# Patient Record
Sex: FEMALE | Race: WHITE | Employment: UNEMPLOYED | URBAN - NONMETROPOLITAN AREA
[De-identification: names, ages, dates, MRNs, and addresses within clinical notes are randomized per-mention and may not be internally consistent; named-entity substitution may affect disease eponyms.]

---

## 2017-08-31 ENCOUNTER — HOSPITAL ENCOUNTER (EMERGENCY)
Dept: GENERAL RADIOLOGY | Age: 32
Discharge: HOME | End: 2017-08-31
Payer: MEDICAID

## 2017-08-31 ENCOUNTER — HOSPITAL ENCOUNTER (EMERGENCY)
Age: 32
Discharge: HOME | End: 2017-08-31
Attending: NURSE PRACTITIONER
Payer: MEDICAID

## 2017-08-31 VITALS
WEIGHT: 293 LBS | DIASTOLIC BLOOD PRESSURE: 93 MMHG | HEART RATE: 69 BPM | OXYGEN SATURATION: 97 % | BODY MASS INDEX: 51.91 KG/M2 | SYSTOLIC BLOOD PRESSURE: 148 MMHG | HEIGHT: 63 IN | RESPIRATION RATE: 16 BRPM | TEMPERATURE: 97.9 F

## 2017-08-31 DIAGNOSIS — M89.8X7 PAIN IN METATARSUS OF LEFT FOOT: Primary | ICD-10-CM

## 2017-08-31 PROCEDURE — 99214 OFFICE O/P EST MOD 30 MIN: CPT

## 2017-08-31 PROCEDURE — 73630 X-RAY EXAM OF FOOT: CPT

## 2017-08-31 PROCEDURE — 99213 OFFICE O/P EST LOW 20 MIN: CPT | Performed by: NURSE PRACTITIONER

## 2017-08-31 RX ORDER — ACETAMINOPHEN 325 MG/1
975 TABLET ORAL EVERY 6 HOURS PRN
COMMUNITY
End: 2022-08-24

## 2017-08-31 RX ORDER — METHYLPREDNISOLONE 4 MG/1
TABLET ORAL
Qty: 1 KIT | Refills: 0 | Status: SHIPPED | OUTPATIENT
Start: 2017-08-31 | End: 2017-09-06

## 2017-08-31 ASSESSMENT — PAIN DESCRIPTION - FREQUENCY: FREQUENCY: CONTINUOUS

## 2017-08-31 ASSESSMENT — PAIN SCALES - GENERAL: PAINLEVEL_OUTOF10: 4

## 2017-08-31 ASSESSMENT — PAIN DESCRIPTION - PAIN TYPE: TYPE: ACUTE PAIN

## 2017-08-31 ASSESSMENT — PAIN DESCRIPTION - DESCRIPTORS: DESCRIPTORS: ACHING

## 2017-08-31 ASSESSMENT — PAIN DESCRIPTION - LOCATION: LOCATION: FOOT

## 2017-08-31 ASSESSMENT — PAIN DESCRIPTION - ORIENTATION: ORIENTATION: LEFT

## 2017-09-18 ENCOUNTER — APPOINTMENT (OUTPATIENT)
Dept: CT IMAGING | Age: 32
DRG: 313 | End: 2017-09-18
Payer: MEDICAID

## 2017-09-18 ENCOUNTER — APPOINTMENT (OUTPATIENT)
Dept: GENERAL RADIOLOGY | Age: 32
DRG: 313 | End: 2017-09-18
Payer: MEDICAID

## 2017-09-18 ENCOUNTER — HOSPITAL ENCOUNTER (INPATIENT)
Age: 32
LOS: 7 days | Discharge: NURSING, REHAB OR LONG-TERM CARE | DRG: 313 | End: 2017-09-25
Attending: FAMILY MEDICINE | Admitting: SURGERY
Payer: MEDICAID

## 2017-09-18 DIAGNOSIS — Z91.89 AT HIGH RISK FOR PAIN FROM PROCEDURE: ICD-10-CM

## 2017-09-18 DIAGNOSIS — E66.01 MORBID OBESITY WITH BMI OF 60.0-69.9, ADULT (HCC): ICD-10-CM

## 2017-09-18 DIAGNOSIS — J98.4 OTHER DISEASES OF LUNG, NOT ELSEWHERE CLASSIFIED: ICD-10-CM
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
## 2017-09-18 DIAGNOSIS — S82.62XA CLOSED FRACTURE OF LATERAL MALLEOLUS OF LEFT FIBULA, INITIAL ENCOUNTER: ICD-10-CM

## 2017-09-18 PROBLEM — S40.212A ABRASION OF LEFT SHOULDER: Status: ACTIVE | Noted: 2017-09-18

## 2017-09-18 PROBLEM — S29.011A RUPTURE OF PECTORALIS MAJOR MUSCLE: Status: ACTIVE | Noted: 2017-09-18

## 2017-09-18 PROBLEM — S20.01XA CONTUSION OF RIGHT BREAST: Status: ACTIVE | Noted: 2017-09-18

## 2017-09-18 PROBLEM — S30.811A ABRASION OF ABDOMINAL WALL: Status: ACTIVE | Noted: 2017-09-18

## 2017-09-18 PROBLEM — S71.131A: Status: ACTIVE | Noted: 2017-09-18

## 2017-09-18 PROBLEM — S70.211A ABRASION OF RIGHT HIP: Status: ACTIVE | Noted: 2017-09-18

## 2017-09-18 PROBLEM — S82.892A CLOSED LEFT ANKLE FRACTURE: Status: ACTIVE | Noted: 2017-09-18

## 2017-09-18 PROBLEM — S70.212A: Status: ACTIVE | Noted: 2017-09-18

## 2017-09-18 LAB
ABO: NORMAL
ALBUMIN SERPL-MCNC: 3.3 G/DL (ref 3.5–5.1)
ALP BLD-CCNC: 69 U/L (ref 38–126)
ALT SERPL-CCNC: 33 U/L (ref 11–66)
AMPHETAMINE+METHAMPHETAMINE URINE SCREEN: NEGATIVE
ANGLE TEG: 76.8 DEG (ref 53–72)
ANION GAP SERPL CALCULATED.3IONS-SCNC: 17 MEQ/L (ref 8–16)
ANTIBODY SCREEN: NORMAL
APTT: 25.4 SECONDS (ref 22–38)
AST SERPL-CCNC: 40 U/L (ref 5–40)
BACTERIA: ABNORMAL
BARBITURATE QUANTITATIVE URINE: NEGATIVE
BENZODIAZEPINE QUANTITATIVE URINE: NEGATIVE
BILIRUB SERPL-MCNC: 0.2 MG/DL (ref 0.3–1.2)
BILIRUBIN URINE: NEGATIVE
BLOOD, URINE: NEGATIVE
BUN BLDV-MCNC: 17 MG/DL (ref 7–22)
CALCIUM SERPL-MCNC: 8.3 MG/DL (ref 8.5–10.5)
CANNABINOID QUANTITATIVE URINE: NEGATIVE
CASTS: ABNORMAL /LPF
CASTS: ABNORMAL /LPF
CHARACTER, URINE: ABNORMAL
CHLORIDE BLD-SCNC: 101 MEQ/L (ref 98–111)
CO2: 21 MEQ/L (ref 23–33)
COCAINE METABOLITE QUANTITATIVE URINE: NEGATIVE
COLOR: YELLOW
CREAT SERPL-MCNC: 0.7 MG/DL (ref 0.4–1.2)
CRYSTALS: ABNORMAL
DIFFERENTIAL, MANUAL: NORMAL
EPITHELIAL CELLS, UA: ABNORMAL /HPF
EPL-TEG: 0 %
ETHYL ALCOHOL, SERUM: < 0.01 %
GFR SERPL CREATININE-BSD FRML MDRD: > 90 ML/MIN/1.73M2
GLUCOSE BLD-MCNC: 114 MG/DL (ref 70–108)
GLUCOSE BLD-MCNC: 139 MG/DL (ref 70–108)
GLUCOSE, URINE: NEGATIVE MG/DL
HEPARIN THERAPY: NO
INHIBITION AA TEG: 15 %
INHIBITION ADP TEG: 100 %
INR BLD: 1.01 (ref 0.85–1.13)
KETONES, URINE: NEGATIVE
KINETICS TEG: 0.8 MINUTES (ref 1–3)
LACTIC ACID: 4.3 MMOL/L (ref 0.5–2.2)
LEUKOCYTE EST, POC: NEGATIVE
LY30 (LYSIS) TEG: 0 % (ref 0–7.5)
MA (MAX CLOT) TEG: 74.5 MM (ref 50–70)
MA(AA) TEG: 65.3 MM
MA(ACTIVATED) TEG: 13.2 MM
MA(ADP) TEG: 13.1 MM
MISCELLANEOUS LAB TEST RESULT: ABNORMAL
MUCUS: ABNORMAL
NITRITE, URINE: NEGATIVE
OPIATES, URINE: NEGATIVE
OSMOLALITY CALCULATION: 281.3 MOSMOL/KG (ref 275–300)
OXYCODONE: NEGATIVE
PH UA: 5.5
PHENCYCLIDINE QUANTITATIVE URINE: NEGATIVE
POTASSIUM SERPL-SCNC: 3.9 MEQ/L (ref 3.5–5.2)
PREGNANCY, SERUM: NEGATIVE
PROTEIN UA: ABNORMAL MG/DL
RBC URINE: ABNORMAL /HPF
REACTION TIME TEG: 3.5 MINUTES (ref 5–10)
RENAL EPITHELIAL, UA: ABNORMAL
RH FACTOR: NORMAL
SODIUM BLD-SCNC: 139 MEQ/L (ref 135–145)
SPECIFIC GRAVITY UA: 1.03 (ref 1–1.03)
TOTAL CK: 660 U/L (ref 30–135)
TOTAL PROTEIN: 6.5 G/DL (ref 6.1–8)
UROBILINOGEN, URINE: 0.2 EU/DL
WBC UA: ABNORMAL /HPF
YEAST: ABNORMAL

## 2017-09-18 PROCEDURE — 6370000000 HC RX 637 (ALT 250 FOR IP): Performed by: NURSE PRACTITIONER

## 2017-09-18 PROCEDURE — 6360000002 HC RX W HCPCS

## 2017-09-18 PROCEDURE — 74177 CT ABD & PELVIS W/CONTRAST: CPT

## 2017-09-18 PROCEDURE — 70450 CT HEAD/BRAIN W/O DYE: CPT

## 2017-09-18 PROCEDURE — 6360000002 HC RX W HCPCS: Performed by: FAMILY MEDICINE

## 2017-09-18 PROCEDURE — 73030 X-RAY EXAM OF SHOULDER: CPT

## 2017-09-18 PROCEDURE — 90471 IMMUNIZATION ADMIN: CPT | Performed by: FAMILY MEDICINE

## 2017-09-18 PROCEDURE — 86900 BLOOD TYPING SEROLOGIC ABO: CPT

## 2017-09-18 PROCEDURE — 81001 URINALYSIS AUTO W/SCOPE: CPT

## 2017-09-18 PROCEDURE — 85025 COMPLETE CBC W/AUTO DIFF WBC: CPT

## 2017-09-18 PROCEDURE — 85610 PROTHROMBIN TIME: CPT

## 2017-09-18 PROCEDURE — G0480 DRUG TEST DEF 1-7 CLASSES: HCPCS

## 2017-09-18 PROCEDURE — 72170 X-RAY EXAM OF PELVIS: CPT

## 2017-09-18 PROCEDURE — 80053 COMPREHEN METABOLIC PANEL: CPT

## 2017-09-18 PROCEDURE — 86850 RBC ANTIBODY SCREEN: CPT

## 2017-09-18 PROCEDURE — 99223 1ST HOSP IP/OBS HIGH 75: CPT | Performed by: SURGERY

## 2017-09-18 PROCEDURE — 93005 ELECTROCARDIOGRAM TRACING: CPT

## 2017-09-18 PROCEDURE — P9016 RBC LEUKOCYTES REDUCED: HCPCS

## 2017-09-18 PROCEDURE — 96374 THER/PROPH/DIAG INJ IV PUSH: CPT

## 2017-09-18 PROCEDURE — 86901 BLOOD TYPING SEROLOGIC RH(D): CPT

## 2017-09-18 PROCEDURE — 2580000003 HC RX 258: Performed by: NURSE PRACTITIONER

## 2017-09-18 PROCEDURE — 99285 EMERGENCY DEPT VISIT HI MDM: CPT

## 2017-09-18 PROCEDURE — 85730 THROMBOPLASTIN TIME PARTIAL: CPT

## 2017-09-18 PROCEDURE — 6360000002 HC RX W HCPCS: Performed by: SURGERY

## 2017-09-18 PROCEDURE — 99999 PR OFFICE/OUTPT VISIT,PROCEDURE ONLY: CPT | Performed by: NURSE PRACTITIONER

## 2017-09-18 PROCEDURE — 1200000003 HC TELEMETRY R&B

## 2017-09-18 PROCEDURE — 73552 X-RAY EXAM OF FEMUR 2/>: CPT

## 2017-09-18 PROCEDURE — 72125 CT NECK SPINE W/O DYE: CPT

## 2017-09-18 PROCEDURE — 6820000002 HC L2 INJURY CALL ACTIVATION

## 2017-09-18 PROCEDURE — 86923 COMPATIBILITY TEST ELECTRIC: CPT

## 2017-09-18 PROCEDURE — 1200000000 HC SEMI PRIVATE

## 2017-09-18 PROCEDURE — 6370000000 HC RX 637 (ALT 250 FOR IP): Performed by: SURGERY

## 2017-09-18 PROCEDURE — 84703 CHORIONIC GONADOTROPIN ASSAY: CPT

## 2017-09-18 PROCEDURE — 6360000004 HC RX CONTRAST MEDICATION: Performed by: FAMILY MEDICINE

## 2017-09-18 PROCEDURE — 71260 CT THORAX DX C+: CPT

## 2017-09-18 PROCEDURE — 36415 COLL VENOUS BLD VENIPUNCTURE: CPT

## 2017-09-18 PROCEDURE — 82948 REAGENT STRIP/BLOOD GLUCOSE: CPT

## 2017-09-18 PROCEDURE — 85576 BLOOD PLATELET AGGREGATION: CPT

## 2017-09-18 PROCEDURE — 83605 ASSAY OF LACTIC ACID: CPT

## 2017-09-18 PROCEDURE — 80307 DRUG TEST PRSMV CHEM ANLYZR: CPT

## 2017-09-18 PROCEDURE — 71010 XR CHEST PORTABLE: CPT

## 2017-09-18 PROCEDURE — 73110 X-RAY EXAM OF WRIST: CPT

## 2017-09-18 PROCEDURE — 73600 X-RAY EXAM OF ANKLE: CPT

## 2017-09-18 PROCEDURE — 90715 TDAP VACCINE 7 YRS/> IM: CPT | Performed by: FAMILY MEDICINE

## 2017-09-18 RX ORDER — DOCUSATE SODIUM 100 MG/1
100 CAPSULE, LIQUID FILLED ORAL 2 TIMES DAILY
Status: DISCONTINUED | OUTPATIENT
Start: 2017-09-18 | End: 2017-09-25 | Stop reason: HOSPADM

## 2017-09-18 RX ORDER — FENTANYL CITRATE 50 UG/ML
50 INJECTION, SOLUTION INTRAMUSCULAR; INTRAVENOUS
Status: DISCONTINUED | OUTPATIENT
Start: 2017-09-18 | End: 2017-09-25 | Stop reason: HOSPADM

## 2017-09-18 RX ORDER — OXYCODONE HYDROCHLORIDE AND ACETAMINOPHEN 5; 325 MG/1; MG/1
1 TABLET ORAL EVERY 4 HOURS PRN
Status: DISCONTINUED | OUTPATIENT
Start: 2017-09-18 | End: 2017-09-25 | Stop reason: HOSPADM

## 2017-09-18 RX ORDER — BISACODYL 10 MG
10 SUPPOSITORY, RECTAL RECTAL DAILY PRN
Status: DISCONTINUED | OUTPATIENT
Start: 2017-09-18 | End: 2017-09-25 | Stop reason: HOSPADM

## 2017-09-18 RX ORDER — MORPHINE SULFATE 2 MG/ML
2 INJECTION, SOLUTION INTRAMUSCULAR; INTRAVENOUS
Status: DISCONTINUED | OUTPATIENT
Start: 2017-09-18 | End: 2017-09-18

## 2017-09-18 RX ORDER — GINSENG 100 MG
CAPSULE ORAL 3 TIMES DAILY
Status: DISCONTINUED | OUTPATIENT
Start: 2017-09-18 | End: 2017-09-25 | Stop reason: HOSPADM

## 2017-09-18 RX ORDER — SODIUM CHLORIDE 0.9 % (FLUSH) 0.9 %
10 SYRINGE (ML) INJECTION EVERY 12 HOURS SCHEDULED
Status: DISCONTINUED | OUTPATIENT
Start: 2017-09-18 | End: 2017-09-25 | Stop reason: HOSPADM

## 2017-09-18 RX ORDER — SODIUM CHLORIDE 9 MG/ML
INJECTION, SOLUTION INTRAVENOUS CONTINUOUS
Status: DISCONTINUED | OUTPATIENT
Start: 2017-09-18 | End: 2017-09-20

## 2017-09-18 RX ORDER — MORPHINE SULFATE 4 MG/ML
4 INJECTION, SOLUTION INTRAMUSCULAR; INTRAVENOUS
Status: DISCONTINUED | OUTPATIENT
Start: 2017-09-18 | End: 2017-09-18

## 2017-09-18 RX ORDER — OXYCODONE HYDROCHLORIDE AND ACETAMINOPHEN 5; 325 MG/1; MG/1
2 TABLET ORAL EVERY 4 HOURS PRN
Status: DISCONTINUED | OUTPATIENT
Start: 2017-09-18 | End: 2017-09-25 | Stop reason: HOSPADM

## 2017-09-18 RX ORDER — FENTANYL CITRATE 50 UG/ML
25 INJECTION, SOLUTION INTRAMUSCULAR; INTRAVENOUS
Status: DISCONTINUED | OUTPATIENT
Start: 2017-09-18 | End: 2017-09-25 | Stop reason: HOSPADM

## 2017-09-18 RX ORDER — FENTANYL CITRATE 50 UG/ML
100 INJECTION, SOLUTION INTRAMUSCULAR; INTRAVENOUS ONCE
Status: COMPLETED | OUTPATIENT
Start: 2017-09-18 | End: 2017-09-18

## 2017-09-18 RX ORDER — FENTANYL CITRATE 50 UG/ML
INJECTION, SOLUTION INTRAMUSCULAR; INTRAVENOUS
Status: COMPLETED
Start: 2017-09-18 | End: 2017-09-18

## 2017-09-18 RX ORDER — FAMOTIDINE 20 MG/1
20 TABLET, FILM COATED ORAL 2 TIMES DAILY
Status: DISCONTINUED | OUTPATIENT
Start: 2017-09-18 | End: 2017-09-25 | Stop reason: HOSPADM

## 2017-09-18 RX ORDER — SODIUM CHLORIDE 0.9 % (FLUSH) 0.9 %
10 SYRINGE (ML) INJECTION PRN
Status: DISCONTINUED | OUTPATIENT
Start: 2017-09-18 | End: 2017-09-25 | Stop reason: HOSPADM

## 2017-09-18 RX ORDER — ONDANSETRON 2 MG/ML
4 INJECTION INTRAMUSCULAR; INTRAVENOUS EVERY 6 HOURS PRN
Status: DISCONTINUED | OUTPATIENT
Start: 2017-09-18 | End: 2017-09-25 | Stop reason: HOSPADM

## 2017-09-18 RX ORDER — ACETAMINOPHEN 325 MG/1
650 TABLET ORAL EVERY 4 HOURS PRN
Status: DISCONTINUED | OUTPATIENT
Start: 2017-09-18 | End: 2017-09-25 | Stop reason: HOSPADM

## 2017-09-18 RX ADMIN — FENTANYL CITRATE 100 MCG: 50 INJECTION, SOLUTION INTRAMUSCULAR; INTRAVENOUS at 17:49

## 2017-09-18 RX ADMIN — FAMOTIDINE 20 MG: 20 TABLET, FILM COATED ORAL at 21:47

## 2017-09-18 RX ADMIN — IOPAMIDOL 85 ML: 755 INJECTION, SOLUTION INTRAVENOUS at 18:33

## 2017-09-18 RX ADMIN — FENTANYL CITRATE 50 MCG: 50 INJECTION INTRAMUSCULAR; INTRAVENOUS at 20:44

## 2017-09-18 RX ADMIN — BACITRACIN: 500 OINTMENT TOPICAL at 21:47

## 2017-09-18 RX ADMIN — SODIUM CHLORIDE: 9 INJECTION, SOLUTION INTRAVENOUS at 21:45

## 2017-09-18 RX ADMIN — TETANUS TOXOID, REDUCED DIPHTHERIA TOXOID AND ACELLULAR PERTUSSIS VACCINE, ADSORBED 0.5 ML: 5; 2.5; 8; 8; 2.5 SUSPENSION INTRAMUSCULAR at 19:09

## 2017-09-18 RX ADMIN — OXYCODONE HYDROCHLORIDE AND ACETAMINOPHEN 2 TABLET: 5; 325 TABLET ORAL at 21:47

## 2017-09-18 RX ADMIN — FENTANYL CITRATE 100 MCG: 50 INJECTION INTRAMUSCULAR; INTRAVENOUS at 17:49

## 2017-09-18 ASSESSMENT — ENCOUNTER SYMPTOMS
SHORTNESS OF BREATH: 1
APNEA: 0
CONSTIPATION: 0
EYE DISCHARGE: 0
CHEST TIGHTNESS: 0
EYE REDNESS: 0
VOMITING: 0
BACK PAIN: 0
WHEEZING: 0
VOICE CHANGE: 0
SHORTNESS OF BREATH: 0
FACIAL SWELLING: 0
STRIDOR: 0
BLOOD IN STOOL: 0
ABDOMINAL DISTENTION: 0
DIARRHEA: 0
PHOTOPHOBIA: 0
EYE PAIN: 0
NAUSEA: 0
TROUBLE SWALLOWING: 0
CHOKING: 0
SINUS PRESSURE: 0
EYE ITCHING: 0
ABDOMINAL PAIN: 0
COLOR CHANGE: 0
SORE THROAT: 0
COUGH: 0
RHINORRHEA: 0

## 2017-09-18 ASSESSMENT — PAIN DESCRIPTION - DESCRIPTORS: DESCRIPTORS: PRESSURE

## 2017-09-18 ASSESSMENT — PAIN SCALES - GENERAL
PAINLEVEL_OUTOF10: 8
PAINLEVEL_OUTOF10: 10
PAINLEVEL_OUTOF10: 8
PAINLEVEL_OUTOF10: 3
PAINLEVEL_OUTOF10: 4
PAINLEVEL_OUTOF10: 10
PAINLEVEL_OUTOF10: 10

## 2017-09-18 ASSESSMENT — PAIN DESCRIPTION - LOCATION: LOCATION: CHEST

## 2017-09-18 ASSESSMENT — PAIN DESCRIPTION - PAIN TYPE: TYPE: ACUTE PAIN

## 2017-09-18 ASSESSMENT — PAIN DESCRIPTION - ONSET: ONSET: ON-GOING

## 2017-09-18 ASSESSMENT — PAIN DESCRIPTION - ORIENTATION: ORIENTATION: ANTERIOR

## 2017-09-18 ASSESSMENT — PAIN DESCRIPTION - FREQUENCY: FREQUENCY: CONTINUOUS

## 2017-09-19 ENCOUNTER — APPOINTMENT (OUTPATIENT)
Dept: GENERAL RADIOLOGY | Age: 32
DRG: 313 | End: 2017-09-19
Payer: MEDICAID

## 2017-09-19 ENCOUNTER — ANESTHESIA EVENT (OUTPATIENT)
Dept: OPERATING ROOM | Age: 32
DRG: 313 | End: 2017-09-19
Payer: MEDICAID

## 2017-09-19 ENCOUNTER — ANESTHESIA (OUTPATIENT)
Dept: OPERATING ROOM | Age: 32
DRG: 313 | End: 2017-09-19
Payer: MEDICAID

## 2017-09-19 VITALS
DIASTOLIC BLOOD PRESSURE: 90 MMHG | TEMPERATURE: 96.8 F | SYSTOLIC BLOOD PRESSURE: 160 MMHG | OXYGEN SATURATION: 89 % | RESPIRATION RATE: 2 BRPM

## 2017-09-19 PROBLEM — S82.62XA CLOSED FRACTURE OF LATERAL MALLEOLUS OF LEFT FIBULA: Status: ACTIVE | Noted: 2017-09-19

## 2017-09-19 PROBLEM — Z91.89 AT HIGH RISK FOR PAIN FROM PROCEDURE: Status: ACTIVE | Noted: 2017-09-19

## 2017-09-19 LAB
ANION GAP SERPL CALCULATED.3IONS-SCNC: 11 MEQ/L (ref 8–16)
ANISOCYTOSIS: ABNORMAL
ATYPICAL LYMPHOCYTES: ABNORMAL %
BANDED NEUTROPHILS ABSOLUTE COUNT: 0.3 THOU/MM3
BANDS PRESENT: 1 %
BASOPHILIA: SLIGHT
BASOPHILS # BLD: 1 %
BASOPHILS ABSOLUTE: 0.3 THOU/MM3 (ref 0–0.1)
BUN BLDV-MCNC: 16 MG/DL (ref 7–22)
CALCIUM SERPL-MCNC: 7.9 MG/DL (ref 8.5–10.5)
CHLORIDE BLD-SCNC: 104 MEQ/L (ref 98–111)
CO2: 23 MEQ/L (ref 23–33)
CREAT SERPL-MCNC: 0.6 MG/DL (ref 0.4–1.2)
CRENATED RBC'S: ABNORMAL
EOSINOPHIL # BLD: 1 %
EOSINOPHILS ABSOLUTE: 0.3 THOU/MM3 (ref 0–0.4)
GFR SERPL CREATININE-BSD FRML MDRD: > 90 ML/MIN/1.73M2
GLUCOSE BLD-MCNC: 117 MG/DL (ref 70–108)
HCT VFR BLD CALC: 31.7 % (ref 37–47)
HCT VFR BLD CALC: 39.6 % (ref 37–47)
HEMOGLOBIN: 10.3 GM/DL (ref 12–16)
HEMOGLOBIN: 12.5 GM/DL (ref 12–16)
HYPOCHROMIA: ABNORMAL
LYMPHOCYTES # BLD: 17 %
LYMPHOCYTES ABSOLUTE: 5.8 THOU/MM3 (ref 1–4.8)
MCH RBC QN AUTO: 25.8 PG (ref 27–31)
MCH RBC QN AUTO: 26 PG (ref 27–31)
MCHC RBC AUTO-ENTMCNC: 31.7 GM/DL (ref 33–37)
MCHC RBC AUTO-ENTMCNC: 32.5 GM/DL (ref 33–37)
MCV RBC AUTO: 80.2 FL (ref 81–99)
MCV RBC AUTO: 81.5 FL (ref 81–99)
MONOCYTES # BLD: 8 %
MONOCYTES ABSOLUTE: 2.7 THOU/MM3 (ref 0.4–1.3)
NUCLEATED RED BLOOD CELLS: 0 /100 WBC
OVALOCYTES: ABNORMAL
PATHOLOGIST REVIEW: ABNORMAL
PDW BLD-RTO: 16.2 % (ref 11.5–14.5)
PDW BLD-RTO: 16.2 % (ref 11.5–14.5)
PLATELET # BLD: 200 THOU/MM3 (ref 130–400)
PLATELET # BLD: 320 THOU/MM3 (ref 130–400)
PLATELET ESTIMATE: ADEQUATE
PMV BLD AUTO: 9.3 MCM (ref 7.4–10.4)
PMV BLD AUTO: 9.8 MCM (ref 7.4–10.4)
POTASSIUM SERPL-SCNC: 4.4 MEQ/L (ref 3.5–5.2)
RBC # BLD: 3.96 MILL/MM3 (ref 4.2–5.4)
RBC # BLD: 4.86 MILL/MM3 (ref 4.2–5.4)
RBC # BLD: NORMAL 10*6/UL
SEG NEUTROPHILS: 72 %
SEGMENTED NEUTROPHILS ABSOLUTE COUNT: 24.5 THOU/MM3 (ref 1.8–7.7)
SODIUM BLD-SCNC: 138 MEQ/L (ref 135–145)
WBC # BLD: 34 THOU/MM3 (ref 4.8–10.8)
WBC # BLD: 8.5 THOU/MM3 (ref 4.8–10.8)

## 2017-09-19 PROCEDURE — 80048 BASIC METABOLIC PNL TOTAL CA: CPT

## 2017-09-19 PROCEDURE — 3700000000 HC ANESTHESIA ATTENDED CARE: Performed by: ORTHOPAEDIC SURGERY

## 2017-09-19 PROCEDURE — 3600000004 HC SURGERY LEVEL 4 BASE: Performed by: ORTHOPAEDIC SURGERY

## 2017-09-19 PROCEDURE — 73600 X-RAY EXAM OF ANKLE: CPT

## 2017-09-19 PROCEDURE — 2580000003 HC RX 258: Performed by: NURSE PRACTITIONER

## 2017-09-19 PROCEDURE — 71010 XR CHEST PORTABLE: CPT

## 2017-09-19 PROCEDURE — 1200000003 HC TELEMETRY R&B

## 2017-09-19 PROCEDURE — 6360000002 HC RX W HCPCS: Performed by: NURSE ANESTHETIST, CERTIFIED REGISTERED

## 2017-09-19 PROCEDURE — 6370000000 HC RX 637 (ALT 250 FOR IP): Performed by: SURGERY

## 2017-09-19 PROCEDURE — 1200000000 HC SEMI PRIVATE

## 2017-09-19 PROCEDURE — 3209999900 FLUORO FOR SURGICAL PROCEDURES

## 2017-09-19 PROCEDURE — 85027 COMPLETE CBC AUTOMATED: CPT

## 2017-09-19 PROCEDURE — 99999 PR OFFICE/OUTPT VISIT,PROCEDURE ONLY: CPT | Performed by: NURSE PRACTITIONER

## 2017-09-19 PROCEDURE — A6223 GAUZE >16<=48 NO W/SAL W/O B: HCPCS | Performed by: ORTHOPAEDIC SURGERY

## 2017-09-19 PROCEDURE — 7100000000 HC PACU RECOVERY - FIRST 15 MIN: Performed by: ORTHOPAEDIC SURGERY

## 2017-09-19 PROCEDURE — 2500000003 HC RX 250 WO HCPCS: Performed by: NURSE ANESTHETIST, CERTIFIED REGISTERED

## 2017-09-19 PROCEDURE — 6370000000 HC RX 637 (ALT 250 FOR IP): Performed by: NURSE PRACTITIONER

## 2017-09-19 PROCEDURE — 7100000001 HC PACU RECOVERY - ADDTL 15 MIN: Performed by: ORTHOPAEDIC SURGERY

## 2017-09-19 PROCEDURE — 6360000002 HC RX W HCPCS: Performed by: NURSE PRACTITIONER

## 2017-09-19 PROCEDURE — 92523 SPEECH SOUND LANG COMPREHEN: CPT

## 2017-09-19 PROCEDURE — 6360000002 HC RX W HCPCS: Performed by: SURGERY

## 2017-09-19 PROCEDURE — C1713 ANCHOR/SCREW BN/BN,TIS/BN: HCPCS | Performed by: ORTHOPAEDIC SURGERY

## 2017-09-19 PROCEDURE — 73610 X-RAY EXAM OF ANKLE: CPT

## 2017-09-19 PROCEDURE — 3700000001 HC ADD 15 MINUTES (ANESTHESIA): Performed by: ORTHOPAEDIC SURGERY

## 2017-09-19 PROCEDURE — 73130 X-RAY EXAM OF HAND: CPT

## 2017-09-19 PROCEDURE — 99232 SBSQ HOSP IP/OBS MODERATE 35: CPT | Performed by: SURGERY

## 2017-09-19 PROCEDURE — 3600000014 HC SURGERY LEVEL 4 ADDTL 15MIN: Performed by: ORTHOPAEDIC SURGERY

## 2017-09-19 PROCEDURE — 36415 COLL VENOUS BLD VENIPUNCTURE: CPT

## 2017-09-19 PROCEDURE — 0QSK04Z REPOSITION LEFT FIBULA WITH INTERNAL FIXATION DEVICE, OPEN APPROACH: ICD-10-PCS | Performed by: ORTHOPAEDIC SURGERY

## 2017-09-19 DEVICE — EVOS 3.5MM X 15MM CORTEX SCREW SELF-TAPPING
Type: IMPLANTABLE DEVICE | Site: ANKLE | Status: FUNCTIONAL
Brand: EVOS

## 2017-09-19 DEVICE — EVOS 3.5MM X 10MM CORTEX SCREW SELF-TAPPING
Type: IMPLANTABLE DEVICE | Site: ANKLE | Status: FUNCTIONAL
Brand: EVOS

## 2017-09-19 DEVICE — EVOS 3.5MM X 14MM CORTEX SCREW SELF-TAPPING
Type: IMPLANTABLE DEVICE | Site: ANKLE | Status: FUNCTIONAL
Brand: EVOS

## 2017-09-19 DEVICE — EVOS 3.5MM X 14MM LOCKING SCREW SELF-TAPPING
Type: IMPLANTABLE DEVICE | Site: ANKLE | Status: FUNCTIONAL
Brand: EVOS

## 2017-09-19 DEVICE — EVOS 3.5MM X 16MM LOCKING SCREW SELF-TAPPING
Type: IMPLANTABLE DEVICE | Site: ANKLE | Status: FUNCTIONAL
Brand: EVOS

## 2017-09-19 DEVICE — EVOS 3.5MM X 15MM LOCKING SCREW SELF-TAPPING
Type: IMPLANTABLE DEVICE | Site: ANKLE | Status: FUNCTIONAL
Brand: EVOS

## 2017-09-19 DEVICE — EVOS 3.5MM LATERAL DISTAL FIBULA                                    PLATE 5 HOLE LEFT 81MM
Type: IMPLANTABLE DEVICE | Site: ANKLE | Status: FUNCTIONAL
Brand: EVOS

## 2017-09-19 RX ORDER — TIZANIDINE 4 MG/1
4 TABLET ORAL EVERY 6 HOURS PRN
Status: DISCONTINUED | OUTPATIENT
Start: 2017-09-19 | End: 2017-09-20

## 2017-09-19 RX ORDER — OXYCODONE HYDROCHLORIDE AND ACETAMINOPHEN 5; 325 MG/1; MG/1
1-2 TABLET ORAL EVERY 4 HOURS PRN
Qty: 80 TABLET | Refills: 0 | Status: SHIPPED | OUTPATIENT
Start: 2017-09-19 | End: 2017-09-26

## 2017-09-19 RX ORDER — LIDOCAINE 50 MG/G
2 PATCH TOPICAL DAILY
Status: DISPENSED | OUTPATIENT
Start: 2017-09-19 | End: 2017-09-24

## 2017-09-19 RX ORDER — POLYETHYLENE GLYCOL 3350 17 G/17G
17 POWDER, FOR SOLUTION ORAL DAILY
Status: DISCONTINUED | OUTPATIENT
Start: 2017-09-19 | End: 2017-09-21

## 2017-09-19 RX ORDER — FENTANYL CITRATE 50 UG/ML
INJECTION, SOLUTION INTRAMUSCULAR; INTRAVENOUS PRN
Status: DISCONTINUED | OUTPATIENT
Start: 2017-09-19 | End: 2017-09-19 | Stop reason: SDUPTHER

## 2017-09-19 RX ORDER — MIDAZOLAM HYDROCHLORIDE 1 MG/ML
INJECTION INTRAMUSCULAR; INTRAVENOUS PRN
Status: DISCONTINUED | OUTPATIENT
Start: 2017-09-19 | End: 2017-09-19 | Stop reason: SDUPTHER

## 2017-09-19 RX ORDER — PROPOFOL 10 MG/ML
INJECTION, EMULSION INTRAVENOUS PRN
Status: DISCONTINUED | OUTPATIENT
Start: 2017-09-19 | End: 2017-09-19 | Stop reason: SDUPTHER

## 2017-09-19 RX ORDER — SENNA PLUS 8.6 MG/1
2 TABLET ORAL NIGHTLY
Status: DISCONTINUED | OUTPATIENT
Start: 2017-09-19 | End: 2017-09-21

## 2017-09-19 RX ORDER — LIDOCAINE HYDROCHLORIDE 20 MG/ML
INJECTION, SOLUTION INFILTRATION; PERINEURAL PRN
Status: DISCONTINUED | OUTPATIENT
Start: 2017-09-19 | End: 2017-09-19 | Stop reason: SDUPTHER

## 2017-09-19 RX ORDER — SUCCINYLCHOLINE CHLORIDE 20 MG/ML
INJECTION INTRAMUSCULAR; INTRAVENOUS PRN
Status: DISCONTINUED | OUTPATIENT
Start: 2017-09-19 | End: 2017-09-19 | Stop reason: SDUPTHER

## 2017-09-19 RX ORDER — ONDANSETRON 2 MG/ML
INJECTION INTRAMUSCULAR; INTRAVENOUS PRN
Status: DISCONTINUED | OUTPATIENT
Start: 2017-09-19 | End: 2017-09-19 | Stop reason: SDUPTHER

## 2017-09-19 RX ORDER — DEXAMETHASONE SODIUM PHOSPHATE 4 MG/ML
INJECTION, SOLUTION INTRA-ARTICULAR; INTRALESIONAL; INTRAMUSCULAR; INTRAVENOUS; SOFT TISSUE PRN
Status: DISCONTINUED | OUTPATIENT
Start: 2017-09-19 | End: 2017-09-19 | Stop reason: SDUPTHER

## 2017-09-19 RX ADMIN — OXYCODONE HYDROCHLORIDE AND ACETAMINOPHEN 2 TABLET: 5; 325 TABLET ORAL at 05:58

## 2017-09-19 RX ADMIN — BACITRACIN: 500 OINTMENT TOPICAL at 09:45

## 2017-09-19 RX ADMIN — SUCCINYLCHOLINE CHLORIDE 140 MG: 20 INJECTION, SOLUTION INTRAMUSCULAR; INTRAVENOUS at 12:39

## 2017-09-19 RX ADMIN — DOCUSATE SODIUM 100 MG: 100 CAPSULE ORAL at 09:45

## 2017-09-19 RX ADMIN — BACITRACIN: 500 OINTMENT TOPICAL at 20:15

## 2017-09-19 RX ADMIN — OXYCODONE HYDROCHLORIDE AND ACETAMINOPHEN 2 TABLET: 5; 325 TABLET ORAL at 10:47

## 2017-09-19 RX ADMIN — ONDANSETRON 4 MG: 2 INJECTION INTRAMUSCULAR; INTRAVENOUS at 00:55

## 2017-09-19 RX ADMIN — FAMOTIDINE 20 MG: 20 TABLET, FILM COATED ORAL at 09:45

## 2017-09-19 RX ADMIN — FENTANYL CITRATE 50 MCG: 50 INJECTION INTRAMUSCULAR; INTRAVENOUS at 13:35

## 2017-09-19 RX ADMIN — OXYCODONE HYDROCHLORIDE AND ACETAMINOPHEN 2 TABLET: 5; 325 TABLET ORAL at 20:14

## 2017-09-19 RX ADMIN — FAMOTIDINE 20 MG: 20 TABLET, FILM COATED ORAL at 20:14

## 2017-09-19 RX ADMIN — LIDOCAINE HYDROCHLORIDE 100 MG: 20 INJECTION, SOLUTION INFILTRATION; PERINEURAL at 12:39

## 2017-09-19 RX ADMIN — OXYCODONE HYDROCHLORIDE AND ACETAMINOPHEN 2 TABLET: 5; 325 TABLET ORAL at 13:55

## 2017-09-19 RX ADMIN — SENNA 17.2 MG: 8.6 TABLET, COATED ORAL at 20:14

## 2017-09-19 RX ADMIN — BACITRACIN: 500 OINTMENT TOPICAL at 16:14

## 2017-09-19 RX ADMIN — SODIUM CHLORIDE: 9 INJECTION, SOLUTION INTRAVENOUS at 04:11

## 2017-09-19 RX ADMIN — POLYETHYLENE GLYCOL 3350 17 G: 17 POWDER, FOR SOLUTION ORAL at 16:14

## 2017-09-19 RX ADMIN — CEFAZOLIN SODIUM 3 G: 2 SOLUTION INTRAVENOUS at 12:54

## 2017-09-19 RX ADMIN — PROPOFOL 170 MG: 10 INJECTION, EMULSION INTRAVENOUS at 12:39

## 2017-09-19 RX ADMIN — ONDANSETRON 4 MG: 2 INJECTION INTRAMUSCULAR; INTRAVENOUS at 12:42

## 2017-09-19 RX ADMIN — SODIUM CHLORIDE: 9 INJECTION, SOLUTION INTRAVENOUS at 16:14

## 2017-09-19 RX ADMIN — FENTANYL CITRATE 50 MCG: 50 INJECTION INTRAMUSCULAR; INTRAVENOUS at 13:02

## 2017-09-19 RX ADMIN — FENTANYL CITRATE 50 MCG: 50 INJECTION INTRAMUSCULAR; INTRAVENOUS at 12:37

## 2017-09-19 RX ADMIN — MIDAZOLAM HYDROCHLORIDE 2 MG: 1 INJECTION INTRAMUSCULAR; INTRAVENOUS at 12:35

## 2017-09-19 RX ADMIN — DEXAMETHASONE SODIUM PHOSPHATE 10 MG: 4 INJECTION, SOLUTION INTRAMUSCULAR; INTRAVENOUS at 12:42

## 2017-09-19 RX ADMIN — SODIUM CHLORIDE: 9 INJECTION, SOLUTION INTRAVENOUS at 22:15

## 2017-09-19 RX ADMIN — FENTANYL CITRATE 50 MCG: 50 INJECTION INTRAMUSCULAR; INTRAVENOUS at 12:39

## 2017-09-19 RX ADMIN — SODIUM CHLORIDE: 9 INJECTION, SOLUTION INTRAVENOUS at 11:32

## 2017-09-19 RX ADMIN — FENTANYL CITRATE 25 MCG: 50 INJECTION INTRAMUSCULAR; INTRAVENOUS at 09:45

## 2017-09-19 RX ADMIN — OXYCODONE HYDROCHLORIDE AND ACETAMINOPHEN 2 TABLET: 5; 325 TABLET ORAL at 01:50

## 2017-09-19 RX ADMIN — FENTANYL CITRATE 50 MCG: 50 INJECTION INTRAMUSCULAR; INTRAVENOUS at 22:15

## 2017-09-19 RX ADMIN — DOCUSATE SODIUM 100 MG: 100 CAPSULE ORAL at 20:14

## 2017-09-19 RX ADMIN — Medication 10 ML: at 22:15

## 2017-09-19 RX ADMIN — CEFAZOLIN SODIUM 2 G: 2 SOLUTION INTRAVENOUS at 20:14

## 2017-09-19 ASSESSMENT — PULMONARY FUNCTION TESTS
PIF_VALUE: 25
PIF_VALUE: 26
PIF_VALUE: 24
PIF_VALUE: 6
PIF_VALUE: 22
PIF_VALUE: 23
PIF_VALUE: 1
PIF_VALUE: 10
PIF_VALUE: 24
PIF_VALUE: 23
PIF_VALUE: 20
PIF_VALUE: 18
PIF_VALUE: 18
PIF_VALUE: 24
PIF_VALUE: 0
PIF_VALUE: 28
PIF_VALUE: 24
PIF_VALUE: 27
PIF_VALUE: 25
PIF_VALUE: 26
PIF_VALUE: 25
PIF_VALUE: 26
PIF_VALUE: 25
PIF_VALUE: 8
PIF_VALUE: 24
PIF_VALUE: 24
PIF_VALUE: 26
PIF_VALUE: 27
PIF_VALUE: 1
PIF_VALUE: 24
PIF_VALUE: 27
PIF_VALUE: 11
PIF_VALUE: 0
PIF_VALUE: 25
PIF_VALUE: 24
PIF_VALUE: 1
PIF_VALUE: 24
PIF_VALUE: 0
PIF_VALUE: 25
PIF_VALUE: 3
PIF_VALUE: 2
PIF_VALUE: 25
PIF_VALUE: 29
PIF_VALUE: 0
PIF_VALUE: 24
PIF_VALUE: 19
PIF_VALUE: 0
PIF_VALUE: 22
PIF_VALUE: 24
PIF_VALUE: 0
PIF_VALUE: 21
PIF_VALUE: 21
PIF_VALUE: 15
PIF_VALUE: 26
PIF_VALUE: 21
PIF_VALUE: 24
PIF_VALUE: 0
PIF_VALUE: 26
PIF_VALUE: 24
PIF_VALUE: 25
PIF_VALUE: 25
PIF_VALUE: 19
PIF_VALUE: 26

## 2017-09-19 ASSESSMENT — PAIN DESCRIPTION - PAIN TYPE: TYPE: ACUTE PAIN

## 2017-09-19 ASSESSMENT — PAIN DESCRIPTION - ORIENTATION: ORIENTATION: ANTERIOR

## 2017-09-19 ASSESSMENT — PAIN DESCRIPTION - ONSET: ONSET: ON-GOING

## 2017-09-19 ASSESSMENT — PAIN SCALES - GENERAL
PAINLEVEL_OUTOF10: 9
PAINLEVEL_OUTOF10: 7
PAINLEVEL_OUTOF10: 5
PAINLEVEL_OUTOF10: 2
PAINLEVEL_OUTOF10: 7
PAINLEVEL_OUTOF10: 8
PAINLEVEL_OUTOF10: 5
PAINLEVEL_OUTOF10: 2
PAINLEVEL_OUTOF10: 4
PAINLEVEL_OUTOF10: 7
PAINLEVEL_OUTOF10: 7
PAINLEVEL_OUTOF10: 8
PAINLEVEL_OUTOF10: 5

## 2017-09-19 ASSESSMENT — PAIN DESCRIPTION - FREQUENCY: FREQUENCY: CONTINUOUS

## 2017-09-19 ASSESSMENT — PAIN DESCRIPTION - DESCRIPTORS: DESCRIPTORS: PRESSURE

## 2017-09-19 ASSESSMENT — LIFESTYLE VARIABLES: HISTORY_ALCOHOL_USE: NO

## 2017-09-19 ASSESSMENT — PAIN DESCRIPTION - LOCATION: LOCATION: CHEST

## 2017-09-20 LAB
ANION GAP SERPL CALCULATED.3IONS-SCNC: 12 MEQ/L (ref 8–16)
BUN BLDV-MCNC: 11 MG/DL (ref 7–22)
CALCIUM SERPL-MCNC: 8 MG/DL (ref 8.5–10.5)
CHLORIDE BLD-SCNC: 105 MEQ/L (ref 98–111)
CO2: 22 MEQ/L (ref 23–33)
CREAT SERPL-MCNC: 0.4 MG/DL (ref 0.4–1.2)
GFR SERPL CREATININE-BSD FRML MDRD: > 90 ML/MIN/1.73M2
GLUCOSE BLD-MCNC: 138 MG/DL (ref 70–108)
HCT VFR BLD CALC: 29.2 % (ref 37–47)
HEMOGLOBIN: 9.6 GM/DL (ref 12–16)
MCH RBC QN AUTO: 26.4 PG (ref 27–31)
MCHC RBC AUTO-ENTMCNC: 32.9 GM/DL (ref 33–37)
MCV RBC AUTO: 80.4 FL (ref 81–99)
PDW BLD-RTO: 15.9 % (ref 11.5–14.5)
PLATELET # BLD: 184 THOU/MM3 (ref 130–400)
PMV BLD AUTO: 9.4 MCM (ref 7.4–10.4)
POTASSIUM SERPL-SCNC: 4.2 MEQ/L (ref 3.5–5.2)
RBC # BLD: 3.63 MILL/MM3 (ref 4.2–5.4)
SODIUM BLD-SCNC: 139 MEQ/L (ref 135–145)
WBC # BLD: 11 THOU/MM3 (ref 4.8–10.8)

## 2017-09-20 PROCEDURE — 97530 THERAPEUTIC ACTIVITIES: CPT

## 2017-09-20 PROCEDURE — 36415 COLL VENOUS BLD VENIPUNCTURE: CPT

## 2017-09-20 PROCEDURE — 97162 PT EVAL MOD COMPLEX 30 MIN: CPT

## 2017-09-20 PROCEDURE — 6370000000 HC RX 637 (ALT 250 FOR IP): Performed by: NURSE PRACTITIONER

## 2017-09-20 PROCEDURE — 99999 PR OFFICE/OUTPT VISIT,PROCEDURE ONLY: CPT | Performed by: NURSE PRACTITIONER

## 2017-09-20 PROCEDURE — 80048 BASIC METABOLIC PNL TOTAL CA: CPT

## 2017-09-20 PROCEDURE — 2580000003 HC RX 258: Performed by: NURSE PRACTITIONER

## 2017-09-20 PROCEDURE — 97167 OT EVAL HIGH COMPLEX 60 MIN: CPT

## 2017-09-20 PROCEDURE — 6360000002 HC RX W HCPCS: Performed by: NURSE PRACTITIONER

## 2017-09-20 PROCEDURE — 97110 THERAPEUTIC EXERCISES: CPT

## 2017-09-20 PROCEDURE — G8979 MOBILITY GOAL STATUS: HCPCS

## 2017-09-20 PROCEDURE — 85027 COMPLETE CBC AUTOMATED: CPT

## 2017-09-20 PROCEDURE — G8978 MOBILITY CURRENT STATUS: HCPCS

## 2017-09-20 PROCEDURE — 1200000003 HC TELEMETRY R&B

## 2017-09-20 PROCEDURE — 99232 SBSQ HOSP IP/OBS MODERATE 35: CPT | Performed by: SURGERY

## 2017-09-20 PROCEDURE — 6370000000 HC RX 637 (ALT 250 FOR IP): Performed by: SURGERY

## 2017-09-20 RX ORDER — TIZANIDINE 4 MG/1
2 TABLET ORAL EVERY 6 HOURS PRN
Status: DISCONTINUED | OUTPATIENT
Start: 2017-09-20 | End: 2017-09-21

## 2017-09-20 RX ADMIN — TIZANIDINE 2 MG: 4 TABLET ORAL at 15:44

## 2017-09-20 RX ADMIN — FAMOTIDINE 20 MG: 20 TABLET, FILM COATED ORAL at 20:33

## 2017-09-20 RX ADMIN — OXYCODONE HYDROCHLORIDE AND ACETAMINOPHEN 2 TABLET: 5; 325 TABLET ORAL at 04:44

## 2017-09-20 RX ADMIN — CEFAZOLIN SODIUM 2 G: 2 SOLUTION INTRAVENOUS at 04:39

## 2017-09-20 RX ADMIN — TIZANIDINE 2 MG: 4 TABLET ORAL at 23:56

## 2017-09-20 RX ADMIN — FAMOTIDINE 20 MG: 20 TABLET, FILM COATED ORAL at 08:15

## 2017-09-20 RX ADMIN — SODIUM CHLORIDE: 9 INJECTION, SOLUTION INTRAVENOUS at 04:40

## 2017-09-20 RX ADMIN — BACITRACIN: 500 OINTMENT TOPICAL at 08:16

## 2017-09-20 RX ADMIN — DOCUSATE SODIUM 100 MG: 100 CAPSULE ORAL at 20:33

## 2017-09-20 RX ADMIN — TIZANIDINE 4 MG: 4 TABLET ORAL at 08:15

## 2017-09-20 RX ADMIN — SENNA 17.2 MG: 8.6 TABLET, COATED ORAL at 20:33

## 2017-09-20 RX ADMIN — BACITRACIN: 500 OINTMENT TOPICAL at 20:34

## 2017-09-20 RX ADMIN — ENOXAPARIN SODIUM 40 MG: 40 INJECTION SUBCUTANEOUS at 07:58

## 2017-09-20 RX ADMIN — OXYCODONE HYDROCHLORIDE AND ACETAMINOPHEN 2 TABLET: 5; 325 TABLET ORAL at 18:58

## 2017-09-20 RX ADMIN — BACITRACIN: 500 OINTMENT TOPICAL at 13:31

## 2017-09-20 RX ADMIN — OXYCODONE HYDROCHLORIDE AND ACETAMINOPHEN 2 TABLET: 5; 325 TABLET ORAL at 00:09

## 2017-09-20 RX ADMIN — OXYCODONE HYDROCHLORIDE AND ACETAMINOPHEN 2 TABLET: 5; 325 TABLET ORAL at 09:22

## 2017-09-20 RX ADMIN — OXYCODONE HYDROCHLORIDE AND ACETAMINOPHEN 2 TABLET: 5; 325 TABLET ORAL at 23:56

## 2017-09-20 RX ADMIN — DOCUSATE SODIUM 100 MG: 100 CAPSULE ORAL at 08:15

## 2017-09-20 RX ADMIN — Medication 10 ML: at 20:33

## 2017-09-20 RX ADMIN — OXYCODONE HYDROCHLORIDE AND ACETAMINOPHEN 2 TABLET: 5; 325 TABLET ORAL at 13:31

## 2017-09-20 ASSESSMENT — PAIN SCALES - GENERAL
PAINLEVEL_OUTOF10: 2
PAINLEVEL_OUTOF10: 7
PAINLEVEL_OUTOF10: 6
PAINLEVEL_OUTOF10: 7
PAINLEVEL_OUTOF10: 7
PAINLEVEL_OUTOF10: 8
PAINLEVEL_OUTOF10: 7
PAINLEVEL_OUTOF10: 2

## 2017-09-20 ASSESSMENT — PAIN DESCRIPTION - ORIENTATION: ORIENTATION: LEFT

## 2017-09-20 ASSESSMENT — PAIN DESCRIPTION - PAIN TYPE: TYPE: ACUTE PAIN

## 2017-09-20 ASSESSMENT — PAIN DESCRIPTION - LOCATION: LOCATION: CHEST;ANKLE

## 2017-09-21 ENCOUNTER — APPOINTMENT (OUTPATIENT)
Dept: CT IMAGING | Age: 32
DRG: 313 | End: 2017-09-21
Payer: MEDICAID

## 2017-09-21 ENCOUNTER — APPOINTMENT (OUTPATIENT)
Dept: GENERAL RADIOLOGY | Age: 32
DRG: 313 | End: 2017-09-21
Payer: MEDICAID

## 2017-09-21 PROBLEM — D62 ACUTE BLOOD LOSS ANEMIA: Status: ACTIVE | Noted: 2017-09-21

## 2017-09-21 PROBLEM — S22.41XA CLOSED FRACTURE OF MULTIPLE RIBS OF RIGHT SIDE: Status: ACTIVE | Noted: 2017-09-21

## 2017-09-21 LAB
ANION GAP SERPL CALCULATED.3IONS-SCNC: 4 MEQ/L (ref 8–16)
BUN BLDV-MCNC: 15 MG/DL (ref 7–22)
CALCIUM SERPL-MCNC: 7.8 MG/DL (ref 8.5–10.5)
CHLORIDE BLD-SCNC: 105 MEQ/L (ref 98–111)
CO2: 25 MEQ/L (ref 23–33)
CREAT SERPL-MCNC: 0.6 MG/DL (ref 0.4–1.2)
GFR SERPL CREATININE-BSD FRML MDRD: > 90 ML/MIN/1.73M2
GLUCOSE BLD-MCNC: 89 MG/DL (ref 70–108)
HCT VFR BLD CALC: 20.2 % (ref 37–47)
HCT VFR BLD CALC: 23 % (ref 37–47)
HCT VFR BLD CALC: 24.2 % (ref 37–47)
HEMOGLOBIN: 6.6 GM/DL (ref 12–16)
HEMOGLOBIN: 7.4 GM/DL (ref 12–16)
HEMOGLOBIN: 7.7 GM/DL (ref 12–16)
MCH RBC QN AUTO: 26 PG (ref 27–31)
MCHC RBC AUTO-ENTMCNC: 32.3 GM/DL (ref 33–37)
MCV RBC AUTO: 80.7 FL (ref 81–99)
PDW BLD-RTO: 16.4 % (ref 11.5–14.5)
PLATELET # BLD: 178 THOU/MM3 (ref 130–400)
PMV BLD AUTO: 9.5 MCM (ref 7.4–10.4)
POTASSIUM SERPL-SCNC: 3.8 MEQ/L (ref 3.5–5.2)
RBC # BLD: 2.85 MILL/MM3 (ref 4.2–5.4)
SODIUM BLD-SCNC: 134 MEQ/L (ref 135–145)
WBC # BLD: 11.8 THOU/MM3 (ref 4.8–10.8)

## 2017-09-21 PROCEDURE — 36415 COLL VENOUS BLD VENIPUNCTURE: CPT

## 2017-09-21 PROCEDURE — P9016 RBC LEUKOCYTES REDUCED: HCPCS

## 2017-09-21 PROCEDURE — 94010 BREATHING CAPACITY TEST: CPT

## 2017-09-21 PROCEDURE — 71250 CT THORAX DX C-: CPT

## 2017-09-21 PROCEDURE — 97530 THERAPEUTIC ACTIVITIES: CPT

## 2017-09-21 PROCEDURE — 76376 3D RENDER W/INTRP POSTPROCES: CPT

## 2017-09-21 PROCEDURE — 85027 COMPLETE CBC AUTOMATED: CPT

## 2017-09-21 PROCEDURE — 80048 BASIC METABOLIC PNL TOTAL CA: CPT

## 2017-09-21 PROCEDURE — 2580000003 HC RX 258: Performed by: NURSE PRACTITIONER

## 2017-09-21 PROCEDURE — 99232 SBSQ HOSP IP/OBS MODERATE 35: CPT | Performed by: SURGERY

## 2017-09-21 PROCEDURE — 6370000000 HC RX 637 (ALT 250 FOR IP): Performed by: NURSE PRACTITIONER

## 2017-09-21 PROCEDURE — 85018 HEMOGLOBIN: CPT

## 2017-09-21 PROCEDURE — 71020 XR CHEST STANDARD TWO VW: CPT

## 2017-09-21 PROCEDURE — 85014 HEMATOCRIT: CPT

## 2017-09-21 PROCEDURE — 97535 SELF CARE MNGMENT TRAINING: CPT

## 2017-09-21 PROCEDURE — 99999 PR OFFICE/OUTPT VISIT,PROCEDURE ONLY: CPT | Performed by: NURSE PRACTITIONER

## 2017-09-21 PROCEDURE — 36430 TRANSFUSION BLD/BLD COMPNT: CPT

## 2017-09-21 PROCEDURE — 6370000000 HC RX 637 (ALT 250 FOR IP): Performed by: SURGERY

## 2017-09-21 PROCEDURE — 1200000003 HC TELEMETRY R&B

## 2017-09-21 PROCEDURE — 97110 THERAPEUTIC EXERCISES: CPT

## 2017-09-21 RX ORDER — 0.9 % SODIUM CHLORIDE 0.9 %
250 INTRAVENOUS SOLUTION INTRAVENOUS ONCE
Status: COMPLETED | OUTPATIENT
Start: 2017-09-21 | End: 2017-09-22

## 2017-09-21 RX ORDER — FERROUS SULFATE 325(65) MG
325 TABLET ORAL 2 TIMES DAILY WITH MEALS
Status: DISCONTINUED | OUTPATIENT
Start: 2017-09-21 | End: 2017-09-25 | Stop reason: HOSPADM

## 2017-09-21 RX ORDER — IPRATROPIUM BROMIDE AND ALBUTEROL SULFATE 2.5; .5 MG/3ML; MG/3ML
1 SOLUTION RESPIRATORY (INHALATION)
Status: DISCONTINUED | OUTPATIENT
Start: 2017-09-21 | End: 2017-09-25 | Stop reason: HOSPADM

## 2017-09-21 RX ORDER — SENNA PLUS 8.6 MG/1
4 TABLET ORAL NIGHTLY
Status: DISCONTINUED | OUTPATIENT
Start: 2017-09-21 | End: 2017-09-25 | Stop reason: HOSPADM

## 2017-09-21 RX ORDER — POLYETHYLENE GLYCOL 3350 17 G/17G
17 POWDER, FOR SOLUTION ORAL 2 TIMES DAILY
Status: DISCONTINUED | OUTPATIENT
Start: 2017-09-21 | End: 2017-09-25 | Stop reason: HOSPADM

## 2017-09-21 RX ORDER — GUAIFENESIN 600 MG/1
600 TABLET, EXTENDED RELEASE ORAL 2 TIMES DAILY
Status: DISCONTINUED | OUTPATIENT
Start: 2017-09-21 | End: 2017-09-25 | Stop reason: HOSPADM

## 2017-09-21 RX ORDER — CYCLOBENZAPRINE HCL 10 MG
5 TABLET ORAL 3 TIMES DAILY PRN
Status: DISCONTINUED | OUTPATIENT
Start: 2017-09-21 | End: 2017-09-25 | Stop reason: HOSPADM

## 2017-09-21 RX ADMIN — POLYETHYLENE GLYCOL 3350 17 G: 17 POWDER, FOR SOLUTION ORAL at 09:29

## 2017-09-21 RX ADMIN — OXYCODONE HYDROCHLORIDE AND ACETAMINOPHEN 2 TABLET: 5; 325 TABLET ORAL at 12:12

## 2017-09-21 RX ADMIN — FAMOTIDINE 20 MG: 20 TABLET, FILM COATED ORAL at 09:29

## 2017-09-21 RX ADMIN — Medication 325 MG: at 18:54

## 2017-09-21 RX ADMIN — GUAIFENESIN 600 MG: 600 TABLET, EXTENDED RELEASE ORAL at 21:28

## 2017-09-21 RX ADMIN — OXYCODONE HYDROCHLORIDE AND ACETAMINOPHEN 2 TABLET: 5; 325 TABLET ORAL at 21:28

## 2017-09-21 RX ADMIN — OXYCODONE HYDROCHLORIDE AND ACETAMINOPHEN 2 TABLET: 5; 325 TABLET ORAL at 06:50

## 2017-09-21 RX ADMIN — SENNA 34.4 MG: 8.6 TABLET, COATED ORAL at 21:27

## 2017-09-21 RX ADMIN — SODIUM CHLORIDE 250 ML: 9 INJECTION, SOLUTION INTRAVENOUS at 20:47

## 2017-09-21 RX ADMIN — DOCUSATE SODIUM 100 MG: 100 CAPSULE ORAL at 21:28

## 2017-09-21 RX ADMIN — Medication 10 ML: at 21:32

## 2017-09-21 RX ADMIN — Medication 10 ML: at 09:29

## 2017-09-21 RX ADMIN — BACITRACIN: 500 OINTMENT TOPICAL at 21:28

## 2017-09-21 RX ADMIN — FAMOTIDINE 20 MG: 20 TABLET, FILM COATED ORAL at 21:28

## 2017-09-21 RX ADMIN — DOCUSATE SODIUM 100 MG: 100 CAPSULE ORAL at 09:29

## 2017-09-21 RX ADMIN — POLYETHYLENE GLYCOL 3350 17 G: 17 POWDER, FOR SOLUTION ORAL at 21:28

## 2017-09-21 RX ADMIN — BACITRACIN: 500 OINTMENT TOPICAL at 09:29

## 2017-09-21 RX ADMIN — BACITRACIN: 500 OINTMENT TOPICAL at 16:58

## 2017-09-21 ASSESSMENT — PAIN DESCRIPTION - LOCATION
LOCATION: GENERALIZED
LOCATION: ANKLE;CHEST

## 2017-09-21 ASSESSMENT — PAIN SCALES - GENERAL
PAINLEVEL_OUTOF10: 10
PAINLEVEL_OUTOF10: 7
PAINLEVEL_OUTOF10: 3
PAINLEVEL_OUTOF10: 7
PAINLEVEL_OUTOF10: 0
PAINLEVEL_OUTOF10: 8
PAINLEVEL_OUTOF10: 8
PAINLEVEL_OUTOF10: 7

## 2017-09-21 ASSESSMENT — PAIN DESCRIPTION - PAIN TYPE
TYPE: ACUTE PAIN
TYPE: ACUTE PAIN

## 2017-09-22 ENCOUNTER — APPOINTMENT (OUTPATIENT)
Dept: GENERAL RADIOLOGY | Age: 32
DRG: 313 | End: 2017-09-22
Payer: MEDICAID

## 2017-09-22 LAB
ANION GAP SERPL CALCULATED.3IONS-SCNC: 11 MEQ/L (ref 8–16)
BUN BLDV-MCNC: 15 MG/DL (ref 7–22)
CALCIUM SERPL-MCNC: 7.9 MG/DL (ref 8.5–10.5)
CHLORIDE BLD-SCNC: 101 MEQ/L (ref 98–111)
CO2: 23 MEQ/L (ref 23–33)
CREAT SERPL-MCNC: 0.4 MG/DL (ref 0.4–1.2)
GFR SERPL CREATININE-BSD FRML MDRD: > 90 ML/MIN/1.73M2
GLUCOSE BLD-MCNC: 81 MG/DL (ref 70–108)
HCT VFR BLD CALC: 24.1 % (ref 37–47)
HCT VFR BLD CALC: 24.3 % (ref 37–47)
HEMOGLOBIN: 8.3 GM/DL (ref 12–16)
HEMOGLOBIN: 8.5 GM/DL (ref 12–16)
MCH RBC QN AUTO: 27.8 PG (ref 27–31)
MCHC RBC AUTO-ENTMCNC: 35.4 GM/DL (ref 33–37)
MCV RBC AUTO: 78.6 FL (ref 81–99)
PDW BLD-RTO: 16.5 % (ref 11.5–14.5)
PLATELET # BLD: 169 THOU/MM3 (ref 130–400)
PMV BLD AUTO: 9.4 MCM (ref 7.4–10.4)
POTASSIUM SERPL-SCNC: 4.2 MEQ/L (ref 3.5–5.2)
RBC # BLD: 3.06 MILL/MM3 (ref 4.2–5.4)
SODIUM BLD-SCNC: 135 MEQ/L (ref 135–145)
WBC # BLD: 13.7 THOU/MM3 (ref 4.8–10.8)

## 2017-09-22 PROCEDURE — 6370000000 HC RX 637 (ALT 250 FOR IP): Performed by: NURSE PRACTITIONER

## 2017-09-22 PROCEDURE — 36415 COLL VENOUS BLD VENIPUNCTURE: CPT

## 2017-09-22 PROCEDURE — 2580000003 HC RX 258: Performed by: NURSE PRACTITIONER

## 2017-09-22 PROCEDURE — 36430 TRANSFUSION BLD/BLD COMPNT: CPT

## 2017-09-22 PROCEDURE — 1200000003 HC TELEMETRY R&B

## 2017-09-22 PROCEDURE — 85014 HEMATOCRIT: CPT

## 2017-09-22 PROCEDURE — 71020 XR CHEST STANDARD TWO VW: CPT

## 2017-09-22 PROCEDURE — 85027 COMPLETE CBC AUTOMATED: CPT

## 2017-09-22 PROCEDURE — 87086 URINE CULTURE/COLONY COUNT: CPT

## 2017-09-22 PROCEDURE — 97110 THERAPEUTIC EXERCISES: CPT

## 2017-09-22 PROCEDURE — 6370000000 HC RX 637 (ALT 250 FOR IP): Performed by: SURGERY

## 2017-09-22 PROCEDURE — 94640 AIRWAY INHALATION TREATMENT: CPT

## 2017-09-22 PROCEDURE — 87186 SC STD MICRODIL/AGAR DIL: CPT

## 2017-09-22 PROCEDURE — 87184 SC STD DISK METHOD PER PLATE: CPT

## 2017-09-22 PROCEDURE — 85018 HEMOGLOBIN: CPT

## 2017-09-22 PROCEDURE — 80048 BASIC METABOLIC PNL TOTAL CA: CPT

## 2017-09-22 PROCEDURE — 51798 US URINE CAPACITY MEASURE: CPT

## 2017-09-22 PROCEDURE — 99232 SBSQ HOSP IP/OBS MODERATE 35: CPT | Performed by: NURSE PRACTITIONER

## 2017-09-22 PROCEDURE — 87077 CULTURE AEROBIC IDENTIFY: CPT

## 2017-09-22 RX ORDER — ATROPA BELLADONNA AND OPIUM 16.2; 3 MG/1; MG/1
30 SUPPOSITORY RECTAL EVERY 8 HOURS PRN
Status: DISCONTINUED | OUTPATIENT
Start: 2017-09-22 | End: 2017-09-25 | Stop reason: HOSPADM

## 2017-09-22 RX ORDER — DIPHENHYDRAMINE HYDROCHLORIDE 50 MG/ML
25 INJECTION INTRAMUSCULAR; INTRAVENOUS EVERY 6 HOURS PRN
Status: DISCONTINUED | OUTPATIENT
Start: 2017-09-22 | End: 2017-09-25 | Stop reason: HOSPADM

## 2017-09-22 RX ORDER — SODIUM CHLORIDE 9 MG/ML
INJECTION, SOLUTION INTRAVENOUS CONTINUOUS
Status: DISCONTINUED | OUTPATIENT
Start: 2017-09-22 | End: 2017-09-23

## 2017-09-22 RX ADMIN — ATROPA BELLADONNA AND OPIUM 30 MG: 16.2; 3 SUPPOSITORY RECTAL at 17:25

## 2017-09-22 RX ADMIN — FAMOTIDINE 20 MG: 20 TABLET, FILM COATED ORAL at 21:48

## 2017-09-22 RX ADMIN — IPRATROPIUM BROMIDE AND ALBUTEROL SULFATE 1 AMPULE: .5; 3 SOLUTION RESPIRATORY (INHALATION) at 21:00

## 2017-09-22 RX ADMIN — OXYCODONE HYDROCHLORIDE AND ACETAMINOPHEN 2 TABLET: 5; 325 TABLET ORAL at 01:48

## 2017-09-22 RX ADMIN — Medication 10 ML: at 09:24

## 2017-09-22 RX ADMIN — BACITRACIN: 500 OINTMENT TOPICAL at 09:23

## 2017-09-22 RX ADMIN — FAMOTIDINE 20 MG: 20 TABLET, FILM COATED ORAL at 09:23

## 2017-09-22 RX ADMIN — OXYCODONE HYDROCHLORIDE AND ACETAMINOPHEN 2 TABLET: 5; 325 TABLET ORAL at 09:23

## 2017-09-22 RX ADMIN — CYCLOBENZAPRINE HYDROCHLORIDE 5 MG: 10 TABLET, FILM COATED ORAL at 16:06

## 2017-09-22 RX ADMIN — BACITRACIN: 500 OINTMENT TOPICAL at 21:49

## 2017-09-22 RX ADMIN — Medication 325 MG: at 16:06

## 2017-09-22 RX ADMIN — IPRATROPIUM BROMIDE AND ALBUTEROL SULFATE 1 AMPULE: .5; 3 SOLUTION RESPIRATORY (INHALATION) at 14:04

## 2017-09-22 RX ADMIN — OXYCODONE HYDROCHLORIDE AND ACETAMINOPHEN 2 TABLET: 5; 325 TABLET ORAL at 16:51

## 2017-09-22 RX ADMIN — SODIUM CHLORIDE: 9 INJECTION, SOLUTION INTRAVENOUS at 18:23

## 2017-09-22 RX ADMIN — IPRATROPIUM BROMIDE AND ALBUTEROL SULFATE 1 AMPULE: .5; 3 SOLUTION RESPIRATORY (INHALATION) at 10:43

## 2017-09-22 RX ADMIN — GUAIFENESIN 600 MG: 600 TABLET, EXTENDED RELEASE ORAL at 21:48

## 2017-09-22 RX ADMIN — SENNA 34.4 MG: 8.6 TABLET, COATED ORAL at 21:48

## 2017-09-22 RX ADMIN — BACITRACIN: 500 OINTMENT TOPICAL at 16:06

## 2017-09-22 RX ADMIN — Medication 325 MG: at 09:23

## 2017-09-22 ASSESSMENT — PAIN SCALES - GENERAL
PAINLEVEL_OUTOF10: 8
PAINLEVEL_OUTOF10: 9
PAINLEVEL_OUTOF10: 4
PAINLEVEL_OUTOF10: 6
PAINLEVEL_OUTOF10: 8
PAINLEVEL_OUTOF10: 1
PAINLEVEL_OUTOF10: 4

## 2017-09-22 ASSESSMENT — PAIN DESCRIPTION - LOCATION
LOCATION: CHEST
LOCATION: GENERALIZED

## 2017-09-22 ASSESSMENT — PAIN DESCRIPTION - PAIN TYPE
TYPE: ACUTE PAIN

## 2017-09-23 ENCOUNTER — APPOINTMENT (OUTPATIENT)
Dept: GENERAL RADIOLOGY | Age: 32
DRG: 313 | End: 2017-09-23
Payer: MEDICAID

## 2017-09-23 LAB
ANION GAP SERPL CALCULATED.3IONS-SCNC: 14 MEQ/L (ref 8–16)
BACTERIA: ABNORMAL /HPF
BILIRUBIN URINE: NEGATIVE
BLOOD, URINE: ABNORMAL
BUN BLDV-MCNC: 14 MG/DL (ref 7–22)
CALCIUM SERPL-MCNC: 8 MG/DL (ref 8.5–10.5)
CASTS UA: ABNORMAL /LPF
CHARACTER, URINE: ABNORMAL
CHLORIDE BLD-SCNC: 100 MEQ/L (ref 98–111)
CO2: 23 MEQ/L (ref 23–33)
COLOR: ABNORMAL
CREAT SERPL-MCNC: 0.4 MG/DL (ref 0.4–1.2)
CRYSTALS, UA: ABNORMAL
EPITHELIAL CELLS, UA: ABNORMAL /HPF
GFR SERPL CREATININE-BSD FRML MDRD: > 90 ML/MIN/1.73M2
GLUCOSE BLD-MCNC: 90 MG/DL (ref 70–108)
GLUCOSE URINE: NEGATIVE MG/DL
HCT VFR BLD CALC: 25.4 % (ref 37–47)
HEMOGLOBIN: 8.3 GM/DL (ref 12–16)
KETONES, URINE: NEGATIVE
LEUKOCYTE ESTERASE, URINE: ABNORMAL
MAGNESIUM: 2 MG/DL (ref 1.6–2.4)
MCH RBC QN AUTO: 26.1 PG (ref 27–31)
MCHC RBC AUTO-ENTMCNC: 32.6 GM/DL (ref 33–37)
MCV RBC AUTO: 80.2 FL (ref 81–99)
MISCELLANEOUS 2: ABNORMAL
MUCUS: ABNORMAL
NITRITE, URINE: POSITIVE
PDW BLD-RTO: 17.1 % (ref 11.5–14.5)
PH UA: 5.5
PHOSPHORUS: 4.2 MG/DL (ref 2.4–4.7)
PLATELET # BLD: 236 THOU/MM3 (ref 130–400)
PMV BLD AUTO: 8.8 MCM (ref 7.4–10.4)
POTASSIUM SERPL-SCNC: 4.6 MEQ/L (ref 3.5–5.2)
PROTEIN UA: 30
RBC # BLD: 3.17 MILL/MM3 (ref 4.2–5.4)
RBC URINE: > 200 /HPF
RENAL EPITHELIAL, UA: ABNORMAL
SODIUM BLD-SCNC: 137 MEQ/L (ref 135–145)
SPECIFIC GRAVITY, URINE: > 1.03 (ref 1–1.03)
UROBILINOGEN, URINE: 0.2 EU/DL
WBC # BLD: 11.9 THOU/MM3 (ref 4.8–10.8)
WBC UA: ABNORMAL /HPF
YEAST: ABNORMAL

## 2017-09-23 PROCEDURE — 94010 BREATHING CAPACITY TEST: CPT

## 2017-09-23 PROCEDURE — 80048 BASIC METABOLIC PNL TOTAL CA: CPT

## 2017-09-23 PROCEDURE — 6360000002 HC RX W HCPCS: Performed by: NURSE PRACTITIONER

## 2017-09-23 PROCEDURE — 97530 THERAPEUTIC ACTIVITIES: CPT

## 2017-09-23 PROCEDURE — 1200000003 HC TELEMETRY R&B

## 2017-09-23 PROCEDURE — 71010 XR CHEST PORTABLE: CPT

## 2017-09-23 PROCEDURE — 36415 COLL VENOUS BLD VENIPUNCTURE: CPT

## 2017-09-23 PROCEDURE — 81001 URINALYSIS AUTO W/SCOPE: CPT

## 2017-09-23 PROCEDURE — 6370000000 HC RX 637 (ALT 250 FOR IP): Performed by: NURSE PRACTITIONER

## 2017-09-23 PROCEDURE — 94640 AIRWAY INHALATION TREATMENT: CPT

## 2017-09-23 PROCEDURE — 97112 NEUROMUSCULAR REEDUCATION: CPT

## 2017-09-23 PROCEDURE — 83735 ASSAY OF MAGNESIUM: CPT

## 2017-09-23 PROCEDURE — 97116 GAIT TRAINING THERAPY: CPT

## 2017-09-23 PROCEDURE — 6370000000 HC RX 637 (ALT 250 FOR IP): Performed by: SURGERY

## 2017-09-23 PROCEDURE — 99999 PR OFFICE/OUTPT VISIT,PROCEDURE ONLY: CPT | Performed by: NURSE PRACTITIONER

## 2017-09-23 PROCEDURE — 97110 THERAPEUTIC EXERCISES: CPT

## 2017-09-23 PROCEDURE — 85027 COMPLETE CBC AUTOMATED: CPT

## 2017-09-23 PROCEDURE — 99232 SBSQ HOSP IP/OBS MODERATE 35: CPT | Performed by: SURGERY

## 2017-09-23 PROCEDURE — 2580000003 HC RX 258: Performed by: NURSE PRACTITIONER

## 2017-09-23 PROCEDURE — 84100 ASSAY OF PHOSPHORUS: CPT

## 2017-09-23 RX ADMIN — Medication 325 MG: at 16:53

## 2017-09-23 RX ADMIN — OXYCODONE HYDROCHLORIDE AND ACETAMINOPHEN 2 TABLET: 5; 325 TABLET ORAL at 09:08

## 2017-09-23 RX ADMIN — DOCUSATE SODIUM 100 MG: 100 CAPSULE ORAL at 21:45

## 2017-09-23 RX ADMIN — IPRATROPIUM BROMIDE AND ALBUTEROL SULFATE 1 AMPULE: .5; 3 SOLUTION RESPIRATORY (INHALATION) at 17:39

## 2017-09-23 RX ADMIN — GUAIFENESIN 600 MG: 600 TABLET, EXTENDED RELEASE ORAL at 09:08

## 2017-09-23 RX ADMIN — FAMOTIDINE 20 MG: 20 TABLET, FILM COATED ORAL at 21:45

## 2017-09-23 RX ADMIN — BACITRACIN: 500 OINTMENT TOPICAL at 14:15

## 2017-09-23 RX ADMIN — Medication 325 MG: at 09:08

## 2017-09-23 RX ADMIN — OXYCODONE HYDROCHLORIDE AND ACETAMINOPHEN 2 TABLET: 5; 325 TABLET ORAL at 00:16

## 2017-09-23 RX ADMIN — BACITRACIN: 500 OINTMENT TOPICAL at 09:08

## 2017-09-23 RX ADMIN — OXYCODONE HYDROCHLORIDE AND ACETAMINOPHEN 2 TABLET: 5; 325 TABLET ORAL at 18:20

## 2017-09-23 RX ADMIN — IPRATROPIUM BROMIDE AND ALBUTEROL SULFATE 1 AMPULE: .5; 3 SOLUTION RESPIRATORY (INHALATION) at 10:39

## 2017-09-23 RX ADMIN — GUAIFENESIN 600 MG: 600 TABLET, EXTENDED RELEASE ORAL at 21:45

## 2017-09-23 RX ADMIN — IPRATROPIUM BROMIDE AND ALBUTEROL SULFATE 1 AMPULE: .5; 3 SOLUTION RESPIRATORY (INHALATION) at 14:00

## 2017-09-23 RX ADMIN — ENOXAPARIN SODIUM 40 MG: 40 INJECTION SUBCUTANEOUS at 12:30

## 2017-09-23 RX ADMIN — IPRATROPIUM BROMIDE AND ALBUTEROL SULFATE 1 AMPULE: .5; 3 SOLUTION RESPIRATORY (INHALATION) at 22:22

## 2017-09-23 RX ADMIN — FAMOTIDINE 20 MG: 20 TABLET, FILM COATED ORAL at 09:08

## 2017-09-23 RX ADMIN — OXYCODONE HYDROCHLORIDE AND ACETAMINOPHEN 2 TABLET: 5; 325 TABLET ORAL at 14:15

## 2017-09-23 RX ADMIN — OXYCODONE HYDROCHLORIDE AND ACETAMINOPHEN 2 TABLET: 5; 325 TABLET ORAL at 22:33

## 2017-09-23 RX ADMIN — Medication 10 ML: at 21:46

## 2017-09-23 RX ADMIN — SENNA 34.4 MG: 8.6 TABLET, COATED ORAL at 21:45

## 2017-09-23 RX ADMIN — OXYCODONE HYDROCHLORIDE AND ACETAMINOPHEN 2 TABLET: 5; 325 TABLET ORAL at 04:25

## 2017-09-23 RX ADMIN — BACITRACIN: 500 OINTMENT TOPICAL at 21:47

## 2017-09-23 ASSESSMENT — PAIN DESCRIPTION - PAIN TYPE
TYPE: ACUTE PAIN;SURGICAL PAIN
TYPE: ACUTE PAIN;SURGICAL PAIN
TYPE: ACUTE PAIN
TYPE: ACUTE PAIN;SURGICAL PAIN

## 2017-09-23 ASSESSMENT — PAIN DESCRIPTION - ONSET
ONSET: ON-GOING
ONSET: ON-GOING

## 2017-09-23 ASSESSMENT — PAIN DESCRIPTION - ORIENTATION
ORIENTATION: LEFT
ORIENTATION: LEFT
ORIENTATION: LEFT;MID

## 2017-09-23 ASSESSMENT — PAIN SCALES - GENERAL
PAINLEVEL_OUTOF10: 2
PAINLEVEL_OUTOF10: 7
PAINLEVEL_OUTOF10: 9
PAINLEVEL_OUTOF10: 7
PAINLEVEL_OUTOF10: 8
PAINLEVEL_OUTOF10: 7
PAINLEVEL_OUTOF10: 0
PAINLEVEL_OUTOF10: 7
PAINLEVEL_OUTOF10: 5
PAINLEVEL_OUTOF10: 7

## 2017-09-23 ASSESSMENT — PAIN DESCRIPTION - PROGRESSION
CLINICAL_PROGRESSION: NOT CHANGED

## 2017-09-23 ASSESSMENT — PAIN DESCRIPTION - DESCRIPTORS
DESCRIPTORS: ACHING
DESCRIPTORS: ACHING;TIGHTNESS
DESCRIPTORS: ACHING

## 2017-09-23 ASSESSMENT — PAIN DESCRIPTION - FREQUENCY
FREQUENCY: CONTINUOUS

## 2017-09-23 ASSESSMENT — PAIN DESCRIPTION - LOCATION
LOCATION: ANKLE
LOCATION: ANKLE
LOCATION: ANKLE;ABDOMEN

## 2017-09-24 ENCOUNTER — APPOINTMENT (OUTPATIENT)
Dept: GENERAL RADIOLOGY | Age: 32
DRG: 313 | End: 2017-09-24
Payer: MEDICAID

## 2017-09-24 LAB
HCT VFR BLD CALC: 25.1 % (ref 37–47)
HEMOGLOBIN: 8.2 GM/DL (ref 12–16)
MCH RBC QN AUTO: 26.7 PG (ref 27–31)
MCHC RBC AUTO-ENTMCNC: 32.7 GM/DL (ref 33–37)
MCV RBC AUTO: 81.5 FL (ref 81–99)
PDW BLD-RTO: 16.6 % (ref 11.5–14.5)
PLATELET # BLD: 244 THOU/MM3 (ref 130–400)
PMV BLD AUTO: 8.7 MCM (ref 7.4–10.4)
RBC # BLD: 3.08 MILL/MM3 (ref 4.2–5.4)
WBC # BLD: 11.8 THOU/MM3 (ref 4.8–10.8)

## 2017-09-24 PROCEDURE — 1200000003 HC TELEMETRY R&B

## 2017-09-24 PROCEDURE — 94640 AIRWAY INHALATION TREATMENT: CPT

## 2017-09-24 PROCEDURE — 6360000002 HC RX W HCPCS: Performed by: NURSE PRACTITIONER

## 2017-09-24 PROCEDURE — 85027 COMPLETE CBC AUTOMATED: CPT

## 2017-09-24 PROCEDURE — 6370000000 HC RX 637 (ALT 250 FOR IP): Performed by: NURSE PRACTITIONER

## 2017-09-24 PROCEDURE — 97110 THERAPEUTIC EXERCISES: CPT

## 2017-09-24 PROCEDURE — 2580000003 HC RX 258: Performed by: NURSE PRACTITIONER

## 2017-09-24 PROCEDURE — 36415 COLL VENOUS BLD VENIPUNCTURE: CPT

## 2017-09-24 PROCEDURE — 99232 SBSQ HOSP IP/OBS MODERATE 35: CPT | Performed by: SURGERY

## 2017-09-24 PROCEDURE — 71010 XR CHEST PORTABLE: CPT

## 2017-09-24 PROCEDURE — 6370000000 HC RX 637 (ALT 250 FOR IP): Performed by: SURGERY

## 2017-09-24 PROCEDURE — 94010 BREATHING CAPACITY TEST: CPT

## 2017-09-24 RX ADMIN — IPRATROPIUM BROMIDE AND ALBUTEROL SULFATE 1 AMPULE: .5; 3 SOLUTION RESPIRATORY (INHALATION) at 09:00

## 2017-09-24 RX ADMIN — FAMOTIDINE 20 MG: 20 TABLET, FILM COATED ORAL at 21:53

## 2017-09-24 RX ADMIN — DOCUSATE SODIUM 100 MG: 100 CAPSULE ORAL at 21:53

## 2017-09-24 RX ADMIN — OXYCODONE HYDROCHLORIDE AND ACETAMINOPHEN 2 TABLET: 5; 325 TABLET ORAL at 16:14

## 2017-09-24 RX ADMIN — Medication 325 MG: at 16:00

## 2017-09-24 RX ADMIN — GUAIFENESIN 600 MG: 600 TABLET, EXTENDED RELEASE ORAL at 21:53

## 2017-09-24 RX ADMIN — BACITRACIN: 500 OINTMENT TOPICAL at 08:14

## 2017-09-24 RX ADMIN — POLYETHYLENE GLYCOL 3350 17 G: 17 POWDER, FOR SOLUTION ORAL at 21:53

## 2017-09-24 RX ADMIN — OXYCODONE HYDROCHLORIDE AND ACETAMINOPHEN 2 TABLET: 5; 325 TABLET ORAL at 03:44

## 2017-09-24 RX ADMIN — DOCUSATE SODIUM 100 MG: 100 CAPSULE ORAL at 08:13

## 2017-09-24 RX ADMIN — Medication 10 ML: at 21:53

## 2017-09-24 RX ADMIN — OXYCODONE HYDROCHLORIDE AND ACETAMINOPHEN 2 TABLET: 5; 325 TABLET ORAL at 08:20

## 2017-09-24 RX ADMIN — POLYETHYLENE GLYCOL 3350 17 G: 17 POWDER, FOR SOLUTION ORAL at 08:13

## 2017-09-24 RX ADMIN — ONDANSETRON 4 MG: 2 INJECTION INTRAMUSCULAR; INTRAVENOUS at 08:23

## 2017-09-24 RX ADMIN — FAMOTIDINE 20 MG: 20 TABLET, FILM COATED ORAL at 08:13

## 2017-09-24 RX ADMIN — GUAIFENESIN 600 MG: 600 TABLET, EXTENDED RELEASE ORAL at 08:13

## 2017-09-24 RX ADMIN — OXYCODONE HYDROCHLORIDE AND ACETAMINOPHEN 2 TABLET: 5; 325 TABLET ORAL at 21:53

## 2017-09-24 RX ADMIN — ENOXAPARIN SODIUM 40 MG: 40 INJECTION SUBCUTANEOUS at 08:30

## 2017-09-24 RX ADMIN — BACITRACIN: 500 OINTMENT TOPICAL at 21:53

## 2017-09-24 RX ADMIN — IPRATROPIUM BROMIDE AND ALBUTEROL SULFATE 1 AMPULE: .5; 3 SOLUTION RESPIRATORY (INHALATION) at 17:36

## 2017-09-24 RX ADMIN — BACITRACIN: 500 OINTMENT TOPICAL at 15:29

## 2017-09-24 RX ADMIN — Medication 10 ML: at 08:14

## 2017-09-24 RX ADMIN — SENNA 34.4 MG: 8.6 TABLET, COATED ORAL at 21:53

## 2017-09-24 RX ADMIN — IPRATROPIUM BROMIDE AND ALBUTEROL SULFATE 1 AMPULE: .5; 3 SOLUTION RESPIRATORY (INHALATION) at 13:26

## 2017-09-24 RX ADMIN — Medication 325 MG: at 08:13

## 2017-09-24 ASSESSMENT — PAIN DESCRIPTION - ORIENTATION
ORIENTATION: LEFT

## 2017-09-24 ASSESSMENT — PAIN DESCRIPTION - PAIN TYPE
TYPE: ACUTE PAIN;SURGICAL PAIN

## 2017-09-24 ASSESSMENT — PAIN DESCRIPTION - FREQUENCY
FREQUENCY: INTERMITTENT
FREQUENCY: CONTINUOUS

## 2017-09-24 ASSESSMENT — PAIN DESCRIPTION - LOCATION
LOCATION: ANKLE

## 2017-09-24 ASSESSMENT — PAIN SCALES - GENERAL
PAINLEVEL_OUTOF10: 6
PAINLEVEL_OUTOF10: 4
PAINLEVEL_OUTOF10: 7
PAINLEVEL_OUTOF10: 5
PAINLEVEL_OUTOF10: 7
PAINLEVEL_OUTOF10: 7
PAINLEVEL_OUTOF10: 8
PAINLEVEL_OUTOF10: 8
PAINLEVEL_OUTOF10: 7

## 2017-09-24 ASSESSMENT — PAIN DESCRIPTION - ONSET
ONSET: ON-GOING

## 2017-09-24 ASSESSMENT — PAIN DESCRIPTION - DESCRIPTORS
DESCRIPTORS: ACHING

## 2017-09-24 ASSESSMENT — PAIN DESCRIPTION - PROGRESSION
CLINICAL_PROGRESSION: NOT CHANGED

## 2017-09-25 VITALS
BODY MASS INDEX: 51.91 KG/M2 | WEIGHT: 293 LBS | DIASTOLIC BLOOD PRESSURE: 67 MMHG | TEMPERATURE: 98 F | HEART RATE: 88 BPM | OXYGEN SATURATION: 95 % | RESPIRATION RATE: 20 BRPM | HEIGHT: 63 IN | SYSTOLIC BLOOD PRESSURE: 147 MMHG

## 2017-09-25 PROCEDURE — 99239 HOSP IP/OBS DSCHRG MGMT >30: CPT | Performed by: SURGERY

## 2017-09-25 PROCEDURE — 6370000000 HC RX 637 (ALT 250 FOR IP): Performed by: SURGERY

## 2017-09-25 PROCEDURE — 94640 AIRWAY INHALATION TREATMENT: CPT

## 2017-09-25 PROCEDURE — 97530 THERAPEUTIC ACTIVITIES: CPT

## 2017-09-25 PROCEDURE — 2580000003 HC RX 258: Performed by: NURSE PRACTITIONER

## 2017-09-25 PROCEDURE — 97110 THERAPEUTIC EXERCISES: CPT

## 2017-09-25 PROCEDURE — 6370000000 HC RX 637 (ALT 250 FOR IP): Performed by: NURSE PRACTITIONER

## 2017-09-25 PROCEDURE — 94010 BREATHING CAPACITY TEST: CPT

## 2017-09-25 PROCEDURE — 6360000002 HC RX W HCPCS: Performed by: NURSE PRACTITIONER

## 2017-09-25 PROCEDURE — 97535 SELF CARE MNGMENT TRAINING: CPT

## 2017-09-25 PROCEDURE — 99999 PR OFFICE/OUTPT VISIT,PROCEDURE ONLY: CPT | Performed by: NURSE PRACTITIONER

## 2017-09-25 RX ORDER — GUAIFENESIN 600 MG/1
600 TABLET, EXTENDED RELEASE ORAL 2 TIMES DAILY
Qty: 14 TABLET | Refills: 0 | DISCHARGE
Start: 2017-09-25 | End: 2017-12-20

## 2017-09-25 RX ORDER — BISACODYL 10 MG
10 SUPPOSITORY, RECTAL RECTAL DAILY PRN
DISCHARGE
Start: 2017-09-25 | End: 2017-10-25

## 2017-09-25 RX ORDER — FERROUS SULFATE 325(65) MG
325 TABLET ORAL 2 TIMES DAILY WITH MEALS
Qty: 30 TABLET | Refills: 3 | DISCHARGE
Start: 2017-09-25 | End: 2017-12-20

## 2017-09-25 RX ORDER — ATROPA BELLADONNA AND OPIUM 16.2; 3 MG/1; MG/1
30 SUPPOSITORY RECTAL EVERY 8 HOURS PRN
Qty: 5 SUPPOSITORY | Refills: 0 | Status: SHIPPED | OUTPATIENT
Start: 2017-09-25 | End: 2017-12-20

## 2017-09-25 RX ORDER — SENNA PLUS 8.6 MG/1
4 TABLET ORAL NIGHTLY
DISCHARGE
Start: 2017-09-25 | End: 2017-10-25

## 2017-09-25 RX ORDER — CIPROFLOXACIN 500 MG/1
500 TABLET, FILM COATED ORAL 2 TIMES DAILY
Qty: 14 TABLET | Refills: 0 | DISCHARGE
Start: 2017-09-25 | End: 2017-10-02

## 2017-09-25 RX ORDER — CYCLOBENZAPRINE HCL 5 MG
5 TABLET ORAL 3 TIMES DAILY PRN
DISCHARGE
Start: 2017-09-25 | End: 2017-10-05

## 2017-09-25 RX ORDER — CIPROFLOXACIN 2 MG/ML
400 INJECTION, SOLUTION INTRAVENOUS EVERY 12 HOURS
Status: DISCONTINUED | OUTPATIENT
Start: 2017-09-25 | End: 2017-09-25 | Stop reason: HOSPADM

## 2017-09-25 RX ORDER — POLYETHYLENE GLYCOL 3350 17 G/17G
17 POWDER, FOR SOLUTION ORAL 2 TIMES DAILY
Qty: 527 G | Refills: 1 | DISCHARGE
Start: 2017-09-25 | End: 2017-10-25

## 2017-09-25 RX ORDER — PSEUDOEPHEDRINE HCL 30 MG
100 TABLET ORAL 2 TIMES DAILY
DISCHARGE
Start: 2017-09-25 | End: 2017-12-20

## 2017-09-25 RX ADMIN — FAMOTIDINE 20 MG: 20 TABLET, FILM COATED ORAL at 13:14

## 2017-09-25 RX ADMIN — OXYCODONE HYDROCHLORIDE AND ACETAMINOPHEN 2 TABLET: 5; 325 TABLET ORAL at 16:11

## 2017-09-25 RX ADMIN — OXYCODONE HYDROCHLORIDE AND ACETAMINOPHEN 2 TABLET: 5; 325 TABLET ORAL at 10:06

## 2017-09-25 RX ADMIN — CIPROFLOXACIN 400 MG: 2 INJECTION, SOLUTION INTRAVENOUS at 13:14

## 2017-09-25 RX ADMIN — DOCUSATE SODIUM 100 MG: 100 CAPSULE ORAL at 10:06

## 2017-09-25 RX ADMIN — POLYETHYLENE GLYCOL 3350 17 G: 17 POWDER, FOR SOLUTION ORAL at 13:14

## 2017-09-25 RX ADMIN — Medication 10 ML: at 09:48

## 2017-09-25 RX ADMIN — Medication 325 MG: at 13:14

## 2017-09-25 RX ADMIN — IPRATROPIUM BROMIDE AND ALBUTEROL SULFATE 1 AMPULE: .5; 3 SOLUTION RESPIRATORY (INHALATION) at 09:30

## 2017-09-25 RX ADMIN — BACITRACIN: 500 OINTMENT TOPICAL at 16:11

## 2017-09-25 RX ADMIN — GUAIFENESIN 600 MG: 600 TABLET, EXTENDED RELEASE ORAL at 13:14

## 2017-09-25 RX ADMIN — BISACODYL 10 MG: 10 SUPPOSITORY RECTAL at 14:48

## 2017-09-25 RX ADMIN — BACITRACIN: 500 OINTMENT TOPICAL at 10:06

## 2017-09-25 RX ADMIN — OXYCODONE HYDROCHLORIDE AND ACETAMINOPHEN 2 TABLET: 5; 325 TABLET ORAL at 04:39

## 2017-09-25 ASSESSMENT — PAIN DESCRIPTION - LOCATION
LOCATION: ANKLE
LOCATION: ANKLE
LOCATION: CHEST
LOCATION: CHEST
LOCATION: ANKLE;CHEST

## 2017-09-25 ASSESSMENT — PAIN DESCRIPTION - ORIENTATION
ORIENTATION: LEFT
ORIENTATION: LEFT
ORIENTATION: LEFT;RIGHT
ORIENTATION: RIGHT

## 2017-09-25 ASSESSMENT — PAIN DESCRIPTION - FREQUENCY
FREQUENCY: INTERMITTENT

## 2017-09-25 ASSESSMENT — PAIN SCALES - GENERAL
PAINLEVEL_OUTOF10: 7
PAINLEVEL_OUTOF10: 2
PAINLEVEL_OUTOF10: 2
PAINLEVEL_OUTOF10: 3
PAINLEVEL_OUTOF10: 7
PAINLEVEL_OUTOF10: 8
PAINLEVEL_OUTOF10: 7
PAINLEVEL_OUTOF10: 7

## 2017-09-25 ASSESSMENT — PAIN DESCRIPTION - DESCRIPTORS
DESCRIPTORS: ACHING
DESCRIPTORS: ACHING;SHARP
DESCRIPTORS: ACHING

## 2017-09-25 ASSESSMENT — PAIN DESCRIPTION - PAIN TYPE
TYPE: ACUTE PAIN;SURGICAL PAIN

## 2017-09-25 ASSESSMENT — PAIN DESCRIPTION - ONSET
ONSET: ON-GOING
ONSET: ON-GOING

## 2017-09-26 ENCOUNTER — TELEPHONE (OUTPATIENT)
Dept: FAMILY MEDICINE CLINIC | Age: 32
End: 2017-09-26

## 2017-09-26 LAB
ORGANISM: ABNORMAL
ORGANISM: ABNORMAL
URINE CULTURE REFLEX: ABNORMAL
URINE CULTURE REFLEX: ABNORMAL

## 2017-09-27 ENCOUNTER — TELEPHONE (OUTPATIENT)
Dept: FAMILY MEDICINE CLINIC | Age: 32
End: 2017-09-27

## 2017-09-27 LAB
EKG ATRIAL RATE: 85 BPM
EKG P AXIS: 34 DEGREES
EKG P-R INTERVAL: 138 MS
EKG Q-T INTERVAL: 418 MS
EKG QRS DURATION: 84 MS
EKG QTC CALCULATION (BAZETT): 497 MS
EKG R AXIS: 52 DEGREES
EKG T AXIS: 37 DEGREES
EKG VENTRICULAR RATE: 85 BPM

## 2017-11-16 ENCOUNTER — HOSPITAL ENCOUNTER (OUTPATIENT)
Dept: CT IMAGING | Age: 32
Discharge: HOME | End: 2017-11-16
Payer: MEDICAID

## 2017-11-16 DIAGNOSIS — J98.4 DISEASE OF LUNG: ICD-10-CM

## 2017-11-16 PROCEDURE — 71250 CT THORAX DX C-: CPT

## 2017-12-18 PROBLEM — S29.019A: Status: ACTIVE | Noted: 2017-09-18
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
## 2017-12-20 ENCOUNTER — OFFICE VISIT (OUTPATIENT)
Dept: SURGERY | Age: 32
End: 2017-12-20
Payer: MEDICAID

## 2017-12-20 VITALS
HEIGHT: 63 IN | DIASTOLIC BLOOD PRESSURE: 76 MMHG | OXYGEN SATURATION: 96 % | TEMPERATURE: 98.7 F | HEART RATE: 88 BPM | RESPIRATION RATE: 20 BRPM | SYSTOLIC BLOOD PRESSURE: 134 MMHG | WEIGHT: 293 LBS | BODY MASS INDEX: 51.91 KG/M2

## 2017-12-20 DIAGNOSIS — S22.41XA CLOSED FRACTURE OF MULTIPLE RIBS OF RIGHT SIDE, INITIAL ENCOUNTER: ICD-10-CM

## 2017-12-20 DIAGNOSIS — S82.892A CLOSED FRACTURE OF LEFT ANKLE, INITIAL ENCOUNTER: ICD-10-CM

## 2017-12-20 DIAGNOSIS — M79.89 FAT NECROSIS DUE TO TRAUMA: ICD-10-CM
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
## 2017-12-20 DIAGNOSIS — S29.011A RUPTURE OF PECTORALIS MAJOR MUSCLE, INITIAL ENCOUNTER: ICD-10-CM

## 2017-12-20 DIAGNOSIS — E66.01 MORBID OBESITY WITH BMI OF 60.0-69.9, ADULT (HCC): ICD-10-CM

## 2017-12-20 DIAGNOSIS — S29.019A: Primary | ICD-10-CM

## 2017-12-20 PROCEDURE — 99213 OFFICE O/P EST LOW 20 MIN: CPT | Performed by: SURGERY

## 2017-12-20 ASSESSMENT — ENCOUNTER SYMPTOMS
SORE THROAT: 0
COUGH: 0
CHEST TIGHTNESS: 0
FACIAL SWELLING: 0
CHOKING: 0
STRIDOR: 0
DIARRHEA: 0
NAUSEA: 0
COLOR CHANGE: 0
APNEA: 0
CONSTIPATION: 0
VOMITING: 0
RHINORRHEA: 0
VOICE CHANGE: 0
EYE ITCHING: 0
ANAL BLEEDING: 0
RECTAL PAIN: 0
EYE REDNESS: 0
EYE DISCHARGE: 0
SINUS PRESSURE: 0
BACK PAIN: 0
TROUBLE SWALLOWING: 0
EYE PAIN: 0
BLOOD IN STOOL: 0
PHOTOPHOBIA: 0
ABDOMINAL DISTENTION: 0
WHEEZING: 0
ABDOMINAL PAIN: 0
SHORTNESS OF BREATH: 1

## 2018-02-01 ENCOUNTER — HOSPITAL ENCOUNTER (EMERGENCY)
Age: 33
Discharge: HOME | End: 2018-02-01
Attending: NURSE PRACTITIONER
Payer: MEDICAID

## 2018-02-01 VITALS
DIASTOLIC BLOOD PRESSURE: 75 MMHG | WEIGHT: 293 LBS | BODY MASS INDEX: 51.91 KG/M2 | SYSTOLIC BLOOD PRESSURE: 191 MMHG | OXYGEN SATURATION: 98 % | HEIGHT: 63 IN | TEMPERATURE: 98.1 F | RESPIRATION RATE: 22 BRPM | HEART RATE: 68 BPM

## 2018-02-01 DIAGNOSIS — J06.9 VIRAL UPPER RESPIRATORY TRACT INFECTION WITH COUGH: Primary | ICD-10-CM

## 2018-02-01 LAB
FLU A ANTIGEN: NEGATIVE
FLU B ANTIGEN: NEGATIVE

## 2018-02-01 PROCEDURE — 99213 OFFICE O/P EST LOW 20 MIN: CPT

## 2018-02-01 PROCEDURE — 87804 INFLUENZA ASSAY W/OPTIC: CPT

## 2018-02-01 PROCEDURE — 99213 OFFICE O/P EST LOW 20 MIN: CPT | Performed by: NURSE PRACTITIONER

## 2018-02-01 RX ORDER — BENZONATATE 100 MG/1
100 CAPSULE ORAL 3 TIMES DAILY PRN
Qty: 21 CAPSULE | Refills: 0 | Status: SHIPPED | OUTPATIENT
Start: 2018-02-01 | End: 2018-02-08

## 2018-02-01 ASSESSMENT — ENCOUNTER SYMPTOMS
ABDOMINAL PAIN: 1
SHORTNESS OF BREATH: 0
COUGH: 1
WHEEZING: 0
SORE THROAT: 0
SINUS PAIN: 0
SINUS PRESSURE: 0

## 2018-02-02 NOTE — ED PROVIDER NOTES
Vitals:    02/01/18 1930   BP: (!) 191/75   Pulse: 68   Resp: 22   Temp: 98.1 °F (36.7 °C)   TempSrc: Temporal   SpO2: 98%   Weight: (!) 403 lb (182.8 kg)   Height: 5' 3\" (1.6 m)       Medications - No data to display  PROCEDURES:  None  FINAL IMPRESSION      1. Viral upper respiratory tract infection with cough        DISPOSITION/PLAN   DISPOSITION    Lab results are negative for influenza. Exam is consistent with a viral upper respiratory tract infection with cough. Patient was encouraged to push clear liquids. she will be given a prescription for Tessalon Perles for cough. Follow-up with her primary care provider if symptoms aren't improving.     PATIENT REFERRED TO:  Cole Soriano NP  [address] [place], [place] [phone]    Schedule an appointment as soon as possible for a visit   As needed    DISCHARGE MEDICATIONS:  Discharge Medication List as of 2/1/2018  8:05 PM      START taking these medications    Details   benzonatate (TESSALON PERLES) 100 MG capsule Take 1 capsule by mouth 3 times daily as needed for Cough, Disp-21 capsule, R-0Print           Discharge Medication List as of 2/1/2018  8:05 PM          Rudi Goldman, [facility] [address], CNP  02/01/18 7321

## 2018-02-16 ENCOUNTER — HOSPITAL ENCOUNTER (EMERGENCY)
Dept: GENERAL RADIOLOGY | Age: 33
Discharge: HOME | End: 2018-02-16
Payer: MEDICAID

## 2018-02-16 ENCOUNTER — HOSPITAL ENCOUNTER (EMERGENCY)
Age: 33
Discharge: HOME | End: 2018-02-16
Payer: MEDICAID

## 2018-02-16 VITALS
OXYGEN SATURATION: 98 % | SYSTOLIC BLOOD PRESSURE: 166 MMHG | BODY MASS INDEX: 51.91 KG/M2 | HEIGHT: 63 IN | DIASTOLIC BLOOD PRESSURE: 79 MMHG | TEMPERATURE: 96.7 F | RESPIRATION RATE: 20 BRPM | WEIGHT: 293 LBS | HEART RATE: 74 BPM

## 2018-02-16 DIAGNOSIS — M25.561 ACUTE PAIN OF RIGHT KNEE: Primary | ICD-10-CM

## 2018-02-16 PROCEDURE — 99214 OFFICE O/P EST MOD 30 MIN: CPT

## 2018-02-16 PROCEDURE — 73564 X-RAY EXAM KNEE 4 OR MORE: CPT

## 2018-02-16 PROCEDURE — 99214 OFFICE O/P EST MOD 30 MIN: CPT | Performed by: NURSE PRACTITIONER

## 2018-02-16 ASSESSMENT — PAIN DESCRIPTION - PAIN TYPE: TYPE: ACUTE PAIN

## 2018-02-16 ASSESSMENT — ENCOUNTER SYMPTOMS
NAUSEA: 0
SHORTNESS OF BREATH: 0
DIARRHEA: 0
VOMITING: 0
COUGH: 0

## 2018-02-16 ASSESSMENT — PAIN SCALES - GENERAL: PAINLEVEL_OUTOF10: 10

## 2018-02-16 ASSESSMENT — PAIN DESCRIPTION - FREQUENCY: FREQUENCY: CONTINUOUS

## 2018-02-16 ASSESSMENT — PAIN DESCRIPTION - ORIENTATION: ORIENTATION: RIGHT

## 2018-02-16 ASSESSMENT — PAIN DESCRIPTION - ONSET: ONSET: ON-GOING

## 2018-02-16 ASSESSMENT — PAIN DESCRIPTION - LOCATION: LOCATION: KNEE

## 2018-02-16 ASSESSMENT — PAIN DESCRIPTION - DESCRIPTORS: DESCRIPTORS: ACHING

## 2018-02-16 ASSESSMENT — PAIN DESCRIPTION - PROGRESSION: CLINICAL_PROGRESSION: NOT CHANGED

## 2018-02-16 NOTE — ED PROVIDER NOTES
Neurological: She is alert and oriented to person, place, and time. Skin: Skin is warm and dry. Psychiatric: She has a normal mood and affect. Her behavior is normal. Judgment and thought content normal.       DIAGNOSTIC RESULTS   Labs:No results found for this visit on 02/16/18. IMAGING:    XR KNEE RIGHT (MIN 4 VIEWS)   Final Result    No acute fracture. **This report has been created using voice recognition software. It may contain minor errors which are inherent in voice recognition technology. **      Final report electronically signed by Dr. Adriano Liu on 2/16/2018 6:35 PM            URGENT CARE COURSE:     Vitals:    02/16/18 1753   BP: (!) 166/79   Pulse: 74   Resp: 20   Temp: 96.7 °F (35.9 °C)   TempSrc: Oral   SpO2: 98%   Weight: (!) 411 lb (186.4 kg)   Height: 5' 3\" (1.6 m)       Medications - No data to display    ED Course        PROCEDURES:  None    FINAL IMPRESSION      1. Acute pain of right knee          DISPOSITION/PLAN   DISPOSITION Decision To Discharge 02/16/2018 07:07:55 PM  May ice right knee 3-4 times a day for 20 minutes each time for the next 48 hours. Ace wrap to right knee and stability. Tylenol or Motrin for pain as needed. Go to ER if unable to walk or falling, increasing pain, fever greater than 102°F, or difficulty breathing. Patient had possible dislocation of patella which appeared to come back in place by itself. Ace wrap applied to right knee patient advised to wear while up trying to ambulate for more support. Appointment made for her to see physician at [facility] this coming Monday. All questions answered. Patient discharged from the [facility] in good condition.       PATIENT REFERRED TO:  Ajith Gallagher NP  [facility], [address]  [facility] [address] [phone]    In 1 week  As needed, If symptoms worsen    [address] [place] [address] [phone]  In 3 days        DISCHARGE MEDICATIONS:  Discharge Medication

## 2018-02-17 NOTE — ED NOTES
Pt discharge teaching taught via teach back method. Ace wrap applied to right knee. Talked with pt about monitoring for pulses and making sure ace was not to tight. Pt voiced understanding. Pt ambulated to leave, rr easy and unlabored.      Kirit Damon RN  02/16/18 6590

## 2018-05-23 ENCOUNTER — HOSPITAL ENCOUNTER (OUTPATIENT)
Dept: MRI IMAGING | Age: 33
Discharge: HOME | End: 2018-05-23
Payer: MEDICAID

## 2018-05-23 DIAGNOSIS — M84.675A PATHOLOGICAL FRACTURE IN OTHER DISEASE, LEFT FOOT, INITIAL ENCOUNTER FOR FRACTURE: ICD-10-CM

## 2018-05-23 DIAGNOSIS — M79.672 LEFT FOOT PAIN: ICD-10-CM

## 2018-05-23 PROCEDURE — 73718 MRI LOWER EXTREMITY W/O DYE: CPT

## 2018-07-03 ENCOUNTER — TELEPHONE (OUTPATIENT)
Dept: FAMILY MEDICINE CLINIC | Age: 33
End: 2018-07-03

## 2018-07-03 DIAGNOSIS — N63.10 BREAST MASS, RIGHT: Primary | ICD-10-CM

## 2018-07-09 ENCOUNTER — HOSPITAL ENCOUNTER (OUTPATIENT)
Dept: WOMENS IMAGING | Age: 33
Discharge: HOME | End: 2018-07-09
Payer: MEDICAID

## 2018-07-09 DIAGNOSIS — N63.10 BREAST MASS, RIGHT: ICD-10-CM

## 2018-07-09 DIAGNOSIS — N63.0 BREAST LUMP: ICD-10-CM

## 2018-07-09 PROCEDURE — 76642 ULTRASOUND BREAST LIMITED: CPT

## 2018-07-09 PROCEDURE — G0279 TOMOSYNTHESIS, MAMMO: HCPCS

## 2018-07-10 ENCOUNTER — OFFICE VISIT (OUTPATIENT)
Dept: FAMILY MEDICINE CLINIC | Age: 33
End: 2018-07-10
Payer: MEDICAID

## 2018-07-10 VITALS
OXYGEN SATURATION: 98 % | TEMPERATURE: 98.3 F | BODY MASS INDEX: 51.91 KG/M2 | SYSTOLIC BLOOD PRESSURE: 124 MMHG | HEIGHT: 63 IN | RESPIRATION RATE: 16 BRPM | WEIGHT: 293 LBS | DIASTOLIC BLOOD PRESSURE: 76 MMHG | HEART RATE: 80 BPM

## 2018-07-10 DIAGNOSIS — R05.9 COUGH: ICD-10-CM

## 2018-07-10 DIAGNOSIS — J31.0 RHINOSINUSITIS: Primary | ICD-10-CM

## 2018-07-10 DIAGNOSIS — J32.9 RHINOSINUSITIS: Primary | ICD-10-CM

## 2018-07-10 DIAGNOSIS — Z30.42 ENCOUNTER FOR SURVEILLANCE OF INJECTABLE CONTRACEPTIVE: ICD-10-CM

## 2018-07-10 PROCEDURE — G8417 CALC BMI ABV UP PARAM F/U: HCPCS | Performed by: NURSE PRACTITIONER

## 2018-07-10 PROCEDURE — G8427 DOCREV CUR MEDS BY ELIG CLIN: HCPCS | Performed by: NURSE PRACTITIONER

## 2018-07-10 PROCEDURE — 81025 URINE PREGNANCY TEST: CPT | Performed by: NURSE PRACTITIONER

## 2018-07-10 PROCEDURE — 1036F TOBACCO NON-USER: CPT | Performed by: NURSE PRACTITIONER

## 2018-07-10 PROCEDURE — 99213 OFFICE O/P EST LOW 20 MIN: CPT | Performed by: NURSE PRACTITIONER

## 2018-07-10 RX ORDER — AMOXICILLIN AND CLAVULANATE POTASSIUM 875; 125 MG/1; MG/1
1 TABLET, FILM COATED ORAL 2 TIMES DAILY WITH MEALS
Qty: 20 TABLET | Refills: 0 | Status: SHIPPED | OUTPATIENT
Start: 2018-07-10 | End: 2018-07-20

## 2018-07-10 RX ORDER — BENZONATATE 100 MG/1
200 CAPSULE ORAL 3 TIMES DAILY PRN
Qty: 28 CAPSULE | Refills: 0 | Status: SHIPPED | OUTPATIENT
Start: 2018-07-10 | End: 2018-07-17

## 2018-07-10 RX ORDER — MEDROXYPROGESTERONE ACETATE 150 MG/ML
150 INJECTION, SUSPENSION INTRAMUSCULAR ONCE
Qty: 1 ML | Refills: 0 | Status: SHIPPED | OUTPATIENT
Start: 2018-07-10 | End: 2019-02-19

## 2018-07-10 RX ORDER — MEDROXYPROGESTERONE ACETATE 150 MG/ML
150 INJECTION, SUSPENSION INTRAMUSCULAR ONCE
Qty: 1 ML | Refills: 0 | Status: SHIPPED | OUTPATIENT
Start: 2018-07-10 | End: 2018-07-10 | Stop reason: SDUPTHER

## 2018-07-10 ASSESSMENT — PATIENT HEALTH QUESTIONNAIRE - PHQ9
2. FEELING DOWN, DEPRESSED OR HOPELESS: 0
SUM OF ALL RESPONSES TO PHQ QUESTIONS 1-9: 0
1. LITTLE INTEREST OR PLEASURE IN DOING THINGS: 0
SUM OF ALL RESPONSES TO PHQ9 QUESTIONS 1 & 2: 0

## 2018-07-10 ASSESSMENT — ENCOUNTER SYMPTOMS
SORE THROAT: 1
SHORTNESS OF BREATH: 0
NAUSEA: 0
RHINORRHEA: 1
COUGH: 1
ABDOMINAL PAIN: 0

## 2018-07-13 ENCOUNTER — NURSE ONLY (OUTPATIENT)
Dept: FAMILY MEDICINE CLINIC | Age: 33
End: 2018-07-13
Payer: MEDICAID

## 2018-07-13 DIAGNOSIS — Z30.42 FAMILY PLANNING, DEPO-PROVERA CONTRACEPTION MONITORING/ADMINISTRATION: Primary | ICD-10-CM

## 2018-07-13 PROCEDURE — 96372 THER/PROPH/DIAG INJ SC/IM: CPT | Performed by: NURSE PRACTITIONER

## 2018-07-13 RX ORDER — MEDROXYPROGESTERONE ACETATE 150 MG/ML
150 INJECTION, SUSPENSION INTRAMUSCULAR ONCE
Status: COMPLETED | OUTPATIENT
Start: 2018-07-13 | End: 2018-07-13

## 2018-07-13 RX ADMIN — MEDROXYPROGESTERONE ACETATE 150 MG: 150 INJECTION, SUSPENSION INTRAMUSCULAR at 10:56

## 2018-07-13 NOTE — PROGRESS NOTES
Administrations This Visit     medroxyPROGESTERone (DEPO-PROVERA) injection 150 mg     Admin Date  07/13/2018  10:56 Action  Given Dose  150 mg Route  Intramuscular Site  Dorsogluteal Right Administered By  Jamee Choudhury CMA ([facility])    Ordering Provider:  MALCOM Anders CNP    NDC:  0740-2995-56    Lot#:  [id number]    :  AMPHASTAR-IMS    Patient Supplied?:  Yes    Comments:  Patient tolerated well

## 2018-07-19 ENCOUNTER — HOSPITAL ENCOUNTER (OUTPATIENT)
Dept: GENERAL RADIOLOGY | Age: 33
Discharge: HOME | End: 2018-07-19
Payer: MEDICAID

## 2018-07-19 ENCOUNTER — HOSPITAL ENCOUNTER (OUTPATIENT)
Age: 33
Discharge: HOME | End: 2018-07-19
Payer: MEDICAID

## 2018-07-19 ENCOUNTER — OFFICE VISIT (OUTPATIENT)
Dept: FAMILY MEDICINE CLINIC | Age: 33
End: 2018-07-19
Payer: MEDICAID

## 2018-07-19 VITALS
SYSTOLIC BLOOD PRESSURE: 134 MMHG | BODY MASS INDEX: 51.91 KG/M2 | TEMPERATURE: 98.1 F | RESPIRATION RATE: 16 BRPM | HEIGHT: 63 IN | HEART RATE: 76 BPM | WEIGHT: 293 LBS | DIASTOLIC BLOOD PRESSURE: 70 MMHG

## 2018-07-19 DIAGNOSIS — M79.672 LEFT FOOT PAIN: ICD-10-CM

## 2018-07-19 DIAGNOSIS — Z01.818 PREOP EXAMINATION: Primary | ICD-10-CM

## 2018-07-19 DIAGNOSIS — Z01.818 PREOP EXAMINATION: ICD-10-CM

## 2018-07-19 DIAGNOSIS — E66.01 MORBID OBESITY WITH BMI OF 70 AND OVER, ADULT (HCC): ICD-10-CM

## 2018-07-19 PROBLEM — S22.41XA CLOSED FRACTURE OF MULTIPLE RIBS OF RIGHT SIDE: Status: RESOLVED | Noted: 2017-09-21 | Resolved: 2018-07-19

## 2018-07-19 PROBLEM — S29.011A RUPTURE OF PECTORALIS MAJOR MUSCLE: Status: RESOLVED | Noted: 2017-09-18 | Resolved: 2018-07-19

## 2018-07-19 PROBLEM — S30.811A ABRASION OF ABDOMINAL WALL: Status: RESOLVED | Noted: 2017-09-18 | Resolved: 2018-07-19

## 2018-07-19 PROBLEM — S20.01XA CONTUSION OF RIGHT BREAST: Status: RESOLVED | Noted: 2017-09-18 | Resolved: 2018-07-19

## 2018-07-19 PROBLEM — S40.212A ABRASION OF LEFT SHOULDER: Status: RESOLVED | Noted: 2017-09-18 | Resolved: 2018-07-19
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
## 2018-07-19 PROBLEM — M79.89 FAT NECROSIS DUE TO TRAUMA: Status: RESOLVED | Noted: 2017-12-20 | Resolved: 2018-07-19

## 2018-07-19 PROBLEM — Z91.89 AT HIGH RISK FOR PAIN FROM PROCEDURE: Status: RESOLVED | Noted: 2017-09-19 | Resolved: 2018-07-19

## 2018-07-19 PROBLEM — D62 ACUTE BLOOD LOSS ANEMIA: Status: RESOLVED | Noted: 2017-09-21 | Resolved: 2018-07-19

## 2018-07-19 PROBLEM — S29.019A: Status: RESOLVED | Noted: 2017-09-18 | Resolved: 2018-07-19

## 2018-07-19 PROBLEM — S70.212A: Status: RESOLVED | Noted: 2017-09-18 | Resolved: 2018-07-19

## 2018-07-19 PROBLEM — S70.211A ABRASION OF RIGHT HIP: Status: RESOLVED | Noted: 2017-09-18 | Resolved: 2018-07-19

## 2018-07-19 PROBLEM — S71.131A: Status: RESOLVED | Noted: 2017-09-18 | Resolved: 2018-07-19

## 2018-07-19 LAB
ANION GAP SERPL CALCULATED.3IONS-SCNC: 14 MEQ/L (ref 8–16)
AVERAGE GLUCOSE: 99 MG/DL (ref 70–126)
BUN BLDV-MCNC: 12 MG/DL (ref 7–22)
CALCIUM SERPL-MCNC: 9 MG/DL (ref 8.5–10.5)
CHLORIDE BLD-SCNC: 105 MEQ/L (ref 98–111)
CO2: 22 MEQ/L (ref 23–33)
CREAT SERPL-MCNC: 0.6 MG/DL (ref 0.4–1.2)
EKG ATRIAL RATE: 82 BPM
EKG P AXIS: 31 DEGREES
EKG P-R INTERVAL: 150 MS
EKG Q-T INTERVAL: 404 MS
EKG QRS DURATION: 98 MS
EKG QTC CALCULATION (BAZETT): 472 MS
EKG R AXIS: 25 DEGREES
EKG T AXIS: 27 DEGREES
EKG VENTRICULAR RATE: 82 BPM
GFR SERPL CREATININE-BSD FRML MDRD: > 90 ML/MIN/1.73M2
GLUCOSE BLD-MCNC: 88 MG/DL (ref 70–108)
HBA1C MFR BLD: 5.3 % (ref 4.4–6.4)
HCT VFR BLD CALC: 41.5 % (ref 37–47)
HEMOGLOBIN: 12.7 GM/DL (ref 12–16)
POTASSIUM SERPL-SCNC: 3.9 MEQ/L (ref 3.5–5.2)
SODIUM BLD-SCNC: 141 MEQ/L (ref 135–145)
VITAMIN D 25-HYDROXY: 11 NG/ML (ref 30–100)

## 2018-07-19 PROCEDURE — 93005 ELECTROCARDIOGRAM TRACING: CPT | Performed by: NURSE PRACTITIONER

## 2018-07-19 PROCEDURE — 80048 BASIC METABOLIC PNL TOTAL CA: CPT

## 2018-07-19 PROCEDURE — G8417 CALC BMI ABV UP PARAM F/U: HCPCS | Performed by: NURSE PRACTITIONER

## 2018-07-19 PROCEDURE — 93010 ELECTROCARDIOGRAM REPORT: CPT | Performed by: INTERNAL MEDICINE

## 2018-07-19 PROCEDURE — 36415 COLL VENOUS BLD VENIPUNCTURE: CPT

## 2018-07-19 PROCEDURE — 71046 X-RAY EXAM CHEST 2 VIEWS: CPT

## 2018-07-19 PROCEDURE — 82306 VITAMIN D 25 HYDROXY: CPT

## 2018-07-19 PROCEDURE — 85018 HEMOGLOBIN: CPT

## 2018-07-19 PROCEDURE — 83036 HEMOGLOBIN GLYCOSYLATED A1C: CPT

## 2018-07-19 PROCEDURE — 99242 OFF/OP CONSLTJ NEW/EST SF 20: CPT | Performed by: NURSE PRACTITIONER

## 2018-07-19 PROCEDURE — 85014 HEMATOCRIT: CPT

## 2018-07-19 PROCEDURE — G8427 DOCREV CUR MEDS BY ELIG CLIN: HCPCS | Performed by: NURSE PRACTITIONER

## 2018-07-19 ASSESSMENT — ENCOUNTER SYMPTOMS
COUGH: 0
SHORTNESS OF BREATH: 0
ABDOMINAL PAIN: 0
NAUSEA: 0
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
## 2018-07-19 NOTE — PROGRESS NOTES
Visit Information    Have you changed or started any medications since your last visit including any over-the-counter medicines, vitamins, or herbal medicines? no   Are you having any side effects from any of your medications? -  no  Have you stopped taking any of your medications? Is so, why? -  no    Have you seen any other physician or provider since your last visit? No  Have you had any other diagnostic tests since your last visit? No  Have you been seen in the emergency room and/or had an admission to a hospital since we last saw you? No  Have you had your routine dental cleaning in the past 6 months? no    Have you activated your Ex[employer] account? If not, what are your barriers?  Yes     Patient Care Team:  MALCOM Rosales CNP as PCP - General (Nurse Practitioner)    Medical History Review  Past Medical, Family, and Social History reviewed and does contribute to the patient presenting condition    Health Maintenance   Topic Date Due    HIV screen  08/26/2000    Cervical cancer screen  08/26/2006    Flu vaccine (1) 09/01/2018    DTaP/Tdap/Td vaccine (2 - Td) 09/18/2027

## 2018-07-20 ENCOUNTER — TELEPHONE (OUTPATIENT)
Dept: FAMILY MEDICINE CLINIC | Age: 33
End: 2018-07-20

## 2018-07-20 DIAGNOSIS — E66.01 MORBID OBESITY WITH BMI OF 70 AND OVER, ADULT (HCC): ICD-10-CM

## 2018-07-20 DIAGNOSIS — R94.31 EKG ABNORMALITY: ICD-10-CM

## 2018-07-20 DIAGNOSIS — E55.9 VITAMIN D DEFICIENCY: Primary | ICD-10-CM

## 2018-07-20 RX ORDER — ERGOCALCIFEROL 1.25 MG/1
50000 CAPSULE ORAL WEEKLY
Qty: 12 CAPSULE | Refills: 1 | Status: SHIPPED | OUTPATIENT
Start: 2018-07-20 | End: 2019-02-19

## 2018-07-20 NOTE — TELEPHONE ENCOUNTER
scheduled appt for Echocardiogram at [facility] 2nd floor   July 26 at 8 am  arrive at 730 am  Left message for pt to ret call

## 2018-07-26 ENCOUNTER — HOSPITAL ENCOUNTER (OUTPATIENT)
Dept: NON INVASIVE DIAGNOSTICS | Age: 33
Discharge: HOME | End: 2018-07-26
Payer: MEDICAID

## 2018-07-26 DIAGNOSIS — E66.01 MORBID OBESITY WITH BMI OF 70 AND OVER, ADULT (HCC): ICD-10-CM

## 2018-07-26 DIAGNOSIS — R94.31 EKG ABNORMALITY: ICD-10-CM

## 2018-07-26 LAB
LV EF: 60 %
LVEF MODALITY: NORMAL

## 2018-07-26 PROCEDURE — 93306 TTE W/DOPPLER COMPLETE: CPT

## 2018-07-26 RX ORDER — IBUPROFEN 400 MG/1
400 TABLET ORAL EVERY 6 HOURS PRN
Status: ON HOLD | COMMUNITY
End: 2018-08-06 | Stop reason: HOSPADM

## 2018-07-26 NOTE — PROGRESS NOTES
In preparation for their surgical procedure above patient was screened for Obstructive Sleep Apnea (SANTIAGO) using the STOP-Bang Questionnaire by the Pre-Admission Testing department. This is a pre-surgical screening tool for patient safety and serves as a recommendation, this WILL NOT cause cancellation of surgery. STOP-Bang Questionnaire  * Do you currently see a pulmonologist?  No     If yes STOP, do not complete. 1.  Do you snore loudly (able to be heard in the next room)? Yes    2. Do you often feel tired or sleepy during the daytime? No       3. Has anyone ever told you that you stop breathing during your sleep? No    4. Do you have or are you being treated for high blood pressure? No      5. BMI more than 35? BMI (Calculated): 74.7        Yes    6. Age over 48 years? 28 y.o. No    7. Neck Circumference greater than 17 inches for male or 16 inches for female? Measured           (visits only)            Not Applicable    8. Gender Male? No      TOTAL SCORE: 2    SANTIAGO - Low Risk : Yes to 0 - 2 questions  SANTIAGO - Intermediate Risk : Yes to 3 - 4 questions  SANTIAGO - High Risk : Yes to 5 - 8 questions    Adapted from:   STOP Questionnaire: A Tool to Screen Patients for Obstructive Sleep Apnea   ELDA Mobley.C.P.C., Gabriela Cruz M.B.B.S., Shefali Quintero M.D., Janet Khan. Alton Estrella, Ph.D., MARIE Chand.B.B.S., MARIE Lee.Sc., Dar Duran M.D., Deloris Roldan. KIRT MajorC.P.C.    Anesthesiology 2008; 665:335-13 Copyright 2008, the [address] of Anesthesiologists, [facility] 37.   ----------------------------------------------------------------------------------------------------------------

## 2018-07-26 NOTE — PROGRESS NOTES
NPO after midnight  [employer] and drivers license  Wear comfortable clean clothing  Do not bring jewelry   Shower night before and morning of surgery with a liquid antibacterial soap  Bring medications in original bottles  Follow all instructions given by your physician   needed at discharge  Call [phone] for any questions

## 2018-08-02 ENCOUNTER — APPOINTMENT (OUTPATIENT)
Dept: GENERAL RADIOLOGY | Age: 33
End: 2018-08-02
Attending: PODIATRIST
Payer: MEDICAID

## 2018-08-02 ENCOUNTER — HOSPITAL ENCOUNTER (OUTPATIENT)
Age: 33
Discharge: NURSING, REHAB OR LONG-TERM CARE | End: 2018-08-06
Attending: PODIATRIST | Admitting: PODIATRIST
Payer: MEDICAID

## 2018-08-02 ENCOUNTER — ANESTHESIA (OUTPATIENT)
Dept: OPERATING ROOM | Age: 33
End: 2018-08-02
Payer: MEDICAID

## 2018-08-02 ENCOUNTER — ANESTHESIA EVENT (OUTPATIENT)
Dept: OPERATING ROOM | Age: 33
End: 2018-08-02
Payer: MEDICAID

## 2018-08-02 VITALS
SYSTOLIC BLOOD PRESSURE: 141 MMHG | OXYGEN SATURATION: 95 % | TEMPERATURE: 98.6 F | DIASTOLIC BLOOD PRESSURE: 69 MMHG | RESPIRATION RATE: 2 BRPM

## 2018-08-02 PROBLEM — M21.6X9 EQUINUS DEFORMITY OF FOOT: Status: ACTIVE | Noted: 2018-08-02

## 2018-08-02 LAB — PREGNANCY, URINE: NEGATIVE

## 2018-08-02 PROCEDURE — 6360000002 HC RX W HCPCS: Performed by: STUDENT IN AN ORGANIZED HEALTH CARE EDUCATION/TRAINING PROGRAM

## 2018-08-02 PROCEDURE — 3600000014 HC SURGERY LEVEL 4 ADDTL 15MIN: Performed by: PODIATRIST

## 2018-08-02 PROCEDURE — 2500000003 HC RX 250 WO HCPCS: Performed by: NURSE ANESTHETIST, CERTIFIED REGISTERED

## 2018-08-02 PROCEDURE — 3209999900 FLUORO FOR SURGICAL PROCEDURES

## 2018-08-02 PROCEDURE — C9359 IMPLNT,BON VOID FILLER-PUTTY: HCPCS | Performed by: PODIATRIST

## 2018-08-02 PROCEDURE — 73600 X-RAY EXAM OF ANKLE: CPT

## 2018-08-02 PROCEDURE — C1713 ANCHOR/SCREW BN/BN,TIS/BN: HCPCS | Performed by: PODIATRIST

## 2018-08-02 PROCEDURE — 2720000010 HC SURG SUPPLY STERILE: Performed by: PODIATRIST

## 2018-08-02 PROCEDURE — 2580000003 HC RX 258: Performed by: STUDENT IN AN ORGANIZED HEALTH CARE EDUCATION/TRAINING PROGRAM

## 2018-08-02 PROCEDURE — 2580000003 HC RX 258: Performed by: NURSE ANESTHETIST, CERTIFIED REGISTERED

## 2018-08-02 PROCEDURE — 2500000003 HC RX 250 WO HCPCS: Performed by: PODIATRIST

## 2018-08-02 PROCEDURE — 81025 URINE PREGNANCY TEST: CPT

## 2018-08-02 PROCEDURE — 6360000002 HC RX W HCPCS: Performed by: NURSE ANESTHETIST, CERTIFIED REGISTERED

## 2018-08-02 PROCEDURE — 6360000002 HC RX W HCPCS: Performed by: ANESTHESIOLOGY

## 2018-08-02 PROCEDURE — 6360000002 HC RX W HCPCS

## 2018-08-02 PROCEDURE — 2709999900 HC NON-CHARGEABLE SUPPLY: Performed by: PODIATRIST

## 2018-08-02 PROCEDURE — 7100000001 HC PACU RECOVERY - ADDTL 15 MIN: Performed by: PODIATRIST

## 2018-08-02 PROCEDURE — 3700000000 HC ANESTHESIA ATTENDED CARE: Performed by: PODIATRIST

## 2018-08-02 PROCEDURE — 3700000001 HC ADD 15 MINUTES (ANESTHESIA): Performed by: PODIATRIST

## 2018-08-02 PROCEDURE — 7100000000 HC PACU RECOVERY - FIRST 15 MIN: Performed by: PODIATRIST

## 2018-08-02 PROCEDURE — 6370000000 HC RX 637 (ALT 250 FOR IP): Performed by: STUDENT IN AN ORGANIZED HEALTH CARE EDUCATION/TRAINING PROGRAM

## 2018-08-02 PROCEDURE — E0749 ELEC OSTEOGEN STIM IMPLANTED: HCPCS | Performed by: PODIATRIST

## 2018-08-02 PROCEDURE — 3600000004 HC SURGERY LEVEL 4 BASE: Performed by: PODIATRIST

## 2018-08-02 DEVICE — GRAFT BNE SUB 1.5CC RHPDGF-BB/-TCP FOR ANK HINDFOOT ARTH: Type: IMPLANTABLE DEVICE | Site: FOOT | Status: FUNCTIONAL

## 2018-08-02 DEVICE — SINGLE TROCAR WIRE
Type: IMPLANTABLE DEVICE | Site: FOOT | Status: FUNCTIONAL
Brand: CHARLOTTE

## 2018-08-02 DEVICE — IMPLANTABLE DEVICE
Type: IMPLANTABLE DEVICE | Site: FOOT | Status: FUNCTIONAL
Brand: DARCO

## 2018-08-02 DEVICE — C BONE PUTTY WITH DBM AND CANCELLOUS BONE CHIPS
Type: IMPLANTABLE DEVICE | Site: FOOT | Status: FUNCTIONAL
Brand: ALLOMATRIX

## 2018-08-02 DEVICE — HEADED K-WIRE W/ LONG THREADED TIP
Type: IMPLANTABLE DEVICE | Site: FOOT | Status: FUNCTIONAL
Brand: DARCO

## 2018-08-02 DEVICE — KIT STPL BRDG 18MM LEG 18MM MAX CLSR 10.4MM BME ELITE: Type: IMPLANTABLE DEVICE | Site: FOOT | Status: FUNCTIONAL

## 2018-08-02 DEVICE — INJECTABLE PUTTY
Type: IMPLANTABLE DEVICE | Site: FOOT | Status: FUNCTIONAL
Brand: ALLOMATRIX

## 2018-08-02 DEVICE — KIT STPL BRDG 15MM 2 LEG 15MM MAX CLSR 9.1MM BME ELITE: Type: IMPLANTABLE DEVICE | Site: FOOT | Status: FUNCTIONAL

## 2018-08-02 DEVICE — JONES K-WIRE
Type: IMPLANTABLE DEVICE | Site: FOOT | Status: FUNCTIONAL
Brand: CHARLOTTE

## 2018-08-02 DEVICE — KIT STPL W20XL20MM BNE FIX BRDG 2 LEG CONT COMPR BME ELITE: Type: IMPLANTABLE DEVICE | Site: FOOT | Status: FUNCTIONAL

## 2018-08-02 DEVICE — IMPLANTABLE DEVICE
Type: IMPLANTABLE DEVICE | Site: FOOT | Status: FUNCTIONAL
Brand: SALVATION

## 2018-08-02 RX ORDER — ONDANSETRON 2 MG/ML
4 INJECTION INTRAMUSCULAR; INTRAVENOUS
Status: COMPLETED | OUTPATIENT
Start: 2018-08-02 | End: 2018-08-02

## 2018-08-02 RX ORDER — MEDROXYPROGESTERONE ACETATE 150 MG/ML
150 INJECTION, SUSPENSION INTRAMUSCULAR
Status: DISCONTINUED | OUTPATIENT
Start: 2018-08-02 | End: 2018-08-02

## 2018-08-02 RX ORDER — BUPIVACAINE HYDROCHLORIDE 5 MG/ML
INJECTION, SOLUTION PERINEURAL PRN
Status: DISCONTINUED | OUTPATIENT
Start: 2018-08-02 | End: 2018-08-02 | Stop reason: HOSPADM

## 2018-08-02 RX ORDER — ACETAMINOPHEN 325 MG/1
650 TABLET ORAL EVERY 4 HOURS PRN
Status: DISCONTINUED | OUTPATIENT
Start: 2018-08-02 | End: 2018-08-06 | Stop reason: HOSPADM

## 2018-08-02 RX ORDER — PROMETHAZINE HYDROCHLORIDE 25 MG/ML
INJECTION, SOLUTION INTRAMUSCULAR; INTRAVENOUS
Status: COMPLETED
Start: 2018-08-02 | End: 2018-08-02

## 2018-08-02 RX ORDER — PROMETHAZINE HYDROCHLORIDE 25 MG/ML
6.25 INJECTION, SOLUTION INTRAMUSCULAR; INTRAVENOUS EVERY 6 HOURS PRN
Status: DISCONTINUED | OUTPATIENT
Start: 2018-08-02 | End: 2018-08-02

## 2018-08-02 RX ORDER — PROPOFOL 10 MG/ML
INJECTION, EMULSION INTRAVENOUS PRN
Status: DISCONTINUED | OUTPATIENT
Start: 2018-08-02 | End: 2018-08-02 | Stop reason: SDUPTHER

## 2018-08-02 RX ORDER — TRAMADOL HYDROCHLORIDE 50 MG/1
50 TABLET ORAL EVERY 6 HOURS PRN
Status: DISCONTINUED | OUTPATIENT
Start: 2018-08-02 | End: 2018-08-04 | Stop reason: SDUPTHER

## 2018-08-02 RX ORDER — DEXAMETHASONE SODIUM PHOSPHATE 4 MG/ML
INJECTION, SOLUTION INTRA-ARTICULAR; INTRALESIONAL; INTRAMUSCULAR; INTRAVENOUS; SOFT TISSUE PRN
Status: DISCONTINUED | OUTPATIENT
Start: 2018-08-02 | End: 2018-08-02 | Stop reason: SDUPTHER

## 2018-08-02 RX ORDER — SUCCINYLCHOLINE CHLORIDE 20 MG/ML
INJECTION INTRAMUSCULAR; INTRAVENOUS PRN
Status: DISCONTINUED | OUTPATIENT
Start: 2018-08-02 | End: 2018-08-02 | Stop reason: SDUPTHER

## 2018-08-02 RX ORDER — HYDROMORPHONE HCL 110MG/55ML
PATIENT CONTROLLED ANALGESIA SYRINGE INTRAVENOUS PRN
Status: DISCONTINUED | OUTPATIENT
Start: 2018-08-02 | End: 2018-08-02 | Stop reason: SDUPTHER

## 2018-08-02 RX ORDER — ROCURONIUM BROMIDE 10 MG/ML
INJECTION, SOLUTION INTRAVENOUS PRN
Status: DISCONTINUED | OUTPATIENT
Start: 2018-08-02 | End: 2018-08-02 | Stop reason: SDUPTHER

## 2018-08-02 RX ORDER — SODIUM CHLORIDE 0.9 % (FLUSH) 0.9 %
10 SYRINGE (ML) INJECTION PRN
Status: DISCONTINUED | OUTPATIENT
Start: 2018-08-02 | End: 2018-08-02 | Stop reason: HOSPADM

## 2018-08-02 RX ORDER — MEPERIDINE HYDROCHLORIDE 25 MG/ML
12.5 INJECTION INTRAMUSCULAR; INTRAVENOUS; SUBCUTANEOUS EVERY 5 MIN PRN
Status: DISCONTINUED | OUTPATIENT
Start: 2018-08-02 | End: 2018-08-02 | Stop reason: HOSPADM

## 2018-08-02 RX ORDER — ONDANSETRON 2 MG/ML
4 INJECTION INTRAMUSCULAR; INTRAVENOUS EVERY 6 HOURS PRN
Status: DISCONTINUED | OUTPATIENT
Start: 2018-08-02 | End: 2018-08-06 | Stop reason: HOSPADM

## 2018-08-02 RX ORDER — BUPIVACAINE HYDROCHLORIDE AND EPINEPHRINE 5; 5 MG/ML; UG/ML
INJECTION, SOLUTION EPIDURAL; INTRACAUDAL; PERINEURAL PRN
Status: DISCONTINUED | OUTPATIENT
Start: 2018-08-02 | End: 2018-08-02 | Stop reason: HOSPADM

## 2018-08-02 RX ORDER — SODIUM CHLORIDE 9 MG/ML
INJECTION, SOLUTION INTRAVENOUS CONTINUOUS
Status: DISCONTINUED | OUTPATIENT
Start: 2018-08-02 | End: 2018-08-02

## 2018-08-02 RX ORDER — LABETALOL HYDROCHLORIDE 5 MG/ML
5 INJECTION, SOLUTION INTRAVENOUS EVERY 10 MIN PRN
Status: DISCONTINUED | OUTPATIENT
Start: 2018-08-02 | End: 2018-08-02 | Stop reason: HOSPADM

## 2018-08-02 RX ORDER — SODIUM CHLORIDE 9 MG/ML
INJECTION, SOLUTION INTRAVENOUS CONTINUOUS PRN
Status: DISCONTINUED | OUTPATIENT
Start: 2018-08-02 | End: 2018-08-02 | Stop reason: SDUPTHER

## 2018-08-02 RX ORDER — MEDROXYPROGESTERONE ACETATE 150 MG/ML
150 INJECTION, SUSPENSION INTRAMUSCULAR ONCE
Status: DISCONTINUED | OUTPATIENT
Start: 2018-08-02 | End: 2018-08-02 | Stop reason: RX

## 2018-08-02 RX ORDER — FENTANYL CITRATE 50 UG/ML
50 INJECTION, SOLUTION INTRAMUSCULAR; INTRAVENOUS EVERY 5 MIN PRN
Status: DISCONTINUED | OUTPATIENT
Start: 2018-08-02 | End: 2018-08-02 | Stop reason: HOSPADM

## 2018-08-02 RX ORDER — LIDOCAINE HYDROCHLORIDE AND EPINEPHRINE 10; 10 MG/ML; UG/ML
INJECTION, SOLUTION INFILTRATION; PERINEURAL PRN
Status: DISCONTINUED | OUTPATIENT
Start: 2018-08-02 | End: 2018-08-02 | Stop reason: HOSPADM

## 2018-08-02 RX ORDER — ERGOCALCIFEROL 1.25 MG/1
50000 CAPSULE ORAL WEEKLY
Status: DISCONTINUED | OUTPATIENT
Start: 2018-08-04 | End: 2018-08-06 | Stop reason: HOSPADM

## 2018-08-02 RX ORDER — SODIUM CHLORIDE 0.9 % (FLUSH) 0.9 %
10 SYRINGE (ML) INJECTION EVERY 12 HOURS SCHEDULED
Status: DISCONTINUED | OUTPATIENT
Start: 2018-08-02 | End: 2018-08-06 | Stop reason: HOSPADM

## 2018-08-02 RX ORDER — FENTANYL CITRATE 50 UG/ML
INJECTION, SOLUTION INTRAMUSCULAR; INTRAVENOUS PRN
Status: DISCONTINUED | OUTPATIENT
Start: 2018-08-02 | End: 2018-08-02 | Stop reason: SDUPTHER

## 2018-08-02 RX ORDER — SODIUM CHLORIDE 0.9 % (FLUSH) 0.9 %
10 SYRINGE (ML) INJECTION PRN
Status: DISCONTINUED | OUTPATIENT
Start: 2018-08-02 | End: 2018-08-06 | Stop reason: HOSPADM

## 2018-08-02 RX ORDER — PROMETHAZINE HYDROCHLORIDE 25 MG/ML
25 INJECTION, SOLUTION INTRAMUSCULAR; INTRAVENOUS EVERY 6 HOURS PRN
Status: DISCONTINUED | OUTPATIENT
Start: 2018-08-02 | End: 2018-08-02 | Stop reason: HOSPADM

## 2018-08-02 RX ORDER — DIPHENHYDRAMINE HYDROCHLORIDE 50 MG/ML
INJECTION INTRAMUSCULAR; INTRAVENOUS PRN
Status: DISCONTINUED | OUTPATIENT
Start: 2018-08-02 | End: 2018-08-02 | Stop reason: SDUPTHER

## 2018-08-02 RX ORDER — SODIUM CHLORIDE 0.9 % (FLUSH) 0.9 %
10 SYRINGE (ML) INJECTION EVERY 12 HOURS SCHEDULED
Status: DISCONTINUED | OUTPATIENT
Start: 2018-08-02 | End: 2018-08-02 | Stop reason: HOSPADM

## 2018-08-02 RX ADMIN — ROCURONIUM BROMIDE 10 MG: 10 INJECTION INTRAVENOUS at 11:27

## 2018-08-02 RX ADMIN — FENTANYL CITRATE 50 MCG: 50 INJECTION INTRAMUSCULAR; INTRAVENOUS at 16:25

## 2018-08-02 RX ADMIN — FENTANYL CITRATE 100 MCG: 50 INJECTION INTRAMUSCULAR; INTRAVENOUS at 11:27

## 2018-08-02 RX ADMIN — Medication 3 G: at 11:22

## 2018-08-02 RX ADMIN — ROCURONIUM BROMIDE 40 MG: 10 INJECTION INTRAVENOUS at 11:41

## 2018-08-02 RX ADMIN — HYDROMORPHONE HYDROCHLORIDE 0.6 MG: 2 INJECTION, SOLUTION INTRAMUSCULAR; INTRAVENOUS; SUBCUTANEOUS at 11:45

## 2018-08-02 RX ADMIN — ACETAMINOPHEN 650 MG: 325 TABLET ORAL at 20:35

## 2018-08-02 RX ADMIN — ONDANSETRON 4 MG: 2 INJECTION INTRAMUSCULAR; INTRAVENOUS at 16:15

## 2018-08-02 RX ADMIN — PROMETHAZINE HYDROCHLORIDE 25 MG: 25 INJECTION INTRAMUSCULAR; INTRAVENOUS at 16:25

## 2018-08-02 RX ADMIN — PROMETHAZINE HYDROCHLORIDE 25 MG: 25 INJECTION, SOLUTION INTRAMUSCULAR; INTRAVENOUS at 16:25

## 2018-08-02 RX ADMIN — HYDROMORPHONE HYDROCHLORIDE 0.5 MG: 1 INJECTION, SOLUTION INTRAMUSCULAR; INTRAVENOUS; SUBCUTANEOUS at 16:40

## 2018-08-02 RX ADMIN — HYDROMORPHONE HYDROCHLORIDE 0.4 MG: 2 INJECTION, SOLUTION INTRAMUSCULAR; INTRAVENOUS; SUBCUTANEOUS at 15:18

## 2018-08-02 RX ADMIN — HYDROMORPHONE HYDROCHLORIDE 0.2 MG: 2 INJECTION, SOLUTION INTRAMUSCULAR; INTRAVENOUS; SUBCUTANEOUS at 14:56

## 2018-08-02 RX ADMIN — TRAMADOL HYDROCHLORIDE 50 MG: 50 TABLET, FILM COATED ORAL at 20:35

## 2018-08-02 RX ADMIN — HYDROMORPHONE HYDROCHLORIDE 0.4 MG: 2 INJECTION, SOLUTION INTRAMUSCULAR; INTRAVENOUS; SUBCUTANEOUS at 12:26

## 2018-08-02 RX ADMIN — HYDROMORPHONE HYDROCHLORIDE 0.4 MG: 2 INJECTION, SOLUTION INTRAMUSCULAR; INTRAVENOUS; SUBCUTANEOUS at 12:11

## 2018-08-02 RX ADMIN — FENTANYL CITRATE 50 MCG: 50 INJECTION INTRAMUSCULAR; INTRAVENOUS at 16:30

## 2018-08-02 RX ADMIN — Medication 3 G: at 15:17

## 2018-08-02 RX ADMIN — DEXAMETHASONE SODIUM PHOSPHATE 8 MG: 4 INJECTION, SOLUTION INTRA-ARTICULAR; INTRALESIONAL; INTRAMUSCULAR; INTRAVENOUS; SOFT TISSUE at 12:12

## 2018-08-02 RX ADMIN — DIPHENHYDRAMINE HYDROCHLORIDE 25 MG: 50 INJECTION, SOLUTION INTRAMUSCULAR; INTRAVENOUS at 13:12

## 2018-08-02 RX ADMIN — HYDROMORPHONE HYDROCHLORIDE 0.5 MG: 1 INJECTION, SOLUTION INTRAMUSCULAR; INTRAVENOUS; SUBCUTANEOUS at 16:35

## 2018-08-02 RX ADMIN — SODIUM CHLORIDE: 9 INJECTION, SOLUTION INTRAVENOUS at 09:37

## 2018-08-02 RX ADMIN — PROPOFOL 200 MG: 10 INJECTION, EMULSION INTRAVENOUS at 11:27

## 2018-08-02 RX ADMIN — Medication 180 MG: at 11:27

## 2018-08-02 ASSESSMENT — PULMONARY FUNCTION TESTS
PIF_VALUE: 36
PIF_VALUE: 31
PIF_VALUE: 33
PIF_VALUE: 12
PIF_VALUE: 33
PIF_VALUE: 34
PIF_VALUE: 33
PIF_VALUE: 36
PIF_VALUE: 36
PIF_VALUE: 30
PIF_VALUE: 35
PIF_VALUE: 32
PIF_VALUE: 36
PIF_VALUE: 33
PIF_VALUE: 35
PIF_VALUE: 33
PIF_VALUE: 34
PIF_VALUE: 33
PIF_VALUE: 34
PIF_VALUE: 35
PIF_VALUE: 33
PIF_VALUE: 34
PIF_VALUE: 36
PIF_VALUE: 33
PIF_VALUE: 33
PIF_VALUE: 34
PIF_VALUE: 33
PIF_VALUE: 33
PIF_VALUE: 36
PIF_VALUE: 34
PIF_VALUE: 35
PIF_VALUE: 31
PIF_VALUE: 33
PIF_VALUE: 35
PIF_VALUE: 18
PIF_VALUE: 33
PIF_VALUE: 35
PIF_VALUE: 31
PIF_VALUE: 33
PIF_VALUE: 36
PIF_VALUE: 35
PIF_VALUE: 35
PIF_VALUE: 32
PIF_VALUE: 33
PIF_VALUE: 34
PIF_VALUE: 30
PIF_VALUE: 33
PIF_VALUE: 34
PIF_VALUE: 35
PIF_VALUE: 33
PIF_VALUE: 33
PIF_VALUE: 11
PIF_VALUE: 34
PIF_VALUE: 33
PIF_VALUE: 35
PIF_VALUE: 32
PIF_VALUE: 31
PIF_VALUE: 30
PIF_VALUE: 33
PIF_VALUE: 31
PIF_VALUE: 33
PIF_VALUE: 1
PIF_VALUE: 34
PIF_VALUE: 33
PIF_VALUE: 34
PIF_VALUE: 33
PIF_VALUE: 31
PIF_VALUE: 33
PIF_VALUE: 41
PIF_VALUE: 34
PIF_VALUE: 33
PIF_VALUE: 32
PIF_VALUE: 36
PIF_VALUE: 13
PIF_VALUE: 32
PIF_VALUE: 35
PIF_VALUE: 33
PIF_VALUE: 32
PIF_VALUE: 25
PIF_VALUE: 33
PIF_VALUE: 34
PIF_VALUE: 0
PIF_VALUE: 32
PIF_VALUE: 36
PIF_VALUE: 31
PIF_VALUE: 34
PIF_VALUE: 35
PIF_VALUE: 33
PIF_VALUE: 34
PIF_VALUE: 29
PIF_VALUE: 31
PIF_VALUE: 33
PIF_VALUE: 35
PIF_VALUE: 35
PIF_VALUE: 33
PIF_VALUE: 33
PIF_VALUE: 7
PIF_VALUE: 33
PIF_VALUE: 34
PIF_VALUE: 33
PIF_VALUE: 30
PIF_VALUE: 36
PIF_VALUE: 33
PIF_VALUE: 33
PIF_VALUE: 28
PIF_VALUE: 33
PIF_VALUE: 33
PIF_VALUE: 1
PIF_VALUE: 30
PIF_VALUE: 33
PIF_VALUE: 33
PIF_VALUE: 35
PIF_VALUE: 34
PIF_VALUE: 2
PIF_VALUE: 4
PIF_VALUE: 30
PIF_VALUE: 35
PIF_VALUE: 33
PIF_VALUE: 36
PIF_VALUE: 24
PIF_VALUE: 33
PIF_VALUE: 34
PIF_VALUE: 35
PIF_VALUE: 30
PIF_VALUE: 33
PIF_VALUE: 32
PIF_VALUE: 33
PIF_VALUE: 39
PIF_VALUE: 34
PIF_VALUE: 33
PIF_VALUE: 34
PIF_VALUE: 34
PIF_VALUE: 31
PIF_VALUE: 32
PIF_VALUE: 36
PIF_VALUE: 11
PIF_VALUE: 35
PIF_VALUE: 32
PIF_VALUE: 33
PIF_VALUE: 33
PIF_VALUE: 35
PIF_VALUE: 33
PIF_VALUE: 36
PIF_VALUE: 34
PIF_VALUE: 33
PIF_VALUE: 30
PIF_VALUE: 34
PIF_VALUE: 33
PIF_VALUE: 0
PIF_VALUE: 33
PIF_VALUE: 34
PIF_VALUE: 33
PIF_VALUE: 34
PIF_VALUE: 36
PIF_VALUE: 34
PIF_VALUE: 33
PIF_VALUE: 34
PIF_VALUE: 34
PIF_VALUE: 35
PIF_VALUE: 35
PIF_VALUE: 33
PIF_VALUE: 33
PIF_VALUE: 31
PIF_VALUE: 33
PIF_VALUE: 11
PIF_VALUE: 33
PIF_VALUE: 33
PIF_VALUE: 34
PIF_VALUE: 34
PIF_VALUE: 33
PIF_VALUE: 32
PIF_VALUE: 34
PIF_VALUE: 33
PIF_VALUE: 36
PIF_VALUE: 32
PIF_VALUE: 36
PIF_VALUE: 33
PIF_VALUE: 32
PIF_VALUE: 33
PIF_VALUE: 34
PIF_VALUE: 32
PIF_VALUE: 33
PIF_VALUE: 33
PIF_VALUE: 12
PIF_VALUE: 32
PIF_VALUE: 32
PIF_VALUE: 35
PIF_VALUE: 31
PIF_VALUE: 8
PIF_VALUE: 33
PIF_VALUE: 35
PIF_VALUE: 34
PIF_VALUE: 29
PIF_VALUE: 33
PIF_VALUE: 35
PIF_VALUE: 0
PIF_VALUE: 33
PIF_VALUE: 35
PIF_VALUE: 30
PIF_VALUE: 32
PIF_VALUE: 33
PIF_VALUE: 34
PIF_VALUE: 35
PIF_VALUE: 34
PIF_VALUE: 32
PIF_VALUE: 32
PIF_VALUE: 33
PIF_VALUE: 32
PIF_VALUE: 33
PIF_VALUE: 32
PIF_VALUE: 30
PIF_VALUE: 32
PIF_VALUE: 34
PIF_VALUE: 35
PIF_VALUE: 33
PIF_VALUE: 36
PIF_VALUE: 33
PIF_VALUE: 32
PIF_VALUE: 33
PIF_VALUE: 32
PIF_VALUE: 33
PIF_VALUE: 31
PIF_VALUE: 5
PIF_VALUE: 33
PIF_VALUE: 4
PIF_VALUE: 33
PIF_VALUE: 1
PIF_VALUE: 31
PIF_VALUE: 33
PIF_VALUE: 35
PIF_VALUE: 33
PIF_VALUE: 32
PIF_VALUE: 33
PIF_VALUE: 35
PIF_VALUE: 33
PIF_VALUE: 34
PIF_VALUE: 33
PIF_VALUE: 35
PIF_VALUE: 35
PIF_VALUE: 33
PIF_VALUE: 32
PIF_VALUE: 26
PIF_VALUE: 34
PIF_VALUE: 3
PIF_VALUE: 34
PIF_VALUE: 4

## 2018-08-02 ASSESSMENT — PAIN DESCRIPTION - LOCATION
LOCATION: ANKLE
LOCATION: ANKLE
LOCATION: FOOT
LOCATION: ANKLE

## 2018-08-02 ASSESSMENT — PAIN SCALES - GENERAL
PAINLEVEL_OUTOF10: 9
PAINLEVEL_OUTOF10: 10
PAINLEVEL_OUTOF10: 0
PAINLEVEL_OUTOF10: 9
PAINLEVEL_OUTOF10: 4
PAINLEVEL_OUTOF10: 9
PAINLEVEL_OUTOF10: 10
PAINLEVEL_OUTOF10: 10

## 2018-08-02 ASSESSMENT — PAIN DESCRIPTION - PAIN TYPE
TYPE: ACUTE PAIN;SURGICAL PAIN
TYPE: SURGICAL PAIN

## 2018-08-02 ASSESSMENT — PAIN DESCRIPTION - FREQUENCY
FREQUENCY: CONTINUOUS

## 2018-08-02 ASSESSMENT — PAIN DESCRIPTION - DESCRIPTORS
DESCRIPTORS: ACHING;SORE
DESCRIPTORS: SHARP;SHOOTING
DESCRIPTORS: ACHING;SORE
DESCRIPTORS: ACHING;SORE

## 2018-08-02 ASSESSMENT — PAIN DESCRIPTION - PROGRESSION
CLINICAL_PROGRESSION: NOT CHANGED
CLINICAL_PROGRESSION: NOT CHANGED

## 2018-08-02 ASSESSMENT — PAIN DESCRIPTION - ORIENTATION
ORIENTATION: LEFT

## 2018-08-02 ASSESSMENT — PAIN DESCRIPTION - ONSET
ONSET: ON-GOING
ONSET: ON-GOING

## 2018-08-02 NOTE — PROGRESS NOTES
Admitted to [facility] from home. Family present at bedside. Oriented to room and light and surgery procedure. done

## 2018-08-02 NOTE — OP NOTE
Operative Report  8/2/2018  Gabe Mays  28 y.o. Pre-Op Diagnosis: LEFT FOOT PAIN, CALCANEOVALGUS, PTTD, EQUINUS     Post-Op Diagnosis: Same       Procedure(s):  LEFT SUBTALAR JOINT FUSION, MEDIAL COLUMN FUSION, GASTROC RECESSION,  DELTOID REPAIR, TIBIAL BONE MARROW HARVEST     Anesthesia: General     Surgeon(s):  Michelle Turner DPM      Assistants:   Martell Xiao, PGY3   Chirstiane Dye PGY2   Jaz Lynch PGY1      Staff:  Scrub Person First: Charito Mendez      Estimated Blood Loss: 700 (units unknown) mL     Complications: None     Specimens:   * No specimens in log *     Implants:     Implant Name Type Inv.  Item Serial No.  Lot No. LRB No. Used   GRAFT BONE AUGMENT 1.5CC Bone/Graft/Tissue GRAFT BONE AUGMENT 1.5CC   [employer] [id number] Left 1   GRAFT BONE AUGMENT 1.5CC Bone/Graft/Tissue GRAFT BONE AUGMENT 1.5CC   [employer] [id number] Left 1   MELONY-PUTTY SUBST CANC ALLOMATRIX 10CC Bone/Graft/Tissue MELONY-PUTTY SUBST CANC ALLOMATRIX 10CC   [employer] [id number] Left 1   MELONY-PUTTY SUBST DBM INJ 5CC Bone/Graft/Tissue MELONY-PUTTY SUBST DBM INJ 5CC   [employer] [id number] Left 1   External Bone Physio-Steim     [id number]   [id number] Left 1   IMPL KWIRE 3MM HEADED THREAD TIP Leg/Ankle/Foot/Toe IMPL KWIRE 3MM HEADED THREAD TIP   [employer]   Left 2   BOLT SALVATION 5.0X65MM Screw/Plate/Nail/Sancho BOLT SALVATION 5.0X65MM   [employer]   Left 1   IMPL KWIRE 228MM Screw/Plate/Nail/Sancho IMPL KWIRE 228MM   [employer]   Left 1   STAPLE 15 MM       SYNTHES [id number] Left 1   STAPLE 20MM         [id number] Left 1   DRILLING TEMPLATE KIT         [id number] Left 1   STAPLE 15MM          [id number] Left 1   DRILL KIT         [id number] Left 1   STAPLE 18MM         [id number] Left 1   IMPL KWIRE FIXATION 1.6MM 150MM  TROCAR Screw/Plate/Nail/Sancho IMPL KWIRE FIXATION 1.6MM 150MM  TROCAR   [employer] muscle were palpated, and a skin line was drawn just medial to the midline of the gastroc aponeurosis, to ensure visualization of the larger medial gastrocnemius head. A sharp linear incision, approximately 3cm in length was made full thickness with a scalpel blade. Martita scissors were then used to bluntly dissect through subcutaneous tissue, making sure to retract all vital neurovascular structures. Upon visualization of the paratenon, blunt dissection with a finger was used to clear the gastrocnemius and paratenon of any subcutaneous fat that was adhered. A scalpel blade was then used to linearly incise the paratenon, allowing for clear visualization of the gastrocnemius. The paratenon was then retracted medially and laterally with Army-Beckemeyer's. The gastrocnemius tendon was then incised with a scalpel blade, cutting all fibers in a transverse fashion. Blunt inspection with a finger was used to ensure release of all gastroc fibers. The surgical area was then flushed with sterile saline. The incision site was then re-approximated using 3-0 vicryl subcutaneously and 4-0 monocryl for skin. The left foot was then exsanguinated with an esmark and the tourniquet was inflated to 350 mmHg. Attention was then directed to the lateral aspect of the subtalar joint on the left foot. A skin marker was used to plan out an incision extending from the anterior process of the calcaneus along the floor of the sinus tarsi to just proximal to the distal tip of the fibula. A #15 scalpel blade was used to make a full thickness incision. Blunt dissection was carried down to the level of the subtalar joint making sure to retract all vital neurovascular structures and the peroneal tendon and making sure to cauterize any small bleeding vessels. The sinus tarsi was then evacuated of its contents with the scalpel blade.  All ligamentous and capsular joint structures were then freed with an osteotome and the joint was opened up with a laminar

## 2018-08-02 NOTE — BRIEF OP NOTE
Brief Postoperative Note  ______________________________________________________________    Patient: Chantelle Loss  YOB: 1985  MRN: [id number]  Date of Procedure: 8/2/2018    Pre-Op Diagnosis: FOOT PAIN, CALCANEOVALGUS, PTTD, EQUINUS ALL ON THE LEFT    Post-Op Diagnosis: Same       Procedure(s):  LEFT SUBTALAR JOINT FUSION, LEFT MEDIAL COLUMN FUSION, LEFT GASTROC RECESSION, LEFT DELTOID REPIAR    Anesthesia: General    Surgeon(s):  NENITA ChristieM     Assistants:   Cassidy Caicedo DPM PGY-3  [address] DPM PGY-2  Lawrence Oneil DPM PGY-1    Staff:  Scrub Person First: Olamide Gardiner     Hemostasis: Thigh tourniquet @350 mm Hg for 120 minutes    Estimated Blood Loss: 700 cc's    Materials: 3-0 Monocryl, 4-0 Monocryl, 3-0 Vicryl, 2-0 Vicryl    Injectables: 18 cc's 0.5% Marcaine plain, 20 cc's 1% Lidocaine with Epinephrine    Complications: None    Specimens:   None    Implants:    Implant Name Type Inv.  Item Serial No.  Lot No. LRB No. Used   GRAFT BONE AUGMENT 1.5CC Bone/Graft/Tissue GRAFT BONE AUGMENT 1.5CC  [employer] [id number] Left 1   GRAFT BONE AUGMENT 1.5CC Bone/Graft/Tissue GRAFT BONE AUGMENT 1.5CC  [email or web address] [id number] Left 1   MELONY-PUTTY SUBST CANC ALLOMATRIX 10CC Bone/Graft/Tissue MELONY-PUTTY SUBST CANC ALLOMATRIX 10CC  [employer] [id number] Left 1   MELONY-PUTTY SUBST DBM INJ 5CC Bone/Graft/Tissue MELONY-PUTTY SUBST DBM INJ [facility]  [employer] [id number] Left 1   External Bone Physio-Steim   [id number]  [id number] Left 1   IMPL KWIRE 3MM HEADED THREAD TIP Leg/Ankle/Foot/Toe IMPL KWIRE 3MM HEADED THREAD TIP  [email or web address]  Left 2   BOLT SALVATION 5.0X65MM Screw/Plate/Nail/Sancho BOLT SALVATION 5.0X65MM  [employer] [facility]  Left 1   IMPL KWIRE 228MM Screw/Plate/Nail/Sancho IMPL KWIRE 228MM  [employer] [facility]  Left 1   STAPLE 15 MM    SYNTHES [id number] Left 1   STAPLE 20MM     [id number] Left 1 DRILLING TEMPLATE KIT     [id number] Left 1   STAPLE 15MM      [id number] Left 1   DRILL KIT     [id number] Left 1   STAPLE 18MM     [id number] Left 1   IMPL KWIRE FIXATION 1.6MM 150MM  TROCAR Screw/Plate/Nail/Sancho IMPL KWIRE FIXATION 1.6MM 150MM  TROCAR  [employer] [facility]  Left 4   7.5 X 75 SHORT THERMAL      Left 1   7.5 X 70 SHORT THERMAL           Left 1         Drains:   Urethral Catheter (Active)       Findings: pre op diagnosis confirmed    Dr. Jesús Bear DPM    Electronically signed by David Beverly DPM on 8/2/2018 at 4:19 PM

## 2018-08-02 NOTE — PROGRESS NOTES
1615 Awake and oriented on arrival to PACU with O2 3L NC , HOB elevated , pt crying and states pain a # 10 and feels nauseas . ,medicated with zofran 4 mg IV   1625 no change in pain or nausea medicated with phenergan 25 mg IM and fentanyl 50 mcg IV  1630 pain unchanged nausea easing up medicated with fentanyl 50 mcg IV   1635 pain remains a # 10 , pt no longer crying , medicated with dilaudid 0.5 mg iV   1640 pain unchanged pt medicated with dilaudid 0.5 mg IV   1650 starting to rest on and off   1705 resting on and off resp easy   1720 awakens on own states pain tolerable   1735 resting resp easy  1745 Pt states pain tolerable , toes warm and pink with brisk cap refill   1750 meets criteria for discharge , transported to  unable to locate family for update

## 2018-08-03 LAB
HCT VFR BLD CALC: 31.3 % (ref 37–47)
HEMOGLOBIN: 9.6 GM/DL (ref 12–16)

## 2018-08-03 PROCEDURE — 96360 HYDRATION IV INFUSION INIT: CPT

## 2018-08-03 PROCEDURE — 97530 THERAPEUTIC ACTIVITIES: CPT

## 2018-08-03 PROCEDURE — 97166 OT EVAL MOD COMPLEX 45 MIN: CPT

## 2018-08-03 PROCEDURE — 85014 HEMATOCRIT: CPT

## 2018-08-03 PROCEDURE — 85018 HEMOGLOBIN: CPT

## 2018-08-03 PROCEDURE — G8987 SELF CARE CURRENT STATUS: HCPCS

## 2018-08-03 PROCEDURE — 6360000002 HC RX W HCPCS: Performed by: STUDENT IN AN ORGANIZED HEALTH CARE EDUCATION/TRAINING PROGRAM

## 2018-08-03 PROCEDURE — G8988 SELF CARE GOAL STATUS: HCPCS

## 2018-08-03 PROCEDURE — 97161 PT EVAL LOW COMPLEX 20 MIN: CPT

## 2018-08-03 PROCEDURE — 36415 COLL VENOUS BLD VENIPUNCTURE: CPT

## 2018-08-03 PROCEDURE — 6370000000 HC RX 637 (ALT 250 FOR IP): Performed by: STUDENT IN AN ORGANIZED HEALTH CARE EDUCATION/TRAINING PROGRAM

## 2018-08-03 PROCEDURE — G8978 MOBILITY CURRENT STATUS: HCPCS

## 2018-08-03 PROCEDURE — G8979 MOBILITY GOAL STATUS: HCPCS

## 2018-08-03 PROCEDURE — 96372 THER/PROPH/DIAG INJ SC/IM: CPT

## 2018-08-03 PROCEDURE — 96361 HYDRATE IV INFUSION ADD-ON: CPT

## 2018-08-03 PROCEDURE — 97110 THERAPEUTIC EXERCISES: CPT

## 2018-08-03 PROCEDURE — 2580000003 HC RX 258: Performed by: STUDENT IN AN ORGANIZED HEALTH CARE EDUCATION/TRAINING PROGRAM

## 2018-08-03 RX ADMIN — ENOXAPARIN SODIUM 60 MG: 60 INJECTION SUBCUTANEOUS at 21:33

## 2018-08-03 RX ADMIN — ENOXAPARIN SODIUM 60 MG: 60 INJECTION SUBCUTANEOUS at 07:59

## 2018-08-03 RX ADMIN — TRAMADOL HYDROCHLORIDE 50 MG: 50 TABLET, FILM COATED ORAL at 09:28

## 2018-08-03 RX ADMIN — Medication 10 ML: at 21:33

## 2018-08-03 RX ADMIN — TRAMADOL HYDROCHLORIDE 50 MG: 50 TABLET, FILM COATED ORAL at 15:55

## 2018-08-03 RX ADMIN — TRAMADOL HYDROCHLORIDE 50 MG: 50 TABLET, FILM COATED ORAL at 21:43

## 2018-08-03 RX ADMIN — ACETAMINOPHEN 650 MG: 325 TABLET ORAL at 00:25

## 2018-08-03 RX ADMIN — ACETAMINOPHEN 650 MG: 325 TABLET ORAL at 14:40

## 2018-08-03 RX ADMIN — TRAMADOL HYDROCHLORIDE 50 MG: 50 TABLET, FILM COATED ORAL at 02:48

## 2018-08-03 RX ADMIN — ACETAMINOPHEN 650 MG: 325 TABLET ORAL at 21:41

## 2018-08-03 ASSESSMENT — PAIN DESCRIPTION - PROGRESSION

## 2018-08-03 ASSESSMENT — PAIN DESCRIPTION - LOCATION
LOCATION: FOOT
LOCATION: FOOT
LOCATION: ANKLE
LOCATION: FOOT
LOCATION: ANKLE

## 2018-08-03 ASSESSMENT — PAIN DESCRIPTION - ORIENTATION
ORIENTATION: LEFT

## 2018-08-03 ASSESSMENT — PAIN DESCRIPTION - PAIN TYPE
TYPE: SURGICAL PAIN

## 2018-08-03 ASSESSMENT — PAIN DESCRIPTION - FREQUENCY
FREQUENCY: CONTINUOUS

## 2018-08-03 ASSESSMENT — PAIN DESCRIPTION - DESCRIPTORS
DESCRIPTORS: ACHING;SORE
DESCRIPTORS: ACHING

## 2018-08-03 ASSESSMENT — PAIN SCALES - GENERAL
PAINLEVEL_OUTOF10: 10
PAINLEVEL_OUTOF10: 7
PAINLEVEL_OUTOF10: 7
PAINLEVEL_OUTOF10: 8
PAINLEVEL_OUTOF10: 10
PAINLEVEL_OUTOF10: 7
PAINLEVEL_OUTOF10: 10
PAINLEVEL_OUTOF10: 3
PAINLEVEL_OUTOF10: 10

## 2018-08-03 ASSESSMENT — PAIN DESCRIPTION - ONSET
ONSET: ON-GOING

## 2018-08-03 NOTE — PROGRESS NOTES
Wheelchair-manual          Ambulation Assistance: Independent  Transfer Assistance: Independent     Occupation: Part time employment  Type of occupation: Housekeeping at [employer]  Additional Comments: Pt amb without AD, occasional use of SC or RW when in pain      Objective:     RLE AROM: WFL    LLE PROM: WFL  LLE General PROM: L ankle NT due to splint        R LE grossly 3+/5, L hip 2-/5, L quad 3/5       RLE Tone: Normotonic  LLE Tone: Normotonic        Supine to Sit: Stand by assistance (HOB elevated >45 degrees, use of bed rail)  Scooting: Stand by assistance (to edge of bed)    Transfers  Sit to Stand: Contact guard assistance (From EOB, verbal cues for NWB L LE, good compliance)  Stand to sit: Contact guard assistance     Ambulation 1  Surface: level tile  Device: Standard Walker  Assistance: Minimal assistance  Quality of Gait: Pt with good compliance with NWB, pivots around R foot and is able to 2-3 times, good use of walker. Distance: 2 feet to chair        Exercises:  Comments: Pt performs B LE ther ex: R ankle pumps, AAROM L heel slides and hip abd/add, AROM R heel slides and hip abd/add, seated LAQ AROM, all x 10 reps to increase strength for improved transfers. Activity Tolerance:  Activity Tolerance: Patient Tolerated treatment well    Treatment Initiated: See above exercises. Assessment: Body structures, Functions, Activity limitations: Decreased functional mobility , Decreased balance, Decreased endurance, Decreased strength  Assessment: Pt tolerates session well, limited by pain and NWB status. Pt unable to return home at this time due to 4 steps to enter home and pt is NWB. Pt demonstrates decreased strength, bed mobility, transfers, balance and gait indicating the need for skilled PT services. Prognosis: Good    Clinical Presentation: Low - Stable and Uncomplicated: Pt tolerates session well, limited by pain and NWB status.   Pt unable to return home at this time due to 4 steps

## 2018-08-03 NOTE — PROGRESS NOTES
habitus, patient would likely benefit from nursing facility or rehab. Consult placed to social work for possible nursing home, TCU, or gumaro placement.        Electronically signed by Ling Rodgers DPM on 8/3/2018 at 10:13 AM

## 2018-08-03 NOTE — PROGRESS NOTES
(52.2 kg), % Ideal Body 366%  · BMI Classification: BMI > or equal to 40.0 Obese Class III  · Comparative Standards (Estimated Nutrition Needs):  · Estimated Daily Total Kcal: ~1528kcals  · Estimated Daily Protein (g): 104-130 grams    Estimated Intake vs Estimated Needs: Intake Exceeds Needs (per home diet hx)    Nutrition Risk Level: High    Nutrition Interventions:   Modify current diet  Continued Inpatient Monitoring, Education Initiated (Left written carb controlled weight reduction diet materials at bedside due to pt. in pain. I advised pt. to stop drinking countrytime & pop & gave low calorie alternatives. )    Nutrition Evaluation:   · Evaluation: Goals set   · Goals: pt. will lose 1-2#/week during LOS. · Monitoring: Meal Intake, Pertinent Labs, Weight, Skin Integrity, Wound Healing, Patient/Family Education    See Adult Nutrition Doc Flowsheet for more detail.      Electronically signed by Delgado Guo RD, LD on 8/3/18 at 3:07 PM    Contact Number: [phone]

## 2018-08-03 NOTE — PLAN OF CARE
Problem: Pain Control  Goal: Maintain pain level at or below patient's acceptable level (or 5 if patient is unable to determine acceptable level)  Outcome: Ongoing  Pt report pain at 8 on scale. Pt states oral medication helping to achieve pain goal of a 6 on scale. Problem: Neurological  Goal: Maximum potential motor/sensory/cognitive function  Outcome: Ongoing  Pt denies numbness and tingling at this time. remains with full senstion in BUE and BLE. Problem: Cardiovascular  Goal: No DVT, peripheral vascular complications  Outcome: Ongoing  Pt without s/s of DVT. Pt able to take prescribed anticoagulants  and SCD'S in place to help prevent development of DVT. Problem: Respiratory  Goal: No pulmonary complications  Outcome: Ongoing  Pt able to cough and deep breath, use IS as recommenced. sats on 94% room air. Problem: GI  Goal: No bowel complications  Outcome: Ongoing  Pt with bowel sounds, passing flatus, and without nausea. Taking prescribed medications to assist with BM    Problem:   Goal: Adequate urinary output  Outcome: Ongoing  Pt able to void adequate amounts of urine without difficulty. Problem: Nutrition  Goal: Optimal nutrition therapy  Outcome: Ongoing  Pt able to tolerate po nutrition well. Problem: Skin Integrity/Risk  Goal: No skin breakdown during hospitalization  Outcome: Ongoing  Pt remains free from new skin associated issues at this time. Pt able to turn and reposition as encouraged. Problem: Musculor/Skeletal Functional Status  Goal: Highest potential functional level  Outcome: Ongoing  Pt requires one staff maximum assistance with ADLs. Problem: Discharge Planning:  Goal: Discharged to appropriate level of care  Discharged to appropriate level of care   Outcome: Ongoing  Pt planing ECF at discharge.  and  helping with discharge needs. Comments: Care plan reviewed with patient.   Patient verbalizes understanding of the plan of care and

## 2018-08-03 NOTE — PLAN OF CARE
Problem: Discharge Planning:  Goal: Discharged to appropriate level of care  Discharged to appropriate level of care  Pt plans ECF at discharge. Care manager and social workier helping with discharge needs. Comments: Care plan reviewed with patient. Patient verbalizes understanding of the plan of care and contributes to goal setting.

## 2018-08-03 NOTE — PLAN OF CARE
Problem: Pain Control  Goal: Maintain pain level at or below patient's acceptable level (or 5 if patient is unable to determine acceptable level)  Outcome: Ongoing  Pt report pain at 3-8on scale. Pt states oral medication helping to achieve pain goal of a 6 on scale. Problem: Neurological  Goal: Maximum potential motor/sensory/cognitive function  Outcome: Ongoing  Patient denies numbing and tingling to all extremities. Patient alert and oriented x4. Problem: Cardiovascular  Goal: No DVT, peripheral vascular complications  Outcome: Ongoing  Pt without s/s of DVT. Pt able to take prescribed anticoagulants to help prevent development of DVT. Problem: Respiratory  Goal: No pulmonary complications  Outcome: Ongoing  SPO2 at 98% on room air. Patient encouraged to cough, turn, and deep breathe every two hours. Problem: GI  Goal: No bowel complications  Outcome: Ongoing  Pt with hypoactive bowel sounds, not passing flatus, and without nausea. No bm this shift. Problem:   Goal: Adequate urinary output  Outcome: Ongoing  Pt voiding adequate amounts without difficulty. Problem: Nutrition  Goal: Optimal nutrition therapy  Outcome: Ongoing  Pt tolerating diet well. No nausea noted. Problem: Skin Integrity/Risk  Goal: No skin breakdown during hospitalization  Outcome: Ongoing  Pt's left ankle surgical incision healing. Dressing is dry and intact and not due to be changed today. Patient with bruising on abdomen and redness/sore on right buttocks. Pt understands the importance of frequent repositioning in order to prevent any skin breakdown. Problem: Musculor/Skeletal Functional Status  Goal: Highest potential functional level  Outcome: Ongoing  Up with 1-2 assist, walker, and gait belt, and tolerates ambulation fairly well. Will work with PT and OT. Comments: Care plan reviewed with patient. Patient verbalizes understanding of the plan of care and contributes to goal setting.

## 2018-08-03 NOTE — PROGRESS NOTES
and min vc for safety to increase indep and safety with grooming tasks. Short term goal 3: Pt will complete short distance ambulation with CGA and min vc for safety to increase indep and safety with toileting. Short term goal 4: Pt will complete LB dressing with LHAE PRN and Min A to increase indep and safety within home environment. Long term goals  Time Frame for Long term goals : No LTGs d/t short estimated length of stay. Evaluation Complexity: Based on the findings of patient history, examination, clinical presentation, and decision making during this evaluation, this patient is of medium complexity. OT G-codes  Functional Limitation: Self care  Self Care Current Status (): At least 40 percent but less than 60 percent impaired, limited or restricted  Self Care Goal Status ():  At least 20 percent but less than 40 percent impaired, limited or restricted  AM-PAC Inpatient Daily Activity Raw Score: 17  AM-PAC Inpatient ADL T-Scale Score : 37.26  ADL Inpatient CMS 0-100% Score: 50.11  ADL Inpatient CMS G-Code Modifier : CK

## 2018-08-03 NOTE — PLAN OF CARE
Problem: DISCHARGE BARRIERS  Goal: Patient's continuum of care needs are met  Outcome: Ongoing  Planning ecf. Please see progress note 08/03/18.

## 2018-08-04 PROCEDURE — 97530 THERAPEUTIC ACTIVITIES: CPT

## 2018-08-04 PROCEDURE — 6370000000 HC RX 637 (ALT 250 FOR IP): Performed by: PODIATRIST

## 2018-08-04 PROCEDURE — 96372 THER/PROPH/DIAG INJ SC/IM: CPT

## 2018-08-04 PROCEDURE — 97110 THERAPEUTIC EXERCISES: CPT

## 2018-08-04 PROCEDURE — 6370000000 HC RX 637 (ALT 250 FOR IP): Performed by: STUDENT IN AN ORGANIZED HEALTH CARE EDUCATION/TRAINING PROGRAM

## 2018-08-04 PROCEDURE — 6360000002 HC RX W HCPCS: Performed by: STUDENT IN AN ORGANIZED HEALTH CARE EDUCATION/TRAINING PROGRAM

## 2018-08-04 PROCEDURE — 2580000003 HC RX 258: Performed by: STUDENT IN AN ORGANIZED HEALTH CARE EDUCATION/TRAINING PROGRAM

## 2018-08-04 RX ORDER — OXYCODONE HYDROCHLORIDE AND ACETAMINOPHEN 5; 325 MG/1; MG/1
1 TABLET ORAL EVERY 4 HOURS PRN
Status: DISCONTINUED | OUTPATIENT
Start: 2018-08-04 | End: 2018-08-06 | Stop reason: HOSPADM

## 2018-08-04 RX ORDER — OXYCODONE HYDROCHLORIDE AND ACETAMINOPHEN 5; 325 MG/1; MG/1
2 TABLET ORAL EVERY 4 HOURS PRN
Status: DISCONTINUED | OUTPATIENT
Start: 2018-08-04 | End: 2018-08-06 | Stop reason: HOSPADM

## 2018-08-04 RX ADMIN — ENOXAPARIN SODIUM 60 MG: 60 INJECTION SUBCUTANEOUS at 20:21

## 2018-08-04 RX ADMIN — OXYCODONE HYDROCHLORIDE AND ACETAMINOPHEN 2 TABLET: 5; 325 TABLET ORAL at 11:09

## 2018-08-04 RX ADMIN — ERGOCALCIFEROL 50000 UNITS: 1.25 CAPSULE ORAL at 08:33

## 2018-08-04 RX ADMIN — Medication 10 ML: at 08:33

## 2018-08-04 RX ADMIN — OXYCODONE HYDROCHLORIDE AND ACETAMINOPHEN 2 TABLET: 5; 325 TABLET ORAL at 20:21

## 2018-08-04 RX ADMIN — OXYCODONE HYDROCHLORIDE AND ACETAMINOPHEN 2 TABLET: 5; 325 TABLET ORAL at 16:16

## 2018-08-04 RX ADMIN — Medication 10 ML: at 21:00

## 2018-08-04 RX ADMIN — ENOXAPARIN SODIUM 60 MG: 60 INJECTION SUBCUTANEOUS at 08:39

## 2018-08-04 RX ADMIN — OXYCODONE HYDROCHLORIDE AND ACETAMINOPHEN 2 TABLET: 5; 325 TABLET ORAL at 05:12

## 2018-08-04 RX ADMIN — OXYCODONE HYDROCHLORIDE AND ACETAMINOPHEN 2 TABLET: 5; 325 TABLET ORAL at 00:27

## 2018-08-04 ASSESSMENT — PAIN DESCRIPTION - PROGRESSION
CLINICAL_PROGRESSION: NOT CHANGED
CLINICAL_PROGRESSION: GRADUALLY IMPROVING
CLINICAL_PROGRESSION: NOT CHANGED

## 2018-08-04 ASSESSMENT — PAIN SCALES - GENERAL
PAINLEVEL_OUTOF10: 7
PAINLEVEL_OUTOF10: 6
PAINLEVEL_OUTOF10: 8
PAINLEVEL_OUTOF10: 8
PAINLEVEL_OUTOF10: 5
PAINLEVEL_OUTOF10: 7
PAINLEVEL_OUTOF10: 4
PAINLEVEL_OUTOF10: 7
PAINLEVEL_OUTOF10: 8
PAINLEVEL_OUTOF10: 5
PAINLEVEL_OUTOF10: 8

## 2018-08-04 ASSESSMENT — PAIN DESCRIPTION - PAIN TYPE
TYPE: SURGICAL PAIN

## 2018-08-04 ASSESSMENT — PAIN DESCRIPTION - LOCATION
LOCATION: ANKLE

## 2018-08-04 ASSESSMENT — PAIN DESCRIPTION - DESCRIPTORS
DESCRIPTORS: ACHING
DESCRIPTORS: ACHING

## 2018-08-04 ASSESSMENT — PAIN DESCRIPTION - FREQUENCY
FREQUENCY: CONTINUOUS
FREQUENCY: CONTINUOUS

## 2018-08-04 ASSESSMENT — PAIN DESCRIPTION - ORIENTATION
ORIENTATION: LEFT

## 2018-08-04 ASSESSMENT — PAIN DESCRIPTION - ONSET
ONSET: ON-GOING
ONSET: ON-GOING
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
## 2018-08-04 NOTE — PROGRESS NOTES
NA, K, CL, CO2, BUN, CREATININE, GLUCOSE in the last 72 hours. Hepatic: No results for input(s): AST, ALT, ALB, BILITOT, ALKPHOS in the last 72 hours. Troponin: No results for input(s): TROPONINI in the last 72 hours. BNP: No results for input(s): BNP in the last 72 hours. Lipids: No results for input(s): CHOL, HDL in the last 72 hours. Invalid input(s): LDLCALCU  INR: No results for input(s): INR in the last 72 hours. Physical Exam:  General Appearance: alert and oriented to person, place and time, in no acute distress  Cardiovascular:  distal pulses intact, no JVD  Pulmonary/Chest:  normal air movement, no respiratory distress  Abdomen: soft, non-tender, non-distended, normal bowel sounds, no masses   Extremities: no cyanosis, clubbing or edema, pulse   Skin: warm and dry, no rash or erythema  Head: normocephalic and atraumatic  Eyes: pupils equal, round, and reactive to light  Neck: supple and non-tender without mass, no thyromegaly   Musculoskeletal: normal range of motion, no joint swelling, deformity or tenderness  Neurological: alert, oriented, normal speech, no focal findings or movement disorder noted    LOWER EXTREMITY   Posterior splint intact to left lower extremity     Vascular: CFT brisk to exposed digits, left foot. Dermatological: posterior splint intact to left lower extremity     Neuro: light touch sensation to exposed digits, left foot. MSK: patient able to move digits on left foot. No pain with calf squeeze. Left foot rectus and at 90 degree angle to the left leg. Assessment and Plan:   Patient Active Problem List:     Closed fracture of lateral malleolus of left fibula     Morbid obesity with BMI of 70 and over, adult ([facility] [address]     Vitamin D deficiency     Equinus deformity of foot    Principle:   s/p Left subtalar joint fusion, medial column fusion, gastroc recession, deltoid repair and tibial bone marrow harvest (DOS: 8.2.18)    PLAN   1.  S/P Left subtalar joint fusion,

## 2018-08-05 PROCEDURE — 96372 THER/PROPH/DIAG INJ SC/IM: CPT

## 2018-08-05 PROCEDURE — 6360000002 HC RX W HCPCS: Performed by: STUDENT IN AN ORGANIZED HEALTH CARE EDUCATION/TRAINING PROGRAM

## 2018-08-05 PROCEDURE — 6370000000 HC RX 637 (ALT 250 FOR IP): Performed by: PODIATRIST

## 2018-08-05 PROCEDURE — 2580000003 HC RX 258: Performed by: STUDENT IN AN ORGANIZED HEALTH CARE EDUCATION/TRAINING PROGRAM

## 2018-08-05 RX ADMIN — OXYCODONE HYDROCHLORIDE AND ACETAMINOPHEN 2 TABLET: 5; 325 TABLET ORAL at 19:44

## 2018-08-05 RX ADMIN — ENOXAPARIN SODIUM 60 MG: 60 INJECTION SUBCUTANEOUS at 08:09

## 2018-08-05 RX ADMIN — OXYCODONE HYDROCHLORIDE AND ACETAMINOPHEN 2 TABLET: 5; 325 TABLET ORAL at 01:12

## 2018-08-05 RX ADMIN — ENOXAPARIN SODIUM 60 MG: 60 INJECTION SUBCUTANEOUS at 19:45

## 2018-08-05 RX ADMIN — Medication 10 ML: at 19:45

## 2018-08-05 RX ADMIN — OXYCODONE HYDROCHLORIDE AND ACETAMINOPHEN 2 TABLET: 5; 325 TABLET ORAL at 10:21

## 2018-08-05 RX ADMIN — OXYCODONE HYDROCHLORIDE AND ACETAMINOPHEN 2 TABLET: 5; 325 TABLET ORAL at 05:28

## 2018-08-05 RX ADMIN — OXYCODONE HYDROCHLORIDE AND ACETAMINOPHEN 2 TABLET: 5; 325 TABLET ORAL at 15:54

## 2018-08-05 ASSESSMENT — PAIN SCALES - GENERAL
PAINLEVEL_OUTOF10: 9
PAINLEVEL_OUTOF10: 5
PAINLEVEL_OUTOF10: 7
PAINLEVEL_OUTOF10: 8
PAINLEVEL_OUTOF10: 7
PAINLEVEL_OUTOF10: 8
PAINLEVEL_OUTOF10: 8

## 2018-08-05 NOTE — PLAN OF CARE
Problem: Pain Control  Goal: Maintain pain level at or below patient's acceptable level (or 5 if patient is unable to determine acceptable level)  Outcome: Met This Shift  Patient taking oral pain medication to help her achieve her pain goal of 6/10, patient verbalizes relief after pain meds given    Problem: Neurological  Goal: Maximum potential motor/sensory/cognitive function  Outcome: Met This Shift  Patient denies numbness and tingling she is alert and oriented, patient able to move all 4 extremities     Problem: Cardiovascular  Goal: No DVT, peripheral vascular complications  Outcome: Met This Shift  Bilateral homans sign negative, patient receiving lovenox for dvt prophylaxis     Problem: Respiratory  Goal: No pulmonary complications  Outcome: Met This Shift  Patient lung sounds are clear, sats wnl on room air     Problem: GI  Goal: No bowel complications  Outcome: Ongoing  Patient states she has not had a bm since Wednesday evening but is passing gas frequently, abdomen is soft and bowel sounds are active     Problem:   Goal: Adequate urinary output  Outcome: Met This Shift  Urine output is adequate     Problem: Nutrition  Goal: Optimal nutrition therapy  Outcome: Met This Shift  Patient eating all meals, recomendations per dietary    Problem: Skin Integrity/Risk  Goal: No skin breakdown during hospitalization  Outcome: Ongoing  Patient skin is intact, she has red area to her right buttocks, patient is able to turn self frequently in bed     Problem: Musculor/Skeletal Functional Status  Goal: Highest potential functional level  Outcome: Ongoing  Patient is non weight bearing to LLE, patient up with walker, patient pivots to commode and is currently compliant with her non weight bearing status    Problem: Discharge Planning:  Goal: Discharged to appropriate level of care  Discharged to appropriate level of care   Outcome: Ongoing  Patient going to ecf at discharge, precert needs completed     Problem: Risk

## 2018-08-05 NOTE — PROGRESS NOTES
oxyCODONE-acetaminophen **OR** oxyCODONE-acetaminophen, sodium chloride flush, acetaminophen, ondansetron    Objective:   Vitals: BP (!) 118/56   Pulse 89   Temp 97.6 °F (36.4 °C) (Oral)   Resp 16   Ht 5' 3\" (1.6 m)   Wt (!) 421 lb (191 kg)   LMP 08/02/2013 (Within Months)   SpO2 97%   BMI 74.58 kg/m²   BMI: Body mass index is 74.58 kg/m². CBC:   Recent Labs      08/03/18   0543   HGB  9.6*     BMP:  No results for input(s): NA, K, CL, CO2, BUN, CREATININE, GLUCOSE in the last 72 hours. Hepatic: No results for input(s): AST, ALT, ALB, BILITOT, ALKPHOS in the last 72 hours. Troponin: No results for input(s): TROPONINI in the last 72 hours. BNP: No results for input(s): BNP in the last 72 hours. Lipids: No results for input(s): CHOL, HDL in the last 72 hours. Invalid input(s): LDLCALCU  INR: No results for input(s): INR in the last 72 hours. Physical Exam:  General Appearance: alert and oriented to person, place and time, in no acute distress  Cardiovascular:  distal pulses intact, no JVD  Pulmonary/Chest:  normal air movement, no respiratory distress  Abdomen: soft, non-tender, non-distended, normal bowel sounds, no masses   Extremities: no cyanosis, clubbing or edema, pulse   Skin: warm and dry, no rash or erythema  Head: normocephalic and atraumatic  Eyes: pupils equal, round, and reactive to light  Neck: supple and non-tender without mass, no thyromegaly   Musculoskeletal: normal range of motion, no joint swelling, deformity or tenderness  Neurological: alert, oriented, normal speech, no focal findings or movement disorder noted    LOWER EXTREMITY   Posterior splint intact to left lower extremity     Vascular: CFT brisk to exposed digits, left foot. Dermatological: posterior splint intact to left lower extremity     Neuro: light touch sensation to exposed digits, left foot. MSK: patient able to move digits on left foot. No pain with calf squeeze.  Left foot rectus and at 90 degree angle

## 2018-08-06 VITALS
BODY MASS INDEX: 51.91 KG/M2 | RESPIRATION RATE: 16 BRPM | DIASTOLIC BLOOD PRESSURE: 61 MMHG | HEIGHT: 63 IN | TEMPERATURE: 98.7 F | HEART RATE: 88 BPM | SYSTOLIC BLOOD PRESSURE: 133 MMHG | WEIGHT: 293 LBS | OXYGEN SATURATION: 98 %

## 2018-08-06 PROCEDURE — 97110 THERAPEUTIC EXERCISES: CPT

## 2018-08-06 PROCEDURE — 97530 THERAPEUTIC ACTIVITIES: CPT

## 2018-08-06 PROCEDURE — 6360000002 HC RX W HCPCS: Performed by: STUDENT IN AN ORGANIZED HEALTH CARE EDUCATION/TRAINING PROGRAM

## 2018-08-06 PROCEDURE — 6370000000 HC RX 637 (ALT 250 FOR IP): Performed by: PODIATRIST

## 2018-08-06 PROCEDURE — 96372 THER/PROPH/DIAG INJ SC/IM: CPT

## 2018-08-06 RX ADMIN — OXYCODONE HYDROCHLORIDE AND ACETAMINOPHEN 2 TABLET: 5; 325 TABLET ORAL at 00:19

## 2018-08-06 RX ADMIN — OXYCODONE HYDROCHLORIDE AND ACETAMINOPHEN 2 TABLET: 5; 325 TABLET ORAL at 15:48

## 2018-08-06 RX ADMIN — OXYCODONE HYDROCHLORIDE AND ACETAMINOPHEN 2 TABLET: 5; 325 TABLET ORAL at 09:36

## 2018-08-06 RX ADMIN — ENOXAPARIN SODIUM 60 MG: 60 INJECTION SUBCUTANEOUS at 09:36

## 2018-08-06 ASSESSMENT — PAIN SCALES - GENERAL
PAINLEVEL_OUTOF10: 7
PAINLEVEL_OUTOF10: 4
PAINLEVEL_OUTOF10: 6
PAINLEVEL_OUTOF10: 7

## 2018-08-06 ASSESSMENT — PAIN DESCRIPTION - PAIN TYPE
TYPE: SURGICAL PAIN
TYPE: SURGICAL PAIN

## 2018-08-06 ASSESSMENT — PAIN DESCRIPTION - LOCATION
LOCATION: ANKLE
LOCATION: ANKLE

## 2018-08-06 ASSESSMENT — PAIN DESCRIPTION - FREQUENCY: FREQUENCY: CONTINUOUS

## 2018-08-06 ASSESSMENT — PAIN DESCRIPTION - DESCRIPTORS: DESCRIPTORS: ACHING

## 2018-08-06 ASSESSMENT — PAIN DESCRIPTION - ORIENTATION: ORIENTATION: LEFT

## 2018-08-06 ASSESSMENT — PAIN DESCRIPTION - PROGRESSION: CLINICAL_PROGRESSION: NOT CHANGED

## 2018-08-06 NOTE — PROGRESS NOTES
helping. Patient denies fever, vomiting, nausea, chills, shortness of breath, chest pain. Patient has not had a BM since surgery. Diet: DIET GENERAL; Carb Control: 3 carb choices (45 gms)/meal  Medications:   Scheduled Meds:   vitamin D  50,000 Units Oral Weekly    sodium chloride flush  10 mL Intravenous 2 times per day    enoxaparin  60 mg Subcutaneous BID     Continuous Infusions:  PRN Medications: oxyCODONE-acetaminophen **OR** oxyCODONE-acetaminophen, sodium chloride flush, acetaminophen, ondansetron    Objective:   Vitals: /67   Pulse 86   Temp 98.4 °F (36.9 °C) (Oral)   Resp 16   Ht 5' 3\" (1.6 m)   Wt (!) 421 lb (191 kg)   LMP 08/02/2013 (Within Months)   SpO2 95%   BMI 74.58 kg/m²   BMI: Body mass index is 74.58 kg/m². CBC:   No results for input(s): WBC, HGB, PLT in the last 72 hours. BMP:  No results for input(s): NA, K, CL, CO2, BUN, CREATININE, GLUCOSE in the last 72 hours. Hepatic: No results for input(s): AST, ALT, ALB, BILITOT, ALKPHOS in the last 72 hours. Troponin: No results for input(s): TROPONINI in the last 72 hours. BNP: No results for input(s): BNP in the last 72 hours. Lipids: No results for input(s): CHOL, HDL in the last 72 hours. Invalid input(s): LDLCALCU  INR: No results for input(s): INR in the last 72 hours.     Physical Exam:  General Appearance: alert and oriented to person, place and time, in no acute distress  Cardiovascular:  distal pulses intact, no JVD  Pulmonary/Chest:  normal air movement, no respiratory distress  Abdomen: soft, non-tender, non-distended, normal bowel sounds, no masses   Extremities: no cyanosis, clubbing or edema, pulse   Skin: warm and dry, no rash or erythema  Head: normocephalic and atraumatic  Eyes: pupils equal, round, and reactive to light  Neck: supple and non-tender without mass, no thyromegaly   Musculoskeletal: normal range of motion, no joint swelling, deformity or tenderness  Neurological: alert, oriented, normal
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
## 2018-08-06 NOTE — PLAN OF CARE
Problem: Nutrition  Goal: Optimal nutrition therapy  Outcome: Ongoing  Nutrition Problem: Food and nutrition-related knowledge deficit  Intervention: Food and/or Nutrient Delivery: Continue current diet, ONS not indicated  Nutritional Goals: pt. will lose 1-2#/week during LOS.
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
## 2018-08-06 NOTE — PROGRESS NOTES
Weight, Skin Integrity, Wound Healing, Patient/Family Education    See Adult Nutrition Doc Flowsheet for more detail.      Electronically signed by Rima Eden RD, LD on 8/6/18 at 3:14 PM    Contact Number: [phone]
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
## 2018-08-07 ENCOUNTER — TELEPHONE (OUTPATIENT)
Dept: FAMILY MEDICINE CLINIC | Age: 33
End: 2018-08-07

## 2018-08-07 NOTE — TELEPHONE ENCOUNTER
[facility] Transitions Initial Follow Up Call    Call within 2 business days of discharge: Yes     Patient: Gabe Mays Patient : 1985   MRN: [id number]  Reason for Admission: There are no discharge diagnoses documented for the most recent discharge. Discharge Date: 18 RARS: No Data Recorded     Spoke with: left message for pt to ret call  Have the medications prescribed at discharge been filled? Pt went to [facility]  Does the patient understand what the medications are for? Nursing takes care of meds  Has the patient experienced any new symptoms or have previous symptoms worsened? no  Is the patient experiencing any pain? no If yes, is the pain well controlled? n/a  We want to be sure the patient has the best recovery possible. Does the patient understand all the discharge instructions? No  Instructions sent to nursing  Did someone talk to the patient and/or family about the patient needs prior to being discharged? yes  Did the patient's doctor communicate well? Pt on pain meds  Did not know what was being told to her  Did the patient's nurse communicate well? yes  Was the patient satisfied with the services and care received at [address] [place] [address]? yes        Discharge department/facility: [facility]    Non-face-to-face services provided: Follow Up  No future appointments.     Estela Llanos RN

## 2019-02-19 ENCOUNTER — OFFICE VISIT (OUTPATIENT)
Dept: FAMILY MEDICINE CLINIC | Age: 34
End: 2019-02-19
Payer: MEDICAID

## 2019-02-19 VITALS
HEIGHT: 63 IN | HEART RATE: 86 BPM | WEIGHT: 293 LBS | RESPIRATION RATE: 20 BRPM | DIASTOLIC BLOOD PRESSURE: 80 MMHG | SYSTOLIC BLOOD PRESSURE: 136 MMHG | BODY MASS INDEX: 51.91 KG/M2

## 2019-02-19 DIAGNOSIS — Z13.31 POSITIVE DEPRESSION SCREENING: ICD-10-CM

## 2019-02-19 DIAGNOSIS — F33.1 MODERATE EPISODE OF RECURRENT MAJOR DEPRESSIVE DISORDER (HCC): Primary | ICD-10-CM

## 2019-02-19 PROCEDURE — G8417 CALC BMI ABV UP PARAM F/U: HCPCS | Performed by: NURSE PRACTITIONER

## 2019-02-19 PROCEDURE — 99213 OFFICE O/P EST LOW 20 MIN: CPT | Performed by: NURSE PRACTITIONER

## 2019-02-19 PROCEDURE — G8431 POS CLIN DEPRES SCRN F/U DOC: HCPCS | Performed by: NURSE PRACTITIONER

## 2019-02-19 PROCEDURE — 96160 PT-FOCUSED HLTH RISK ASSMT: CPT | Performed by: NURSE PRACTITIONER

## 2019-02-19 PROCEDURE — G8484 FLU IMMUNIZE NO ADMIN: HCPCS | Performed by: NURSE PRACTITIONER

## 2019-02-19 PROCEDURE — G8427 DOCREV CUR MEDS BY ELIG CLIN: HCPCS | Performed by: NURSE PRACTITIONER

## 2019-02-19 PROCEDURE — 1036F TOBACCO NON-USER: CPT | Performed by: NURSE PRACTITIONER

## 2019-02-19 RX ORDER — ESCITALOPRAM OXALATE 10 MG/1
10 TABLET ORAL DAILY
Qty: 30 TABLET | Refills: 1 | Status: SHIPPED | OUTPATIENT
Start: 2019-02-19 | End: 2019-03-19 | Stop reason: SDUPTHER

## 2019-02-19 RX ORDER — HYDROXYZINE HYDROCHLORIDE 25 MG/1
25 TABLET, FILM COATED ORAL EVERY 8 HOURS PRN
Qty: 30 TABLET | Refills: 1 | Status: SHIPPED | OUTPATIENT
Start: 2019-02-19 | End: 2019-03-19 | Stop reason: DRUGHIGH

## 2019-02-19 ASSESSMENT — PATIENT HEALTH QUESTIONNAIRE - PHQ9
5. POOR APPETITE OR OVEREATING: 3
SUM OF ALL RESPONSES TO PHQ QUESTIONS 1-9: 20
SUM OF ALL RESPONSES TO PHQ9 QUESTIONS 1 & 2: 5
1. LITTLE INTEREST OR PLEASURE IN DOING THINGS: 3
10. IF YOU CHECKED OFF ANY PROBLEMS, HOW DIFFICULT HAVE THESE PROBLEMS MADE IT FOR YOU TO DO YOUR WORK, TAKE CARE OF THINGS AT HOME, OR GET ALONG WITH OTHER PEOPLE: 1
4. FEELING TIRED OR HAVING LITTLE ENERGY: 3
6. FEELING BAD ABOUT YOURSELF - OR THAT YOU ARE A FAILURE OR HAVE LET YOURSELF OR YOUR FAMILY DOWN: 3
3. TROUBLE FALLING OR STAYING ASLEEP: 3
8. MOVING OR SPEAKING SO SLOWLY THAT OTHER PEOPLE COULD HAVE NOTICED. OR THE OPPOSITE, BEING SO FIGETY OR RESTLESS THAT YOU HAVE BEEN MOVING AROUND A LOT MORE THAN USUAL: 1
SUM OF ALL RESPONSES TO PHQ QUESTIONS 1-9: 20
7. TROUBLE CONCENTRATING ON THINGS, SUCH AS READING THE NEWSPAPER OR WATCHING TELEVISION: 2
9. THOUGHTS THAT YOU WOULD BE BETTER OFF DEAD, OR OF HURTING YOURSELF: 0
2. FEELING DOWN, DEPRESSED OR HOPELESS: 2

## 2019-02-20 ASSESSMENT — ENCOUNTER SYMPTOMS
SHORTNESS OF BREATH: 0
COUGH: 0
NAUSEA: 0
ABDOMINAL PAIN: 0

## 2019-03-19 ENCOUNTER — OFFICE VISIT (OUTPATIENT)
Dept: FAMILY MEDICINE CLINIC | Age: 34
End: 2019-03-19
Payer: MEDICAID

## 2019-03-19 VITALS
RESPIRATION RATE: 16 BRPM | WEIGHT: 293 LBS | HEIGHT: 63 IN | HEART RATE: 72 BPM | BODY MASS INDEX: 51.91 KG/M2 | SYSTOLIC BLOOD PRESSURE: 118 MMHG | DIASTOLIC BLOOD PRESSURE: 70 MMHG

## 2019-03-19 DIAGNOSIS — F33.1 MODERATE EPISODE OF RECURRENT MAJOR DEPRESSIVE DISORDER (HCC): Primary | ICD-10-CM

## 2019-03-19 PROCEDURE — G8427 DOCREV CUR MEDS BY ELIG CLIN: HCPCS | Performed by: NURSE PRACTITIONER

## 2019-03-19 PROCEDURE — 99213 OFFICE O/P EST LOW 20 MIN: CPT | Performed by: NURSE PRACTITIONER

## 2019-03-19 PROCEDURE — G8417 CALC BMI ABV UP PARAM F/U: HCPCS | Performed by: NURSE PRACTITIONER

## 2019-03-19 PROCEDURE — G8484 FLU IMMUNIZE NO ADMIN: HCPCS | Performed by: NURSE PRACTITIONER

## 2019-03-19 PROCEDURE — 1036F TOBACCO NON-USER: CPT | Performed by: NURSE PRACTITIONER

## 2019-03-19 RX ORDER — ESCITALOPRAM OXALATE 20 MG/1
20 TABLET ORAL DAILY
Qty: 30 TABLET | Refills: 1 | Status: SHIPPED | OUTPATIENT
Start: 2019-03-19 | End: 2019-04-18 | Stop reason: SDUPTHER

## 2019-03-19 RX ORDER — HYDROXYZINE HYDROCHLORIDE 25 MG/1
50 TABLET, FILM COATED ORAL EVERY 8 HOURS PRN
Qty: 30 TABLET | Refills: 1 | Status: SHIPPED
Start: 2019-03-19 | End: 2019-04-18

## 2019-03-19 ASSESSMENT — ENCOUNTER SYMPTOMS
ABDOMINAL PAIN: 0
SHORTNESS OF BREATH: 0
NAUSEA: 0
COUGH: 0

## 2019-03-20 ENCOUNTER — TELEPHONE (OUTPATIENT)
Dept: FAMILY MEDICINE CLINIC | Age: 34
End: 2019-03-20

## 2019-03-20 DIAGNOSIS — F51.02 ADJUSTMENT INSOMNIA: Primary | ICD-10-CM

## 2019-03-20 RX ORDER — TRAZODONE HYDROCHLORIDE 50 MG/1
50 TABLET ORAL NIGHTLY PRN
Qty: 30 TABLET | Refills: 0 | Status: SHIPPED | OUTPATIENT
Start: 2019-03-20 | End: 2019-04-09 | Stop reason: ALTCHOICE

## 2019-04-09 DIAGNOSIS — F51.02 ADJUSTMENT INSOMNIA: Primary | ICD-10-CM

## 2019-04-09 RX ORDER — DOXEPIN HYDROCHLORIDE 25 MG/1
25 CAPSULE ORAL NIGHTLY
Qty: 30 CAPSULE | Refills: 0 | Status: SHIPPED | OUTPATIENT
Start: 2019-04-09 | End: 2019-04-18 | Stop reason: ALTCHOICE

## 2019-04-09 NOTE — TELEPHONE ENCOUNTER
Patient was seen 03/19/19 and prescribed Trazodone and it is not helping. Patient tried taking two and still unable to sleep.  Patient is requesting something different  Pharmacy [facility]

## 2019-04-09 NOTE — TELEPHONE ENCOUNTER
"              After Visit Summary   9/26/2017    Liya Corona    MRN: [id number]           Patient Information     Date Of Birth          1962        Visit Information        Provider Department      9/26/2017 3:00 PM Deisi Fuller, PT [facility] Physical Therapy        Today's Diagnoses     Left ankle pain    -  1       Follow-ups after your visit        Your next 10 appointments already scheduled     Oct 04, 2017  7:40 AM CDT   NELDA Extremity with Deisi Fuller PT   [facility] ([facility]  )    [address] [place] [id number]   [phone]            Oct 10, 2017  3:20 PM CDT   NELDA Extremity with Marleny Monsivais PT   [facility] ([facility]  )    [address] [place] [id number] [phone]              Who to contact     If you have questions or need follow up information about today's clinic visit or your schedule please contact [facility] PHYSICAL THERAPY directly at [phone].  Normal or non-critical lab and imaging results will be communicated to you by [employer], letter or phone within 4 business days after the clinic has received the results. If you do not hear from us within 7 days, please contact the clinic through [employer] or phone. If you have a critical or abnormal lab result, we will notify you by phone as soon as possible.  Submit refill requests through [employer] or call your pharmacy and they will forward the refill request to us. Please allow 3 business days for your refill to be completed.          Additional Information About Your Visit        MyChart Information     [employer] lets you send messages to your doctor, view your test results, renew your prescriptions, schedule appointments and more. To sign up, go to [email or web address] . Click on \"Log in\" on " Attempted to notify pt, left vm. "the left side of the screen, which will take you to the Welcome page. Then click on \"Sign up Now\" on the right side of the page.     You will be asked to enter the access code listed below, as well as some personal information. Please follow the directions to create your username and password.     Your access code is: [email or web address]  Expires: 2017  4:15 PM     Your access code will  in 90 days. If you need help or a new code, please call your [place] clinic or [phone].        Care EveryWhere ID     This is your Care EveryWhere ID. This could be used by other organizations to access your [place] medical records  [id number]         Blood Pressure from Last 3 Encounters:   17 112/67   17 106/80   16 111/74    Weight from Last 3 Encounters:   17 99.8 kg (220 lb)   08/24/15 97.3 kg (214 lb 9.6 oz)   13 95.3 kg (210 lb)              We Performed the Following     HC PT EVAL, LOW COMPLEXITY     NELDA INITIAL EVAL REPORT     THERAPEUTIC EXERCISES        Primary Care Provider Office Phone # Fax #    Tawanda Jade [phone]       [address]  [facility] [address]        Equal Access to Services     [facility]: Hadii aad ku hadasho Soomaali, waaxda luqadaha, qaybta kaalmada adeegyada, waxay valentin haysusannah mascorro . So [facility] [phone].    ATENCIÓN: Si habla español, tiene a liang disposición servicios gratuitos de asistencia lingüística. Llame al [phone].    We comply with applicable federal civil rights laws and [place] laws. We do not discriminate on the basis of race, color, national origin, age, disability sex, sexual orientation or gender identity.            Thank you!     Thank you for choosing [facility] PHYSICAL THERAPY  for your care. Our goal is always to provide you with excellent care. Hearing back from our patients is one way we can continue to improve our services. Please " take a few minutes to complete the written survey that you may receive in the mail after your visit with us. Thank you!             Your Updated Medication List - Protect others around you: Learn how to safely use, store and throw away your medicines at www.disposemymeds.org.          This list is accurate as of: 9/26/17  4:15 PM.  Always use your most recent med list.                   Brand Name Dispense Instructions for use Diagnosis    ADVIL 200 MG tablet   Generic drug:  ibuprofen      1 tablet every 4 to 6 Hours as needed        ALEVE PO      Take  by mouth. Takes prn        citalopram 20 MG tablet    celeXA    90 tablet    Take 1 tablet (20 mg) by mouth daily    Anxiety       MELATONIN PO       Encounter for routine gynecological examination       MULTI-DAY Tabs      1 TABLET DAILY

## 2019-04-15 NOTE — TELEPHONE ENCOUNTER
I followed up with pt, she is doing better on the new medication and would like to continue. Pt says she has a follow up appt with you on Thursday and wants to know if she needs to keep that appt. Pt will need refills of her other medications in a few weeks. Please advise.

## 2019-04-18 ENCOUNTER — OFFICE VISIT (OUTPATIENT)
Dept: FAMILY MEDICINE CLINIC | Age: 34
End: 2019-04-18
Payer: MEDICAID

## 2019-04-18 VITALS
HEIGHT: 63 IN | SYSTOLIC BLOOD PRESSURE: 120 MMHG | BODY MASS INDEX: 51.91 KG/M2 | DIASTOLIC BLOOD PRESSURE: 68 MMHG | RESPIRATION RATE: 16 BRPM | HEART RATE: 76 BPM | WEIGHT: 293 LBS

## 2019-04-18 DIAGNOSIS — K30 INDIGESTION: ICD-10-CM

## 2019-04-18 DIAGNOSIS — F33.1 MODERATE EPISODE OF RECURRENT MAJOR DEPRESSIVE DISORDER (HCC): Primary | ICD-10-CM

## 2019-04-18 PROCEDURE — 1036F TOBACCO NON-USER: CPT | Performed by: NURSE PRACTITIONER

## 2019-04-18 PROCEDURE — 99213 OFFICE O/P EST LOW 20 MIN: CPT | Performed by: NURSE PRACTITIONER

## 2019-04-18 PROCEDURE — G8427 DOCREV CUR MEDS BY ELIG CLIN: HCPCS | Performed by: NURSE PRACTITIONER

## 2019-04-18 PROCEDURE — G8417 CALC BMI ABV UP PARAM F/U: HCPCS | Performed by: NURSE PRACTITIONER

## 2019-04-18 RX ORDER — RANITIDINE 150 MG/1
150 TABLET ORAL 2 TIMES DAILY PRN
Qty: 60 TABLET | Refills: 2 | Status: SHIPPED | OUTPATIENT
Start: 2019-04-18 | End: 2021-04-01 | Stop reason: SDUPTHER

## 2019-04-18 RX ORDER — ESCITALOPRAM OXALATE 20 MG/1
20 TABLET ORAL DAILY
Qty: 30 TABLET | Refills: 11 | Status: SHIPPED | OUTPATIENT
Start: 2019-04-18 | End: 2021-01-03 | Stop reason: SDUPTHER

## 2019-04-18 ASSESSMENT — ENCOUNTER SYMPTOMS: SHORTNESS OF BREATH: 0

## 2019-04-18 NOTE — PROGRESS NOTES
Chronic Disease Visit Information    BP Readings from Last 3 Encounters:   04/18/19 120/68   03/19/19 118/70   02/19/19 136/80          Hemoglobin A1C (%)   Date Value   07/19/2018 5.3     BUN (mg/dL)   Date Value   07/19/2018 12     CREATININE (mg/dL)   Date Value   07/19/2018 0.6     Glucose (mg/dL)   Date Value   07/19/2018 88            Have you changed or started any medications since your last visit including any over-the-counter medicines, vitamins, or herbal medicines? no   Are you having any side effects from any of your medications? -  no  Have you stopped taking any of your medications? Is so, why? -  no    Have you seen any other physician or provider since your last visit? No  Have you had any other diagnostic tests since your last visit? No  Have you been seen in the emergency room and/or had an admission to a hospital since we last saw you? No  Have you had your annual diabetic retinal (eye) exam? n/a  Have you had your routine dental cleaning in the past 6 months? n/a    Have you activated your [employer] account? If not, what are your barriers?  Yes     Patient Care Team:  MALCOM Spicer CNP as PCP - General (Nurse Practitioner)  Adam Tolentino DPM as Consulting Physician (Podiatry)         Medical History Review  Past Medical, Family, and Social History reviewed and does contribute to the patient presenting condition    Health Maintenance   Topic Date Due    Varicella Vaccine (1 of 2 - 13+ 2-dose series) 08/26/1998    HIV screen  08/26/2000    Cervical cancer screen  08/26/2006    Flu vaccine (Season Ended) 09/01/2019    DTaP/Tdap/Td vaccine (2 - Td) 09/18/2027    Pneumococcal 0-64 years Vaccine  Aged Out

## 2019-04-18 NOTE — PROGRESS NOTES
Subjective:      Patient ID: Teri Mott is a 35 y.o. female. HPI: 1 month FOllow    Chief Complaint   Patient presents with    1 Month Follow-Up     improved    Depression    Anxiety    Gastroesophageal Reflux     Was increased on Lexapro 20 mg last visit for depression/anxiety. Less emotional.  Continued improvement seen with dose increase. No SE. Tolerating well. Desire to leave house and be around other people. Anxiety and sleep are well improved. issues. Hydroxyzine is helping anxiety PRN basis. Random indigestion. High acid or spicey foods. No OTC. Vitals:    04/18/19 1326   BP: 120/68   Pulse: 76   Resp: 16         Review of Systems   Constitutional: Negative for chills and fever. HENT: Negative. Respiratory: Negative for shortness of breath. Skin: Negative for rash. Neurological: Negative for dizziness, light-headedness and headaches. Psychiatric/Behavioral: Negative for dysphoric mood, self-injury, sleep disturbance and suicidal ideas. The patient is not nervous/anxious. Objective:   Physical Exam   Constitutional: She is oriented to person, place, and time. Vital signs are normal. She appears well-developed and well-nourished. She is active. She does not have a sickly appearance. No distress. HENT:   Right Ear: Tympanic membrane normal.   Left Ear: Tympanic membrane normal.   Nose: Nose normal.   Mouth/Throat: Oropharynx is clear and moist and mucous membranes are normal.   Eyes: Pupils are equal, round, and reactive to light. EOM are normal.   Neck: Normal range of motion. Neck supple. Cardiovascular: Normal rate, regular rhythm, S1 normal, S2 normal, normal heart sounds and normal pulses. Exam reveals no S3. No murmur heard. Pulmonary/Chest: Effort normal and breath sounds normal. She has no decreased breath sounds. She has no wheezes. She has no rhonchi. Abdominal: Soft. Bowel sounds are normal. She exhibits no distension. There is no tenderness. Musculoskeletal: Normal range of motion. She exhibits no tenderness. Neurological: She is alert and oriented to person, place, and time. Skin: Skin is warm and dry. No rash noted. Psychiatric: Her speech is normal. Her mood appears not anxious. She is not slowed and not withdrawn. Thought content is not paranoid. Cognition and memory are normal. She does not express impulsivity. She does not exhibit a depressed mood. She expresses no suicidal ideation. She expresses no suicidal plans. Assessment:       Diagnosis Orders   1. Moderate episode of recurrent major depressive disorder (HCC)  escitalopram (LEXAPRO) 20 MG tablet   2.  Indigestion  ranitidine (ZANTAC) 150 MG tablet           Plan:      Continue Lexapro 20 mg - reevaluate need in 1 year  Hydroxyzine 50 mg PRN  No sleep aids needed anymore  Zantac PRN  Avoidance of foods and weight loss discussed  RTO in 1 year        MALCOM Marsh - CNP

## 2019-05-16 DIAGNOSIS — F33.1 MODERATE EPISODE OF RECURRENT MAJOR DEPRESSIVE DISORDER (HCC): ICD-10-CM

## 2019-05-16 RX ORDER — HYDROXYZINE HYDROCHLORIDE 25 MG/1
25 TABLET, FILM COATED ORAL EVERY 8 HOURS PRN
Qty: 60 TABLET | Refills: 1 | Status: SHIPPED | OUTPATIENT
Start: 2019-05-16 | End: 2019-05-26

## 2019-05-29 ENCOUNTER — TELEPHONE (OUTPATIENT)
Dept: FAMILY MEDICINE CLINIC | Age: 34
End: 2019-05-29

## 2019-05-29 NOTE — TELEPHONE ENCOUNTER
Pt has Medical Statement form for Jarrell Phoenix to fill out. He wants her to make an appointment before he will complete them. LM for pt to call and schedule.

## 2019-06-03 ENCOUNTER — OFFICE VISIT (OUTPATIENT)
Dept: FAMILY MEDICINE CLINIC | Age: 34
End: 2019-06-03
Payer: MEDICAID

## 2019-06-03 VITALS
RESPIRATION RATE: 18 BRPM | DIASTOLIC BLOOD PRESSURE: 78 MMHG | TEMPERATURE: 98.2 F | HEART RATE: 72 BPM | BODY MASS INDEX: 51.91 KG/M2 | SYSTOLIC BLOOD PRESSURE: 128 MMHG | WEIGHT: 293 LBS | HEIGHT: 63 IN

## 2019-06-03 DIAGNOSIS — E66.01 MORBID OBESITY WITH BMI OF 70 AND OVER, ADULT (HCC): ICD-10-CM

## 2019-06-03 DIAGNOSIS — G89.29 CHRONIC PAIN OF LEFT ANKLE: Primary | ICD-10-CM

## 2019-06-03 DIAGNOSIS — F33.1 MODERATE EPISODE OF RECURRENT MAJOR DEPRESSIVE DISORDER (HCC): ICD-10-CM

## 2019-06-03 DIAGNOSIS — M25.572 CHRONIC PAIN OF LEFT ANKLE: Primary | ICD-10-CM

## 2019-06-03 DIAGNOSIS — S29.019D: ICD-10-CM

## 2019-06-03 PROCEDURE — 99213 OFFICE O/P EST LOW 20 MIN: CPT | Performed by: NURSE PRACTITIONER

## 2019-06-03 PROCEDURE — G8417 CALC BMI ABV UP PARAM F/U: HCPCS | Performed by: NURSE PRACTITIONER

## 2019-06-03 PROCEDURE — 1036F TOBACCO NON-USER: CPT | Performed by: NURSE PRACTITIONER

## 2019-06-03 PROCEDURE — G8427 DOCREV CUR MEDS BY ELIG CLIN: HCPCS | Performed by: NURSE PRACTITIONER

## 2019-06-03 RX ORDER — HYDROXYZINE HYDROCHLORIDE 25 MG/1
25 TABLET, FILM COATED ORAL PRN
COMMUNITY
End: 2021-01-07 | Stop reason: SDUPTHER

## 2019-06-03 ASSESSMENT — ENCOUNTER SYMPTOMS
ABDOMINAL PAIN: 0
SHORTNESS OF BREATH: 0
NAUSEA: 0
COUGH: 0
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
## 2019-06-03 NOTE — PROGRESS NOTES
Visit Information    Have you changed or started any medications since your last visit including any over-the-counter medicines, vitamins, or herbal medicines? yes - see med list   Are you having any side effects from any of your medications? -  no  Have you stopped taking any of your medications? Is so, why? -  no    Have you seen any other physician or provider since your last visit? No  Have you had any other diagnostic tests since your last visit? No  Have you been seen in the emergency room and/or had an admission to a hospital since we last saw you? No  Have you had your routine dental cleaning in the past 6 months? no    Have you activated your [employer] account? If not, what are your barriers?  Yes     Patient Care Team:  MALCOM Trevino CNP as PCP - General (Nurse Practitioner)  MALCOM Tre[facility]ino CNP as P[facility]  VI Abdi as Consulting Physician (Podiatry)    Medical History Review  Past Medical, Family, and Social History reviewed and does contribute to the patient presenting condition    Health Maintenance   Topic Date Due    Varicella Vaccine (1 of 2 - 13+ 2-dose series) 08/26/1998    HIV screen  08/26/2000    Cervical cancer screen  08/26/2006    Flu vaccine (Season Ended) 09/01/2019    DTaP/Tdap/Td vaccine (2 - Td) 09/18/2027    Pneumococcal 0-64 years Vaccine  Aged Out

## 2019-06-04 ENCOUNTER — TELEPHONE (OUTPATIENT)
Dept: FAMILY MEDICINE CLINIC | Age: 34
End: 2019-06-04

## 2019-06-04 ASSESSMENT — ENCOUNTER SYMPTOMS: BACK PAIN: 1

## 2019-07-01 ENCOUNTER — OFFICE VISIT (OUTPATIENT)
Dept: FAMILY MEDICINE CLINIC | Age: 34
End: 2019-07-01
Payer: MEDICAID

## 2019-07-01 VITALS
DIASTOLIC BLOOD PRESSURE: 62 MMHG | HEART RATE: 92 BPM | WEIGHT: 293 LBS | SYSTOLIC BLOOD PRESSURE: 128 MMHG | BODY MASS INDEX: 77.82 KG/M2 | RESPIRATION RATE: 24 BRPM | OXYGEN SATURATION: 98 %

## 2019-07-01 DIAGNOSIS — H81.13 BPPV (BENIGN PAROXYSMAL POSITIONAL VERTIGO), BILATERAL: Primary | ICD-10-CM

## 2019-07-01 PROCEDURE — G8417 CALC BMI ABV UP PARAM F/U: HCPCS | Performed by: NURSE PRACTITIONER

## 2019-07-01 PROCEDURE — G8427 DOCREV CUR MEDS BY ELIG CLIN: HCPCS | Performed by: NURSE PRACTITIONER

## 2019-07-01 PROCEDURE — 1036F TOBACCO NON-USER: CPT | Performed by: NURSE PRACTITIONER

## 2019-07-01 PROCEDURE — 99213 OFFICE O/P EST LOW 20 MIN: CPT | Performed by: NURSE PRACTITIONER

## 2019-07-01 RX ORDER — MECLIZINE HYDROCHLORIDE 25 MG/1
25 TABLET ORAL 3 TIMES DAILY PRN
Qty: 20 TABLET | Refills: 0 | Status: SHIPPED | OUTPATIENT
Start: 2019-07-01 | End: 2022-08-11

## 2019-07-01 ASSESSMENT — ENCOUNTER SYMPTOMS
NAUSEA: 0
ABDOMINAL PAIN: 0
COUGH: 0
SHORTNESS OF BREATH: 0

## 2019-09-14 ENCOUNTER — HOSPITAL ENCOUNTER (EMERGENCY)
Age: 34
Discharge: HOME | End: 2019-09-14
Attending: NURSE PRACTITIONER
Payer: MEDICAID

## 2019-09-14 VITALS
DIASTOLIC BLOOD PRESSURE: 65 MMHG | RESPIRATION RATE: 16 BRPM | WEIGHT: 293 LBS | HEIGHT: 63 IN | HEART RATE: 87 BPM | OXYGEN SATURATION: 99 % | TEMPERATURE: 97.5 F | SYSTOLIC BLOOD PRESSURE: 133 MMHG | BODY MASS INDEX: 51.91 KG/M2

## 2019-09-14 DIAGNOSIS — J06.9 UPPER RESPIRATORY INFECTION WITH COUGH AND CONGESTION: Primary | ICD-10-CM

## 2019-09-14 PROCEDURE — 99213 OFFICE O/P EST LOW 20 MIN: CPT | Performed by: NURSE PRACTITIONER

## 2019-09-14 PROCEDURE — 99212 OFFICE O/P EST SF 10 MIN: CPT

## 2019-09-14 RX ORDER — METHYLPREDNISOLONE 4 MG/1
TABLET ORAL
Qty: 1 KIT | Refills: 0 | Status: SHIPPED | OUTPATIENT
Start: 2019-09-14 | End: 2019-09-20

## 2019-09-14 RX ORDER — ALBUTEROL SULFATE 90 UG/1
2 AEROSOL, METERED RESPIRATORY (INHALATION) EVERY 4 HOURS PRN
Qty: 1 INHALER | Refills: 0 | Status: SHIPPED | OUTPATIENT
Start: 2019-09-14 | End: 2022-08-11

## 2019-09-14 RX ORDER — DOXYCYCLINE HYCLATE 100 MG/1
100 CAPSULE ORAL 2 TIMES DAILY
Qty: 20 CAPSULE | Refills: 0 | Status: SHIPPED | OUTPATIENT
Start: 2019-09-14 | End: 2019-09-24

## 2019-09-14 ASSESSMENT — PAIN DESCRIPTION - DESCRIPTORS: DESCRIPTORS: ACHING

## 2019-09-14 ASSESSMENT — ENCOUNTER SYMPTOMS
NAUSEA: 0
SINUS PAIN: 0
COUGH: 1
SORE THROAT: 1
BACK PAIN: 1
DIARRHEA: 0
TROUBLE SWALLOWING: 0
ABDOMINAL PAIN: 0
CHEST TIGHTNESS: 1
VOICE CHANGE: 0
VOMITING: 0
SINUS PRESSURE: 0

## 2019-09-14 ASSESSMENT — PAIN DESCRIPTION - LOCATION: LOCATION: GENERALIZED;THROAT;CHEST

## 2019-09-14 ASSESSMENT — PAIN - FUNCTIONAL ASSESSMENT: PAIN_FUNCTIONAL_ASSESSMENT: ACTIVITIES ARE NOT PREVENTED

## 2019-09-14 ASSESSMENT — PAIN DESCRIPTION - PROGRESSION: CLINICAL_PROGRESSION: NOT CHANGED

## 2019-09-14 ASSESSMENT — PAIN SCALES - GENERAL: PAINLEVEL_OUTOF10: 8

## 2019-09-14 ASSESSMENT — PAIN DESCRIPTION - FREQUENCY: FREQUENCY: CONTINUOUS

## 2019-09-14 ASSESSMENT — PAIN DESCRIPTION - ONSET: ONSET: AWAKENED FROM SLEEP

## 2019-09-14 ASSESSMENT — PAIN DESCRIPTION - PAIN TYPE: TYPE: ACUTE PAIN

## 2019-09-14 NOTE — ED PROVIDER NOTES
tablet Take by mouth., Disp-1 kit, R-0Print      albuterol sulfate  (90 Base) MCG/ACT inhaler Inhale 2 puffs into the lungs every 4 hours as needed for Wheezing or Shortness of Breath (cough), Disp-1 Inhaler, R-0Print           Discharge Medication List as of 9/14/2019  6:47 PM          Nuha Rodriguez, APRN - [address] [facility] [address], APRN - CNP  09/14/19 9979

## 2019-09-16 ENCOUNTER — TELEPHONE (OUTPATIENT)
Dept: FAMILY MEDICINE CLINIC | Age: 34
End: 2019-09-16

## 2019-10-01 ENCOUNTER — OFFICE VISIT (OUTPATIENT)
Dept: FAMILY MEDICINE CLINIC | Age: 34
End: 2019-10-01
Payer: MEDICAID

## 2019-10-01 VITALS
DIASTOLIC BLOOD PRESSURE: 72 MMHG | RESPIRATION RATE: 24 BRPM | HEART RATE: 88 BPM | SYSTOLIC BLOOD PRESSURE: 132 MMHG | TEMPERATURE: 98.4 F | HEIGHT: 63 IN | BODY MASS INDEX: 51.91 KG/M2 | WEIGHT: 293 LBS

## 2019-10-01 DIAGNOSIS — R05.8 POST-VIRAL COUGH SYNDROME: Primary | ICD-10-CM

## 2019-10-01 PROCEDURE — G8484 FLU IMMUNIZE NO ADMIN: HCPCS | Performed by: NURSE PRACTITIONER

## 2019-10-01 PROCEDURE — G8427 DOCREV CUR MEDS BY ELIG CLIN: HCPCS | Performed by: NURSE PRACTITIONER

## 2019-10-01 PROCEDURE — 1036F TOBACCO NON-USER: CPT | Performed by: NURSE PRACTITIONER

## 2019-10-01 PROCEDURE — 99213 OFFICE O/P EST LOW 20 MIN: CPT | Performed by: NURSE PRACTITIONER

## 2019-10-01 PROCEDURE — G8417 CALC BMI ABV UP PARAM F/U: HCPCS | Performed by: NURSE PRACTITIONER

## 2019-10-01 RX ORDER — BENZONATATE 100 MG/1
100-200 CAPSULE ORAL 3 TIMES DAILY PRN
Qty: 30 CAPSULE | Refills: 0 | Status: SHIPPED | OUTPATIENT
Start: 2019-10-01 | End: 2019-10-08

## 2019-10-01 ASSESSMENT — ENCOUNTER SYMPTOMS
SORE THROAT: 0
SHORTNESS OF BREATH: 0
COUGH: 1

## 2020-03-09 ENCOUNTER — OFFICE VISIT (OUTPATIENT)
Dept: FAMILY MEDICINE CLINIC | Age: 35
End: 2020-03-09
Payer: MEDICAID

## 2020-03-09 VITALS
OXYGEN SATURATION: 98 % | RESPIRATION RATE: 16 BRPM | BODY MASS INDEX: 82.44 KG/M2 | WEIGHT: 293 LBS | HEART RATE: 74 BPM | DIASTOLIC BLOOD PRESSURE: 82 MMHG | SYSTOLIC BLOOD PRESSURE: 124 MMHG

## 2020-03-09 PROCEDURE — G8417 CALC BMI ABV UP PARAM F/U: HCPCS | Performed by: NURSE PRACTITIONER

## 2020-03-09 PROCEDURE — 99213 OFFICE O/P EST LOW 20 MIN: CPT | Performed by: NURSE PRACTITIONER

## 2020-03-09 PROCEDURE — G8427 DOCREV CUR MEDS BY ELIG CLIN: HCPCS | Performed by: NURSE PRACTITIONER

## 2020-03-09 PROCEDURE — G8484 FLU IMMUNIZE NO ADMIN: HCPCS | Performed by: NURSE PRACTITIONER

## 2020-03-09 PROCEDURE — 1036F TOBACCO NON-USER: CPT | Performed by: NURSE PRACTITIONER

## 2020-03-09 RX ORDER — OXYBUTYNIN CHLORIDE 10 MG/1
10 TABLET, EXTENDED RELEASE ORAL DAILY
Qty: 30 TABLET | Refills: 5 | Status: SHIPPED | OUTPATIENT
Start: 2020-03-09 | End: 2022-08-24

## 2020-03-09 SDOH — ECONOMIC STABILITY: TRANSPORTATION INSECURITY
IN THE PAST 12 MONTHS, HAS LACK OF TRANSPORTATION KEPT YOU FROM MEETINGS, WORK, OR FROM GETTING THINGS NEEDED FOR DAILY LIVING?: YES

## 2020-03-09 SDOH — ECONOMIC STABILITY: TRANSPORTATION INSECURITY
IN THE PAST 12 MONTHS, HAS THE LACK OF TRANSPORTATION KEPT YOU FROM MEDICAL APPOINTMENTS OR FROM GETTING MEDICATIONS?: YES

## 2020-03-09 SDOH — ECONOMIC STABILITY: FOOD INSECURITY: WITHIN THE PAST 12 MONTHS, THE FOOD YOU BOUGHT JUST DIDN'T LAST AND YOU DIDN'T HAVE MONEY TO GET MORE.: NEVER TRUE

## 2020-03-09 SDOH — ECONOMIC STABILITY: FOOD INSECURITY: WITHIN THE PAST 12 MONTHS, YOU WORRIED THAT YOUR FOOD WOULD RUN OUT BEFORE YOU GOT MONEY TO BUY MORE.: NEVER TRUE

## 2020-03-09 SDOH — ECONOMIC STABILITY: INCOME INSECURITY: HOW HARD IS IT FOR YOU TO PAY FOR THE VERY BASICS LIKE FOOD, HOUSING, MEDICAL CARE, AND HEATING?: SOMEWHAT HARD

## 2020-03-09 ASSESSMENT — PATIENT HEALTH QUESTIONNAIRE - PHQ9
1. LITTLE INTEREST OR PLEASURE IN DOING THINGS: 0
SUM OF ALL RESPONSES TO PHQ QUESTIONS 1-9: 0
SUM OF ALL RESPONSES TO PHQ9 QUESTIONS 1 & 2: 0
2. FEELING DOWN, DEPRESSED OR HOPELESS: 0
SUM OF ALL RESPONSES TO PHQ QUESTIONS 1-9: 0

## 2020-03-09 NOTE — PROGRESS NOTES
Visit Information    Have you changed or started any medications since your last visit including any over-the-counter medicines, vitamins, or herbal medicines? no   Are you having any side effects from any of your medications? -  no  Have you stopped taking any of your medications? Is so, why? -  no    Have you seen any other physician or provider since your last visit? No  Have you had any other diagnostic tests since your last visit? No  Have you been seen in the emergency room and/or had an admission to a hospital since we last saw you? No    Have you activated your [employer] account? If not, what are your barriers?  Yes     Patient Care Team:  MALCOM Whittaker CNP as PCP - General (Nurse Practitioner)  MALCOM Whittaker CNP as PCP - [facility]  Vaishali Brooke DPM as Consulting Physician (Podiatry)    Medical History Review  Past Medical, Family, and Social History reviewed and does contribute to the patient presenting condition    Health Maintenance   Topic Date Due    Varicella vaccine (1 of 2 - 2-dose childhood series) 08/26/1986    HIV screen  08/26/2000    Cervical cancer screen  08/26/2006    Flu vaccine (1) 06/30/2020 (Originally 9/1/2019)    DTaP/Tdap/Td vaccine (2 - Td) 09/18/2027    Shingles Vaccine (1 of 2) 08/26/2035    Hepatitis A vaccine  Aged Out    Hepatitis B vaccine  Aged Out    Hib vaccine  Aged Out    Meningococcal (ACWY) vaccine  Aged Out    Pneumococcal 0-64 years Vaccine  Aged Out
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
## 2020-03-09 NOTE — PATIENT INSTRUCTIONS
special weights in your vagina while you do the exercises. · Do not smoke. It can irritate the bladder. If you need help quitting, talk to your doctor about stop-smoking programs and medicines. These can increase your chances of quitting for good. Where can you learn more? Go to [email or web address] and sign in to your [employer] account. Enter M016 in the Hooked box to learn more about \"Kegel Exercises: Care Instructions. \"     If you do not have an account, please click on the \"Sign Up Now\" link. Current as of: August 21, 2019  Content Version: 12.3  © 1521-0118 Healthwise, Incorporated. Care instructions adapted under license by [facility] ([facility]). If you have questions about a medical condition or this instruction, always ask your healthcare professional. [address] any warranty or liability for your use of this information.

## 2020-03-10 ASSESSMENT — ENCOUNTER SYMPTOMS
NAUSEA: 0
SHORTNESS OF BREATH: 0
COUGH: 0
ABDOMINAL PAIN: 0
BACK PAIN: 1

## 2020-03-10 NOTE — PROGRESS NOTES
Referral to [facility] and to Dr. Jenn Duran were faxed to their offices and they will call the patient to schedule.       Referrals faxed Dr. Jenn Duran [phone]  [facility] [phone]

## 2020-03-17 ENCOUNTER — HOSPITAL ENCOUNTER (OUTPATIENT)
Dept: GENERAL RADIOLOGY | Age: 35
Discharge: HOME | End: 2020-03-17
Payer: MEDICAID

## 2020-03-17 ENCOUNTER — HOSPITAL ENCOUNTER (OUTPATIENT)
Age: 35
Discharge: HOME | End: 2020-03-17
Payer: MEDICAID

## 2020-03-17 LAB
ALBUMIN SERPL-MCNC: 3.9 G/DL (ref 3.5–5.1)
ALP BLD-CCNC: 73 U/L (ref 38–126)
ALT SERPL-CCNC: 19 U/L (ref 11–66)
ANION GAP SERPL CALCULATED.3IONS-SCNC: 17 MEQ/L (ref 8–16)
AST SERPL-CCNC: 19 U/L (ref 5–40)
BILIRUB SERPL-MCNC: 0.4 MG/DL (ref 0.3–1.2)
BUN BLDV-MCNC: 15 MG/DL (ref 7–22)
CALCIUM SERPL-MCNC: 9.3 MG/DL (ref 8.5–10.5)
CHLORIDE BLD-SCNC: 102 MEQ/L (ref 98–111)
CHOLESTEROL, TOTAL: 216 MG/DL (ref 100–199)
CO2: 24 MEQ/L (ref 23–33)
CREAT SERPL-MCNC: 0.7 MG/DL (ref 0.4–1.2)
ERYTHROCYTE [DISTWIDTH] IN BLOOD BY AUTOMATED COUNT: 17.7 % (ref 11.5–14.5)
ERYTHROCYTE [DISTWIDTH] IN BLOOD BY AUTOMATED COUNT: 50.2 FL (ref 35–45)
GFR SERPL CREATININE-BSD FRML MDRD: > 90 ML/MIN/1.73M2
GLUCOSE BLD-MCNC: 80 MG/DL (ref 70–108)
HCT VFR BLD CALC: 43.8 % (ref 37–47)
HDLC SERPL-MCNC: 49 MG/DL
HEMOGLOBIN: 12.4 GM/DL (ref 12–16)
LDL CHOLESTEROL CALCULATED: 142 MG/DL
MCH RBC QN AUTO: 22.6 PG (ref 26–33)
MCHC RBC AUTO-ENTMCNC: 28.3 GM/DL (ref 32.2–35.5)
MCV RBC AUTO: 79.9 FL (ref 81–99)
PLATELET # BLD: 294 THOU/MM3 (ref 130–400)
PMV BLD AUTO: 11.1 FL (ref 9.4–12.4)
POTASSIUM SERPL-SCNC: 3.8 MEQ/L (ref 3.5–5.2)
RBC # BLD: 5.48 MILL/MM3 (ref 4.2–5.4)
SODIUM BLD-SCNC: 143 MEQ/L (ref 135–145)
TOTAL PROTEIN: 7.7 G/DL (ref 6.1–8)
TRIGL SERPL-MCNC: 125 MG/DL (ref 0–199)
TSH SERPL DL<=0.05 MIU/L-ACNC: 3.11 UIU/ML (ref 0.4–4.2)
WBC # BLD: 9.1 THOU/MM3 (ref 4.8–10.8)

## 2020-03-17 PROCEDURE — 80053 COMPREHEN METABOLIC PANEL: CPT

## 2020-03-17 PROCEDURE — 83036 HEMOGLOBIN GLYCOSYLATED A1C: CPT

## 2020-03-17 PROCEDURE — 84443 ASSAY THYROID STIM HORMONE: CPT

## 2020-03-17 PROCEDURE — 80061 LIPID PANEL: CPT

## 2020-03-17 PROCEDURE — 72100 X-RAY EXAM L-S SPINE 2/3 VWS: CPT

## 2020-03-17 PROCEDURE — 36415 COLL VENOUS BLD VENIPUNCTURE: CPT

## 2020-03-17 PROCEDURE — 85027 COMPLETE CBC AUTOMATED: CPT

## 2020-03-18 LAB
AVERAGE GLUCOSE: 99 MG/DL (ref 70–126)
HBA1C MFR BLD: 5.3 % (ref 4.4–6.4)

## 2021-01-02 ENCOUNTER — E-VISIT (OUTPATIENT)
Dept: INTERNAL MEDICINE CLINIC | Age: 36
End: 2021-01-02
Payer: MEDICAID

## 2021-01-02 DIAGNOSIS — N39.0 UTI (URINARY TRACT INFECTION), UNCOMPLICATED: Primary | ICD-10-CM

## 2021-01-02 PROCEDURE — 99421 OL DIG E/M SVC 5-10 MIN: CPT | Performed by: INTERNAL MEDICINE

## 2021-01-03 RX ORDER — SULFAMETHOXAZOLE AND TRIMETHOPRIM 800; 160 MG/1; MG/1
1 TABLET ORAL 2 TIMES DAILY
Qty: 10 TABLET | Refills: 0 | Status: SHIPPED | OUTPATIENT
Start: 2021-01-03 | End: 2021-01-07

## 2021-01-03 NOTE — PROGRESS NOTES
5-10 minutes were spent on the digital evaluation and management of this patient. Diagnoses and all orders for this visit:    UTI (urinary tract infection), uncomplicated  -     sulfamethoxazole-trimethoprim (BACTRIM DS;SEPTRA DS) 800-160 MG per tablet;  Take 1 tablet by mouth 2 times daily for 5 days

## 2021-01-07 ENCOUNTER — TELEPHONE (OUTPATIENT)
Dept: FAMILY MEDICINE CLINIC | Age: 36
End: 2021-01-07

## 2021-01-07 ENCOUNTER — TELEMEDICINE (OUTPATIENT)
Dept: FAMILY MEDICINE CLINIC | Age: 36
End: 2021-01-07
Payer: MEDICARE

## 2021-01-07 DIAGNOSIS — F33.1 MODERATE EPISODE OF RECURRENT MAJOR DEPRESSIVE DISORDER (HCC): ICD-10-CM

## 2021-01-07 DIAGNOSIS — G43.109 MIGRAINE WITH AURA AND WITHOUT STATUS MIGRAINOSUS, NOT INTRACTABLE: ICD-10-CM

## 2021-01-07 DIAGNOSIS — N32.81 OAB (OVERACTIVE BLADDER): Primary | ICD-10-CM

## 2021-01-07 DIAGNOSIS — E66.01 MORBID OBESITY WITH BMI OF 70 AND OVER, ADULT (HCC): ICD-10-CM

## 2021-01-07 PROCEDURE — 99213 OFFICE O/P EST LOW 20 MIN: CPT | Performed by: NURSE PRACTITIONER

## 2021-01-07 PROCEDURE — G8428 CUR MEDS NOT DOCUMENT: HCPCS | Performed by: NURSE PRACTITIONER

## 2021-01-07 RX ORDER — RIZATRIPTAN BENZOATE 10 MG/1
10 TABLET ORAL
Qty: 30 TABLET | Refills: 0 | Status: SHIPPED | OUTPATIENT
Start: 2021-01-07 | End: 2022-08-24

## 2021-01-07 RX ORDER — TOLTERODINE 4 MG/1
4 CAPSULE, EXTENDED RELEASE ORAL DAILY
Qty: 30 CAPSULE | Refills: 5 | Status: SHIPPED | OUTPATIENT
Start: 2021-01-07 | End: 2022-08-11

## 2021-01-07 RX ORDER — HYDROXYZINE HYDROCHLORIDE 25 MG/1
25 TABLET, FILM COATED ORAL EVERY 8 HOURS PRN
Qty: 60 TABLET | Refills: 1 | Status: SHIPPED | OUTPATIENT
Start: 2021-01-07 | End: 2022-02-08 | Stop reason: SDUPTHER

## 2021-01-07 ASSESSMENT — ENCOUNTER SYMPTOMS
NAUSEA: 0
COUGH: 0
SHORTNESS OF BREATH: 0
ABDOMINAL PAIN: 0

## 2021-01-07 NOTE — TELEPHONE ENCOUNTER
VALARIE Small fax received, PA required for Myrbetriq 25 mg tablets. Cover my meds PA submitted. If PA is DENIED:  Generic therapeutic alternatives for Myrbetriq are available  Oxybutynin Chloride   Oxybutynin Chloride ER   Tolterodine Tartrate   Trospium Chloride ER   Trospium Chloride   Tolterodine Tartrate ER      [facility] has NOT changed pt last name from [facility] to [facility]. Noted for chart.

## 2021-01-07 NOTE — PROGRESS NOTES
Subjective:      Patient ID: Fallon Redman is a 28 y.o. female    HPI: Acute for OAB    TELEHEALTH EVALUATION -- Audio/Visual (During BQFYL-48 public health emergency)    Patient identification was verified at the start of the visit: Yes    Services were provided through a video synchronous discussion virtually to substitute for in-person clinic visit. Patient and provider were located at their individual homes. Patient has requested an audio/video evaluation for the following concern(s):    Chief Complaint   Patient presents with    Urinary Frequency     Ditropan 10 mg XL for my leaky bladder issue is no longer working. I was wondering if there is something different that you can prescribe. Was beneficial for 6 months. Controlling frequency and stress incontinence. Increase in HA/migraines. Was previous getting them 5 days a week. Down to 2 days a week. No benefit with Tylenol or IBU. Start in front of head. Patient Active Problem List   Diagnosis    Closed fracture of lateral malleolus of left fibula    Morbid obesity with BMI of 70 and over, adult ([facility] [address]    Vitamin D deficiency    Equinus deformity of foot       Review of Systems   Constitutional: Negative for chills and fever. HENT: Negative. Respiratory: Negative for cough and shortness of breath. Cardiovascular: Negative for chest pain. Gastrointestinal: Negative for abdominal pain and nausea. Genitourinary: Positive for difficulty urinating and frequency. Skin: Negative for rash. Neurological: Positive for headaches. Negative for dizziness and light-headedness. Psychiatric/Behavioral: Negative. Objective:   Physical Exam  Constitutional:       General: She is not in acute distress. Appearance: She is not ill-appearing. Pulmonary:      Effort: Pulmonary effort is normal. No respiratory distress. Neurological:      Mental Status: She is alert and oriented to person, place, and time.    Psychiatric: Mood and Affect: Mood normal.         Behavior: Behavior normal.         Assessment:       Diagnosis Orders   1. OAB (overactive bladder)  mirabegron (MYRBETRIQ) 25 MG TB24   2. Morbid obesity with BMI of 70 and over, adult ([facility] [address]     3. Migraine with aura and without status migrainosus, not intractable  rizatriptan (MAXALT) 10 MG tablet   4. Moderate episode of recurrent major depressive disorder (HCC)  hydrOXYzine (ATARAX) 25 MG tablet       Plan:     Myrebetriq 25 mg Daily  Push fluids  Minimize caffiene  Discussion on abortive vs preventative for HA  Maxalt PRN for KAY  HA Journal  Call with update in 1 month       Due to this being a TeleHealth encounter ([facility]), evaluation of the following organ systems was limited: Vitals/Constitutional/EENT/Resp/CV/GI//MS/Neuro/Skin/Heme-Lymph-Imm. Pursuant to the emergency declaration under the [address] [place] [address] authority and the [employer] Act, this Virtual Visit was conducted with patient's (and/or legal guardian's) consent, to reduce the patient's risk of exposure to COVID-19 and provide necessary medical care. The patient (and/or legal guardian) has also been advised to contact this office for worsening conditions or problems, and seek emergency medical treatment and/or call 911 if deemed necessary. --MALCOM Recinos - CNP on 1/7/2021 at 8:14 AM    An electronic signature was used to authenticate this note.      1/7/2021

## 2021-01-07 NOTE — TELEPHONE ENCOUNTER
[id number] - PA Case ID: [id number] Need help? Call us at [phone]   Outcome   Denied today   Request Reference Number: [id number]. MYRBETRIQ TAB 25MG is denied for not meeting the prior authorization requirement(s). For further questions, call [address] [place] [address] at [phone] for more information. Appeals are not supported through [address] [place] [address]. Please refer to the fax case notice for appeals information and instructions.    Drug Myrbetriq 25MG er tablets

## 2021-01-21 ENCOUNTER — PATIENT MESSAGE (OUTPATIENT)
Dept: FAMILY MEDICINE CLINIC | Age: 36
End: 2021-01-21

## 2021-01-21 NOTE — TELEPHONE ENCOUNTER
From: Jeri Schneider  To: MALCOM Bassett - CNP  Sent: 1/21/2021 12:10 PM EST  Subject: Prescription Question    Hello I was wondering if there is something different you can prescribe for the over active bladder. Insurance sent me a paper saying they denied paying for the prescription you sent in.

## 2021-02-10 ENCOUNTER — PATIENT MESSAGE (OUTPATIENT)
Dept: FAMILY MEDICINE CLINIC | Age: 36
End: 2021-02-10

## 2021-02-10 DIAGNOSIS — G43.109 MIGRAINE WITH AURA AND WITHOUT STATUS MIGRAINOSUS, NOT INTRACTABLE: Primary | ICD-10-CM

## 2021-02-10 RX ORDER — BUTALBITAL, ACETAMINOPHEN AND CAFFEINE 50; 325; 40 MG/1; MG/1; MG/1
1 TABLET ORAL EVERY 6 HOURS PRN
Qty: 15 TABLET | Refills: 0 | Status: SHIPPED | OUTPATIENT
Start: 2021-02-10 | End: 2021-04-01 | Stop reason: SDUPTHER

## 2021-02-10 NOTE — TELEPHONE ENCOUNTER
From: Teresa Momin  To: MALCOM Yeboah - CNP  Sent: 2/10/2021 12:55 PM EST  Subject: Prescription Question    Hello I was writing because I havent received notice of insurance approving the tolterodine for my bladder. I didn't know if you needed to prescribe me something else. Also I was wondering if there something else you could give me for migraines instead if the rizatriptan. It has not helped at all. My migraines have been more frequent.

## 2021-02-11 ENCOUNTER — TELEPHONE (OUTPATIENT)
Dept: FAMILY MEDICINE CLINIC | Age: 36
End: 2021-02-11

## 2021-02-11 DIAGNOSIS — N32.81 OAB (OVERACTIVE BLADDER): Primary | ICD-10-CM

## 2021-02-11 RX ORDER — SOLIFENACIN SUCCINATE 10 MG/1
10 TABLET, FILM COATED ORAL DAILY
Qty: 90 TABLET | Refills: 1 | Status: SHIPPED | OUTPATIENT
Start: 2021-02-11

## 2021-02-11 NOTE — TELEPHONE ENCOUNTER
PA request received from pharmacy for Tolterodine Tartrate ER 4mg. PA submitted online at [email or web address] and pending review.       [facility] still has patient under [facility]

## 2021-02-11 NOTE — TELEPHONE ENCOUNTER
PA request received from pharmacy for Solifenacin 10mg. PA submitted online at [email or web address] com and pending review.

## 2021-04-01 ENCOUNTER — TELEPHONE (OUTPATIENT)
Dept: FAMILY MEDICINE CLINIC | Age: 36
End: 2021-04-01

## 2021-04-01 DIAGNOSIS — K30 INDIGESTION: Primary | ICD-10-CM

## 2021-04-01 RX ORDER — FAMOTIDINE 20 MG/1
20 TABLET, FILM COATED ORAL 2 TIMES DAILY
Qty: 60 TABLET | Refills: 11 | Status: SHIPPED | OUTPATIENT
Start: 2021-04-01

## 2021-04-01 NOTE — TELEPHONE ENCOUNTER
Received a VM from [facility] [employer] stating that they received a script for Zantac for the patient, she stated that this is no longer manufactured and an alternative will need to be sent in. Please advise.

## 2021-05-10 ENCOUNTER — PATIENT MESSAGE (OUTPATIENT)
Dept: FAMILY MEDICINE CLINIC | Age: 36
End: 2021-05-10

## 2021-05-10 DIAGNOSIS — M25.572 CHRONIC PAIN OF BOTH ANKLES: Primary | ICD-10-CM

## 2021-05-10 DIAGNOSIS — G89.29 CHRONIC PAIN OF BOTH ANKLES: Primary | ICD-10-CM

## 2021-05-10 DIAGNOSIS — Z87.81 HISTORY OF FRACTURE OF LEFT ANKLE: ICD-10-CM

## 2021-05-10 DIAGNOSIS — M25.571 CHRONIC PAIN OF BOTH ANKLES: Primary | ICD-10-CM

## 2021-05-10 DIAGNOSIS — Z87.81 HISTORY OF FRACTURE OF RIGHT ANKLE: ICD-10-CM

## 2021-05-26 DIAGNOSIS — G43.109 MIGRAINE WITH AURA AND WITHOUT STATUS MIGRAINOSUS, NOT INTRACTABLE: ICD-10-CM

## 2021-05-27 RX ORDER — BUTALBITAL, ACETAMINOPHEN AND CAFFEINE 50; 325; 40 MG/1; MG/1; MG/1
1 TABLET ORAL EVERY 6 HOURS PRN
Qty: 15 TABLET | Refills: 0 | Status: SHIPPED | OUTPATIENT
Start: 2021-05-27 | End: 2022-02-08 | Stop reason: SDUPTHER

## 2021-07-05 LAB — SARS-COV-2: DETECTED

## 2021-07-06 ENCOUNTER — PATIENT MESSAGE (OUTPATIENT)
Dept: FAMILY MEDICINE CLINIC | Age: 36
End: 2021-07-06

## 2021-07-06 DIAGNOSIS — R05.9 COUGH: ICD-10-CM

## 2021-07-06 DIAGNOSIS — R23.8 COVID TOES: ICD-10-CM

## 2021-07-06 DIAGNOSIS — U07.1 COVID-19: Primary | ICD-10-CM

## 2021-07-06 DIAGNOSIS — U07.1 COVID TOES: ICD-10-CM

## 2021-07-06 NOTE — TELEPHONE ENCOUNTER
From: Liz Ignacio  To: MALCOM José CNP  Sent: 7/6/2021 8:48 AM EDT  Subject: Non-Urgent Medical Question    I have tested positive for covid. Is there anything I should do or take to help fight this?

## 2021-07-08 ENCOUNTER — HOSPITAL ENCOUNTER (INPATIENT)
Age: 36
LOS: 5 days | Discharge: HOME | DRG: 177 | End: 2021-07-13
Attending: EMERGENCY MEDICINE | Admitting: HOSPITALIST
Payer: MEDICARE

## 2021-07-08 ENCOUNTER — APPOINTMENT (OUTPATIENT)
Dept: CT IMAGING | Age: 36
DRG: 177 | End: 2021-07-08
Payer: MEDICARE

## 2021-07-08 ENCOUNTER — APPOINTMENT (OUTPATIENT)
Dept: GENERAL RADIOLOGY | Age: 36
DRG: 177 | End: 2021-07-08
Payer: MEDICARE

## 2021-07-08 DIAGNOSIS — U07.1 PNEUMONIA DUE TO COVID-19 VIRUS: Primary | ICD-10-CM

## 2021-07-08 DIAGNOSIS — R09.02 HYPOXEMIA: ICD-10-CM

## 2021-07-08 DIAGNOSIS — J12.82 PNEUMONIA DUE TO COVID-19 VIRUS: Primary | ICD-10-CM

## 2021-07-08 LAB
ALBUMIN SERPL-MCNC: 3.3 G/DL (ref 3.5–5.1)
ALLEN TEST: POSITIVE
ALP BLD-CCNC: 55 U/L (ref 38–126)
ALT SERPL-CCNC: 21 U/L (ref 11–66)
ANION GAP SERPL CALCULATED.3IONS-SCNC: 14 MEQ/L (ref 8–16)
AST SERPL-CCNC: 33 U/L (ref 5–40)
BASE EXCESS (CALCULATED): 0 MMOL/L (ref -2.5–2.5)
BASE EXCESS (CALCULATED): 0.7 MMOL/L (ref -2.5–2.5)
BASOPHILS # BLD: 0 %
BASOPHILS ABSOLUTE: 0 THOU/MM3 (ref 0–0.1)
BILIRUB SERPL-MCNC: 0.3 MG/DL (ref 0.3–1.2)
BILIRUBIN DIRECT: < 0.2 MG/DL (ref 0–0.3)
BUN BLDV-MCNC: 11 MG/DL (ref 7–22)
CALCIUM SERPL-MCNC: 8 MG/DL (ref 8.5–10.5)
CHLORIDE BLD-SCNC: 98 MEQ/L (ref 98–111)
CO2: 23 MEQ/L (ref 23–33)
COLLECTED BY:: ABNORMAL
COLLECTED BY:: NORMAL
CREAT SERPL-MCNC: 0.7 MG/DL (ref 0.4–1.2)
D-DIMER QUANTITATIVE: 874 NG/ML FEU (ref 0–500)
DEVICE: ABNORMAL
DEVICE: NORMAL
EKG ATRIAL RATE: 77 BPM
EKG P AXIS: 21 DEGREES
EKG P-R INTERVAL: 154 MS
EKG Q-T INTERVAL: 390 MS
EKG QRS DURATION: 98 MS
EKG QTC CALCULATION (BAZETT): 441 MS
EKG R AXIS: 146 DEGREES
EKG T AXIS: -29 DEGREES
EKG VENTRICULAR RATE: 77 BPM
EOSINOPHIL # BLD: 0.6 %
EOSINOPHILS ABSOLUTE: 0 THOU/MM3 (ref 0–0.4)
ERYTHROCYTE [DISTWIDTH] IN BLOOD BY AUTOMATED COUNT: 18.6 % (ref 11.5–14.5)
ERYTHROCYTE [DISTWIDTH] IN BLOOD BY AUTOMATED COUNT: 50.3 FL (ref 35–45)
FERRITIN: 157 NG/ML (ref 10–291)
FLU A ANTIGEN: NEGATIVE
FLU B ANTIGEN: NEGATIVE
GFR SERPL CREATININE-BSD FRML MDRD: > 90 ML/MIN/1.73M2
GLUCOSE BLD-MCNC: 137 MG/DL (ref 70–108)
GLUCOSE BLD-MCNC: 99 MG/DL (ref 70–108)
HCO3: 24 MMOL/L (ref 23–28)
HCO3: 26 MMOL/L (ref 23–28)
HCT VFR BLD CALC: 39.7 % (ref 37–47)
HEMOGLOBIN: 11.4 GM/DL (ref 12–16)
HYPOCHROMIA: PRESENT
IFIO2: 2
IFIO2: 3
IMMATURE GRANS (ABS): 0.02 THOU/MM3 (ref 0–0.07)
IMMATURE GRANULOCYTES: 0.4 %
LACTIC ACID, SEPSIS: 2.1 MMOL/L (ref 0.5–1.9)
LACTIC ACID: 1.1 MMOL/L (ref 0.5–2)
LD: 449 U/L (ref 100–190)
LIPASE: 10.1 U/L (ref 5.6–51.3)
LYMPHOCYTES # BLD: 26.9 %
LYMPHOCYTES ABSOLUTE: 1.3 THOU/MM3 (ref 1–4.8)
MAGNESIUM: 2 MG/DL (ref 1.6–2.4)
MCH RBC QN AUTO: 21.8 PG (ref 26–33)
MCHC RBC AUTO-ENTMCNC: 28.7 GM/DL (ref 32.2–35.5)
MCV RBC AUTO: 75.8 FL (ref 81–99)
MONOCYTES # BLD: 4.5 %
MONOCYTES ABSOLUTE: 0.2 THOU/MM3 (ref 0.4–1.3)
MRSA SCREEN RT-PCR: NEGATIVE
NUCLEATED RED BLOOD CELLS: 0 /100 WBC
O2 SATURATION: 93 %
O2 SATURATION: 95 %
OSMOLALITY CALCULATION: 269.5 MOSMOL/KG (ref 275–300)
PCO2: 37 MMHG (ref 35–45)
PCO2: 42 MMHG (ref 35–45)
PH BLOOD GAS: 7.39 (ref 7.35–7.45)
PH BLOOD GAS: 7.42 (ref 7.35–7.45)
PLATELET # BLD: 168 THOU/MM3 (ref 130–400)
PLATELET ESTIMATE: ADEQUATE
PMV BLD AUTO: 11.6 FL (ref 9.4–12.4)
PO2: 67 MMHG (ref 71–104)
PO2: 71 MMHG (ref 71–104)
POTASSIUM SERPL-SCNC: 3.8 MEQ/L (ref 3.5–5.2)
PREGNANCY, SERUM: NEGATIVE
PRO-BNP: 112.4 PG/ML (ref 0–450)
PROCALCITONIN: 0.21 NG/ML (ref 0.01–0.09)
RBC # BLD: 5.24 MILL/MM3 (ref 4.2–5.4)
SARS-COV-2, NAAT: DETECTED
SCAN OF BLOOD SMEAR: NORMAL
SEG NEUTROPHILS: 67.6 %
SEGMENTED NEUTROPHILS ABSOLUTE COUNT: 3.2 THOU/MM3 (ref 1.8–7.7)
SODIUM BLD-SCNC: 135 MEQ/L (ref 135–145)
SOURCE, BLOOD GAS: ABNORMAL
SOURCE, BLOOD GAS: NORMAL
TOTAL PROTEIN: 7.1 G/DL (ref 6.1–8)
TROPONIN T: < 0.01 NG/ML
VANCOMYCIN RESISTANT ENTEROCOCCUS: NEGATIVE
WBC # BLD: 4.7 THOU/MM3 (ref 4.8–10.8)

## 2021-07-08 PROCEDURE — 84484 ASSAY OF TROPONIN QUANT: CPT

## 2021-07-08 PROCEDURE — 1200000003 HC TELEMETRY R&B

## 2021-07-08 PROCEDURE — 87635 SARS-COV-2 COVID-19 AMP PRB: CPT

## 2021-07-08 PROCEDURE — 6370000000 HC RX 637 (ALT 250 FOR IP): Performed by: HOSPITALIST

## 2021-07-08 PROCEDURE — 6360000002 HC RX W HCPCS: Performed by: HOSPITALIST

## 2021-07-08 PROCEDURE — 80053 COMPREHEN METABOLIC PANEL: CPT

## 2021-07-08 PROCEDURE — 83615 LACTATE (LD) (LDH) ENZYME: CPT

## 2021-07-08 PROCEDURE — 94640 AIRWAY INHALATION TREATMENT: CPT

## 2021-07-08 PROCEDURE — 2580000003 HC RX 258: Performed by: HOSPITALIST

## 2021-07-08 PROCEDURE — 94669 MECHANICAL CHEST WALL OSCILL: CPT

## 2021-07-08 PROCEDURE — 84703 CHORIONIC GONADOTROPIN ASSAY: CPT

## 2021-07-08 PROCEDURE — 96374 THER/PROPH/DIAG INJ IV PUSH: CPT

## 2021-07-08 PROCEDURE — 71045 X-RAY EXAM CHEST 1 VIEW: CPT

## 2021-07-08 PROCEDURE — 84145 PROCALCITONIN (PCT): CPT

## 2021-07-08 PROCEDURE — 85379 FIBRIN DEGRADATION QUANT: CPT

## 2021-07-08 PROCEDURE — 6370000000 HC RX 637 (ALT 250 FOR IP): Performed by: EMERGENCY MEDICINE

## 2021-07-08 PROCEDURE — 83690 ASSAY OF LIPASE: CPT

## 2021-07-08 PROCEDURE — 2500000003 HC RX 250 WO HCPCS: Performed by: HOSPITALIST

## 2021-07-08 PROCEDURE — 6360000004 HC RX CONTRAST MEDICATION: Performed by: EMERGENCY MEDICINE

## 2021-07-08 PROCEDURE — 71275 CT ANGIOGRAPHY CHEST: CPT

## 2021-07-08 PROCEDURE — 93010 ELECTROCARDIOGRAM REPORT: CPT | Performed by: INTERNAL MEDICINE

## 2021-07-08 PROCEDURE — 93005 ELECTROCARDIOGRAM TRACING: CPT | Performed by: EMERGENCY MEDICINE

## 2021-07-08 PROCEDURE — 87641 MR-STAPH DNA AMP PROBE: CPT

## 2021-07-08 PROCEDURE — 83880 ASSAY OF NATRIURETIC PEPTIDE: CPT

## 2021-07-08 PROCEDURE — XW033E5 INTRODUCTION OF REMDESIVIR ANTI-INFECTIVE INTO PERIPHERAL VEIN, PERCUTANEOUS APPROACH, NEW TECHNOLOGY GROUP 5: ICD-10-PCS | Performed by: PHYSICIAN ASSISTANT

## 2021-07-08 PROCEDURE — 82248 BILIRUBIN DIRECT: CPT

## 2021-07-08 PROCEDURE — 97162 PT EVAL MOD COMPLEX 30 MIN: CPT

## 2021-07-08 PROCEDURE — 93005 ELECTROCARDIOGRAM TRACING: CPT | Performed by: HOSPITALIST

## 2021-07-08 PROCEDURE — 6360000002 HC RX W HCPCS: Performed by: EMERGENCY MEDICINE

## 2021-07-08 PROCEDURE — 36415 COLL VENOUS BLD VENIPUNCTURE: CPT

## 2021-07-08 PROCEDURE — 82803 BLOOD GASES ANY COMBINATION: CPT

## 2021-07-08 PROCEDURE — 36600 WITHDRAWAL OF ARTERIAL BLOOD: CPT

## 2021-07-08 PROCEDURE — 82728 ASSAY OF FERRITIN: CPT

## 2021-07-08 PROCEDURE — 85025 COMPLETE CBC W/AUTO DIFF WBC: CPT

## 2021-07-08 PROCEDURE — 97530 THERAPEUTIC ACTIVITIES: CPT

## 2021-07-08 PROCEDURE — 87804 INFLUENZA ASSAY W/OPTIC: CPT

## 2021-07-08 PROCEDURE — 2700000000 HC OXYGEN THERAPY PER DAY

## 2021-07-08 PROCEDURE — 83735 ASSAY OF MAGNESIUM: CPT

## 2021-07-08 PROCEDURE — 82948 REAGENT STRIP/BLOOD GLUCOSE: CPT

## 2021-07-08 PROCEDURE — 87081 CULTURE SCREEN ONLY: CPT

## 2021-07-08 PROCEDURE — 83036 HEMOGLOBIN GLYCOSYLATED A1C: CPT

## 2021-07-08 PROCEDURE — 83605 ASSAY OF LACTIC ACID: CPT

## 2021-07-08 PROCEDURE — 94761 N-INVAS EAR/PLS OXIMETRY MLT: CPT

## 2021-07-08 PROCEDURE — 99285 EMERGENCY DEPT VISIT HI MDM: CPT

## 2021-07-08 PROCEDURE — 87500 VANOMYCIN DNA AMP PROBE: CPT

## 2021-07-08 PROCEDURE — 99223 1ST HOSP IP/OBS HIGH 75: CPT | Performed by: HOSPITALIST

## 2021-07-08 PROCEDURE — 87040 BLOOD CULTURE FOR BACTERIA: CPT

## 2021-07-08 RX ORDER — SODIUM CHLORIDE 0.9 % (FLUSH) 0.9 %
10 SYRINGE (ML) INJECTION EVERY 12 HOURS SCHEDULED
Status: DISCONTINUED | OUTPATIENT
Start: 2021-07-08 | End: 2021-07-13 | Stop reason: HOSPADM

## 2021-07-08 RX ORDER — AZITHROMYCIN 250 MG/1
500 TABLET, FILM COATED ORAL ONCE
Status: COMPLETED | OUTPATIENT
Start: 2021-07-08 | End: 2021-07-08

## 2021-07-08 RX ORDER — BUTALBITAL, ACETAMINOPHEN AND CAFFEINE 50; 325; 40 MG/1; MG/1; MG/1
1 TABLET ORAL EVERY 6 HOURS PRN
Status: DISCONTINUED | OUTPATIENT
Start: 2021-07-08 | End: 2021-07-13 | Stop reason: HOSPADM

## 2021-07-08 RX ORDER — ESCITALOPRAM OXALATE 20 MG/1
20 TABLET ORAL DAILY
Status: DISCONTINUED | OUTPATIENT
Start: 2021-07-08 | End: 2021-07-13 | Stop reason: HOSPADM

## 2021-07-08 RX ORDER — ACETAMINOPHEN 325 MG/1
650 TABLET ORAL EVERY 6 HOURS PRN
Status: DISCONTINUED | OUTPATIENT
Start: 2021-07-08 | End: 2021-07-13 | Stop reason: HOSPADM

## 2021-07-08 RX ORDER — AZITHROMYCIN 250 MG/1
250 TABLET, FILM COATED ORAL DAILY
Status: COMPLETED | OUTPATIENT
Start: 2021-07-09 | End: 2021-07-12

## 2021-07-08 RX ORDER — SUMATRIPTAN 50 MG/1
50 TABLET, FILM COATED ORAL DAILY PRN
Status: DISCONTINUED | OUTPATIENT
Start: 2021-07-08 | End: 2021-07-13 | Stop reason: HOSPADM

## 2021-07-08 RX ORDER — DEXAMETHASONE 4 MG/1
6 TABLET ORAL DAILY
Status: DISCONTINUED | OUTPATIENT
Start: 2021-07-09 | End: 2021-07-13 | Stop reason: HOSPADM

## 2021-07-08 RX ORDER — HYDROXYZINE HYDROCHLORIDE 25 MG/1
25 TABLET, FILM COATED ORAL EVERY 8 HOURS PRN
Status: DISCONTINUED | OUTPATIENT
Start: 2021-07-08 | End: 2021-07-13 | Stop reason: HOSPADM

## 2021-07-08 RX ORDER — TROSPIUM CHLORIDE 20 MG/1
20 TABLET, FILM COATED ORAL
Status: DISCONTINUED | OUTPATIENT
Start: 2021-07-08 | End: 2021-07-13 | Stop reason: HOSPADM

## 2021-07-08 RX ORDER — ONDANSETRON 2 MG/ML
4 INJECTION INTRAMUSCULAR; INTRAVENOUS EVERY 6 HOURS PRN
Status: DISCONTINUED | OUTPATIENT
Start: 2021-07-08 | End: 2021-07-13 | Stop reason: HOSPADM

## 2021-07-08 RX ORDER — OXYBUTYNIN CHLORIDE 10 MG/1
10 TABLET, EXTENDED RELEASE ORAL DAILY
Status: DISCONTINUED | OUTPATIENT
Start: 2021-07-08 | End: 2021-07-13 | Stop reason: HOSPADM

## 2021-07-08 RX ORDER — SODIUM CHLORIDE 9 MG/ML
25 INJECTION, SOLUTION INTRAVENOUS PRN
Status: DISCONTINUED | OUTPATIENT
Start: 2021-07-08 | End: 2021-07-13 | Stop reason: HOSPADM

## 2021-07-08 RX ORDER — 0.9 % SODIUM CHLORIDE 0.9 %
30 INTRAVENOUS SOLUTION INTRAVENOUS PRN
Status: DISCONTINUED | OUTPATIENT
Start: 2021-07-08 | End: 2021-07-13 | Stop reason: HOSPADM

## 2021-07-08 RX ORDER — ONDANSETRON 4 MG/1
4 TABLET, ORALLY DISINTEGRATING ORAL EVERY 8 HOURS PRN
Status: DISCONTINUED | OUTPATIENT
Start: 2021-07-08 | End: 2021-07-13 | Stop reason: HOSPADM

## 2021-07-08 RX ORDER — AZITHROMYCIN 250 MG/1
500 TABLET, FILM COATED ORAL DAILY
Status: DISCONTINUED | OUTPATIENT
Start: 2021-07-08 | End: 2021-07-08 | Stop reason: SDUPTHER

## 2021-07-08 RX ORDER — DEXAMETHASONE SODIUM PHOSPHATE 4 MG/ML
10 INJECTION, SOLUTION INTRA-ARTICULAR; INTRALESIONAL; INTRAMUSCULAR; INTRAVENOUS; SOFT TISSUE ONCE
Status: COMPLETED | OUTPATIENT
Start: 2021-07-08 | End: 2021-07-08

## 2021-07-08 RX ORDER — ASPIRIN 81 MG/1
81 TABLET, CHEWABLE ORAL DAILY
Status: DISCONTINUED | OUTPATIENT
Start: 2021-07-08 | End: 2021-07-13 | Stop reason: HOSPADM

## 2021-07-08 RX ORDER — ALBUTEROL SULFATE 90 UG/1
2 AEROSOL, METERED RESPIRATORY (INHALATION) EVERY 4 HOURS PRN
Status: DISCONTINUED | OUTPATIENT
Start: 2021-07-08 | End: 2021-07-13 | Stop reason: HOSPADM

## 2021-07-08 RX ORDER — POLYETHYLENE GLYCOL 3350 17 G/17G
17 POWDER, FOR SOLUTION ORAL DAILY PRN
Status: DISCONTINUED | OUTPATIENT
Start: 2021-07-08 | End: 2021-07-13 | Stop reason: HOSPADM

## 2021-07-08 RX ORDER — IPRATROPIUM BROMIDE AND ALBUTEROL SULFATE 2.5; .5 MG/3ML; MG/3ML
1 SOLUTION RESPIRATORY (INHALATION) ONCE
Status: COMPLETED | OUTPATIENT
Start: 2021-07-08 | End: 2021-07-08

## 2021-07-08 RX ORDER — SODIUM CHLORIDE 0.9 % (FLUSH) 0.9 %
10 SYRINGE (ML) INJECTION PRN
Status: DISCONTINUED | OUTPATIENT
Start: 2021-07-08 | End: 2021-07-13 | Stop reason: HOSPADM

## 2021-07-08 RX ORDER — ACETAMINOPHEN 650 MG/1
650 SUPPOSITORY RECTAL EVERY 6 HOURS PRN
Status: DISCONTINUED | OUTPATIENT
Start: 2021-07-08 | End: 2021-07-13 | Stop reason: HOSPADM

## 2021-07-08 RX ORDER — FAMOTIDINE 20 MG/1
20 TABLET, FILM COATED ORAL 2 TIMES DAILY
Status: DISCONTINUED | OUTPATIENT
Start: 2021-07-08 | End: 2021-07-13 | Stop reason: HOSPADM

## 2021-07-08 RX ORDER — ACETAMINOPHEN 325 MG/1
650 TABLET ORAL ONCE
Status: COMPLETED | OUTPATIENT
Start: 2021-07-08 | End: 2021-07-08

## 2021-07-08 RX ADMIN — IPRATROPIUM BROMIDE AND ALBUTEROL SULFATE 1 AMPULE: .5; 3 SOLUTION RESPIRATORY (INHALATION) at 04:05

## 2021-07-08 RX ADMIN — IPRATROPIUM BROMIDE AND ALBUTEROL SULFATE 1 AMPULE: .5; 3 SOLUTION RESPIRATORY (INHALATION) at 04:06

## 2021-07-08 RX ADMIN — TROSPIUM CHLORIDE 20 MG: 20 TABLET, FILM COATED ORAL at 17:15

## 2021-07-08 RX ADMIN — OXYBUTYNIN CHLORIDE 10 MG: 10 TABLET, EXTENDED RELEASE ORAL at 13:12

## 2021-07-08 RX ADMIN — ESCITALOPRAM 20 MG: 20 TABLET, FILM COATED ORAL at 13:12

## 2021-07-08 RX ADMIN — IOPAMIDOL 80 ML: 755 INJECTION, SOLUTION INTRAVENOUS at 05:53

## 2021-07-08 RX ADMIN — FAMOTIDINE 20 MG: 20 TABLET, FILM COATED ORAL at 21:50

## 2021-07-08 RX ADMIN — DEXAMETHASONE SODIUM PHOSPHATE 10 MG: 4 INJECTION, SOLUTION INTRA-ARTICULAR; INTRALESIONAL; INTRAMUSCULAR; INTRAVENOUS; SOFT TISSUE at 04:55

## 2021-07-08 RX ADMIN — CEFTRIAXONE SODIUM 1000 MG: 1 INJECTION, POWDER, FOR SOLUTION INTRAMUSCULAR; INTRAVENOUS at 09:36

## 2021-07-08 RX ADMIN — AZITHROMYCIN MONOHYDRATE 500 MG: 250 TABLET ORAL at 09:35

## 2021-07-08 RX ADMIN — ENOXAPARIN SODIUM 60 MG: 60 INJECTION SUBCUTANEOUS at 09:35

## 2021-07-08 RX ADMIN — FAMOTIDINE 20 MG: 20 TABLET, FILM COATED ORAL at 13:12

## 2021-07-08 RX ADMIN — SODIUM CHLORIDE, PRESERVATIVE FREE 10 ML: 5 INJECTION INTRAVENOUS at 21:50

## 2021-07-08 RX ADMIN — ASPIRIN 81 MG: 81 TABLET, CHEWABLE ORAL at 17:15

## 2021-07-08 RX ADMIN — ENOXAPARIN SODIUM 60 MG: 60 INJECTION SUBCUTANEOUS at 21:50

## 2021-07-08 RX ADMIN — ACETAMINOPHEN 650 MG: 325 TABLET ORAL at 04:55

## 2021-07-08 RX ADMIN — Medication 2 PUFF: at 23:46

## 2021-07-08 RX ADMIN — ACETAMINOPHEN 650 MG: 325 TABLET ORAL at 21:50

## 2021-07-08 RX ADMIN — REMDESIVIR 200 MG: 100 INJECTION, POWDER, LYOPHILIZED, FOR SOLUTION INTRAVENOUS at 11:27

## 2021-07-08 ASSESSMENT — PAIN SCALES - GENERAL
PAINLEVEL_OUTOF10: 5
PAINLEVEL_OUTOF10: 10
PAINLEVEL_OUTOF10: 10
PAINLEVEL_OUTOF10: 9
PAINLEVEL_OUTOF10: 10
PAINLEVEL_OUTOF10: 6
PAINLEVEL_OUTOF10: 10

## 2021-07-08 ASSESSMENT — ENCOUNTER SYMPTOMS
TROUBLE SWALLOWING: 0
PHOTOPHOBIA: 0
SORE THROAT: 0
EYE REDNESS: 0
ABDOMINAL PAIN: 0
EYE DISCHARGE: 0
EYE ITCHING: 0
BLOOD IN STOOL: 0
WHEEZING: 1
CHOKING: 0
VOICE CHANGE: 0
BACK PAIN: 0
COUGH: 0
DIARRHEA: 0
NAUSEA: 0
EYE PAIN: 0
SINUS PRESSURE: 0
CHEST TIGHTNESS: 0
VOMITING: 0
ABDOMINAL DISTENTION: 0
CONSTIPATION: 0
SHORTNESS OF BREATH: 1
RHINORRHEA: 0

## 2021-07-08 ASSESSMENT — PAIN DESCRIPTION - LOCATION
LOCATION: TEETH
LOCATION: GENERALIZED
LOCATION: TEETH

## 2021-07-08 ASSESSMENT — PAIN DESCRIPTION - PROGRESSION
CLINICAL_PROGRESSION: NOT CHANGED
CLINICAL_PROGRESSION: NOT CHANGED

## 2021-07-08 ASSESSMENT — PAIN DESCRIPTION - DESCRIPTORS
DESCRIPTORS: ACHING
DESCRIPTORS: ACHING

## 2021-07-08 ASSESSMENT — PAIN DESCRIPTION - PAIN TYPE
TYPE: ACUTE PAIN

## 2021-07-08 ASSESSMENT — PAIN - FUNCTIONAL ASSESSMENT
PAIN_FUNCTIONAL_ASSESSMENT: ACTIVITIES ARE NOT PREVENTED
PAIN_FUNCTIONAL_ASSESSMENT: ACTIVITIES ARE NOT PREVENTED

## 2021-07-08 ASSESSMENT — PAIN DESCRIPTION - ONSET
ONSET: ON-GOING
ONSET: ON-GOING

## 2021-07-08 ASSESSMENT — PAIN DESCRIPTION - ORIENTATION: ORIENTATION: UPPER;LOWER

## 2021-07-08 ASSESSMENT — PAIN DESCRIPTION - FREQUENCY
FREQUENCY: CONTINUOUS
FREQUENCY: CONTINUOUS

## 2021-07-08 NOTE — ED PROVIDER NOTES
[facility]  eMERGENCY dEPARTMENT eNCOUnter          CHIEF COMPLAINT       Chief Complaint   Patient presents with    Positive For Covid-19       Nurses Notes reviewed and I agree except as noted in the HPI. Jaswinder Richard is a 28 y.o. female who presents with shortness of breath. Patient for the past week has developed shortness of breath, cough, sputum production, loss of taste and smell. Patient is concerned she may have contracted COVID. At bedside patient was hypoxic to 88% on RA. She notes that she is not on any oxygen at home normally. She also reports that she had a temperature of 102.7 on home thermometer. Patient endorses headache, N/V, and cough. Patient denies pleuritic pain, or changes with  bowel or bladder. REVIEW OF SYSTEMS     Review of Systems   Constitutional: Positive for fever. Negative for activity change, appetite change, diaphoresis, fatigue and unexpected weight change. HENT: Negative for congestion, ear discharge, ear pain, hearing loss, rhinorrhea, sinus pressure, sore throat, trouble swallowing and voice change. Eyes: Negative for photophobia, pain, discharge, redness and itching. Respiratory: Positive for shortness of breath and wheezing. Negative for cough, choking and chest tightness. Cardiovascular: Negative for chest pain, palpitations and leg swelling. Gastrointestinal: Negative for abdominal distention, abdominal pain, blood in stool, constipation, diarrhea, nausea and vomiting. Endocrine: Negative for polydipsia, polyphagia and polyuria. Genitourinary: Negative for decreased urine volume, difficulty urinating, dysuria, enuresis, frequency, hematuria and urgency. Musculoskeletal: Negative for arthralgias, back pain, gait problem, myalgias, neck pain and neck stiffness. Skin: Negative for pallor and rash. Allergic/Immunologic: Negative for immunocompromised state.    Neurological: Negative for dizziness, tremors, seizures, syncope, facial asymmetry, weakness, light-headedness, numbness and headaches. Hematological: Negative for adenopathy. Does not bruise/bleed easily. Psychiatric/Behavioral: Negative for agitation, hallucinations and suicidal ideas. The patient is not nervous/anxious. PAST MEDICAL HISTORY    has a past medical history of Back pain and Morbid obesity ([address] SURGICAL HISTORY      has a past surgical history that includes Adenoidectomy; laparoscopic appendectomy (02/09/2016); Ankle fracture surgery (Left, 9/19/2017); and pr office/outpt visit,procedure only (Left, 8/2/2018). CURRENT MEDICATIONS       Previous Medications    ACETAMINOPHEN (TYLENOL) 325 MG TABLET    Take 975 mg by mouth every 6 hours as needed for Pain    ALBUTEROL SULFATE  (90 BASE) MCG/ACT INHALER    Inhale 2 puffs into the lungs every 4 hours as needed for Wheezing or Shortness of Breath (cough)    BUTALBITAL-ACETAMINOPHEN-CAFFEINE (FIORICET, ESGIC) -40 MG PER TABLET    Take 1 tablet by mouth every 6 hours as needed for Migraine    ESCITALOPRAM (LEXAPRO) 20 MG TABLET    Take 1 tablet by mouth daily    FAMOTIDINE (PEPCID) 20 MG TABLET    Take 1 tablet by mouth 2 times daily    HYDROXYZINE (ATARAX) 25 MG TABLET    Take 1 tablet by mouth every 8 hours as needed for Anxiety    MECLIZINE (ANTIVERT) 25 MG TABLET    Take 1 tablet by mouth 3 times daily as needed for Dizziness    NAPROXEN SODIUM (ALEVE PO)    Take by mouth    OXYBUTYNIN (DITROPAN XL) 10 MG EXTENDED RELEASE TABLET    Take 1 tablet by mouth daily    RIZATRIPTAN (MAXALT) 10 MG TABLET    Take 1 tablet by mouth once as needed for Migraine May repeat in 2 hours if needed    SOLIFENACIN (VESICARE) 10 MG TABLET    Take 1 tablet by mouth daily    TOLTERODINE (DETROL LA) 4 MG EXTENDED RELEASE CAPSULE    Take 1 capsule by mouth daily       ALLERGIES     is allergic to morphine. FAMILY HISTORY     She indicated that her mother is alive.  She indicated that her father is alive. family history includes Diabetes in her father. SOCIAL HISTORY      reports that she quit smoking about 7 years ago. She has never used smokeless tobacco. She reports that she does not drink alcohol and does not use drugs. PHYSICAL EXAM     INITIAL VITALS:  height is 5' 3\" (1.6 m) and weight is 465 lb (210.9 kg) (abnormal). Her oral temperature is 99.2 °F (37.3 °C). Her blood pressure is 159/83 (abnormal) and her pulse is 73. Her respiration is 19 and oxygen saturation is 98%. Physical Exam  Vitals and nursing note reviewed. Constitutional:       General: She is not in acute distress. Appearance: She is well-developed. She is not diaphoretic. HENT:      Head: Normocephalic and atraumatic. Right Ear: External ear normal.      Left Ear: External ear normal.      Nose: Nose normal.      Mouth/Throat:      Pharynx: No oropharyngeal exudate. Eyes:      General: No scleral icterus. Right eye: No discharge. Left eye: No discharge. Conjunctiva/sclera: Conjunctivae normal.      Pupils: Pupils are equal, round, and reactive to light. Neck:      Thyroid: No thyromegaly. Vascular: No JVD. Trachea: No tracheal deviation. Cardiovascular:      Rate and Rhythm: Normal rate and regular rhythm. Heart sounds: Normal heart sounds, S1 normal and S2 normal. No murmur heard. No friction rub. No gallop. Pulmonary:      Effort: Pulmonary effort is normal.      Breath sounds: No stridor. Examination of the right-upper field reveals wheezing and rales. Examination of the left-upper field reveals wheezing and rales. Examination of the right-middle field reveals wheezing and rales. Examination of the left-middle field reveals wheezing and rales. Examination of the right-lower field reveals decreased breath sounds and wheezing. Examination of the left-lower field reveals decreased breath sounds and wheezing.  Decreased breath sounds, wheezing and rales present. No rhonchi. Chest:      Chest wall: No tenderness. Abdominal:      General: Bowel sounds are normal. There is no distension. Palpations: Abdomen is soft. There is no mass. Tenderness: There is no abdominal tenderness. There is no guarding or rebound. Hernia: No hernia is present. Musculoskeletal:         General: No tenderness. Normal range of motion. Cervical back: Normal range of motion and neck supple. Lymphadenopathy:      Cervical: No cervical adenopathy. Skin:     General: Skin is warm and dry. Capillary Refill: Capillary refill takes less than 2 seconds. Findings: No bruising, ecchymosis, lesion or rash. Neurological:      Mental Status: She is alert and oriented to person, place, and time. Cranial Nerves: No cranial nerve deficit. Coordination: Coordination normal.      Deep Tendon Reflexes: Reflexes are normal and symmetric. Psychiatric:         Speech: Speech normal.         Behavior: Behavior normal.         Thought Content: Thought content normal.         Judgment: Judgment normal.           DIFFERENTIAL DIAGNOSIS:   Asthma pneumonia bronchitis    DIAGNOSTIC RESULTS     EKG: All EKG's are interpreted by the Emergency Department Physician who either signs or Co-signs this chart in the absence of a cardiologist.  Normal sinus rhythm, slight right axis deviation, ventricular rate of 77 ME interval 154 QRS duration 98 QT interval 390 QTC of 441. RADIOLOGY: non-plain film images(s) such as CT, Ultrasound and MRI are read by the radiologist.  XR CHEST PORTABLE   Final Result   1. Hazy bilateral airspace opacities which may indicate edema or    pneumonia.       This document has been electronically signed by: Alexis Feng MD on    07/08/2021 05:15 AM      CTA CHEST [address]    (Results Pending)         LABS:   Labs Reviewed   COVID-19, RAPID - Abnormal; Notable for the following components:       Result Value    SARS-CoV-2, NAAT DETECTED (*)     All other components within normal limits   CBC WITH AUTO DIFFERENTIAL - Abnormal; Notable for the following components:    WBC 4.7 (*)     Hemoglobin 11.4 (*)     MCV 75.8 (*)     MCH 21.8 (*)     MCHC 28.7 (*)     RDW-CV 18.6 (*)     RDW-SD 50.3 (*)     Monocytes Absolute 0.2 (*)     All other components within normal limits   BASIC METABOLIC PANEL - Abnormal; Notable for the following components:    Calcium 8.0 (*)     All other components within normal limits   HEPATIC FUNCTION PANEL - Abnormal; Notable for the following components:    Albumin 3.3 (*)     All other components within normal limits   LACTATE, SEPSIS - Abnormal; Notable for the following components:    Lactic Acid, Sepsis 2.1 (*)     All other components within normal limits   PROCALCITONIN - Abnormal; Notable for the following components:    Procalcitonin 0.21 (*)     All other components within normal limits   BLOOD GAS, ARTERIAL - Abnormal; Notable for the following components:    PO2 67 (*)     All other components within normal limits   D-DIMER, QUANTITATIVE - Abnormal; Notable for the following components:    D-Dimer, Quant 874.00 (*)     All other components within normal limits   OSMOLALITY - Abnormal; Notable for the following components:    Osmolality Calc 269.5 (*)     All other components within normal limits   RAPID INFLUENZA A/B ANTIGENS   CULTURE, BLOOD 1   CULTURE, BLOOD 2   BRAIN NATRIURETIC PEPTIDE   LIPASE   TROPONIN   MAGNESIUM   HCG, SERUM, QUALITATIVE   FERRITIN   ANION GAP   GLOMERULAR FILTRATION RATE, ESTIMATED   SCAN OF BLOOD SMEAR   LACTATE, SEPSIS       EMERGENCY DEPARTMENT COURSE:   Vitals:    Vitals:    07/08/21 0328 07/08/21 0342 07/08/21 0400 07/08/21 0459   BP:  (!) 159/83     Pulse:  81  73   Resp:  28  19   Temp:  99.2 °F (37.3 °C)     TempSrc:  Oral     SpO2: (!) 88% 94% 94% 98%   Weight:  (!) 465 lb (210.9 kg)     Height:  5' 3\" (1.6 m)       Patient was assessed at bedside appropriate labs and imaging were ordered. Patient was given 2 DuoNeb treatments at bedside. I reviewed the patient's labs. Heart failure markers are negative. Patient has no white blood cell count. Patient does have an elevated lactic acid. She also has an elevated pro calcitonin. She came back Covid positive. At this point I ordered a D-dimer and ferritin. Ferritin is negative. D-dimer was elevated. CTA of the chest was ordered. At this point I feel the patient needs to be admitted for hypoxemia and her COVID-19. I called the hospitalist and discussed case with them. They graciously accepted the admission. Patient is admitted in stable condition pending further evaluation and treatment. CRITICAL CARE:   None    CONSULTS:  Hospitalist    PROCEDURES:  None    FINAL IMPRESSION      1. Pneumonia due to COVID-19 virus    2. Hypoxemia          DISPOSITION/PLAN   Admission    PATIENT REFERRED TO:  No follow-up provider specified.     DISCHARGE MEDICATIONS:  New Prescriptions    No medications on file       (Please note that portions of this note were completed with a voice recognition program.  Efforts were made to edit the dictations but occasionally words are mis-transcribed.)    Jeff Granados, [facility] [address], DO  07/08/21 2134

## 2021-07-08 NOTE — ED NOTES
ED nurse-to-nurse bedside report    Chief Complaint   Patient presents with    Positive For Covid-19      LOC: alert and orientated to name, place, date  Vital signs   Vitals:    07/08/21 0400 07/08/21 0459 07/08/21 0618 07/08/21 0702   BP:   131/68    Pulse:  73 77 85   Resp:  19 19 23   Temp:       TempSrc:       SpO2: 94% 98% 94% 92%   Weight:       Height:          Pain:    Pain Interventions: none  Pain Goal: 0  Oxygen: Yes    Current needs required 3L NC   Telemetry: Yes  LDAs:   Peripheral IV 07/08/21 Right Antecubital (Active)   Site Assessment Clean;Dry; Intact 07/08/21 0455   Line Status Flushed 07/08/21 0455   Dressing Status Intact;Dry;Clean 07/08/21 0455   Dressing Intervention New 07/08/21 0455     Continuous Infusions:   Mobility: Requires assistance * 1  Rodriguez Fall Risk Score: No flowsheet data found. Fall Interventions: Call light in reach.   Report given to: Vanita Villaseñor RN  07/08/21 4195

## 2021-07-08 NOTE — ED NOTES
Pt transported to 3A03 by cart in stable condition. Called [facility] and informed them that the patient was on their way to the unit.       Katina Miranda, PIERCE  95/39/86 [id number]

## 2021-07-08 NOTE — PROGRESS NOTES
[address] [facility] PHYSICAL THERAPY  EVALUATION  [facility] 3A - 3A-03/003-A    Time In: 1749  Time Out: 1547  Timed Code Treatment Minutes: 24 Minutes  Minutes: 39          Date: 2021  Patient Name: Lionel Chaney,  Gender:  female        MRN: [id number]  : 1985  (28 y.o.)  Referral Date : 21   Referring Practitioner: Bailey Marquez MD  Diagnosis: COVID-19  Additional Pertinent Hx: Per EMR:Anay Castle is a 28 y.o. female who presents with shortness of breath. Patient for the past week has developed shortness of breath, cough, sputum production, loss of taste and smell. Patient is concerned she may have contracted COVID. At bedside patient was hypoxic to 88% on RA. She notes that she is not on any oxygen at home normally. She also reports that she had a temperature of 102.7 on home thermometer. Patient endorses headache, N/V, and cough. Patient denies pleuritic pain, or changes with  bowel or bladder. Restrictions/Precautions:  Restrictions/Precautions: Fall Risk, Isolation    Subjective:  Chart Reviewed: Yes  Patient assessed for rehabilitation services?: Yes  Family / Caregiver Present: No  Subjective: Patient in room in bed, agreeable to PT. RN okay with PT session. Pt cooperative and pleasant.     General:  Overall Orientation Status: Within Functional Limits    Vision: Impaired  Vision Exceptions: Wears glasses for reading    Hearing: Within functional limits         Pain: teeth 9/10--reports new since onset of COVID    Vitals: Oxygen: heart rate ranging 70's-110 range, O2 88-92% in bed, 90-95% sitting up or standing on 4L   Much education and verbal cues for pursed lip breathing    Social/Functional History:    Lives With: Spouse  Type of Home: House  Home Layout: Two level, Able to Live on Main level with bedroom/bathroom, Performs ADL's on one level  Home Access: Stairs to enter without rails  Entrance Stairs - Number of Steps: 3  Home Equipment: Rolling walker, Wheelchair-manual     Bathroom Shower/Tub: Tub/Shower unit       ADL Assistance: Independent     Ambulation Assistance: Independent  Transfer Assistance: Independent       Occupation: On disability  Additional Comments:  works full time. Mother does not work, lives close by and can help patient    OBJECTIVE:  Range of Motion:  Right Lower Extremity: WFL  Left Lower Extremity: [facility]    Strength:  Right Lower Extremity: Impaired - not formally tested but weakness noted with transfers  Left Lower Extremity: Impaired - not formally tested but weakness noted with transfers    Balance:  Static Sitting Balance:  Stand By Assistance, Contact Guard Assistance  Static Standing Balance: Contact Guard Assistance  Dynamic Standing Balance: Contact Guard Assistance, Minimal Assistance    Bed Mobility:  Rolling to Left: Stand By Assistance, Contact Guard Assistance   Rolling to Right: Stand By Assistance, Contact Guard Assistance   Supine to Sit: Minimal Assistance  Sit to Supine: Moderate Assistance   Scooting: able to assist partially up bed then max A x2 to scoot rest of the way  *Uses bed rail    Transfers:  Sit to Stand: Contact Guard Assistance--from elevated bed  Stand to Sit:Minimal Assistance--due to elevated bed, utilized stool to assist with scooting back  Scooting: min to moderate assistance utilizing stool, instability left knee  *Pt requiring increased time to complete transfers and scoot back on bed, verbal cues for pursed lip breathing, O2 monitored throughout. Ambulation:  Contact Guard Assistance  Distance: 5'x2  Surface: Level Tile  Device:No Device  Gait Deviations:  Slow Vera, Decreased Step Length Bilaterally, Decreased Gait Speed and Decreased Heel Strike Bilaterally    Exercise:  Not completed    Functional Outcome Measures: Completed  AM-PAC Inpatient Mobility Raw Score : 16  AM-PAC Inpatient T-Scale Score : 40.78    ASSESSMENT:  Activity Tolerance:  Patient tolerance of  treatment: fair. Limited by shortness of breath      Treatment Initiated: Treatment and education initiated within context of evaluation. Evaluation time included review of current medical information, gathering information related to past medical, social and functional history, completion of standardized testing, formal and informal observation of tasks, assessment of data and development of plan of care and goals. Treatment time included skilled education and facilitation of tasks to increase safety and independence with functional mobility for improved independence and quality of life. Therapeutic activity completed to improve patient's ability to complete functional mobility. Assessment: Body structures, Functions, Activity limitations: Decreased functional mobility , Decreased endurance, Decreased strength, Decreased balance  Assessment: The evaluation of Mrs Zohaib Myles indicates a decline in functional mobility compared to baseline. Patient independent at [facility] with functional mobility, occasional use of RW. Patient requiring CGA to moderate assistance to transfer, becoming short of breath quickly. Pt requiring 4L O2 at this time, on room air at PLOF. Patient would benefit from skilled PT services to improve her ability to complete functional mobility, reduce her risk for falls and allow patient to return to OF.   Prognosis: Good   Co-morbidities: obesity  Patient requiring supplemental O2, on room air at PLOF    REQUIRES PT FOLLOW UP: Yes    Discharge Recommendations:  Discharge Recommendations: Continue to assess pending progress, Patient would benefit from continued therapy after discharge    Patient Education:  PT Education: PT Role, Goals, Plan of Care, Functional Mobility Training    Equipment Recommendations:  Equipment Needed: No    Plan:  Times per week: 5xGM  Times per day: Daily  Current Treatment Recommendations: Strengthening, Transfer Training, Endurance Training, Neuromuscular Re-education, Patient/Caregiver Education & Training, Balance Training, Gait Training, Home Exercise Program, Functional Mobility Training, Stair training, Safety Education & Training    Goals:  Patient goals : go home  Short term goals  Time Frame for Short term goals: at discharge  Short term goal 1: Patiient will complete supine < > sit with modified independence to transfer in/out of bed. Short term goal 2: Patient will complete sit < > stand with modified independence to stand to ambulate safely. Short term goal 3: Patient will ambulate 48' with no AD and SBA to navigate living environment safely. Short term goal 4: Patient will ascend/descend 3 steps with CGA to access home safely. Long term goals  Time Frame for Long term goals : N/A due to short estimated length of stay    Following session, patient left in safe position with all fall risk precautions in place.

## 2021-07-08 NOTE — ED NOTES
Pt to ED via intake from home with c/o SOB. Pt reports being positive for COVID since Tuesday. Pt reports their symptoms began two days prior to getting tested with loss of taste and smell. Pt reports that their body aches have worsened. Pt reports also having fevers at home and taking ibuprofen. Upon arrival to ED pt respirations are quick and labored. Pt oxygen saturations are 86% on room air. 2L nasal canula applied to pt. EKG completed. Tele applied. IV inserted. Will continue to monitor.       Ron Sewell RN  07/08/21 [id number]

## 2021-07-08 NOTE — ED NOTES
Pt medicated per MAR. Pt assisted to bedside commode. Pt updated on POC.  Will continue to monitor      Isaac Dejesus RN  07/08/21 [id number]

## 2021-07-08 NOTE — ED NOTES
Pt updated on POC. Pt VSS. Pt denies needs. Call light in reach, will continue to monitor.       Lesia Pickard RN  07/08/21 [id number]

## 2021-07-08 NOTE — H&P
HISTORY AND PHYSICAL             Date: 7/8/2021        Patient Name: Monae Angulo     YOB: 1985      Age:  28 y.o. Chief Complaint     Chief Complaint   Patient presents with    Positive For Covid-19      SOB    History Obtained From   patient, electronic medical record    History of Present Illness   Pt last well one week ago, + fever/chills, malaise, dry cough, diarrhea. Symptoms progressed over time with SOB started 1-2 days ago. Denies CP/abdo pain/N/V/palpitation/ symptoms/recent sick contact. Pt denies COVID vaccination or prior infection. Past Medical History     Past Medical History:   Diagnosis Date    Back pain     Morbid obesity ([address]         Past Surgical History     Past Surgical History:   Procedure Laterality Date    ADENOIDECTOMY      ANKLE FRACTURE SURGERY Left 9/19/2017    LEFT ANKLE ORIF, I & D right posterior upper leg performed by Karin Beatty MD at [address] [place] [address]  02/09/2016    TX OFFICE/OUTPT VISIT,PROCEDURE ONLY Left 8/2/2018    LEFT SUBTALAR JOINT FUSION, MEDIAL COLUMN FUSION, GASTROC RECESSION, POSS DELTOID REPAIR performed by Vidal Peralta DPM at [place] [facility]        Medications Prior to Admission     Prior to Admission medications    Medication Sig Start Date End Date Taking?  Authorizing Provider   butalbital-acetaminophen-caffeine (FIORICET, ESGIC) -28 MG per tablet Take 1 tablet by mouth every 6 hours as needed for Migraine 5/27/21   MALCOM Maddox - CNP   famotidine (PEPCID) 20 MG tablet Take 1 tablet by mouth 2 times daily 4/1/21   MALCOM Maddox - CNP   solifenacin (VESICARE) 10 MG tablet Take 1 tablet by mouth daily 2/11/21   MALCOM Maddox - CNP   hydrOXYzine (ATARAX) 25 MG tablet Take 1 tablet by mouth every 8 hours as needed for Anxiety 1/7/21   MALCOM Maddox - CNP   rizatriptan (MAXALT) 10 MG tablet Take 1 tablet by mouth once as needed for Migraine May repeat in 2 hours if needed 1/7/21 1/7/21 Tamara Ganser, APRN - CNP   tolterodine (DETROL LA) 4 MG extended release capsule Take 1 capsule by mouth daily 1/7/21   Tamara Ganser, APRN - CNP   escitalopram (LEXAPRO) 20 MG tablet Take 1 tablet by mouth daily 1/4/21   Tamara Ganser, APRN - CNP   Naproxen Sodium (ALEVE PO) Take by mouth    Historical Provider, MD   oxybutynin (DITROPAN XL) 10 MG extended release tablet Take 1 tablet by mouth daily 3/9/20   Tamara Ganser, APRN - CNP   albuterol sulfate  (90 Base) MCG/ACT inhaler Inhale 2 puffs into the lungs every 4 hours as needed for Wheezing or Shortness of Breath (cough) 9/14/19   Pao Uzairin, APRN - CNP   meclizine (ANTIVERT) 25 MG tablet Take 1 tablet by mouth 3 times daily as needed for Dizziness 7/1/19   Tamara Ganser, APRN - CNP   acetaminophen (TYLENOL) 325 MG tablet Take 975 mg by mouth every 6 hours as needed for Pain    Historical Provider, MD        Allergies   Morphine    Social History     Social History     Tobacco History     Smoking Status  Former Smoker Quit date  3/4/2014    Smokeless Tobacco Use  Never Used          Alcohol History     Alcohol Use Status  No Comment  social          Drug Use     Drug Use Status  No          Sexual Activity     Sexually Active  Not Asked                Family History     Family History   Problem Relation Age of Onset    Diabetes Father        Review of Systems   All other systems were reviewed and were negative except otherwise discussed in HPI      Physical Exam   /68   Pulse 85   Temp 99.2 °F (37.3 °C) (Oral)   Resp 23   Ht 5' 3\" (1.6 m)   Wt (!) 465 lb (210.9 kg)   SpO2 92%   BMI 82.37 kg/m²     General appearance: Morbidly obese. A&O x3, Not ill or toxic, in no apparent distress  HEENT:  Normal cephalic, atraumatic without obvious deformity. Pupils equal, round, and reactive to light. Extra ocular muscles intact. Conjunctivae/corneas clear. Neck: Supple, with full range of motion. No jugular venous distention.  Trachea Basophils Absolute 0.0 0.0 - 0.1 thou/mm3    Immature Grans (Abs) 0.02 0.00 - 0.07 thou/mm3    nRBC 0 /100 wbc    Hypochromia PRESENT Absent   Brain Natriuretic Peptide    Collection Time: 07/08/21  3:41 AM   Result Value Ref Range    Pro-.4 0.0 - 450.0 pg/mL   Basic Metabolic Panel    Collection Time: 07/08/21  3:41 AM   Result Value Ref Range    Sodium 135 135 - 145 meq/L    Potassium 3.8 3.5 - 5.2 meq/L    Chloride 98 98 - 111 meq/L    CO2 23 23 - 33 meq/L    Glucose 99 70 - 108 mg/dL    BUN 11 7 - 22 mg/dL    CREATININE 0.7 0.4 - 1.2 mg/dL    Calcium 8.0 (L) 8.5 - 10.5 mg/dL   Hepatic function panel    Collection Time: 07/08/21  3:41 AM   Result Value Ref Range    Albumin 3.3 (L) 3.5 - 5.1 g/dL    Total Bilirubin 0.3 0.3 - 1.2 mg/dL    Bilirubin, Direct <0.2 0.0 - 0.3 mg/dL    Alkaline Phosphatase 55 38 - 126 U/L    AST 33 5 - 40 U/L    ALT 21 11 - 66 U/L    Total Protein 7.1 6.1 - 8.0 g/dL   Lipase    Collection Time: 07/08/21  3:41 AM   Result Value Ref Range    Lipase 10.1 5.6 - 51.3 U/L   Troponin    Collection Time: 07/08/21  3:41 AM   Result Value Ref Range    Troponin T < 0.010 ng/ml   Magnesium    Collection Time: 07/08/21  3:41 AM   Result Value Ref Range    Magnesium 2.0 1.6 - 2.4 mg/dL   HCG Qualitative, Serum    Collection Time: 07/08/21  3:41 AM   Result Value Ref Range    Preg, Serum NEGATIVE NEGATIVE   Lactate, Sepsis    Collection Time: 07/08/21  3:41 AM   Result Value Ref Range    Lactic Acid, Sepsis 2.1 (H) 0.5 - 1.9 mmol/L   Procalcitonin    Collection Time: 07/08/21  3:41 AM   Result Value Ref Range    Procalcitonin 0.21 (H) 0.01 - 0.09 ng/mL   Ferritin    Collection Time: 07/08/21  3:41 AM   Result Value Ref Range    Ferritin 157 10 - 291 ng/mL   Anion Gap    Collection Time: 07/08/21  3:41 AM   Result Value Ref Range    Anion Gap 14.0 8.0 - 16.0 meq/L   Osmolality    Collection Time: 07/08/21  3:41 AM   Result Value Ref Range    Osmolality Calc 269.5 (L) 275.0 - 300.0 mOsmol/kg Glomerular Filtration Rate, Estimated    Collection Time: 07/08/21  3:41 AM   Result Value Ref Range    Est, Glom Filt Rate >90 ml/min/1.73m2   Scan of Blood Smear    Collection Time: 07/08/21  3:41 AM   Result Value Ref Range    SCAN OF BLOOD SMEAR see below    COVID-19, Rapid    Collection Time: 07/08/21  3:45 AM    Specimen: Nasopharyngeal Swab   Result Value Ref Range    SARS-CoV-2, NAAT DETECTED (AA) NOT DETECTED   Blood gas, arterial    Collection Time: 07/08/21  4:32 AM   Result Value Ref Range    pH, Blood Gas 7.39 7.35 - 7.45    PCO2 42 35 - 45 mmhg    PO2 67 (L) 71 - 104 mmhg    HCO3 26 23 - 28 mmol/l    Base Excess (Calculated) 0.7 -2.5 - 2.5 mmol/l    O2 Sat 93 %    IFIO2 2     DEVICE Cannula     Source: L Radial     COLLECTED BY: [id number]    Rapid influenza A/B antigens    Collection Time: 07/08/21  5:00 AM    Specimen: Nasopharyngeal   Result Value Ref Range    Flu A Antigen Negative NEGATIVE    Flu B Antigen Negative NEGATIVE        Imaging/Diagnostics Last 24 Hours   CTA CHEST W WO CONTRAST    Result Date: 7/8/2021  CT angiography chest with contrast. 3D Postprocessing. Comparison:  CT,[facility]  - CT CHEST WO CONTRAST  - 11/16/2017 01:32 PM EST Findings: The study is limited by artifact related to patient body habitus and poor bolus timing. Aorta is nonaneurysmal with no dissection. No indication of central pulmonary embolism. Distal branches cannot be well evaluated. There is herniation of a portion of the right upper lobe through the anterior chest wall with the hernia measuring approximately 7.7 x 3.8 cm. There are diffuse somewhat nodular airspace opacities seen throughout the lungs consistent with pneumonia. No pleural effusion. Mildly enlarged AP window lymph nodes are likely reactive. No acute finding in the upper abdomen. No acute or aggressive osseous lesion. 1.  Limited exam.  No central pulmonary embolism suspected. 2.  Diffuse bilateral airspace opacities consistent with pneumonia.  3. Herniation of a portion of the right upper lobe through the anterior chest wall. This document has been electronically signed by: Kvng Topete MD on 07/08/2021 06:44 AM All CTs at this facility use dose modulation techniques and iterative reconstructions, and/or weight-based dosing when appropriate to reduce radiation to a low as reasonably achievable. XR CHEST PORTABLE    Result Date: 7/8/2021  1 view chest x-ray Comparison:  CR,SR  - XR CHEST STANDARD (2 VW)  - 07/19/2018 04:25 PM EDT Findings: Heart size is normal. Hazy bilateral opacities are present greatest in the perihilar regions. No pleural effusion or pneumothorax. 1.  Hazy bilateral airspace opacities which may indicate edema or pneumonia. This document has been electronically signed by: Kvng Topete MD on 07/08/2021 05:15 AM      Assessment        Plan   1. Acute hypoxic resp. Failure 2/2 COVID PNA: ABS also shows mild hypercapnia likely 2/2 OHS (pt denies HX of COPD,non-smoker). Started on Dexamethasone 6 mg PO QD (on H2 blocker), pharmacy to dose Remdesivir, LMWH BID dosing; added ASA as prothrombotic markers elevated. CTPE negative. IS/Acapella/PT to see. RN aware to Wean supplemental O2 as tolerated to SpO2 > 90%  2. Hx of hyperactive bladder: home meds resumed  3. Hx of Migraine headache; stable. Home prn meds ordered  4. Morbid obesity w/ BMI 89.10. counseling offered. Pt may benefit from OP bariatric surgery referral. Also recommend OP sleep study. Consultations Ordered:  STR ED TO IP CONSULT  With RN in room, patient was updated about and agreed upon the treatment plan, all the questions and concerns were addressed.     Electronically signed by Kobe Shepherd MD on 7/8/21 at 7:08 AM EDT

## 2021-07-08 NOTE — ED NOTES
Patient updated on admission status and covid visitor policy      Maikel Smith [facility]  07/08/21 [id number]

## 2021-07-08 NOTE — PROGRESS NOTES
Remdesivir Initiation Note    This patient meets criteria for initiation of remdesivir based on the following:  Proven COVID-19 - Positive test   Moderate disease (Requiring supplemental O2)  Acceptable hepatic function (ALT within 5 times ULN)    Exclusion Criteria:  Severe disease requiring invasive or non-invasive mechanical ventilation (includes HFNC & BiPAP)  Could consider use in patients requiring high flow if early on in the disease course (based on symptom duration)  Use of more than 1 vasopressor prior to remdesivir initiation  Already improving on supportive treatment and/or impending discharge  Patients in whom the clinical team think death is in the immediate short-term where remdesivir is unlikely to change the clinical outcome     Liver function tests will be monitored daily while on remdesivir.     Latrice DueñasD, BCPS  7/8/2021  8:13 AM

## 2021-07-09 PROBLEM — J96.01 ACUTE RESPIRATORY FAILURE WITH HYPOXIA (HCC): Status: ACTIVE | Noted: 2021-07-09

## 2021-07-09 PROBLEM — F32.A ANXIETY AND DEPRESSION: Status: ACTIVE | Noted: 2021-07-09

## 2021-07-09 PROBLEM — F41.9 ANXIETY AND DEPRESSION: Status: ACTIVE | Noted: 2021-07-09

## 2021-07-09 PROBLEM — J98.4 HERNIATION OF RIGHT LUNG: Status: ACTIVE | Noted: 2021-07-09

## 2021-07-09 LAB
ALBUMIN SERPL-MCNC: 3 G/DL (ref 3.5–5.1)
ALP BLD-CCNC: 45 U/L (ref 38–126)
ALT SERPL-CCNC: 42 U/L (ref 11–66)
AST SERPL-CCNC: 69 U/L (ref 5–40)
AVERAGE GLUCOSE: 114 MG/DL (ref 70–126)
BILIRUB SERPL-MCNC: 0.2 MG/DL (ref 0.3–1.2)
BILIRUBIN DIRECT: < 0.2 MG/DL (ref 0–0.3)
HBA1C MFR BLD: 5.8 % (ref 4.4–6.4)
MAGNESIUM: 2.4 MG/DL (ref 1.6–2.4)
PHOSPHORUS: 2.9 MG/DL (ref 2.4–4.7)
TOTAL PROTEIN: 6.5 G/DL (ref 6.1–8)
TSH SERPL DL<=0.05 MIU/L-ACNC: 0.45 UIU/ML (ref 0.4–4.2)

## 2021-07-09 PROCEDURE — 6370000000 HC RX 637 (ALT 250 FOR IP): Performed by: INTERNAL MEDICINE

## 2021-07-09 PROCEDURE — 99233 SBSQ HOSP IP/OBS HIGH 50: CPT | Performed by: INTERNAL MEDICINE

## 2021-07-09 PROCEDURE — 6360000002 HC RX W HCPCS: Performed by: HOSPITALIST

## 2021-07-09 PROCEDURE — 6370000000 HC RX 637 (ALT 250 FOR IP): Performed by: HOSPITALIST

## 2021-07-09 PROCEDURE — 36415 COLL VENOUS BLD VENIPUNCTURE: CPT

## 2021-07-09 PROCEDURE — 80076 HEPATIC FUNCTION PANEL: CPT

## 2021-07-09 PROCEDURE — 2500000003 HC RX 250 WO HCPCS: Performed by: HOSPITALIST

## 2021-07-09 PROCEDURE — 94761 N-INVAS EAR/PLS OXIMETRY MLT: CPT

## 2021-07-09 PROCEDURE — 1200000003 HC TELEMETRY R&B

## 2021-07-09 PROCEDURE — 84443 ASSAY THYROID STIM HORMONE: CPT

## 2021-07-09 PROCEDURE — 83735 ASSAY OF MAGNESIUM: CPT

## 2021-07-09 PROCEDURE — 2580000003 HC RX 258: Performed by: HOSPITALIST

## 2021-07-09 PROCEDURE — 2700000000 HC OXYGEN THERAPY PER DAY

## 2021-07-09 PROCEDURE — 84100 ASSAY OF PHOSPHORUS: CPT

## 2021-07-09 PROCEDURE — 94669 MECHANICAL CHEST WALL OSCILL: CPT

## 2021-07-09 RX ADMIN — SODIUM CHLORIDE, PRESERVATIVE FREE 10 ML: 5 INJECTION INTRAVENOUS at 09:21

## 2021-07-09 RX ADMIN — DEXAMETHASONE 6 MG: 4 TABLET ORAL at 09:21

## 2021-07-09 RX ADMIN — ACETAMINOPHEN 650 MG: 325 TABLET ORAL at 09:28

## 2021-07-09 RX ADMIN — REMDESIVIR 100 MG: 100 INJECTION, POWDER, LYOPHILIZED, FOR SOLUTION INTRAVENOUS at 09:22

## 2021-07-09 RX ADMIN — CEFTRIAXONE SODIUM 1000 MG: 1 INJECTION, POWDER, FOR SOLUTION INTRAMUSCULAR; INTRAVENOUS at 11:00

## 2021-07-09 RX ADMIN — TROSPIUM CHLORIDE 20 MG: 20 TABLET, FILM COATED ORAL at 06:00

## 2021-07-09 RX ADMIN — FAMOTIDINE 20 MG: 20 TABLET, FILM COATED ORAL at 22:06

## 2021-07-09 RX ADMIN — OXYBUTYNIN CHLORIDE 10 MG: 10 TABLET, EXTENDED RELEASE ORAL at 09:21

## 2021-07-09 RX ADMIN — ENOXAPARIN SODIUM 60 MG: 60 INJECTION SUBCUTANEOUS at 09:21

## 2021-07-09 RX ADMIN — AZITHROMYCIN 250 MG: 250 TABLET, FILM COATED ORAL at 09:21

## 2021-07-09 RX ADMIN — ASPIRIN 81 MG: 81 TABLET, CHEWABLE ORAL at 09:21

## 2021-07-09 RX ADMIN — SALINE NASAL SPRAY 1 SPRAY: 1.5 SOLUTION NASAL at 22:07

## 2021-07-09 RX ADMIN — TROSPIUM CHLORIDE 20 MG: 20 TABLET, FILM COATED ORAL at 16:53

## 2021-07-09 RX ADMIN — SODIUM CHLORIDE, PRESERVATIVE FREE 10 ML: 5 INJECTION INTRAVENOUS at 22:06

## 2021-07-09 RX ADMIN — ENOXAPARIN SODIUM 60 MG: 60 INJECTION SUBCUTANEOUS at 22:07

## 2021-07-09 RX ADMIN — FAMOTIDINE 20 MG: 20 TABLET, FILM COATED ORAL at 09:21

## 2021-07-09 RX ADMIN — ESCITALOPRAM 20 MG: 20 TABLET, FILM COATED ORAL at 09:21

## 2021-07-09 ASSESSMENT — PAIN DESCRIPTION - LOCATION
LOCATION: TEETH
LOCATION: TEETH

## 2021-07-09 ASSESSMENT — ENCOUNTER SYMPTOMS
WHEEZING: 1
SHORTNESS OF BREATH: 1
ABDOMINAL PAIN: 0
EYE DISCHARGE: 0
NAUSEA: 0
CONSTIPATION: 0
BACK PAIN: 0
CHEST TIGHTNESS: 0
STRIDOR: 0
EYE PAIN: 0
RECTAL PAIN: 0
VOICE CHANGE: 0
SINUS PRESSURE: 0
EYE REDNESS: 0
ANAL BLEEDING: 0
SORE THROAT: 0
APNEA: 0
COUGH: 1
RHINORRHEA: 0
EYE ITCHING: 0
ABDOMINAL DISTENTION: 0
PHOTOPHOBIA: 0
COLOR CHANGE: 0
CHOKING: 0
DIARRHEA: 0
BLOOD IN STOOL: 0
FACIAL SWELLING: 0
VOMITING: 0
SINUS PAIN: 0
TROUBLE SWALLOWING: 0

## 2021-07-09 ASSESSMENT — PAIN DESCRIPTION - PAIN TYPE: TYPE: ACUTE PAIN

## 2021-07-09 ASSESSMENT — PAIN DESCRIPTION - ORIENTATION
ORIENTATION: UPPER;LOWER
ORIENTATION: UPPER;LOWER

## 2021-07-09 ASSESSMENT — PAIN SCALES - GENERAL
PAINLEVEL_OUTOF10: 5
PAINLEVEL_OUTOF10: 0
PAINLEVEL_OUTOF10: 8
PAINLEVEL_OUTOF10: 5

## 2021-07-09 ASSESSMENT — PAIN DESCRIPTION - PROGRESSION: CLINICAL_PROGRESSION: NOT CHANGED

## 2021-07-09 ASSESSMENT — PAIN DESCRIPTION - DESCRIPTORS
DESCRIPTORS: ACHING
DESCRIPTORS: ACHING

## 2021-07-09 ASSESSMENT — PAIN - FUNCTIONAL ASSESSMENT: PAIN_FUNCTIONAL_ASSESSMENT: ACTIVITIES ARE NOT PREVENTED

## 2021-07-09 ASSESSMENT — PAIN DESCRIPTION - ONSET: ONSET: ON-GOING

## 2021-07-09 ASSESSMENT — PAIN DESCRIPTION - FREQUENCY
FREQUENCY: CONTINUOUS
FREQUENCY: CONTINUOUS

## 2021-07-09 NOTE — PROGRESS NOTES
Hospitalist Progress Note  [facility] CCU 3A       Patient: Jaida Saleem    Unit/Bed: 3A-03/003-A    YOB: 1985    MRN: [id number]    Acct: [de-identified]     Admitting Diagnosis:COVID-19 [U07.1]    Admit Date:  7/8/2021    Hospital Day: 1    Subjective:    Patient feels slightly better today. Admitted yesterday with progressive and worsening respiratory symptoms of shortness of breath and productive cough with bloodstained sputum. Had a Covid test done on 07/03/2021 got a positive result on 07/06/2021. Her respiratory symptoms have gotten worse hence presentation to the hospital for evaluation and treatment. Patient Seen, Chart, Labs, Radiology studies, and Consults reviewed. Objective:   BP (!) 149/86   Pulse 74   Temp 97.7 °F (36.5 °C) (Oral)   Resp 15   Ht 5' 3\" (1.6 m)   Wt (!) 503 lb (228.2 kg)   SpO2 95%   BMI 89.10 kg/m²       Intake/Output Summary (Last 24 hours) at 7/9/2021 1502  Last data filed at 7/9/2021 1245  Gross per 24 hour   Intake 1622 ml   Output 690 ml   Net 932 ml       Review of Systems   Constitutional: Positive for activity change. Negative for appetite change, chills, diaphoresis, fatigue, fever and unexpected weight change. HENT: Positive for congestion. Negative for dental problem, drooling, ear discharge, ear pain, facial swelling, hearing loss, mouth sores, nosebleeds, postnasal drip, rhinorrhea, sinus pressure, sinus pain, sneezing, sore throat, tinnitus, trouble swallowing and voice change. Eyes: Negative for photophobia, pain, discharge, redness, itching and visual disturbance. Respiratory: Positive for cough, shortness of breath and wheezing. Negative for apnea, choking, chest tightness and stridor. Cardiovascular: Negative for chest pain, palpitations and leg swelling. Gastrointestinal: Negative for abdominal distention, abdominal pain, anal bleeding, blood in stool, constipation, diarrhea, nausea, rectal pain and vomiting. reactive to light. Neck:      Vascular: No carotid bruit. Cardiovascular:      Rate and Rhythm: Normal rate and regular rhythm. Pulses: Normal pulses. Heart sounds: Normal heart sounds. No murmur heard. No friction rub. No gallop. Pulmonary:      Effort: Pulmonary effort is normal. No respiratory distress. Breath sounds: Normal breath sounds. No stridor. No wheezing, rhonchi or rales. Chest:      Chest wall: No tenderness. Abdominal:      General: Bowel sounds are normal. There is no distension. Palpations: Abdomen is soft. There is no mass. Tenderness: There is no abdominal tenderness. There is no right CVA tenderness, left CVA tenderness, guarding or rebound. Hernia: No hernia is present. Musculoskeletal:         General: No swelling, tenderness, deformity or signs of injury. Normal range of motion. Cervical back: Normal range of motion and neck supple. No rigidity or tenderness. Right lower leg: No edema. Left lower leg: No edema. Lymphadenopathy:      Cervical: No cervical adenopathy. Skin:     General: Skin is warm and dry. Coloration: Skin is not jaundiced or pale. Findings: No bruising, erythema, lesion or rash. Neurological:      General: No focal deficit present. Mental Status: She is alert and oriented to person, place, and time. Mental status is at baseline. Cranial Nerves: No cranial nerve deficit. Sensory: No sensory deficit. Motor: No weakness. Coordination: Coordination normal.      Gait: Gait normal.      Deep Tendon Reflexes: Reflexes normal.   Psychiatric:         Mood and Affect: Mood normal.         Behavior: Behavior normal.         Thought Content:  Thought content normal.         Judgment: Judgment normal.     Medications:    escitalopram  20 mg Oral Daily    famotidine  20 mg Oral BID    oxybutynin  10 mg Oral Daily    trospium  20 mg Oral BID AC    dexamethasone  6 mg Oral Daily    cefTRIAXone (ROCEPHIN) IV  1,000 mg Intravenous Q24H    sodium chloride flush  10 mL Intravenous 2 times per day    azithromycin  250 mg Oral Daily    remdesivir IVPB  100 mg Intravenous Q24H    enoxaparin  60 mg Subcutaneous BID    aspirin  81 mg Oral Daily       Continuous Infusions:   sodium chloride         PRN Meds:albuterol sulfate HFA, butalbital-acetaminophen-caffeine, hydrOXYzine, SUMAtriptan, sodium chloride flush, sodium chloride, ondansetron **OR** ondansetron, polyethylene glycol, acetaminophen **OR** acetaminophen, sodium chloride    Data:    CBC:   Recent Labs     07/08/21 0341   WBC 4.7*   RBC 5.24   HGB 11.4*   HCT 39.7   MCV 75.8*          BMP:   Recent Labs     07/08/21 0341      K 3.8   CL 98   CO2 23   BUN 11   CREATININE 0.7       BNP: No results for input(s): BNP in the last 72 hours. PT/INR:No results for input(s): PROTIME, INR in the last 72 hours. APTT: No results for input(s): APTT in the last 72 hours. CARDIAC ENZYMES:   Recent Labs     07/08/21 0341   TROPONINT < 0.010       FASTING LIPID PANEL:  Lab Results   Component Value Date    CHOL 216 (H) 03/17/2020    HDL 49 03/17/2020    TRIG 125 03/17/2020       LIVER PROFILE:   Recent Labs     07/08/21 0341 07/09/21  0435   AST 33 69*   ALT 21 42   BILIDIR <0.2 <0.2   BILITOT 0.3 0.2*   ALKPHOS 55 45        ABGs:   Lab Results   Component Value Date    PH 7.42 07/08/2021    PCO2 37 07/08/2021    PO2 71 07/08/2021    HCO3 24 07/08/2021    O2SAT 95 07/08/2021     Results for John Smith (MRN [id number]) as of 7/9/2021 15:02   Ref. Range 7/8/2021 03:41   Procalcitonin Latest Ref Range: 0.01 - 0.09 ng/mL 0.21 (H)     Results for John Smith (MRN [id number]) as of 7/9/2021 20:23   Ref. Range 3/17/2020 15:30 7/9/2021 04:35   TSH Latest Ref Range: 0.400 - 4.200 uIU/mL 3.110 0.449     Results for John Smith (MRN [id number]) as of 7/9/2021 20:23   Ref.  Range 7/19/2018 15:14 3/17/2020 15:30 7/8/2021 03:41 Hemoglobin A1C Latest Ref Range: 4.4 - 6.4 % 5.3 5.3 5.8     URINALYSIS. None. SEROLOGY  None. TUMOR MARKERS. None. MICROBIOLOGY   Results for Alfredo Reyes (MRN [id number]) as of 7/9/2021 15:02   Ref. Range 7/8/2021 11:00   MRSA SCREEN RT-PCR Unknown NEGATIVE     Results for Alfredo Reyes (MRN [id number]) as of 7/9/2021 15:02   Ref. Range 7/8/2021 05:00   Flu A Antigen Latest Ref Range: NEGATIVE  Negative   Flu B Antigen Latest Ref Range: NEGATIVE  Negative       Results for Alfredo Reyes (MRN [id number]) as of 7/9/2021 15:02   Ref. Range 7/8/2021 03:45   SARS-CoV-2, NAAT Latest Ref Range: NOT DETECTED  DETECTED (AA)     Results for Alfredo Reyes (MRN [id number]) as of 7/9/2021 15:02   Ref. Range 7/3/2021 00:00   SARS-CoV-2 Unknown detected (A)     HISTOPATHOLOGY. None. TOXICOLOGY. None. ENDOSCOPE STUDIES. None. PROCEDURES. None. RADIOLOGY. CTA Chest-7/8/2021. FINDINGS:  The study is limited by artifact related to patient body habitus and poor bolus timing. Aorta is nonaneurysmal with no dissection. No indication of central pulmonary embolism. Distal branches cannot be well evaluated. There is herniation of a portion of the right upper lobe through the anterior chest wall   with the hernia measuring approximately 7.7 x 3.8 cm. There are diffuse somewhat nodular airspace opacities seen throughout the   lungs consistent with pneumonia. No pleural effusion. Mildly enlarged AP window lymph nodes are likely reactive. No acute finding in the upper abdomen. No acute or aggressive osseous lesion.     IMPRESSION:  1. Limited exam.           No central pulmonary embolism suspected. 2.  Diffuse bilateral airspace opacities consistent with pneumonia.     3.  Herniation of a portion of the right upper lobe through the anterior chest wall.     This document has been electronically signed by: Joana Richter MD on   07/08/2021 06:44 AM    ASSESSMENT AND PLAN. 1.NEUROVASCULAR. H/o migraine headaches on Fioricet and Imitrex. Need evaluation for pseudotumor cerebri/benign intracranial hypertension. 2.PULMONARY. Acute hypoxic respiratory failure-O2 supplement, wean as tolerated. RUL  anterior chest wall herniation on CTA chest - CTS consult. Suspected SANTIAGO and OHS. COVID-19 pneumonia w/ severe sepsis-remdesivir, p.o. Dexamethasone, prn nebs. On IV Rocephin and azithromycin for possible superimposed bacterial infection. 3.CARDIOLOGY. HTN-we may need to start antihypertensives. 4. ENDO.     TSH 0.449 and A1 C check. 5.PSYCH. Anxiety and depression-continue Atarax and Lexapro. 6.NUTRITION. Morbid obesity w/ a BMI of 89.10-needs regular exercise, diet control for weight loss      management. T/c bariatric surgery. 7.DISPO. Planned d/c to home vs rehab soon.       Electronically signed Fuad Delvalle MD on 7/9/2021 at 3:02 PM    Rounding Hospitalist

## 2021-07-09 NOTE — CARE COORDINATION
7/9/21, 8:28 AM EDT  DISCHARGE PLANNING EVALUATION:    Isabel Awad       Admitted: 7/8/2021/ Mario Lucio 91 day: 1   Location: -03/003-A Reason for admit: COVID-19 [U07.1]   PMH:  has a past medical history of Back pain and Morbid obesity ([address] Procedure:   7/8 CTA Chest: No PE suspected; Diffuse bilateral airspace opacities consistent with pneumonia; Herniation of a portion of the right upper lobe through the anterior chest wall  7/8 CXR:    Hazy bilateral airspace opacities which may indicate edema or    pneumonia     Barriers to Discharge: Presented with c/o fever, chills, malaise, dry cough and diarrhea. Last well one week ago. Symptoms progressed over time with SOB that started 1-2 days PTA. Found to be +COVID 19. Admitted to . Afebrile. NSR. Sats 93% on 4L O2. Ox4. PT/OT. IS. Asa, po zithromax, IV rocephin, po decadron, lovenox, lexapro, pepcid, ditropan, IV remdesivir - day #2, sanctura. Received 10 mg IV decadron x1. LA 2.1 - now 1.1, procal 0.21, , trop neg, alb 3.3 - now 3, ast 69, wbc 4.7, hgb 11.4, d-dimer 874. Rapid flu was negative. Blood cultures sent. PCP: MALCOM Gold CNP  Readmission Risk Score: 10%    Patient Goals/Plan/Treatment Preferences: Spoke with Anay; states she lives at home with her  and did not use any DME or have any HH services PTA. She states she does have a cane and walker if she would need them. She cares for herself independently, drives, and has a PCP. Anay states she plans to return home at discharge, denies needs, and declines HH at this time. If she needs home O2, she states she would prefer [facility]. Continue to monitor as course progresses. Transportation/Food Security/Housekeeping Addressed:  No issues identified.

## 2021-07-10 LAB
ALBUMIN SERPL-MCNC: 3 G/DL (ref 3.5–5.1)
ALP BLD-CCNC: 42 U/L (ref 38–126)
ALT SERPL-CCNC: 43 U/L (ref 11–66)
AST SERPL-CCNC: 45 U/L (ref 5–40)
BILIRUB SERPL-MCNC: 0.2 MG/DL (ref 0.3–1.2)
BILIRUBIN DIRECT: < 0.2 MG/DL (ref 0–0.3)
MRSA SCREEN: NORMAL
TOTAL PROTEIN: 6.5 G/DL (ref 6.1–8)

## 2021-07-10 PROCEDURE — 2580000003 HC RX 258: Performed by: HOSPITALIST

## 2021-07-10 PROCEDURE — 1200000003 HC TELEMETRY R&B

## 2021-07-10 PROCEDURE — 36415 COLL VENOUS BLD VENIPUNCTURE: CPT

## 2021-07-10 PROCEDURE — 2500000003 HC RX 250 WO HCPCS: Performed by: HOSPITALIST

## 2021-07-10 PROCEDURE — 94640 AIRWAY INHALATION TREATMENT: CPT

## 2021-07-10 PROCEDURE — 6370000000 HC RX 637 (ALT 250 FOR IP): Performed by: HOSPITALIST

## 2021-07-10 PROCEDURE — 80076 HEPATIC FUNCTION PANEL: CPT

## 2021-07-10 PROCEDURE — 87086 URINE CULTURE/COLONY COUNT: CPT

## 2021-07-10 PROCEDURE — 6360000002 HC RX W HCPCS: Performed by: INTERNAL MEDICINE

## 2021-07-10 PROCEDURE — 99232 SBSQ HOSP IP/OBS MODERATE 35: CPT | Performed by: INTERNAL MEDICINE

## 2021-07-10 PROCEDURE — 2700000000 HC OXYGEN THERAPY PER DAY

## 2021-07-10 PROCEDURE — 94760 N-INVAS EAR/PLS OXIMETRY 1: CPT

## 2021-07-10 PROCEDURE — 99222 1ST HOSP IP/OBS MODERATE 55: CPT | Performed by: THORACIC SURGERY (CARDIOTHORACIC VASCULAR SURGERY)

## 2021-07-10 PROCEDURE — 6360000002 HC RX W HCPCS: Performed by: HOSPITALIST

## 2021-07-10 RX ORDER — FUROSEMIDE 10 MG/ML
40 INJECTION INTRAMUSCULAR; INTRAVENOUS ONCE
Status: COMPLETED | OUTPATIENT
Start: 2021-07-10 | End: 2021-07-10

## 2021-07-10 RX ADMIN — FAMOTIDINE 20 MG: 20 TABLET, FILM COATED ORAL at 19:57

## 2021-07-10 RX ADMIN — REMDESIVIR 100 MG: 100 INJECTION, POWDER, LYOPHILIZED, FOR SOLUTION INTRAVENOUS at 08:57

## 2021-07-10 RX ADMIN — ESCITALOPRAM 20 MG: 20 TABLET, FILM COATED ORAL at 08:57

## 2021-07-10 RX ADMIN — ENOXAPARIN SODIUM 60 MG: 60 INJECTION SUBCUTANEOUS at 08:56

## 2021-07-10 RX ADMIN — SODIUM CHLORIDE, PRESERVATIVE FREE 10 ML: 5 INJECTION INTRAVENOUS at 08:57

## 2021-07-10 RX ADMIN — AZITHROMYCIN 250 MG: 250 TABLET, FILM COATED ORAL at 08:57

## 2021-07-10 RX ADMIN — FAMOTIDINE 20 MG: 20 TABLET, FILM COATED ORAL at 08:57

## 2021-07-10 RX ADMIN — ENOXAPARIN SODIUM 60 MG: 60 INJECTION SUBCUTANEOUS at 19:57

## 2021-07-10 RX ADMIN — DEXAMETHASONE 6 MG: 4 TABLET ORAL at 08:57

## 2021-07-10 RX ADMIN — CEFTRIAXONE SODIUM 1000 MG: 1 INJECTION, POWDER, FOR SOLUTION INTRAMUSCULAR; INTRAVENOUS at 10:24

## 2021-07-10 RX ADMIN — TROSPIUM CHLORIDE 20 MG: 20 TABLET, FILM COATED ORAL at 06:09

## 2021-07-10 RX ADMIN — Medication 2 PUFF: at 18:01

## 2021-07-10 RX ADMIN — FUROSEMIDE 40 MG: 10 INJECTION, SOLUTION INTRAMUSCULAR; INTRAVENOUS at 19:57

## 2021-07-10 RX ADMIN — TROSPIUM CHLORIDE 20 MG: 20 TABLET, FILM COATED ORAL at 17:35

## 2021-07-10 RX ADMIN — ASPIRIN 81 MG: 81 TABLET, CHEWABLE ORAL at 08:57

## 2021-07-10 RX ADMIN — OXYBUTYNIN CHLORIDE 10 MG: 10 TABLET, EXTENDED RELEASE ORAL at 08:57

## 2021-07-10 ASSESSMENT — ENCOUNTER SYMPTOMS
FACIAL SWELLING: 0
RHINORRHEA: 0
COLOR CHANGE: 0
SINUS PAIN: 0
NAUSEA: 0
SHORTNESS OF BREATH: 1
APNEA: 0
WHEEZING: 1
CHOKING: 0
BACK PAIN: 0
SORE THROAT: 0
EYE ITCHING: 0
CHEST TIGHTNESS: 0
BLOOD IN STOOL: 0
ABDOMINAL PAIN: 0
SINUS PRESSURE: 0
EYE PAIN: 0
ABDOMINAL DISTENTION: 0
STRIDOR: 0
TROUBLE SWALLOWING: 0
VOMITING: 0
EYE DISCHARGE: 0
CONSTIPATION: 0
COUGH: 1
RECTAL PAIN: 0
DIARRHEA: 0
VOICE CHANGE: 0
EYE REDNESS: 0
ANAL BLEEDING: 0
PHOTOPHOBIA: 0

## 2021-07-10 ASSESSMENT — PAIN SCALES - GENERAL: PAINLEVEL_OUTOF10: 0

## 2021-07-10 NOTE — PROGRESS NOTES
Hospitalist Progress Note  [facility] CCU 3A       Patient: Vanessa     Unit/Bed: 3A-03/003-A    YOB: 1985    MRN: [id number]    Acct: [de-identified]     Admitting Diagnosis:COVID-19 [U07.1]    Admit Date:  7/8/2021    Hospital Day: 2    Subjective:    Patient feels slightly better today. Admitted yesterday with progressive and worsening respiratory symptoms of shortness of breath and productive cough with bloodstained sputum. Had a Covid test done on 07/03/2021 got a positive result on 07/06/2021. Her respiratory symptoms have gotten worse hence presentation to the hospital for evaluation and treatment. Patient Seen, Chart, Labs, Radiology studies, and Consults reviewed. Objective:   /86   Pulse 77   Temp 97.6 °F (36.4 °C) (Oral)   Resp 23   Ht 5' 3\" (1.6 m)   Wt (!) 503 lb (228.2 kg)   SpO2 94%   BMI 89.10 kg/m²       Intake/Output Summary (Last 24 hours) at 7/10/2021 1222  Last data filed at 7/10/2021 1024  Gross per 24 hour   Intake 1522 ml   Output 515 ml   Net 1007 ml       Review of Systems   Constitutional: Positive for activity change. Negative for appetite change, chills, diaphoresis, fatigue, fever and unexpected weight change. HENT: Positive for congestion. Negative for dental problem, drooling, ear discharge, ear pain, facial swelling, hearing loss, mouth sores, nosebleeds, postnasal drip, rhinorrhea, sinus pressure, sinus pain, sneezing, sore throat, tinnitus, trouble swallowing and voice change. Eyes: Negative for photophobia, pain, discharge, redness, itching and visual disturbance. Respiratory: Positive for cough, shortness of breath and wheezing. Negative for apnea, choking, chest tightness and stridor. Cardiovascular: Positive for leg swelling. Negative for chest pain and palpitations. Gastrointestinal: Negative for abdominal distention, abdominal pain, anal bleeding, blood in stool, constipation, diarrhea, nausea, rectal pain and vomiting. Endocrine: Negative for cold intolerance, heat intolerance, polydipsia, polyphagia and polyuria. Genitourinary: Negative for decreased urine volume, difficulty urinating, dyspareunia, dysuria, enuresis, flank pain, frequency, genital sores, hematuria, menstrual problem, pelvic pain, urgency, vaginal bleeding, vaginal discharge and vaginal pain. Musculoskeletal: Negative for arthralgias, back pain, gait problem, joint swelling, myalgias, neck pain and neck stiffness. Skin: Negative for color change, pallor, rash and wound. Allergic/Immunologic: Negative for environmental allergies, food allergies and immunocompromised state. Neurological: Positive for weakness. Negative for dizziness, tremors, seizures, syncope, facial asymmetry, speech difficulty, light-headedness, numbness and headaches. Hematological: Negative for adenopathy. Does not bruise/bleed easily. Psychiatric/Behavioral: Negative for agitation, behavioral problems, confusion, decreased concentration, dysphoric mood, hallucinations, self-injury, sleep disturbance and suicidal ideas. The patient is not nervous/anxious and is not hyperactive. Physical Exam  Vitals and nursing note reviewed. Constitutional:       General: She is not in acute distress. Appearance: Normal appearance. She is obese. She is not ill-appearing, toxic-appearing or diaphoretic. HENT:      Head: Normocephalic and atraumatic. Right Ear: External ear normal.      Left Ear: External ear normal.      Nose: Nose normal. No congestion or rhinorrhea. Mouth/Throat:      Mouth: Mucous membranes are moist.      Pharynx: Oropharynx is clear. No oropharyngeal exudate or posterior oropharyngeal erythema. Eyes:      General: No scleral icterus. Right eye: No discharge. Left eye: No discharge. Extraocular Movements: Extraocular movements intact.       Conjunctiva/sclera: Conjunctivae normal.      Pupils: Pupils are equal, round, and cefTRIAXone (ROCEPHIN) IV  1,000 mg Intravenous Q24H    sodium chloride flush  10 mL Intravenous 2 times per day    azithromycin  250 mg Oral Daily    remdesivir IVPB  100 mg Intravenous Q24H    enoxaparin  60 mg Subcutaneous BID    aspirin  81 mg Oral Daily       Continuous Infusions:   sodium chloride         PRN Meds:sodium chloride, albuterol sulfate HFA, butalbital-acetaminophen-caffeine, hydrOXYzine, SUMAtriptan, sodium chloride flush, sodium chloride, ondansetron **OR** ondansetron, polyethylene glycol, acetaminophen **OR** acetaminophen, sodium chloride    Data:    CBC:   Recent Labs     07/08/21 0341   WBC 4.7*   RBC 5.24   HGB 11.4*   HCT 39.7   MCV 75.8*          BMP:   Recent Labs     07/08/21 0341 07/09/21 0435     --    K 3.8  --    CL 98  --    CO2 23  --    PHOS  --  2.9   BUN 11  --    CREATININE 0.7  --        BNP: No results for input(s): BNP in the last 72 hours. PT/INR:No results for input(s): PROTIME, INR in the last 72 hours. APTT: No results for input(s): APTT in the last 72 hours. CARDIAC ENZYMES:   Recent Labs     07/08/21 0341   TROPONINT < 0.010       FASTING LIPID PANEL:  Lab Results   Component Value Date    CHOL 216 (H) 03/17/2020    HDL 49 03/17/2020    TRIG 125 03/17/2020       LIVER PROFILE:   Recent Labs     07/08/21 0341 07/09/21  0435 07/10/21  0459   AST 33 69* 45*   ALT 21 42 43   BILIDIR <0.2 <0.2 <0.2   BILITOT 0.3 0.2* 0.2*   ALKPHOS 55 45 42        ABGs:   Lab Results   Component Value Date    PH 7.42 07/08/2021    PCO2 37 07/08/2021    PO2 71 07/08/2021    HCO3 24 07/08/2021    O2SAT 95 07/08/2021     Results for John Anthony (MRN [id number]) as of 7/10/2021 12:22   Ref. Range 7/9/2021 04:35   Magnesium Latest Ref Range: 1.6 - 2.4 mg/dL 2.4   Phosphorus Latest Ref Range: 2.4 - 4.7 mg/dL 2.9   Total Protein Latest Ref Range: 6.1 - 8.0 g/dL 6.5       Results for John Anthony (MRN [id number]) as of 7/9/2021 15:02   Ref.  Range 7/8/2021 03:41   Procalcitonin Latest Ref Range: 0.01 - 0.09 ng/mL 0.21 (H)     Results for Carlie Yarbrough (MRN [id number]) as of 7/9/2021 20:23   Ref. Range 3/17/2020 15:30 7/9/2021 04:35   TSH Latest Ref Range: 0.400 - 4.200 uIU/mL 3.110 0.449     Results for Carlie Yarbrough (MRN [id number]) as of 7/9/2021 20:23   Ref. Range 7/19/2018 15:14 3/17/2020 15:30 7/8/2021 03:41   Hemoglobin A1C Latest Ref Range: 4.4 - 6.4 % 5.3 5.3 5.8     URINALYSIS. None. SEROLOGY  None. TUMOR MARKERS. None. MICROBIOLOGY   Results for Carlie Yarbrough (MRN [id number]) as of 7/9/2021 15:02   Ref. Range 7/8/2021 11:00   MRSA SCREEN RT-PCR Unknown NEGATIVE     Results for Carlie Yarbrough (MRN [id number]) as of 7/9/2021 15:02   Ref. Range 7/8/2021 05:00   Flu A Antigen Latest Ref Range: NEGATIVE  Negative   Flu B Antigen Latest Ref Range: NEGATIVE  Negative       Results for Carlie Yarbrough (MRN [id number]) as of 7/9/2021 15:02   Ref. Range 7/8/2021 03:45   SARS-CoV-2, NAAT Latest Ref Range: NOT DETECTED  DETECTED (AA)     Results for Carlie Yarbrough (MRN [id number]) as of 7/9/2021 15:02   Ref. Range 7/3/2021 00:00   SARS-CoV-2 Unknown detected (A)     HISTOPATHOLOGY. None. TOXICOLOGY. None. ENDOSCOPE STUDIES. None. PROCEDURES. None. RADIOLOGY. CTA Chest-7/8/2021. FINDINGS:  The study is limited by artifact related to patient body habitus and poor bolus timing. Aorta is nonaneurysmal with no dissection. No indication of central pulmonary embolism. Distal branches cannot be well evaluated. There is herniation of a portion of the right upper lobe through the anterior chest wall   with the hernia measuring approximately 7.7 x 3.8 cm. There are diffuse somewhat nodular airspace opacities seen throughout the   lungs consistent with pneumonia. No pleural effusion. Mildly enlarged AP window lymph nodes are likely reactive. No acute finding in the upper abdomen.       No acute or aggressive osseous lesion.     IMPRESSION:  1. Limited exam.           No central pulmonary embolism suspected. 2.  Diffuse bilateral airspace opacities consistent with pneumonia. 3.  Herniation of a portion of the right upper lobe through the anterior chest wall.     This document has been electronically signed by: Beryle Felts, MD on   07/08/2021 06:44 AM    ASSESSMENT AND  PLAN. 1.NEUROVASCULAR. H/o migraine headaches on Fioricet and Imitrex. Need evaluation for pseudotumor cerebri/benign intracranial hypertension. 2.PULMONARY. Acute hypoxic respiratory failure-O2 supplement, wean as tolerated. RUL  anterior chest wall herniation on CTA chest -seen by CTS. OP f/u. Suspected SANTIAGO and OHS. COVID-19 pneumonia w/ severe sepsis-remdesivir, p.o. Dexamethasone, prn nebs. On IV Rocephin and azithromycin for possible superimposed bacterial infection. 3.CARDIOLOGY. Borderline hypertension -we will monitor blood pressure. Suspected diastolic heart dysfunction w/ mild fluid overload-IV Lasix 40 mg x 1 dose. Echocardiogram on Monday. 4. ENDO.     TSH 0.449 and A1 C 5.8.      5.PSYCH. Anxiety and depression-continue Atarax and Lexapro. 6.NUTRITION. Morbid obesity w/ a BMI of 89.10-needs regular exercise, diet control for weight loss      management. T/c bariatric surgery. 7.DISPO. Planned d/c to home vs rehab soon.       Electronically signed Jewels Cheatham MD on 7/10/2021 at 12:22 PM    Rounding Hospitalist

## 2021-07-10 NOTE — PLAN OF CARE
Problem: Breathing Pattern - Ineffective  Goal: Ability to achieve and maintain a regular respiratory rate  Outcome: Ongoing     Problem: Airway Clearance - Ineffective  Goal: Achieve or maintain patent airway  Outcome: Ongoing     Problem: Pain:  Goal: Pain level will decrease  Description: Pain level will decrease  Outcome: Ongoing  Goal: Control of acute pain  Description: Control of acute pain  Outcome: Ongoing  Goal: Control of chronic pain  Description: Control of chronic pain  Outcome: Ongoing
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
## 2021-07-11 LAB
ALBUMIN SERPL-MCNC: 3.1 G/DL (ref 3.5–5.1)
ALP BLD-CCNC: 43 U/L (ref 38–126)
ALT SERPL-CCNC: 38 U/L (ref 11–66)
ANION GAP SERPL CALCULATED.3IONS-SCNC: 10 MEQ/L (ref 8–16)
AST SERPL-CCNC: 31 U/L (ref 5–40)
BILIRUB SERPL-MCNC: 0.3 MG/DL (ref 0.3–1.2)
BILIRUBIN DIRECT: < 0.2 MG/DL (ref 0–0.3)
BUN BLDV-MCNC: 13 MG/DL (ref 7–22)
CALCIUM SERPL-MCNC: 8.4 MG/DL (ref 8.5–10.5)
CHLORIDE BLD-SCNC: 99 MEQ/L (ref 98–111)
CO2: 28 MEQ/L (ref 23–33)
CREAT SERPL-MCNC: 0.5 MG/DL (ref 0.4–1.2)
EKG ATRIAL RATE: 60 BPM
EKG P AXIS: 27 DEGREES
EKG P-R INTERVAL: 170 MS
EKG Q-T INTERVAL: 446 MS
EKG QRS DURATION: 92 MS
EKG QTC CALCULATION (BAZETT): 446 MS
EKG R AXIS: 53 DEGREES
EKG T AXIS: 18 DEGREES
EKG VENTRICULAR RATE: 60 BPM
ERYTHROCYTE [DISTWIDTH] IN BLOOD BY AUTOMATED COUNT: 18.5 % (ref 11.5–14.5)
ERYTHROCYTE [DISTWIDTH] IN BLOOD BY AUTOMATED COUNT: 49.8 FL (ref 35–45)
GFR SERPL CREATININE-BSD FRML MDRD: > 90 ML/MIN/1.73M2
GLUCOSE BLD-MCNC: 118 MG/DL (ref 70–108)
HCT VFR BLD CALC: 36 % (ref 37–47)
HEMOGLOBIN: 10.3 GM/DL (ref 12–16)
MCH RBC QN AUTO: 21.5 PG (ref 26–33)
MCHC RBC AUTO-ENTMCNC: 28.6 GM/DL (ref 32.2–35.5)
MCV RBC AUTO: 75.2 FL (ref 81–99)
PLATELET # BLD: 239 THOU/MM3 (ref 130–400)
PMV BLD AUTO: 11 FL (ref 9.4–12.4)
POTASSIUM SERPL-SCNC: 4.3 MEQ/L (ref 3.5–5.2)
RBC # BLD: 4.79 MILL/MM3 (ref 4.2–5.4)
SODIUM BLD-SCNC: 137 MEQ/L (ref 135–145)
TOTAL PROTEIN: 6.6 G/DL (ref 6.1–8)
WBC # BLD: 6.1 THOU/MM3 (ref 4.8–10.8)

## 2021-07-11 PROCEDURE — 2500000003 HC RX 250 WO HCPCS: Performed by: HOSPITALIST

## 2021-07-11 PROCEDURE — 6370000000 HC RX 637 (ALT 250 FOR IP): Performed by: HOSPITALIST

## 2021-07-11 PROCEDURE — 85027 COMPLETE CBC AUTOMATED: CPT

## 2021-07-11 PROCEDURE — 6370000000 HC RX 637 (ALT 250 FOR IP): Performed by: INTERNAL MEDICINE

## 2021-07-11 PROCEDURE — 93010 ELECTROCARDIOGRAM REPORT: CPT | Performed by: INTERNAL MEDICINE

## 2021-07-11 PROCEDURE — 82248 BILIRUBIN DIRECT: CPT

## 2021-07-11 PROCEDURE — 2500000003 HC RX 250 WO HCPCS: Performed by: INTERNAL MEDICINE

## 2021-07-11 PROCEDURE — 1200000003 HC TELEMETRY R&B

## 2021-07-11 PROCEDURE — 6360000002 HC RX W HCPCS: Performed by: HOSPITALIST

## 2021-07-11 PROCEDURE — 36415 COLL VENOUS BLD VENIPUNCTURE: CPT

## 2021-07-11 PROCEDURE — 2580000003 HC RX 258: Performed by: HOSPITALIST

## 2021-07-11 PROCEDURE — 80053 COMPREHEN METABOLIC PANEL: CPT

## 2021-07-11 PROCEDURE — 99232 SBSQ HOSP IP/OBS MODERATE 35: CPT | Performed by: INTERNAL MEDICINE

## 2021-07-11 RX ORDER — SENNA AND DOCUSATE SODIUM 50; 8.6 MG/1; MG/1
1 TABLET, FILM COATED ORAL DAILY
Status: DISCONTINUED | OUTPATIENT
Start: 2021-07-12 | End: 2021-07-13 | Stop reason: HOSPADM

## 2021-07-11 RX ORDER — BUMETANIDE 0.25 MG/ML
1 INJECTION, SOLUTION INTRAMUSCULAR; INTRAVENOUS ONCE
Status: COMPLETED | OUTPATIENT
Start: 2021-07-11 | End: 2021-07-11

## 2021-07-11 RX ORDER — LACTULOSE 10 G/15ML
20 SOLUTION ORAL ONCE
Status: COMPLETED | OUTPATIENT
Start: 2021-07-11 | End: 2021-07-11

## 2021-07-11 RX ADMIN — ESCITALOPRAM 20 MG: 20 TABLET, FILM COATED ORAL at 09:53

## 2021-07-11 RX ADMIN — OXYBUTYNIN CHLORIDE 10 MG: 10 TABLET, EXTENDED RELEASE ORAL at 09:54

## 2021-07-11 RX ADMIN — TROSPIUM CHLORIDE 20 MG: 20 TABLET, FILM COATED ORAL at 09:54

## 2021-07-11 RX ADMIN — SODIUM CHLORIDE, PRESERVATIVE FREE 10 ML: 5 INJECTION INTRAVENOUS at 09:55

## 2021-07-11 RX ADMIN — FAMOTIDINE 20 MG: 20 TABLET, FILM COATED ORAL at 21:02

## 2021-07-11 RX ADMIN — FAMOTIDINE 20 MG: 20 TABLET, FILM COATED ORAL at 09:53

## 2021-07-11 RX ADMIN — TROSPIUM CHLORIDE 20 MG: 20 TABLET, FILM COATED ORAL at 06:02

## 2021-07-11 RX ADMIN — DEXAMETHASONE 6 MG: 4 TABLET ORAL at 09:54

## 2021-07-11 RX ADMIN — AZITHROMYCIN 250 MG: 250 TABLET, FILM COATED ORAL at 09:54

## 2021-07-11 RX ADMIN — REMDESIVIR 100 MG: 100 INJECTION, POWDER, LYOPHILIZED, FOR SOLUTION INTRAVENOUS at 08:00

## 2021-07-11 RX ADMIN — LACTULOSE 20 G: 20 SOLUTION ORAL at 23:34

## 2021-07-11 RX ADMIN — ASPIRIN 81 MG: 81 TABLET, CHEWABLE ORAL at 09:53

## 2021-07-11 RX ADMIN — HYDROXYZINE HYDROCHLORIDE 25 MG: 25 TABLET ORAL at 09:54

## 2021-07-11 RX ADMIN — BUMETANIDE 1 MG: 0.25 INJECTION, SOLUTION INTRAMUSCULAR; INTRAVENOUS at 21:01

## 2021-07-11 RX ADMIN — CEFTRIAXONE SODIUM 1000 MG: 1 INJECTION, POWDER, FOR SOLUTION INTRAMUSCULAR; INTRAVENOUS at 09:57

## 2021-07-11 RX ADMIN — ENOXAPARIN SODIUM 60 MG: 60 INJECTION SUBCUTANEOUS at 21:02

## 2021-07-11 RX ADMIN — SODIUM CHLORIDE, PRESERVATIVE FREE 10 ML: 5 INJECTION INTRAVENOUS at 21:02

## 2021-07-11 RX ADMIN — BUTALBITAL, ACETAMINOPHEN, AND CAFFEINE 1 TABLET: 50; 325; 40 TABLET ORAL at 09:53

## 2021-07-11 RX ADMIN — ENOXAPARIN SODIUM 60 MG: 60 INJECTION SUBCUTANEOUS at 09:53

## 2021-07-11 ASSESSMENT — ENCOUNTER SYMPTOMS
FACIAL SWELLING: 0
RECTAL PAIN: 0
CONSTIPATION: 0
COLOR CHANGE: 0
EYE DISCHARGE: 0
CHOKING: 0
COUGH: 1
BLOOD IN STOOL: 0
EYE REDNESS: 0
ABDOMINAL PAIN: 0
NAUSEA: 0
RHINORRHEA: 0
SINUS PRESSURE: 0
PHOTOPHOBIA: 0
CHEST TIGHTNESS: 0
WHEEZING: 1
STRIDOR: 0
TROUBLE SWALLOWING: 0
APNEA: 0
SINUS PAIN: 0
EYE ITCHING: 0
SORE THROAT: 0
ABDOMINAL DISTENTION: 0
EYE PAIN: 0
ANAL BLEEDING: 0
DIARRHEA: 0
VOMITING: 0
VOICE CHANGE: 0
BACK PAIN: 0

## 2021-07-11 ASSESSMENT — PAIN SCALES - GENERAL
PAINLEVEL_OUTOF10: 0

## 2021-07-11 NOTE — PLAN OF CARE
Problem: Airway Clearance - Ineffective  Goal: Achieve or maintain patent airway  Outcome: Ongoing     Problem: Gas Exchange - Impaired  Goal: Absence of hypoxia  Outcome: Ongoing     Problem: Breathing Pattern - Ineffective  Goal: Ability to achieve and maintain a regular respiratory rate  Outcome: Ongoing     Problem: Body Temperature -  Risk of, Imbalanced  Goal: Ability to maintain a body temperature within defined limits  Outcome: Ongoing     Problem:  Body Temperature -  Risk of, Imbalanced  Goal: Will regain or maintain usual level of consciousness  Outcome: Ongoing     Problem: Risk for Fluid Volume Deficit  Goal: Maintain normal heart rhythm  Outcome: Ongoing     Problem: Risk for Fluid Volume Deficit  Goal: Maintain absence of muscle cramping  Outcome: Ongoing     Problem: Risk for Fluid Volume Deficit  Goal: Maintain normal serum potassium, sodium, calcium, phosphorus, and pH  Outcome: Ongoing

## 2021-07-11 NOTE — FLOWSHEET NOTE
Contacted family member of COVID-23 patient, Alvarez ( also COVID +) Explained availability of support from chaplains during patient's stay. Provided  phone number and hours in hospital. Explained Zoom call availability and how to initiate a Zoom.

## 2021-07-11 NOTE — PROGRESS NOTES
Hospitalist Progress Note  [facility] CCU 3A       Patient: José Miguel Ramon    Unit/Bed: 3A-03/003-A    YOB: 1985    MRN: [id number]    Acct: [de-identified]     Admitting Diagnosis:COVID-19 [U07.1]    Admit Date:  7/8/2021    Hospital Day: 3    Subjective:    Patient is feeling fine. She has no new complaints. Diuresed well with a -2 L fluid balance. Patient Seen, Chart, Labs, Radiology studies, and Consults reviewed. Objective:   BP (!) 144/98   Pulse 53   Temp 98 °F (36.7 °C) (Oral)   Resp (!) 36   Ht 5' 3\" (1.6 m)   Wt (!) 503 lb (228.2 kg)   SpO2 95%   BMI 89.10 kg/m²       Intake/Output Summary (Last 24 hours) at 7/11/2021 1840  Last data filed at 7/11/2021 1520  Gross per 24 hour   Intake 1783.3 ml   Output 3850 ml   Net -2066.7 ml       Review of Systems   Constitutional: Positive for activity change. Negative for appetite change, chills, diaphoresis, fatigue, fever and unexpected weight change. HENT: Negative for congestion, dental problem, drooling, ear discharge, ear pain, facial swelling, hearing loss, mouth sores, nosebleeds, postnasal drip, rhinorrhea, sinus pressure, sinus pain, sneezing, sore throat, tinnitus, trouble swallowing and voice change. Eyes: Negative for photophobia, pain, discharge, redness, itching and visual disturbance. Respiratory: Positive for cough and wheezing. Negative for apnea, choking, chest tightness, shortness of breath and stridor. Cardiovascular: Positive for leg swelling. Negative for chest pain and palpitations. Gastrointestinal: Negative for abdominal distention, abdominal pain, anal bleeding, blood in stool, constipation, diarrhea, nausea, rectal pain and vomiting. Endocrine: Negative for cold intolerance, heat intolerance, polydipsia, polyphagia and polyuria.    Genitourinary: Negative for decreased urine volume, difficulty urinating, dyspareunia, dysuria, enuresis, flank pain, frequency, genital sores, hematuria, menstrual problem, pelvic pain, urgency, vaginal bleeding, vaginal discharge and vaginal pain. Musculoskeletal: Negative for arthralgias, back pain, gait problem, joint swelling, myalgias, neck pain and neck stiffness. Skin: Negative for color change, pallor, rash and wound. Allergic/Immunologic: Negative for environmental allergies, food allergies and immunocompromised state. Neurological: Positive for weakness. Negative for dizziness, tremors, seizures, syncope, facial asymmetry, speech difficulty, light-headedness, numbness and headaches. Hematological: Negative for adenopathy. Does not bruise/bleed easily. Psychiatric/Behavioral: Negative for agitation, behavioral problems, confusion, decreased concentration, dysphoric mood, hallucinations, self-injury, sleep disturbance and suicidal ideas. The patient is not nervous/anxious and is not hyperactive. Physical Exam  Vitals and nursing note reviewed. Constitutional:       General: She is not in acute distress. Appearance: Normal appearance. She is obese. She is not ill-appearing, toxic-appearing or diaphoretic. HENT:      Head: Normocephalic and atraumatic. Right Ear: External ear normal.      Left Ear: External ear normal.      Nose: Nose normal. No congestion or rhinorrhea. Mouth/Throat:      Mouth: Mucous membranes are moist.      Pharynx: Oropharynx is clear. No oropharyngeal exudate or posterior oropharyngeal erythema. Eyes:      General: No scleral icterus. Right eye: No discharge. Left eye: No discharge. Extraocular Movements: Extraocular movements intact. Conjunctiva/sclera: Conjunctivae normal.      Pupils: Pupils are equal, round, and reactive to light. Neck:      Vascular: No carotid bruit. Cardiovascular:      Rate and Rhythm: Normal rate and regular rhythm. Pulses: Normal pulses. Heart sounds: Normal heart sounds. No murmur heard. No friction rub. No gallop.     Pulmonary:      Effort: Pulmonary effort is normal. No respiratory distress. Breath sounds: No stridor. Wheezing and rales present. No rhonchi. Chest:      Chest wall: No tenderness. Abdominal:      General: Bowel sounds are normal. There is no distension. Palpations: Abdomen is soft. There is no mass. Tenderness: There is no abdominal tenderness. There is no right CVA tenderness, left CVA tenderness, guarding or rebound. Hernia: No hernia is present. Musculoskeletal:         General: No swelling, tenderness, deformity or signs of injury. Normal range of motion. Cervical back: Normal range of motion and neck supple. No rigidity or tenderness. Right lower leg: Edema present. Left lower leg: Edema present. Lymphadenopathy:      Cervical: No cervical adenopathy. Skin:     General: Skin is warm and dry. Coloration: Skin is not jaundiced or pale. Findings: No bruising, erythema, lesion or rash. Neurological:      General: No focal deficit present. Mental Status: She is alert and oriented to person, place, and time. Mental status is at baseline. Cranial Nerves: No cranial nerve deficit. Sensory: No sensory deficit. Motor: No weakness. Coordination: Coordination normal.      Gait: Gait normal.      Deep Tendon Reflexes: Reflexes normal.   Psychiatric:         Mood and Affect: Mood normal.         Behavior: Behavior normal.         Thought Content:  Thought content normal.         Judgment: Judgment normal.     Medications:    escitalopram  20 mg Oral Daily    famotidine  20 mg Oral BID    oxybutynin  10 mg Oral Daily    trospium  20 mg Oral BID AC    dexamethasone  6 mg Oral Daily    cefTRIAXone (ROCEPHIN) IV  1,000 mg Intravenous Q24H    sodium chloride flush  10 mL Intravenous 2 times per day    azithromycin  250 mg Oral Daily    remdesivir IVPB  100 mg Intravenous Q24H    enoxaparin  60 mg Subcutaneous BID    aspirin  81 mg Oral Daily       Continuous Infusions:   sodium chloride         PRN Meds:sodium chloride, albuterol sulfate HFA, butalbital-acetaminophen-caffeine, hydrOXYzine, SUMAtriptan, sodium chloride flush, sodium chloride, ondansetron **OR** ondansetron, polyethylene glycol, acetaminophen **OR** acetaminophen, sodium chloride    Data:    CBC:   Recent Labs     07/11/21 0422   WBC 6.1   RBC 4.79   HGB 10.3*   HCT 36.0*   MCV 75.2*          BMP:   Recent Labs     07/09/21 0435 07/11/21 0422   NA  --  137   K  --  4.3   CL  --  99   CO2  --  28   PHOS 2.9  --    BUN  --  13   CREATININE  --  0.5       BNP: No results for input(s): BNP in the last 72 hours. PT/INR:No results for input(s): PROTIME, INR in the last 72 hours. APTT: No results for input(s): APTT in the last 72 hours. CARDIAC ENZYMES:   No results for input(s): CKMB, CKMBINDEX, TROPONINT in the last 72 hours. Invalid input(s): CKTOTAL;3    FASTING LIPID PANEL:  Lab Results   Component Value Date    CHOL 216 (H) 03/17/2020    HDL 49 03/17/2020    TRIG 125 03/17/2020       LIVER PROFILE:   Recent Labs     07/09/21  0435 07/10/21  0459 07/11/21  0422   AST 69* 45* 31   ALT 42 43 38   BILIDIR <0.2 <0.2 <0.2   BILITOT 0.2* 0.2* 0.3   ALKPHOS 45 42 43        ABGs:   Lab Results   Component Value Date    PH 7.42 07/08/2021    PCO2 37 07/08/2021    PO2 71 07/08/2021    HCO3 24 07/08/2021    O2SAT 95 07/08/2021     Results for Marielle Butts (MRN [id number]) as of 7/10/2021 12:22   Ref. Range 7/9/2021 04:35   Magnesium Latest Ref Range: 1.6 - 2.4 mg/dL 2.4   Phosphorus Latest Ref Range: 2.4 - 4.7 mg/dL 2.9   Total Protein Latest Ref Range: 6.1 - 8.0 g/dL 6.5       Results for Marielle Gain (MRN [id number]) as of 7/9/2021 15:02   Ref. Range 7/8/2021 03:41   Procalcitonin Latest Ref Range: 0.01 - 0.09 ng/mL 0.21 (H)     Results for Marielle Gain (MRN [id number]) as of 7/9/2021 20:23   Ref.  Range 3/17/2020 15:30 7/9/2021 04:35   TSH Latest Ref Range: 0.400 - 4.200 uIU/mL 3.110 0.449     Results for Rosi Stallworth (MRN [id number]) as of 7/9/2021 20:23   Ref. Range 7/19/2018 15:14 3/17/2020 15:30 7/8/2021 03:41   Hemoglobin A1C Latest Ref Range: 4.4 - 6.4 % 5.3 5.3 5.8     URINALYSIS. None. SEROLOGY  None. TUMOR MARKERS. None. MICROBIOLOGY   Results for Rosi Stallworth (MRN [id number]) as of 7/9/2021 15:02   Ref. Range 7/8/2021 11:00   MRSA SCREEN RT-PCR Unknown NEGATIVE     Results for Rosi Stallworth (MRN [id number]) as of 7/9/2021 15:02   Ref. Range 7/8/2021 05:00   Flu A Antigen Latest Ref Range: NEGATIVE  Negative   Flu B Antigen Latest Ref Range: NEGATIVE  Negative       Results for Rosi Stallworth (MRN [id number]) as of 7/9/2021 15:02   Ref. Range 7/8/2021 03:45   SARS-CoV-2, NAAT Latest Ref Range: NOT DETECTED  DETECTED (AA)     Results for Rosi Stallworth (MRN [id number]) as of 7/9/2021 15:02   Ref. Range 7/3/2021 00:00   SARS-CoV-2 Unknown detected (A)     HISTOPATHOLOGY. None. TOXICOLOGY. None. ENDOSCOPE STUDIES. None. PROCEDURES. None. RADIOLOGY. CTA Chest-7/8/2021. FINDINGS:  The study is limited by artifact related to patient body habitus and poor bolus timing. Aorta is nonaneurysmal with no dissection. No indication of central pulmonary embolism. Distal branches cannot be well evaluated. There is herniation of a portion of the right upper lobe through the anterior chest wall   with the hernia measuring approximately 7.7 x 3.8 cm. There are diffuse somewhat nodular airspace opacities seen throughout the   lungs consistent with pneumonia. No pleural effusion. Mildly enlarged AP window lymph nodes are likely reactive. No acute finding in the upper abdomen. No acute or aggressive osseous lesion.     IMPRESSION:  1. Limited exam.           No central pulmonary embolism suspected. 2.  Diffuse bilateral airspace opacities consistent with pneumonia.     3.  Herniation of a portion of the right upper lobe through the anterior chest wall.     This document has been electronically signed by: Danitza Araujo MD on   2021 06:44 AM    ASSESSMENT AND  PLAN. 1.NEUROVASCULAR. H/o migraine headaches on Fioricet and Imitrex. Needs evaluation for pseudotumor cerebri/benign intracranial hypertension. 2.PULMONARY. Acute hypoxic respiratory failure-O2 supplement, wean as tolerated. RUL  anterior chest wall herniation on CTA chest -seen by CTS. OP f/u. Suspected SANTIAGO and OHS. COVID-19 pneumonia w/ severe sepsis-remdesivir, p.o. Dexamethasone, prn nebs. On IV Rocephin and azithromycin for possible superimposed bacterial infection. 3.CARDIOLOGY. Borderline hypertension -we will monitor blood pressure. Suspected diastolic heart dysfunction w/ mild fluid overload-IV Bumex 1 mg x 1 dose. Echocardiogram on Monday. 4. ENDO.     TSH 0.449 and A1 C 5.8.      5.PSYCH. Anxiety and depression-continue Atarax and Lexapro. 6.NUTRITION. Morbid obesity w/ a BMI of 89.10-needs regular exercise, diet control for weight loss      management. T/c bariatric surgery. 7.DISPO. Planned d/c to home vs rehab soon.       Electronically signed Sherin Gray MD on 2021 at 6:40 PM    Rounding Hospitalist

## 2021-07-12 LAB
ALBUMIN SERPL-MCNC: 3.2 G/DL (ref 3.5–5.1)
ALP BLD-CCNC: 46 U/L (ref 38–126)
ALT SERPL-CCNC: 55 U/L (ref 11–66)
ANION GAP SERPL CALCULATED.3IONS-SCNC: 11 MEQ/L (ref 8–16)
AST SERPL-CCNC: 40 U/L (ref 5–40)
BILIRUB SERPL-MCNC: 0.4 MG/DL (ref 0.3–1.2)
BILIRUBIN DIRECT: < 0.2 MG/DL (ref 0–0.3)
BUN BLDV-MCNC: 15 MG/DL (ref 7–22)
CALCIUM SERPL-MCNC: 8.6 MG/DL (ref 8.5–10.5)
CHLORIDE BLD-SCNC: 98 MEQ/L (ref 98–111)
CO2: 31 MEQ/L (ref 23–33)
CREAT SERPL-MCNC: 0.4 MG/DL (ref 0.4–1.2)
GFR SERPL CREATININE-BSD FRML MDRD: > 90 ML/MIN/1.73M2
GLUCOSE BLD-MCNC: 121 MG/DL (ref 70–108)
LV EF: 55 %
LVEF MODALITY: NORMAL
POTASSIUM SERPL-SCNC: 4.5 MEQ/L (ref 3.5–5.2)
SODIUM BLD-SCNC: 140 MEQ/L (ref 135–145)
TOTAL PROTEIN: 6.8 G/DL (ref 6.1–8)
URINE CULTURE, ROUTINE: NORMAL

## 2021-07-12 PROCEDURE — 99232 SBSQ HOSP IP/OBS MODERATE 35: CPT | Performed by: PHYSICIAN ASSISTANT

## 2021-07-12 PROCEDURE — 93306 TTE W/DOPPLER COMPLETE: CPT

## 2021-07-12 PROCEDURE — 1200000003 HC TELEMETRY R&B

## 2021-07-12 PROCEDURE — 2500000003 HC RX 250 WO HCPCS: Performed by: HOSPITALIST

## 2021-07-12 PROCEDURE — 6360000002 HC RX W HCPCS: Performed by: HOSPITALIST

## 2021-07-12 PROCEDURE — 2580000003 HC RX 258: Performed by: HOSPITALIST

## 2021-07-12 PROCEDURE — 82248 BILIRUBIN DIRECT: CPT

## 2021-07-12 PROCEDURE — 6370000000 HC RX 637 (ALT 250 FOR IP): Performed by: INTERNAL MEDICINE

## 2021-07-12 PROCEDURE — 80053 COMPREHEN METABOLIC PANEL: CPT

## 2021-07-12 PROCEDURE — 6370000000 HC RX 637 (ALT 250 FOR IP): Performed by: HOSPITALIST

## 2021-07-12 PROCEDURE — 36415 COLL VENOUS BLD VENIPUNCTURE: CPT

## 2021-07-12 PROCEDURE — 97116 GAIT TRAINING THERAPY: CPT

## 2021-07-12 RX ADMIN — FAMOTIDINE 20 MG: 20 TABLET, FILM COATED ORAL at 20:01

## 2021-07-12 RX ADMIN — TROSPIUM CHLORIDE 20 MG: 20 TABLET, FILM COATED ORAL at 17:52

## 2021-07-12 RX ADMIN — SODIUM CHLORIDE, PRESERVATIVE FREE 10 ML: 5 INJECTION INTRAVENOUS at 20:01

## 2021-07-12 RX ADMIN — AZITHROMYCIN 250 MG: 250 TABLET, FILM COATED ORAL at 08:00

## 2021-07-12 RX ADMIN — ENOXAPARIN SODIUM 60 MG: 60 INJECTION SUBCUTANEOUS at 20:01

## 2021-07-12 RX ADMIN — SODIUM CHLORIDE, PRESERVATIVE FREE 10 ML: 5 INJECTION INTRAVENOUS at 09:00

## 2021-07-12 RX ADMIN — ASPIRIN 81 MG: 81 TABLET, CHEWABLE ORAL at 10:15

## 2021-07-12 RX ADMIN — OXYBUTYNIN CHLORIDE 10 MG: 10 TABLET, EXTENDED RELEASE ORAL at 10:14

## 2021-07-12 RX ADMIN — REMDESIVIR 100 MG: 100 INJECTION, POWDER, LYOPHILIZED, FOR SOLUTION INTRAVENOUS at 10:09

## 2021-07-12 RX ADMIN — ENOXAPARIN SODIUM 60 MG: 60 INJECTION SUBCUTANEOUS at 09:00

## 2021-07-12 RX ADMIN — DEXAMETHASONE 6 MG: 4 TABLET ORAL at 10:14

## 2021-07-12 RX ADMIN — DOCUSATE SODIUM 50 MG AND SENNOSIDES 8.6 MG 1 TABLET: 8.6; 5 TABLET, FILM COATED ORAL at 10:15

## 2021-07-12 RX ADMIN — CEFTRIAXONE SODIUM 1000 MG: 1 INJECTION, POWDER, FOR SOLUTION INTRAMUSCULAR; INTRAVENOUS at 10:16

## 2021-07-12 RX ADMIN — ESCITALOPRAM 20 MG: 20 TABLET, FILM COATED ORAL at 10:14

## 2021-07-12 RX ADMIN — FAMOTIDINE 20 MG: 20 TABLET, FILM COATED ORAL at 10:15

## 2021-07-12 ASSESSMENT — PAIN SCALES - GENERAL
PAINLEVEL_OUTOF10: 0

## 2021-07-12 ASSESSMENT — ENCOUNTER SYMPTOMS: SHORTNESS OF BREATH: 0

## 2021-07-12 NOTE — PROGRESS NOTES
A home oxygen evaluation has been completed. [x]Patient is an inpatient. It is expected that the patient will be discharged within the next 48 hours. Qualified provider to write order for home prescription if patient qualifies. Social service/care managers will arrange for home oxygen. If patient is active, arrange for Home Medical supplier to assess for Oxygen Conserving Device per pulse oximetry. []Patient is an outpatient. Results will be faxed to the ordering provider. Qualified provider to write order for home prescription if patient qualifies and arranges for home oxygen. Patient was placed on room air for 1 minutes. SpO2 was 86 % on room air at rest. Patients SpO2 was below 89% and qualified for home oxygen. Oxygen was applied at 1 lpm via nasal cannula to maintain a SpO2 between 90-92% while at rest. Actual SpO2 was 91 %. Patient can ambulate for exercise flow rate. on 1L SP02 was 84% while ambulated. Patients was ambulated, SpO2 was 92 on 6 lpm to maintain SpO2 between 90-92% while exercising. If oxygen need is greater than 4 lpm the SpO2 on 4 lpm was 89. Note: For any SpO2 at 52% see policy and procedure for possible qualifications.

## 2021-07-12 NOTE — PROGRESS NOTES
Hospitalist Progress Note      Patient:  Manish Mcintyre    Unit/Bed:3A-03/003-A  YOB: 1985  MRN: [id number]   Acct: [de-identified]   PCP: MALCOM Magaña CNP  Date of Admission: 7/8/2021    Assessment/Plan:    COVID 19: Patient tested positive for COVID-19 on 7/9/2021. Patient is afebrile at this time. Remdesvir completed and Decadron (Day 4/10) ordered. Continue Vitamin C, Vitamin D, Zinc.   Acute respiratory failure, hypoxia: Patient's baseline is room air. Patient is currently requiring 1 L of oxygen at rest and 6L NC with activity. Incentive spirometry. Acapella. PT/OT. Chest Wall Herniation: CTS consulted. No surgical intervention needed at this time. F/U as an OP. Anxiety & Depression: Continue home medications. Severe Obesity: BMI 89.1, this is likely complicating the respiratory failure. Chief Complaint: COVID-19    Initial H and P:-    Initial H&P \"Pt last well one week ago, + fever/chills, malaise, dry cough, diarrhea. Symptoms progressed over time with SOB started 1-2 days ago. Denies CP/abdo pain/N/V/palpitation/ symptoms/recent sick contact. Pt denies COVID vaccination or prior infection. \"     Subjective (past 24 hours):   No acute events overnight. Pt states that her SOB is becoming less and less prevalent. Pt has no issues or concerns at this time. Pt is eager to get home. Home O2 eval found that the patient needed 6L NC while walking. Past medical history, family history, social history and allergies reviewed again and is unchanged since admission. ROS Pt denies CP, HA, abd pain, diarrhea, myalgias. Pt admits to SOB.      Medications:  Reviewed    Infusion Medications    sodium chloride       Scheduled Medications    sennosides-docusate sodium  1 tablet Oral Daily    escitalopram  20 mg Oral Daily    famotidine  20 mg Oral BID    oxybutynin  10 mg Oral Daily    trospium  20 mg Oral BID AC    dexamethasone  6 mg Oral Daily    cefTRIAXone (ROCEPHIN) IV  1,000 mg Intravenous Q24H    sodium chloride flush  10 mL Intravenous 2 times per day    enoxaparin  60 mg Subcutaneous BID    aspirin  81 mg Oral Daily     PRN Meds: sodium chloride, albuterol sulfate HFA, butalbital-acetaminophen-caffeine, hydrOXYzine, SUMAtriptan, sodium chloride flush, sodium chloride, ondansetron **OR** ondansetron, polyethylene glycol, acetaminophen **OR** acetaminophen, sodium chloride      Intake/Output Summary (Last 24 hours) at 7/12/2021 1428  Last data filed at 7/11/2021 2356  Gross per 24 hour   Intake 1200 ml   Output 3450 ml   Net -2250 ml       Diet:  ADULT DIET; Regular    Exam:  /74   Pulse 57   Temp 98.2 °F (36.8 °C) (Oral)   Resp 26   Ht 5' 3\" (1.6 m)   Wt (!) 503 lb (228.2 kg)   SpO2 94%   BMI 89.10 kg/m²   General appearance: No apparent distress, appears stated age and cooperative. Morbid Obesity. HEENT: Pupils equal, round, and reactive to light. Conjunctivae/corneas clear. Neck: Supple, with full range of motion. No jugular venous distention. Trachea midline. Respiratory:  Normal respiratory effort on 2L NC. Breath sounds diminished. Cardiovascular: Regular rate and rhythm with normal S1/S2 without murmurs, rubs or gallops. Abdomen: Soft, non-tender, non-distended with normal bowel sounds. Musculoskeletal: passive and active ROM x 4 extremities. Skin: Skin color, texture, turgor normal.  No rashes or lesions. Neurologic:  Neurovascularly intact without any focal sensory/motor deficits.  Cranial nerves: II-XII intact, grossly non-focal.  Psychiatric: Alert and oriented, thought content appropriate, normal insight  Capillary Refill: Brisk,< 3 seconds   Peripheral Pulses: +2 palpable, equal bilaterally     Labs:   Recent Labs     07/11/21 0422   WBC 6.1   HGB 10.3*   HCT 36.0*        Recent Labs     07/11/21 0422 07/12/21  0417    140   K 4.3 4.5   CL 99 98   CO2 28 31   BUN 13

## 2021-07-12 NOTE — CARE COORDINATION
7/12/21, 3:48 PM EDT    DISCHARGE ON GOING EVALUATION    [address] day: 4  Location: 3A-03/003-A Reason for admit: COVID-19 [U07.1]   Procedure:   7/8 CTA Chest: No PE suspected; Diffuse bilateral airspace opacities consistent with pneumonia; Herniation of a portion of the right upper lobe through the anterior chest wall  7/8 CXR: Hazy bilateral airspace opacities which may indicate edema or pneumonia. Barriers to Discharge: Afebrile. O2 down to 1L/nc. Sats 88-94. Acapella. IS. CTS consulted for chest wall herniation. No surgical intervention. Home O2 eval done. PT/OT. Remdesivir (day #5). Rocephin iv daily. Decadron po daily. Lovenox bid. PCP: MALCOM Maddox CNP  Readmission Risk Score: 9%  Patient Goals/Plan/Treatment Preferences: Pt is from home with . Prefers  [facility] for home O2. Monitor for [place] needs.

## 2021-07-12 NOTE — PROGRESS NOTES
[address] [address]  INPATIENT PHYSICAL THERAPY  DAILY NOTE  [facility] CCU 3A - 3A-03/003-A    Time In: 4134  Time Out: 1455  Timed Code Treatment Minutes: 18 Minutes  Minutes: 18          Date: 2021  Patient Name: Geoff Hernandez,  Gender:  female        MRN: [id number]  : 1985  (28 y.o.)  Referral Date : 21  Referring Practitioner: Eryn Brice MD  Diagnosis: COVID-19  Additional Pertinent Hx: Per EMR:Anay Hong is a 28 y.o. female who presents with shortness of breath. Patient for the past week has developed shortness of breath, cough, sputum production, loss of taste and smell. Patient is concerned she may have contracted COVID. At bedside patient was hypoxic to 88% on RA. She notes that she is not on any oxygen at home normally. She also reports that she had a temperature of 102.7 on home thermometer. Patient endorses headache, N/V, and cough. Patient denies pleuritic pain, or changes with  bowel or bladder. Prior Level of Function:  Lives With: Spouse  Type of Home: House  Home Layout: Two level, Able to Live on Main level with bedroom/bathroom, Performs ADL's on one level  Home Access: Stairs to enter without rails  Entrance Stairs - Number of Steps: 3  Home Equipment: Rolling walker, Wheelchair-manual   Bathroom Shower/Tub: Tub/Shower unit    ADL Assistance: Independent  Ambulation Assistance: Independent  Transfer Assistance: Independent  Additional Comments:  works full time. Mother does not work, lives close by and can help patient    Restrictions/Precautions:  Restrictions/Precautions: Fall Risk, Isolation      SUBJECTIVE: RN approved session. Pt pleasant and agreeable to therapy. Pt does requires some cueing for deep breathing during ambulation to maintain O2 sats. PAIN: 0/10: denies pain     Vitals: monitored via ICU continuous monitor and remained WFL   *O2 dropped to 88% during ambulation    OBJECTIVE:  Bed Mobility:  Sit to Supine:  Moderate Assistance, X 1, with head of bed flat, with rail, with increased time for completion, use of step stool      Transfers:  Sit to Stand: Stand By Assistance  Stand to Sit:Stand By Assistance    Ambulation:  Stand By Assistance  Distance: 20', 8'   Surface: Level Tile  Device:No Device  Gait Deviations:  Decreased Step Length Bilaterally, Decreased Gait Speed and Wide Base of Support    Balance:  Static Sitting Balance:  Stand By Assistance  Static Standing Balance: Stand By Assistance    Functional Outcome Measures: Not completed       ASSESSMENT:  Assessment: Patient progressing toward established goals. Activity Tolerance:  Patient tolerance of  treatment: good. Pt presents with SOB during/after ambulation and needs ~2-3 min for recovery. Equipment Recommendations:Equipment Needed: No  Discharge Recommendations:  Continue to assess pending progress, Patient would benefit from continued therapy after discharge    Plan: Times per week: 5xGM  Times per day: Daily  Current Treatment Recommendations: Strengthening, Transfer Training, Endurance Training, Neuromuscular Re-education, Patient/Caregiver Education & Training, Balance Training, Gait Training, Home Exercise Program, Functional Mobility Training, Stair training, Safety Education & Training    Patient Education  Patient Education: [employer] Mobility, Transfers, Gait    Goals:  Patient goals : go home  Short term goals  Time Frame for Short term goals: at discharge  Short term goal 1: Patiient will complete supine < > sit with modified independence to transfer in/out of bed. Short term goal 2: Patient will complete sit < > stand with modified independence to stand to ambulate safely. Short term goal 3: Patient will ambulate 48' with no AD and SBA to navigate living environment safely. Short term goal 4: Patient will ascend/descend 3 steps with CGA to access home safely.   Long term goals  Time Frame for Long term goals : N/A due to short estimated length of stay    Following session, patient left in safe position with all fall risk precautions in place.     Romelia Gonzalez PT, DPT [id number]

## 2021-07-13 VITALS
HEIGHT: 63 IN | TEMPERATURE: 97.9 F | RESPIRATION RATE: 13 BRPM | HEART RATE: 46 BPM | DIASTOLIC BLOOD PRESSURE: 85 MMHG | BODY MASS INDEX: 51.91 KG/M2 | SYSTOLIC BLOOD PRESSURE: 176 MMHG | OXYGEN SATURATION: 93 % | WEIGHT: 293 LBS

## 2021-07-13 LAB
BLOOD CULTURE, ROUTINE: NORMAL
BLOOD CULTURE, ROUTINE: NORMAL

## 2021-07-13 PROCEDURE — 6360000002 HC RX W HCPCS: Performed by: HOSPITALIST

## 2021-07-13 PROCEDURE — 99239 HOSP IP/OBS DSCHRG MGMT >30: CPT | Performed by: PHYSICIAN ASSISTANT

## 2021-07-13 PROCEDURE — 2580000003 HC RX 258: Performed by: HOSPITALIST

## 2021-07-13 PROCEDURE — 6370000000 HC RX 637 (ALT 250 FOR IP): Performed by: HOSPITALIST

## 2021-07-13 RX ORDER — DEXAMETHASONE 6 MG/1
6 TABLET ORAL DAILY
Qty: 5 TABLET | Refills: 0 | Status: SHIPPED | OUTPATIENT
Start: 2021-07-14 | End: 2021-07-19

## 2021-07-13 RX ORDER — ASPIRIN 81 MG/1
81 TABLET, CHEWABLE ORAL DAILY
Qty: 30 TABLET | Refills: 3 | Status: SHIPPED | OUTPATIENT
Start: 2021-07-14

## 2021-07-13 RX ADMIN — OXYBUTYNIN CHLORIDE 10 MG: 10 TABLET, EXTENDED RELEASE ORAL at 08:46

## 2021-07-13 RX ADMIN — ESCITALOPRAM 20 MG: 20 TABLET, FILM COATED ORAL at 08:45

## 2021-07-13 RX ADMIN — SODIUM CHLORIDE, PRESERVATIVE FREE 10 ML: 5 INJECTION INTRAVENOUS at 08:45

## 2021-07-13 RX ADMIN — TROSPIUM CHLORIDE 20 MG: 20 TABLET, FILM COATED ORAL at 08:45

## 2021-07-13 RX ADMIN — ASPIRIN 81 MG: 81 TABLET, CHEWABLE ORAL at 08:45

## 2021-07-13 RX ADMIN — ENOXAPARIN SODIUM 60 MG: 60 INJECTION SUBCUTANEOUS at 08:45

## 2021-07-13 RX ADMIN — FAMOTIDINE 20 MG: 20 TABLET, FILM COATED ORAL at 08:45

## 2021-07-13 RX ADMIN — CEFTRIAXONE SODIUM 1000 MG: 1 INJECTION, POWDER, FOR SOLUTION INTRAMUSCULAR; INTRAVENOUS at 08:47

## 2021-07-13 RX ADMIN — DEXAMETHASONE 6 MG: 4 TABLET ORAL at 08:45

## 2021-07-13 ASSESSMENT — PAIN SCALES - WONG BAKER
WONGBAKER_NUMERICALRESPONSE: 0

## 2021-07-13 ASSESSMENT — PAIN SCALES - GENERAL
PAINLEVEL_OUTOF10: 0

## 2021-07-13 NOTE — PLAN OF CARE
Problem: Airway Clearance - Ineffective  Goal: Achieve or maintain patent airway  Outcome: Completed     Problem: Gas Exchange - Impaired  Goal: Absence of hypoxia  Outcome: Completed  Goal: Promote optimal lung function  Outcome: Completed     Problem: Breathing Pattern - Ineffective  Goal: Ability to achieve and maintain a regular respiratory rate  Outcome: Completed     Problem:  Body Temperature -  Risk of, Imbalanced  Goal: Ability to maintain a body temperature within defined limits  Outcome: Completed  Goal: Will regain or maintain usual level of consciousness  Outcome: Completed  Goal: Complications related to the disease process, condition or treatment will be avoided or minimized  Outcome: Completed     Problem: Isolation Precautions - Risk of Spread of Infection  Goal: Prevent transmission of infection  Outcome: Completed     Problem: Nutrition Deficits  Goal: Optimize nutritional status  Outcome: Completed     Problem: Risk for Fluid Volume Deficit  Goal: Maintain normal heart rhythm  Outcome: Completed  Goal: Maintain absence of muscle cramping  Outcome: Completed  Goal: Maintain normal serum potassium, sodium, calcium, phosphorus, and pH  Outcome: Completed     Problem: Loneliness or Risk for Loneliness  Goal: Demonstrate positive use of time alone when socialization is not possible  Outcome: Completed     Problem: Fatigue  Goal: Verbalize increase energy and improved vitality  Outcome: Completed     Problem: Patient Education: Go to Patient Education Activity  Goal: Patient/Family Education  Outcome: Completed     Problem: Pain:  Goal: Pain level will decrease  Description: Pain level will decrease  Outcome: Completed  Goal: Control of acute pain  Description: Control of acute pain  Outcome: Completed  Goal: Control of chronic pain  Description: Control of chronic pain  Outcome: Completed

## 2021-07-13 NOTE — CARE COORDINATION
7/13/21, 1:56 PM EDT    Spoke with Anay; states she plans to return home with her . Denies needs, declines HH. Patient goals/plan/ treatment preferences discussed by  and . Patient goals/plan/ treatment preferences reviewed with patient/ family. Patient/ family verbalize understanding of discharge plan and are in agreement with goal/plan/treatment preferences. Understanding was demonstrated using the teach back method. AVS provided by RN at time of discharge, which includes all necessary medical information pertaining to the patients current course of illness, treatment, post-discharge goals of care, and treatment preferences. IMM Letter  IMM Letter given to Patient/Family/Significant other/Guardian/POA/by[de-identified]   IMM Letter date given[de-identified] 07/13/21  IMM Letter time given[de-identified] [phone] Called [facility] and notified of order for home O2. Patient discharging home today. +COVID. [address] [place] [address] Department and notified of COVID + patient discharging home today.

## 2021-07-13 NOTE — PLAN OF CARE
[address] [employer] THERAPY MISSED TREATMENT NOTE  [facility] CCU 3A  3A-03/003-A      Date: 2021  Patient Name: Oni Rojo        CSN: [id number]   : 1985  (28 y.o.)  Gender: female   Referring Practitioner: Dr. Gerhardt Lovings, MD  Diagnosis: COVID-19         REASON FOR MISSED TREATMENT: Pt is planning on being discharged this afternoon with help from spouse or other family member. Will defer evaluation at this time.

## 2021-07-13 NOTE — PROGRESS NOTES
A home oxygen evaluation has been completed. [x]Patient is an inpatient. It is expected that the patient will be discharged within the next 48 hours. Qualified provider to write order for home prescription if patient qualifies. Social service/care managers will arrange for home oxygen. If patient is active, arrange for Home Medical supplier to assess for Oxygen Conserving Device per pulse oximetry. []Patient is an outpatient. Results will be faxed to the ordering provider. Qualified provider to write order for home prescription if patient qualifies and arranges for home oxygen. Patient was placed on room air for 1 minutes. SpO2 was 86 % on room air at rest. Patients SpO2 was below 89% and qualified for home oxygen. Oxygen was applied at 2 lpm via nasal cannula to maintain a SpO2 between 90-92% while at rest. Actual SpO2 was 91 %. Patient can ambulate for exercise flow rate. Patients was ambulated, SpO2 was 92% on 4 lpm to maintain SpO2 between 90-92% while exercising. Note: For any SpO2 at 78% see policy and procedure for possible qualifications.

## 2021-07-13 NOTE — PROGRESS NOTES
Patient was evaluated today for the diagnosis of COVID-19. I entered a DME order for home oxygen because the diagnosis and testing requires the patient to have supplemental oxygen. Condition will improve or be benefited by oxygen use. The patient is  able to perform good mobility in a home setting and therefore does require the use of a portable oxygen system. The need for this equipment was discussed with the patient and she understands and is in agreement.     Electronically signed by KODAK Chicas on 7/13/2021 at 1:31 PM

## 2021-07-13 NOTE — DISCHARGE SUMMARY
Hospitalist Discharge Summary        Patient: Prabha Easton  YOB: 1985  MRN: [id number]   Acct: [de-identified]    Primary Care Physician: MALCOM Hart CNP    Admit date  7/8/2021    Discharge date:  7/13/2021 1:48 PM    Chief Complaint on presentation :-  COVID-19    Discharge Assessment and Plan:-   COVID 19: Patient tested positive for COVID-19. Patient was afebrile on DC. Patient recieved COVID-19 therapies: Remdesvir and Decadron (Day 5/10). Continue Vitamin C, Vitamin D, Zinc.  Patient was prescribed the rest of Decadron treatment of a total of 10 days. Acute respiratory failure, hypoxia: Patient's baseline is room air. The day of discharge, patient still required some oxygen support. Patient received a Home O2 eval, which showed that the patient required 2 L at rest and 4 L while exercising. Chest Wall Herniation: CTS consulted. No surgical intervention needed at this time. F/U as an OP. Anxiety & Depression: Continue home medications. Severe Obesity: BMI 89.1, this is likely complicating the respiratory failure.      Initial H and P and Hospital course:-  Initial H&P \"Pt last well one week ago, + fever/chills, malaise, dry cough, diarrhea. Symptoms progressed over time with SOB started 1-2 days ago. Denies CP/abdo pain/N/V/palpitation/ symptoms/recent sick contact. Pt denies COVID vaccination or prior infection. \"      Patient was admitted on 7/8/2021. Patient received COVID therapies (Remdesivir, Decadron). Patient continued to improve and oxygen was weaned. However, Patient still required oxygen the day of discharge. Home O2 eval was completed; 2L at rest & 4L at activity. Patient received oxygen before discharge. Patient understood that she needed to call the health department to make sure that she was in compliance with quarantine regulations.  On the day of discharge, it was explained to the patient that it was very important to follow up with his PCP to have continued care. Appointments were made and information was given. Physical Exam:-  General appearance: No apparent distress, appears stated age and cooperative. Morbid Obesity. HEENT: Pupils equal, round, and reactive to light. Conjunctivae/corneas clear. Neck: Supple, with full range of motion. No jugular venous distention. Trachea midline. Respiratory:  Normal respiratory effort on 2L NC. Breath sounds diminished. Cardiovascular: Regular rate and rhythm with normal S1/S2 without murmurs, rubs or gallops. Abdomen: Soft, non-tender, non-distended with normal bowel sounds. Musculoskeletal: passive and active ROM x 4 extremities. Skin: Skin color, texture, turgor normal.  No rashes or lesions. Neurologic:  Neurovascularly intact without any focal sensory/motor deficits.  Cranial nerves: II-XII intact, grossly non-focal.  Psychiatric: Alert and oriented, thought content appropriate, normal insight  Capillary Refill: Brisk,< 3 seconds   Peripheral Pulses: +2 palpable, equal bilaterally     Vitals:   Patient Vitals for the past 24 hrs:   BP Temp Temp src Pulse Resp SpO2   07/13/21 1117 (!) 176/85 97.9 °F (36.6 °C) Oral (!) 46 13 93 %   07/13/21 0800 (!) 145/66 97.8 °F (36.6 °C) Oral 52 14 95 %   07/13/21 0434 -- 98.2 °F (36.8 °C) Oral (!) 48 20 94 %   07/13/21 0008 123/86 98 °F (36.7 °C) Oral (!) 48 23 92 %   07/12/21 1955 138/81 98.1 °F (36.7 °C) Oral 52 22 92 %   07/12/21 1615 (!) 152/112 -- -- 52 -- 92 %     Weight:   Weight: (!) 503 lb (228.2 kg)   24 hour intake/output:     Intake/Output Summary (Last 24 hours) at 7/13/2021 1348  Last data filed at 7/13/2021 1336  Gross per 24 hour   Intake 480 ml   Output 2050 ml   Net -1570 ml       Discharge Medications:-      Medication List      START taking these medications    aspirin 81 MG chewable tablet  Take 1 tablet by mouth daily  Start taking on: July 14, 2021     dexamethasone 6 MG tablet  Commonly known as: DECADRON  Take 1 tablet by mouth daily for 5 days  Start taking on: July 14, 2021     sodium chloride 0.65 % nasal spray  Commonly known as: OCEAN, BABY AYR  1 spray by Nasal route every 4 hours as needed for Congestion        CONTINUE taking these medications    acetaminophen 325 MG tablet  Commonly known as: TYLENOL     albuterol sulfate  (90 Base) MCG/ACT inhaler  Inhale 2 puffs into the lungs every 4 hours as needed for Wheezing or Shortness of Breath (cough)     ALEVE PO     butalbital-acetaminophen-caffeine -40 MG per tablet  Commonly known as: FIORICET, ESGIC  Take 1 tablet by mouth every 6 hours as needed for Migraine     escitalopram 20 MG tablet  Commonly known as: LEXAPRO  Take 1 tablet by mouth daily     famotidine 20 MG tablet  Commonly known as: PEPCID  Take 1 tablet by mouth 2 times daily     hydrOXYzine 25 MG tablet  Commonly known as: ATARAX  Take 1 tablet by mouth every 8 hours as needed for Anxiety     meclizine 25 MG tablet  Commonly known as: ANTIVERT  Take 1 tablet by mouth 3 times daily as needed for Dizziness     oxybutynin 10 MG extended release tablet  Commonly known as: Ditropan XL  Take 1 tablet by mouth daily     rizatriptan 10 MG tablet  Commonly known as: Maxalt  Take 1 tablet by mouth once as needed for Migraine May repeat in 2 hours if needed     solifenacin 10 MG tablet  Commonly known as: VESICARE  Take 1 tablet by mouth daily     tolterodine 4 MG extended release capsule  Commonly known as: Detrol LA  Take 1 capsule by mouth daily           Where to Get Your Medications      These medications were sent to [facility] [address], [address], [address] [place] [address] [address] [address]    Phone: [phone]   · aspirin 81 MG chewable tablet  · dexamethasone 6 MG tablet  · sodium chloride 0.65 % nasal spray          Labs :-  Recent Results (from the past 72 hour(s))   Culture, Urine    Collection Time: 07/10/21  8:50 PM    Specimen: Urine, catheter   Result Value Ref Range    Urine Culture, Routine No growth-preliminary No growth     Hepatic Function Panel    Collection Time: 07/11/21  4:22 AM   Result Value Ref Range    Albumin 3.1 (L) 3.5 - 5.1 g/dL    Total Bilirubin 0.3 0.3 - 1.2 mg/dL    Bilirubin, Direct <0.2 0.0 - 0.3 mg/dL    Alkaline Phosphatase 43 38 - 126 U/L    AST 31 5 - 40 U/L    ALT 38 11 - 66 U/L    Total Protein 6.6 6.1 - 8.0 g/dL   Basic Metabolic Panel    Collection Time: 07/11/21  4:22 AM   Result Value Ref Range    Sodium 137 135 - 145 meq/L    Potassium 4.3 3.5 - 5.2 meq/L    Chloride 99 98 - 111 meq/L    CO2 28 23 - 33 meq/L    Glucose 118 (H) 70 - 108 mg/dL    BUN 13 7 - 22 mg/dL    CREATININE 0.5 0.4 - 1.2 mg/dL    Calcium 8.4 (L) 8.5 - 10.5 mg/dL   CBC    Collection Time: 07/11/21  4:22 AM   Result Value Ref Range    WBC 6.1 4.8 - 10.8 thou/mm3    RBC 4.79 4.20 - 5.40 mill/mm3    Hemoglobin 10.3 (L) 12.0 - 16.0 gm/dl    Hematocrit 36.0 (L) 37.0 - 47.0 %    MCV 75.2 (L) 81.0 - 99.0 fL    MCH 21.5 (L) 26.0 - 33.0 pg    MCHC 28.6 (L) 32.2 - 35.5 gm/dl    RDW-CV 18.5 (H) 11.5 - 14.5 %    RDW-SD 49.8 (H) 35.0 - 45.0 fL    Platelets 624 341 - 455 thou/mm3    MPV 11.0 9.4 - 12.4 fL   Anion Gap    Collection Time: 07/11/21  4:22 AM   Result Value Ref Range    Anion Gap 10.0 8.0 - 16.0 meq/L   Glomerular Filtration Rate, Estimated    Collection Time: 07/11/21  4:22 AM   Result Value Ref Range    Est, Glom Filt Rate >90 ml/min/1.73m2   Hepatic Function Panel    Collection Time: 07/12/21  4:17 AM   Result Value Ref Range    Albumin 3.2 (L) 3.5 - 5.1 g/dL    Total Bilirubin 0.4 0.3 - 1.2 mg/dL    Bilirubin, Direct <0.2 0.0 - 0.3 mg/dL    Alkaline Phosphatase 46 38 - 126 U/L    AST 40 5 - 40 U/L    ALT 55 11 - 66 U/L    Total Protein 6.8 6.1 - 8.0 g/dL   Basic Metabolic Panel    Collection Time: 07/12/21  4:17 AM   Result Value Ref Range    Sodium 140 135 - 145 meq/L    Potassium 4.5 3.5 - 5.2 meq/L    Chloride 98 98 - 111 meq/L    CO2 31 23 - 33 meq/L    Glucose 121 (H) 70 - 108 mg/dL    BUN 15 7 - 22 mg/dL    CREATININE 0.4 0.4 - 1.2 mg/dL    Calcium 8.6 8.5 - 10.5 mg/dL   Anion Gap    Collection Time: 07/12/21  4:17 AM   Result Value Ref Range    Anion Gap 11.0 8.0 - 16.0 meq/L   Glomerular Filtration Rate, Estimated    Collection Time: 07/12/21  4:17 AM   Result Value Ref Range    Est, Glom Filt Rate >90 ml/min/1.73m2        Microbiology:    Blood culture #1:   Lab Results   Component Value Date    BC No growth-preliminary  07/08/2021     Urine culture:   Lab Results   Component Value Date    LABURIN No growth-preliminary No growth  07/10/2021       Radiology:-  CTA CHEST W WO CONTRAST    Result Date: 7/8/2021  CT angiography chest with contrast. 3D Postprocessing. Comparison:  CT,KOSR  - CT CHEST WO CONTRAST  - 11/16/2017 01:32 PM EST Findings: The study is limited by artifact related to patient body habitus and poor bolus timing. Aorta is nonaneurysmal with no dissection. No indication of central pulmonary embolism. Distal branches cannot be well evaluated. There is herniation of a portion of the right upper lobe through the anterior chest wall with the hernia measuring approximately 7.7 x 3.8 cm. There are diffuse somewhat nodular airspace opacities seen throughout the lungs consistent with pneumonia. No pleural effusion. Mildly enlarged AP window lymph nodes are likely reactive. No acute finding in the upper abdomen. No acute or aggressive osseous lesion. 1.  Limited exam.  No central pulmonary embolism suspected. 2.  Diffuse bilateral airspace opacities consistent with pneumonia. 3.  Herniation of a portion of the right upper lobe through the anterior chest wall.  This document has been electronically signed by: Alexis Feng MD on 07/08/2021 06:44 AM All CTs at this facility use dose modulation techniques and iterative reconstructions, and/or weight-based dosing when appropriate to reduce radiation to a low as reasonably achievable. XR CHEST PORTABLE    Result Date: 7/8/2021  1 view chest x-ray Comparison:  CR,SR  - XR CHEST STANDARD (2 VW)  - 07/19/2018 04:25 PM EDT Findings: Heart size is normal. Hazy bilateral opacities are present greatest in the perihilar regions. No pleural effusion or pneumothorax. 1.  Hazy bilateral airspace opacities which may indicate edema or pneumonia.  This document has been electronically signed by: Nolan Hughes MD on 07/08/2021 05:15 AM       Follow-up scheduled after discharge :-    in the next few days with MALCOM Barrera - CNP    Consultations during this hospital stay:-  [x] NONE [] Cardiology  [] Nephrology  [] Hemo onco   [] GI   [] ID  [] Endocrine  [] Pulm    [] Neuro    [] Psych   [] Urology  [] ENT   [] G SURGERY   []Ortho    []CV surg    [] Palliative  [] Hospice [] Pain management   []    []TCU   [] PT/OT  OTHERS:-    Disposition: home  Condition at Discharge: Stable    Time Spent:- 40 minutes    Electronically signed by KODAK Gaines on 7/13/21 at 1:48 PM EDT   Discharging Hospitalist

## 2021-07-14 ENCOUNTER — TELEPHONE (OUTPATIENT)
Dept: FAMILY MEDICINE CLINIC | Age: 36
End: 2021-07-14

## 2021-07-14 ENCOUNTER — CARE COORDINATION (OUTPATIENT)
Dept: CASE MANAGEMENT | Age: 36
End: 2021-07-14

## 2021-07-14 NOTE — CARE COORDINATION
Covid-19 Initial Follow Up Call    Date/Time:  7/14/2021 9:54 AM  Attempted to reach patient by telephone. Call within 2 business days of discharge: Yes Left HIPPA compliant message requesting a return call. Will attempt to reach patient again.

## 2021-07-14 NOTE — TELEPHONE ENCOUNTER
[facility] Transitions Initial Follow Up Call    Outreach made within 2 business days of discharge: Yes    Patient: Amadou Leal Patient : 1985   MRN: [id number]  Reason for Admission: There are no discharge diagnoses documented for the most recent discharge. Discharge Date: 21       Spoke with: attempted to contact pt, left vm. Discharge department/facility: [facility]    TCM Interactive Patient Contact:      Scheduled appointment with PCP within 7-14 days    Follow Up  No future appointments.     Brandy Bal, [address] [place], [place] [address] ([facility])

## 2021-07-15 ENCOUNTER — CARE COORDINATION (OUTPATIENT)
Dept: CASE MANAGEMENT | Age: 36
End: 2021-07-15

## 2021-07-15 NOTE — CARE COORDINATION
Covid-19 Initial Follow Up Call    Date/Time:  7/15/2021 9:26 AM  Attempted to reach patient by telephone. Call within 2 business days of discharge: Yes Left HIPPA compliant message requesting a return call. If no return call, CTN will sign off-2nd attempt.

## 2021-07-15 NOTE — TELEPHONE ENCOUNTER
[facility] Transitions Initial Follow Up Call    Outreach made within 2 business days of discharge: Yes    Patient: Philippe Zimmerman Patient : 1985   MRN: [id number]  Reason for Admission: There are no discharge diagnoses documented for the most recent discharge. Discharge Date: 21       Spoke with: attempted to contact pt, left vm. Discharge department/facility: [facility]    TCM Interactive Patient Contact:      Scheduled appointment with PCP within 7-14 days    Follow Up  No future appointments.     Brandy Yoder, [address] [place], [place] [address] ([facility])

## 2021-07-22 RX ORDER — BENZONATATE 100 MG/1
100-200 CAPSULE ORAL 3 TIMES DAILY PRN
Qty: 60 CAPSULE | Refills: 0 | Status: SHIPPED | OUTPATIENT
Start: 2021-07-22 | End: 2021-07-29

## 2021-07-26 NOTE — PROGRESS NOTES
CLINICAL PHARMACY NOTE: MEDS TO BEDS    Total # of Prescriptions Filled: 3   The following medications were delivered to the patient:  Dexamethasone 6mg  Saline Mist  ASA 81mg    Additional Documentation:

## 2021-07-29 ENCOUNTER — PATIENT MESSAGE (OUTPATIENT)
Dept: FAMILY MEDICINE CLINIC | Age: 36
End: 2021-07-29

## 2021-07-29 DIAGNOSIS — R22.43 LOCALIZED SWELLING OF BOTH LOWER LEGS: Primary | ICD-10-CM

## 2021-07-29 RX ORDER — POTASSIUM CHLORIDE 750 MG/1
10 TABLET, EXTENDED RELEASE ORAL DAILY PRN
Qty: 30 TABLET | Refills: 1 | Status: SHIPPED | OUTPATIENT
Start: 2021-07-29 | End: 2022-08-11

## 2021-07-29 RX ORDER — FUROSEMIDE 40 MG/1
40 TABLET ORAL DAILY PRN
Qty: 30 TABLET | Refills: 1 | Status: SHIPPED | OUTPATIENT
Start: 2021-07-29 | End: 2022-08-11

## 2021-07-29 NOTE — TELEPHONE ENCOUNTER
From: Naomi Glass  To: Keily Weir APRN - CNP  Sent: 7/29/2021 8:38 AM EDT  Subject: Prescription Question    Good morning. I had a question I wanted to ask. When I was in the hospital, I had swelling in my feet, ankles and lower legs. They gave me Lasix twice and all the swelling had went down. My first week home, my feet were still good and not swelled. I noticed they have swelled back up this week and a little bit more than when I was in the hospital. I wasn't sure if I should be taking a water pill or something to help reduce the swelling on a regular basis. I did try ibuprofen and that didn't help any.

## 2021-08-06 ENCOUNTER — PATIENT MESSAGE (OUTPATIENT)
Dept: FAMILY MEDICINE CLINIC | Age: 36
End: 2021-08-06

## 2021-08-06 DIAGNOSIS — R05.9 COUGH: Primary | ICD-10-CM

## 2021-08-06 RX ORDER — DEXTROMETHORPHAN HYDROBROMIDE AND PROMETHAZINE HYDROCHLORIDE 15; 6.25 MG/5ML; MG/5ML
5 SYRUP ORAL 4 TIMES DAILY PRN
Qty: 240 ML | Refills: 1 | Status: SHIPPED | OUTPATIENT
Start: 2021-08-06 | End: 2021-08-13

## 2021-08-06 NOTE — TELEPHONE ENCOUNTER
From: Essence Bourgeois  To: Laure Jang APRN - CNP  Sent: 8/6/2021 8:14 AM EDT  Subject: Prescription Question    Good morning. I was wondering if there is something different you can prescribe me for this cough. The benzonatate has not helped at all. My cough has not gotten worse, it just won't stop. I've slept 3 hours in past 2 days because it's keeping me up.  Thank you

## 2021-08-20 ENCOUNTER — PATIENT MESSAGE (OUTPATIENT)
Dept: FAMILY MEDICINE CLINIC | Age: 36
End: 2021-08-20

## 2021-08-20 DIAGNOSIS — R05.9 COUGH: Primary | ICD-10-CM

## 2021-08-20 DIAGNOSIS — U07.1 COVID-19: ICD-10-CM

## 2021-08-20 RX ORDER — HYDROCODONE POLISTIREX AND CHLORPHENIRAMINE POLISTIREX 10; 8 MG/5ML; MG/5ML
5 SUSPENSION, EXTENDED RELEASE ORAL EVERY 12 HOURS PRN
Qty: 120 ML | Refills: 0 | Status: SHIPPED | OUTPATIENT
Start: 2021-08-20 | End: 2021-08-27

## 2021-08-20 NOTE — TELEPHONE ENCOUNTER
From: Liz Ignacio  To: MALCOM José CNP  Sent: 8/20/2021 8:40 AM EDT  Subject: Prescription Question    Good morning. I hate to bother you again about this. Is there something else I can try for my cough? The cough syrup isn't helping much during the day. If I take it right before bed, I'm able to get a couple hours of sleep before my cough wakes me up again.

## 2021-09-02 ENCOUNTER — PATIENT MESSAGE (OUTPATIENT)
Dept: FAMILY MEDICINE CLINIC | Age: 36
End: 2021-09-02

## 2021-09-02 DIAGNOSIS — B37.2 INTERTRIGINOUS CANDIDIASIS: Primary | ICD-10-CM

## 2021-09-03 RX ORDER — NYSTATIN 100000 [USP'U]/G
POWDER TOPICAL
Qty: 60 G | Refills: 0 | Status: SHIPPED | OUTPATIENT
Start: 2021-09-03 | End: 2022-08-11

## 2021-10-06 ENCOUNTER — TELEPHONE (OUTPATIENT)
Dept: FAMILY MEDICINE CLINIC | Age: 36
End: 2021-10-06

## 2021-10-06 DIAGNOSIS — U07.1 COVID-19: Primary | ICD-10-CM

## 2021-10-06 NOTE — TELEPHONE ENCOUNTER
Patient is no longer needing her oxygen has been off of it for a couple of weeks. Asking to fax a DC order to [address]. Please advise.

## 2022-02-08 DIAGNOSIS — F33.1 MODERATE EPISODE OF RECURRENT MAJOR DEPRESSIVE DISORDER (HCC): ICD-10-CM

## 2022-02-08 DIAGNOSIS — G43.109 MIGRAINE WITH AURA AND WITHOUT STATUS MIGRAINOSUS, NOT INTRACTABLE: ICD-10-CM

## 2022-02-08 RX ORDER — HYDROXYZINE HYDROCHLORIDE 25 MG/1
25 TABLET, FILM COATED ORAL EVERY 8 HOURS PRN
Qty: 60 TABLET | Refills: 1 | Status: SHIPPED | OUTPATIENT
Start: 2022-02-08 | End: 2022-08-11 | Stop reason: SDUPTHER

## 2022-02-08 RX ORDER — BUTALBITAL, ACETAMINOPHEN AND CAFFEINE 50; 325; 40 MG/1; MG/1; MG/1
1 TABLET ORAL EVERY 6 HOURS PRN
Qty: 15 TABLET | Refills: 0 | Status: SHIPPED | OUTPATIENT
Start: 2022-02-08 | End: 2022-08-11

## 2022-02-08 RX ORDER — ESCITALOPRAM OXALATE 20 MG/1
20 TABLET ORAL DAILY
Qty: 30 TABLET | Refills: 11 | Status: SHIPPED | OUTPATIENT
Start: 2022-02-08 | End: 2022-08-11

## 2022-08-11 ENCOUNTER — OFFICE VISIT (OUTPATIENT)
Dept: FAMILY MEDICINE CLINIC | Age: 37
End: 2022-08-11
Payer: MEDICARE

## 2022-08-11 VITALS
HEART RATE: 80 BPM | SYSTOLIC BLOOD PRESSURE: 136 MMHG | OXYGEN SATURATION: 95 % | WEIGHT: 293 LBS | BODY MASS INDEX: 51.91 KG/M2 | DIASTOLIC BLOOD PRESSURE: 82 MMHG | RESPIRATION RATE: 24 BRPM | HEIGHT: 63 IN

## 2022-08-11 DIAGNOSIS — R06.09 POST-COVID CHRONIC DYSPNEA: ICD-10-CM

## 2022-08-11 DIAGNOSIS — M25.572 CHRONIC PAIN OF LEFT ANKLE: ICD-10-CM

## 2022-08-11 DIAGNOSIS — E66.2 OBESITY HYPOVENTILATION SYNDROME (HCC): Primary | ICD-10-CM

## 2022-08-11 DIAGNOSIS — U09.9 POST-COVID CHRONIC DYSPNEA: ICD-10-CM

## 2022-08-11 DIAGNOSIS — F32.2 SEVERE DEPRESSION (HCC): ICD-10-CM

## 2022-08-11 DIAGNOSIS — G89.29 CHRONIC PAIN OF LEFT ANKLE: ICD-10-CM

## 2022-08-11 DIAGNOSIS — E55.9 VITAMIN D DEFICIENCY: ICD-10-CM

## 2022-08-11 DIAGNOSIS — Z87.81 HISTORY OF FRACTURE OF LEFT ANKLE: ICD-10-CM

## 2022-08-11 DIAGNOSIS — R06.02 SOB (SHORTNESS OF BREATH): ICD-10-CM

## 2022-08-11 DIAGNOSIS — R40.0 DAYTIME SOMNOLENCE: ICD-10-CM

## 2022-08-11 DIAGNOSIS — E66.01 MORBID OBESITY WITH BMI OF 70 AND OVER, ADULT (HCC): ICD-10-CM

## 2022-08-11 PROCEDURE — 1036F TOBACCO NON-USER: CPT | Performed by: NURSE PRACTITIONER

## 2022-08-11 PROCEDURE — G8427 DOCREV CUR MEDS BY ELIG CLIN: HCPCS | Performed by: NURSE PRACTITIONER

## 2022-08-11 PROCEDURE — 99215 OFFICE O/P EST HI 40 MIN: CPT | Performed by: NURSE PRACTITIONER

## 2022-08-11 PROCEDURE — G8417 CALC BMI ABV UP PARAM F/U: HCPCS | Performed by: NURSE PRACTITIONER

## 2022-08-11 RX ORDER — DULOXETIN HYDROCHLORIDE 30 MG/1
30 CAPSULE, DELAYED RELEASE ORAL DAILY
Qty: 30 CAPSULE | Refills: 1 | Status: SHIPPED | OUTPATIENT
Start: 2022-08-11 | End: 2022-10-03 | Stop reason: SDUPTHER

## 2022-08-11 RX ORDER — HYDROXYZINE HYDROCHLORIDE 25 MG/1
25 TABLET, FILM COATED ORAL EVERY 8 HOURS PRN
Qty: 60 TABLET | Refills: 1 | Status: SHIPPED | OUTPATIENT
Start: 2022-08-11

## 2022-08-11 RX ORDER — BUDESONIDE AND FORMOTEROL FUMARATE DIHYDRATE 80; 4.5 UG/1; UG/1
2 AEROSOL RESPIRATORY (INHALATION) 2 TIMES DAILY
Qty: 10.2 G | Refills: 0 | Status: SHIPPED | OUTPATIENT
Start: 2022-08-11 | End: 2022-09-28

## 2022-08-11 RX ORDER — ALBUTEROL SULFATE 90 UG/1
2 AEROSOL, METERED RESPIRATORY (INHALATION) EVERY 4 HOURS PRN
Qty: 1 EACH | Refills: 0 | Status: SHIPPED | OUTPATIENT
Start: 2022-08-11

## 2022-08-11 RX ORDER — ARIPIPRAZOLE 5 MG/1
5 TABLET ORAL DAILY
Qty: 30 TABLET | Refills: 1 | Status: SHIPPED | OUTPATIENT
Start: 2022-08-11 | End: 2022-10-03 | Stop reason: SDUPTHER

## 2022-08-11 SDOH — ECONOMIC STABILITY: FOOD INSECURITY: WITHIN THE PAST 12 MONTHS, THE FOOD YOU BOUGHT JUST DIDN'T LAST AND YOU DIDN'T HAVE MONEY TO GET MORE.: NEVER TRUE

## 2022-08-11 SDOH — ECONOMIC STABILITY: FOOD INSECURITY: WITHIN THE PAST 12 MONTHS, YOU WORRIED THAT YOUR FOOD WOULD RUN OUT BEFORE YOU GOT MONEY TO BUY MORE.: OFTEN TRUE

## 2022-08-11 ASSESSMENT — ENCOUNTER SYMPTOMS
ABDOMINAL PAIN: 0
BACK PAIN: 1
CHEST TIGHTNESS: 0
NAUSEA: 0
COUGH: 1
SHORTNESS OF BREATH: 0

## 2022-08-11 ASSESSMENT — COLUMBIA-SUICIDE SEVERITY RATING SCALE - C-SSRS
2. HAVE YOU ACTUALLY HAD ANY THOUGHTS OF KILLING YOURSELF?: YES
4. HAVE YOU HAD THESE THOUGHTS AND HAD SOME INTENTION OF ACTING ON THEM?: YES
6. HAVE YOU EVER DONE ANYTHING, STARTED TO DO ANYTHING, OR PREPARED TO DO ANYTHING TO END YOUR LIFE?: YES
1. WITHIN THE PAST MONTH, HAVE YOU WISHED YOU WERE DEAD OR WISHED YOU COULD GO TO SLEEP AND NOT WAKE UP?: YES
7. DID THIS OCCUR IN THE LAST THREE MONTHS: YES
3. HAVE YOU BEEN THINKING ABOUT HOW YOU MIGHT KILL YOURSELF?: YES
5. HAVE YOU STARTED TO WORK OUT OR WORKED OUT THE DETAILS OF HOW TO KILL YOURSELF? DO YOU INTEND TO CARRY OUT THIS PLAN?: YES

## 2022-08-11 ASSESSMENT — PATIENT HEALTH QUESTIONNAIRE - PHQ9
7. TROUBLE CONCENTRATING ON THINGS, SUCH AS READING THE NEWSPAPER OR WATCHING TELEVISION: 3
SUM OF ALL RESPONSES TO PHQ QUESTIONS 1-9: 23
2. FEELING DOWN, DEPRESSED OR HOPELESS: 3
SUM OF ALL RESPONSES TO PHQ9 QUESTIONS 1 & 2: 6
SUM OF ALL RESPONSES TO PHQ QUESTIONS 1-9: 23
4. FEELING TIRED OR HAVING LITTLE ENERGY: 3
SUM OF ALL RESPONSES TO PHQ QUESTIONS 1-9: 23
3. TROUBLE FALLING OR STAYING ASLEEP: 3
6. FEELING BAD ABOUT YOURSELF - OR THAT YOU ARE A FAILURE OR HAVE LET YOURSELF OR YOUR FAMILY DOWN: 3
SUM OF ALL RESPONSES TO PHQ QUESTIONS 1-9: 21
9. THOUGHTS THAT YOU WOULD BE BETTER OFF DEAD, OR OF HURTING YOURSELF: 2
10. IF YOU CHECKED OFF ANY PROBLEMS, HOW DIFFICULT HAVE THESE PROBLEMS MADE IT FOR YOU TO DO YOUR WORK, TAKE CARE OF THINGS AT HOME, OR GET ALONG WITH OTHER PEOPLE: 3
8. MOVING OR SPEAKING SO SLOWLY THAT OTHER PEOPLE COULD HAVE NOTICED. OR THE OPPOSITE, BEING SO FIGETY OR RESTLESS THAT YOU HAVE BEEN MOVING AROUND A LOT MORE THAN USUAL: 2
1. LITTLE INTEREST OR PLEASURE IN DOING THINGS: 3
5. POOR APPETITE OR OVEREATING: 1

## 2022-08-11 ASSESSMENT — SOCIAL DETERMINANTS OF HEALTH (SDOH): HOW HARD IS IT FOR YOU TO PAY FOR THE VERY BASICS LIKE FOOD, HOUSING, MEDICAL CARE, AND HEATING?: SOMEWHAT HARD

## 2022-08-11 NOTE — PATIENT INSTRUCTIONS
You may receive a survey regarding the care you received during your visit. Your input is valuable to us. We encourage you to complete and return your survey. We hope you will choose us in the future for your healthcare needs. 154
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
## 2022-08-12 ENCOUNTER — CARE COORDINATION (OUTPATIENT)
Dept: CARE COORDINATION | Age: 37
End: 2022-08-12

## 2022-08-12 DIAGNOSIS — M25.572 CHRONIC PAIN OF LEFT ANKLE: Primary | ICD-10-CM

## 2022-08-12 DIAGNOSIS — G89.29 CHRONIC PAIN OF LEFT ANKLE: Primary | ICD-10-CM

## 2022-08-12 NOTE — CARE COORDINATION
Ambulatory Care Coordination Note  8/12/2022    ACC: Ezra Macias, RN    Summary Note: Bharat Antonio was referred to care coordination by PCP. Pt has h/o: obesity, COVID, recent miscarriage. Lives with  but currently . Spoke with Bharat Antonio today. Introduced self and role. Agreeable to f/u calls. SOB/post COVID-referral placed to PULM. PFT's and chest xray ordered. Pt plans on obtaining xray next wk. Has chronic cough. Producing white sputum. Gets SOB with minimal activity. Feels this has been present since COVID. Referral to wt mgmt.-pt reports that they have been in contact with her. She has to complete an online seminar. Once completed they will schedule her. Depression: started on Abilify and Cymbalta. Atarax PRN. Discussed getting established with mental health provider. Discussed providers. Suggested pt check in to [facility] since d/t wrap around services. Pt will check in to it. Encouraged to let us know if wanting to pursue other options. Discussed spiritual care services. Pt declines. Not tearful on the phone. Pt reports that she feels better today. Offered support. Ankle pain-ongoing. Referred to pain mgmt. Pt reports that she received call today and was informed that provider will need to order xray first before they can schedule her. SW referral-food insecurities. Was provided with food box and [employer] voucher. SW will f/u. To provide additional food resources  Plan of care:  Weekly ACM calls  Message routed to PCP re: ankle x-ray  Pt to [facility] of walk-in hours to get established  Suggested pt obtain pill box to help her remember to take medications  Complete online seminar for wt mgmt. Take Atarax PRN anxiety  Obtain chest xray next wk  SW to f/u re: food resources  General Assessment    Do you have any symptoms that are causing concern?: Yes  Progression since Onset: Gradually Improving  Reported Symptoms:  (Comment: depression. started on medications. Historical Provider, MD       Future Appointments   Date Time Provider Jo Karine   8/24/2022  2:00 PM Gwyn Boyd, [address]

## 2022-08-12 NOTE — CARE COORDINATION
Pain mgmt needs to have left ankle x-rays completed prior to scheduling since she has not had any done since 2019. Please advise.

## 2022-08-16 ENCOUNTER — CARE COORDINATION (OUTPATIENT)
Dept: CARE COORDINATION | Age: 37
End: 2022-08-16

## 2022-08-16 NOTE — CARE COORDINATION
Called pt, introduced self and my role. Pt agreeable to SW. Discussed needs. Pt received a food box from PCP and a [employer] voucher. Informed pt I will be mailing out local food pantry list and other resources available. Pt voiced understanding. Will follow up and make sure she receives.

## 2022-08-22 ENCOUNTER — CARE COORDINATION (OUTPATIENT)
Dept: CARE COORDINATION | Age: 37
End: 2022-08-22

## 2022-08-22 ENCOUNTER — HOSPITAL ENCOUNTER (OUTPATIENT)
Age: 37
Discharge: HOME | End: 2022-08-22
Payer: MEDICARE

## 2022-08-22 ENCOUNTER — HOSPITAL ENCOUNTER (OUTPATIENT)
Dept: GENERAL RADIOLOGY | Age: 37
Discharge: HOME | End: 2022-08-22
Payer: MEDICARE

## 2022-08-22 DIAGNOSIS — G89.29 CHRONIC PAIN OF LEFT ANKLE: ICD-10-CM

## 2022-08-22 DIAGNOSIS — E66.2 OBESITY HYPOVENTILATION SYNDROME (HCC): ICD-10-CM

## 2022-08-22 DIAGNOSIS — R06.02 SOB (SHORTNESS OF BREATH): ICD-10-CM

## 2022-08-22 DIAGNOSIS — M25.572 CHRONIC PAIN OF LEFT ANKLE: ICD-10-CM

## 2022-08-22 DIAGNOSIS — R06.09 POST-COVID CHRONIC DYSPNEA: ICD-10-CM

## 2022-08-22 DIAGNOSIS — U09.9 POST-COVID CHRONIC DYSPNEA: ICD-10-CM

## 2022-08-22 PROCEDURE — 73610 X-RAY EXAM OF ANKLE: CPT

## 2022-08-22 PROCEDURE — 71046 X-RAY EXAM CHEST 2 VIEWS: CPT

## 2022-08-22 NOTE — CARE COORDINATION
Ambulatory Care Coordination Note  8/22/2022    ACC: Pérez Rubalcava, RN    Summary Note: Ata Arredondo is being followed by care coordination for education and assistance in managing her chronic conditions and assisting with healthcare needs. Spoke with Ata Arredondo briefly today. Pt has h/o: obesity, COVID, recent miscarriage. Pt has not completed chest or ankle xray at this time. Reports that she was just leaving to get those done when I called. Encouraged to get today. Pain mgmt-needs to complete ankle xray before she can be scheduled. Depression: tolerating medications. Has not checked in to mental health provider at this time. Pt forgot the name that I had provided her during last call. Informed her to check in to [email or web address]. Still needs to complete online seminar for wt mgmt. Encouraged to do so. Has done better about not forgetting to take medications. Encouraged to still obtain pill box. Plan of care:  Complete blood work  Obtain xrays today  F/u with [employer] health services  Complete wt Annette Mckinney  Continue working on medication adherence. Obtain pill box if able. Will f/u with PULM if no outreach at next call. Lab Results       None            Care Coordination Interventions    Referral from Primary Care Provider: No  Suggested Interventions and Community Resources          Goals Addressed    None         Prior to Admission medications    Medication Sig Start Date End Date Taking?  Authorizing Provider   albuterol sulfate HFA (PROVENTIL;VENTOLIN;PROAIR) 108 (90 Base) MCG/ACT inhaler Inhale 2 puffs into the lungs every 4 hours as needed for Wheezing or Shortness of Breath (cough) 8/11/22   MALCOM Romo CNP   hydrOXYzine HCl (ATARAX) 25 MG tablet Take 1 tablet by mouth every 8 hours as needed for Anxiety 8/11/22   MALCOM Romo CNP   budesonide-formoterol (SYMBICORT) 80-4.5 MCG/ACT AERO Inhale 2 puffs into the lungs in the morning and 2 puffs in the evening. Rinse mouth after use. . 8/11/22   Mauri Mohs, APRN - CNP   DULoxetine (CYMBALTA) 30 MG extended release capsule Take 1 capsule by mouth in the morning. 8/11/22   Mauri Mohs, APRN - CNP   ARIPiprazole (ABILIFY) 5 MG tablet Take 1 tablet by mouth in the morning.  8/11/22   Mauri Mohs, APRN - CNP   aspirin 81 MG chewable tablet Take 1 tablet by mouth daily 7/14/21   KODAK Winchester   famotidine (PEPCID) 20 MG tablet Take 1 tablet by mouth 2 times daily  Patient taking differently: Take 20 mg by mouth as needed 4/1/21   Mauri Mohs, APRN - CNP   solifenacin (VESICARE) 10 MG tablet Take 1 tablet by mouth daily 2/11/21   Mauri Mohs, APRN - CNP   rizatriptan (MAXALT) 10 MG tablet Take 1 tablet by mouth once as needed for Migraine May repeat in 2 hours if needed 1/7/21 1/7/21  Mauri Mohs, APRN - CNP   Naproxen Sodium (ALEVE PO) Take by mouth    Historical Provider, MD   oxybutynin (DITROPAN XL) 10 MG extended release tablet Take 1 tablet by mouth daily  Patient not taking: Reported on 8/12/2022 3/9/20   Mauri Mohs, APRN - CNP   acetaminophen (TYLENOL) 325 MG tablet Take 975 mg by mouth every 6 hours as needed for Pain    Historical Provider, MD       Future Appointments   Date Time Provider Jo Milton   8/24/2022  2:00 PM Mauri Mohs, [address]

## 2022-08-24 ENCOUNTER — TELEMEDICINE (OUTPATIENT)
Dept: FAMILY MEDICINE CLINIC | Age: 37
End: 2022-08-24
Payer: MEDICARE

## 2022-08-24 DIAGNOSIS — F32.2 SEVERE DEPRESSION (HCC): Primary | ICD-10-CM

## 2022-08-24 DIAGNOSIS — N32.81 OAB (OVERACTIVE BLADDER): ICD-10-CM

## 2022-08-24 DIAGNOSIS — R06.02 SOB (SHORTNESS OF BREATH): ICD-10-CM

## 2022-08-24 DIAGNOSIS — U09.9 POST-COVID CHRONIC DYSPNEA: ICD-10-CM

## 2022-08-24 DIAGNOSIS — E66.2 OBESITY HYPOVENTILATION SYNDROME (HCC): ICD-10-CM

## 2022-08-24 DIAGNOSIS — M25.572 CHRONIC PAIN OF LEFT ANKLE: ICD-10-CM

## 2022-08-24 DIAGNOSIS — E66.01 MORBID OBESITY WITH BMI OF 70 AND OVER, ADULT (HCC): ICD-10-CM

## 2022-08-24 DIAGNOSIS — E55.9 VITAMIN D DEFICIENCY: ICD-10-CM

## 2022-08-24 DIAGNOSIS — R06.09 POST-COVID CHRONIC DYSPNEA: ICD-10-CM

## 2022-08-24 DIAGNOSIS — G89.29 CHRONIC PAIN OF LEFT ANKLE: ICD-10-CM

## 2022-08-24 DIAGNOSIS — Z87.81 HISTORY OF FRACTURE OF LEFT ANKLE: ICD-10-CM

## 2022-08-24 PROCEDURE — G8427 DOCREV CUR MEDS BY ELIG CLIN: HCPCS | Performed by: NURSE PRACTITIONER

## 2022-08-24 PROCEDURE — 99214 OFFICE O/P EST MOD 30 MIN: CPT | Performed by: NURSE PRACTITIONER

## 2022-08-24 ASSESSMENT — ENCOUNTER SYMPTOMS
SHORTNESS OF BREATH: 0
ABDOMINAL PAIN: 0
NAUSEA: 0
CHEST TIGHTNESS: 0
COUGH: 0
BACK PAIN: 1
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
## 2022-08-24 NOTE — Clinical Note
Patient has not heard from PULM referral or PFT scheduled. Please reach out to whoever schedules this.    Her PAIN and Weight Management referrals she was contacted already

## 2022-08-25 ENCOUNTER — CARE COORDINATION (OUTPATIENT)
Dept: CARE COORDINATION | Age: 37
End: 2022-08-25

## 2022-08-25 NOTE — CARE COORDINATION
SHIKHA called pt to follow up with mailing of "[employer]" that was sent to her last week. Pt unavailable at the time of my call and vm was left asking pt to return my call to [phone].

## 2022-08-30 ENCOUNTER — CARE COORDINATION (OUTPATIENT)
Dept: CARE COORDINATION | Age: 37
End: 2022-08-30

## 2022-08-30 NOTE — CARE COORDINATION
Attempted to reach Kitty Desouza today for care coordination f/u. No answer. Message left to return call.

## 2022-09-02 ENCOUNTER — CARE COORDINATION (OUTPATIENT)
Dept: CARE COORDINATION | Age: 37
End: 2022-09-02

## 2022-09-02 NOTE — CARE COORDINATION
SW second attempt to contact pt to follow up with resource list I mailed her. Pt unavailable at the time of my call and I requested a call back to [phone]. I will resolve if no call back by 9/9.

## 2022-09-08 ENCOUNTER — CARE COORDINATION (OUTPATIENT)
Dept: CARE COORDINATION | Age: 37
End: 2022-09-08

## 2022-09-08 NOTE — CARE COORDINATION
Ambulatory Care Coordination Note  9/8/2022    ACC: Kel Monreal    Summary Note: I spoke with the patient for continued Care Coordination follow up and education. Patient states she is doing good. Patient has pulm appointment on 9/13. Breathing is at baseline. We discussed Zone management tools for chronic disease(s) and patient denies current questions re: s/sx at this time. Patient completed seminar online for wt mgmt. Weight management appointment on 10/7 and pain management appointment on 10/11. SHIKHA Escoto, mailed pt food panty list and local resources. Depression is at baseline. Educated on how to identify sx's that are worse than the baseline and the importance of early symptom recognition and reporting to prevent exacerbation which may lead to ED visits and hospital admissions. I advised patient to contact PCP office if needed. No further needs at this time. Lab Results       None            Care Coordination Interventions    Referral from Primary Care Provider: No  Suggested Interventions and Community Resources          Goals Addressed    None         Prior to Admission medications    Medication Sig Start Date End Date Taking? Authorizing Provider   albuterol sulfate HFA (PROVENTIL;VENTOLIN;PROAIR) 108 (90 Base) MCG/ACT inhaler Inhale 2 puffs into the lungs every 4 hours as needed for Wheezing or Shortness of Breath (cough) 8/11/22   MALCOM Sales - CNP   hydrOXYzine HCl (ATARAX) 25 MG tablet Take 1 tablet by mouth every 8 hours as needed for Anxiety 8/11/22   Geoff Delong APRN - JOHN   budesonide-formoterol (SYMBICORT) 80-4.5 MCG/ACT AERO Inhale 2 puffs into the lungs in the morning and 2 puffs in the evening. Rinse mouth after use. . 8/11/22   Geoff Delong APRN - CNP   DULoxetine (CYMBALTA) 30 MG extended release capsule Take 1 capsule by mouth in the morning. 8/11/22   Geoff Delong APRN - CNP   ARIPiprazole (ABILIFY) 5 MG tablet Take 1 tablet by mouth in the morning. 8/11/22   MALCOM Vicente CNP   aspirin 81 MG chewable tablet Take 1 tablet by mouth daily 7/14/21   KODAK Bradley   famotidine (PEPCID) 20 MG tablet Take 1 tablet by mouth 2 times daily  Patient taking differently: Take 20 mg by mouth as needed 4/1/21   MALCOM Vicente CNP   solifenacin (VESICARE) 10 MG tablet Take 1 tablet by mouth daily 2/11/21   MALCOM Vicente CNP   Naproxen Sodium (ALEVE PO) Take by mouth    Historical Provider, MD       Future Appointments   Date Time Provider Jo Milton   9/13/2022  5:30 PM [facility] PULMONARY FUNCTION ROOM 2 [facility] PFT [address] [facility]   9/28/2022  7:45 AM MALCOM Vicente   10/7/2022  2:30 PM Claudell Ram, MD N SRPX WT MG [facility] - [address] [facility]   10/11/2022  2:00 PM MALCOM Rodriguez CNP N SRPX Pain [facility] [facility]

## 2022-09-13 ENCOUNTER — HOSPITAL ENCOUNTER (OUTPATIENT)
Dept: PULMONOLOGY | Age: 37
Discharge: HOME | End: 2022-09-13
Payer: MEDICARE

## 2022-09-13 DIAGNOSIS — U09.9 POST-COVID CHRONIC DYSPNEA: ICD-10-CM

## 2022-09-13 DIAGNOSIS — R06.09 POST-COVID CHRONIC DYSPNEA: ICD-10-CM

## 2022-09-13 DIAGNOSIS — E66.2 OBESITY HYPOVENTILATION SYNDROME (HCC): ICD-10-CM

## 2022-09-13 DIAGNOSIS — R06.02 SOB (SHORTNESS OF BREATH): ICD-10-CM

## 2022-09-13 PROCEDURE — 94729 DIFFUSING CAPACITY: CPT

## 2022-09-13 PROCEDURE — 94060 EVALUATION OF WHEEZING: CPT

## 2022-09-14 ENCOUNTER — OFFICE VISIT (OUTPATIENT)
Dept: PULMONOLOGY | Age: 37
End: 2022-09-14
Payer: MEDICARE

## 2022-09-14 ENCOUNTER — PATIENT MESSAGE (OUTPATIENT)
Dept: FAMILY MEDICINE CLINIC | Age: 37
End: 2022-09-14

## 2022-09-14 VITALS
OXYGEN SATURATION: 90 % | BODY MASS INDEX: 51.91 KG/M2 | DIASTOLIC BLOOD PRESSURE: 84 MMHG | WEIGHT: 293 LBS | HEART RATE: 109 BPM | SYSTOLIC BLOOD PRESSURE: 136 MMHG | TEMPERATURE: 97.7 F | HEIGHT: 63 IN

## 2022-09-14 DIAGNOSIS — J98.4 HERNIATION OF RIGHT LUNG: ICD-10-CM

## 2022-09-14 DIAGNOSIS — R91.8 PULMONARY INFILTRATES: ICD-10-CM

## 2022-09-14 DIAGNOSIS — J12.82 PNEUMONIA DUE TO COVID-19 VIRUS: Primary | ICD-10-CM

## 2022-09-14 DIAGNOSIS — J43.9 MIXED RESTRICTIVE AND OBSTRUCTIVE LUNG DISEASE (HCC): ICD-10-CM

## 2022-09-14 DIAGNOSIS — R06.83 LOUD SNORING: ICD-10-CM

## 2022-09-14 DIAGNOSIS — U07.1 PNEUMONIA DUE TO COVID-19 VIRUS: ICD-10-CM

## 2022-09-14 DIAGNOSIS — U07.1 PNEUMONIA DUE TO COVID-19 VIRUS: Primary | ICD-10-CM

## 2022-09-14 DIAGNOSIS — L73.9 FOLLICULITIS: Primary | ICD-10-CM

## 2022-09-14 DIAGNOSIS — J12.82 PNEUMONIA DUE TO COVID-19 VIRUS: ICD-10-CM

## 2022-09-14 DIAGNOSIS — J98.4 RESTRICTIVE LUNG DISEASE: ICD-10-CM

## 2022-09-14 DIAGNOSIS — G47.10 HYPERSOMNIA: ICD-10-CM

## 2022-09-14 DIAGNOSIS — J98.4 MIXED RESTRICTIVE AND OBSTRUCTIVE LUNG DISEASE (HCC): ICD-10-CM

## 2022-09-14 DIAGNOSIS — R06.02 SOB (SHORTNESS OF BREATH): ICD-10-CM

## 2022-09-14 DIAGNOSIS — E66.01 MORBID OBESITY (HCC): ICD-10-CM

## 2022-09-14 PROCEDURE — 1036F TOBACCO NON-USER: CPT | Performed by: INTERNAL MEDICINE

## 2022-09-14 PROCEDURE — 99205 OFFICE O/P NEW HI 60 MIN: CPT | Performed by: INTERNAL MEDICINE

## 2022-09-14 PROCEDURE — 3023F SPIROM DOC REV: CPT | Performed by: INTERNAL MEDICINE

## 2022-09-14 PROCEDURE — G8417 CALC BMI ABV UP PARAM F/U: HCPCS | Performed by: INTERNAL MEDICINE

## 2022-09-14 PROCEDURE — G8427 DOCREV CUR MEDS BY ELIG CLIN: HCPCS | Performed by: INTERNAL MEDICINE

## 2022-09-14 ASSESSMENT — ENCOUNTER SYMPTOMS
VOICE CHANGE: 0
WHEEZING: 1
COUGH: 1
CHEST TIGHTNESS: 1
TROUBLE SWALLOWING: 0
SHORTNESS OF BREATH: 1
EYES NEGATIVE: 1

## 2022-09-14 NOTE — PROGRESS NOTES
by Ora Arango MD at [address] [facility]  2016    [place] OFFICE/OUTPT VISIT,PROCEDURE ONLY Left 2018    LEFT SUBTALAR JOINT FUSION, MEDIAL COLUMN FUSION, GASTROC RECESSION, POSS DELTOID REPAIR performed by Pietro Mcbride DPM at [address] History     Tobacco Use    Smoking status: Former     Types: Cigarettes     Quit date: 3/4/2014     Years since quittin.5    Smokeless tobacco: Never   Substance Use Topics    Alcohol use: No     Comment: social    Drug use: Yes     Types: Marijuana (Weed)      Allergies   Allergen Reactions    Morphine Itching     Felt like she was on fire      Family History   Problem Relation Age of Onset    Depression Mother     Thyroid Disease Mother     High Blood Pressure Father     Depression Sister     Depression Sister     Diabetes Paternal Grandfather      Current Outpatient Medications   Medication Sig Dispense Refill    albuterol sulfate HFA (PROVENTIL;VENTOLIN;PROAIR) 108 (90 Base) MCG/ACT inhaler Inhale 2 puffs into the lungs every 4 hours as needed for Wheezing or Shortness of Breath (cough) 1 each 0    hydrOXYzine HCl (ATARAX) 25 MG tablet Take 1 tablet by mouth every 8 hours as needed for Anxiety 60 tablet 1    budesonide-formoterol (SYMBICORT) 80-4.5 MCG/ACT AERO Inhale 2 puffs into the lungs in the morning and 2 puffs in the evening. Rinse mouth after use. . 10.2 g 0    DULoxetine (CYMBALTA) 30 MG extended release capsule Take 1 capsule by mouth in the morning. 30 capsule 1    ARIPiprazole (ABILIFY) 5 MG tablet Take 1 tablet by mouth in the morning.  30 tablet 1    aspirin 81 MG chewable tablet Take 1 tablet by mouth daily 30 tablet 3    famotidine (PEPCID) 20 MG tablet Take 1 tablet by mouth 2 times daily (Patient taking differently: Take 20 mg by mouth as needed) 60 tablet 11    solifenacin (VESICARE) 10 MG tablet Take 1 tablet by mouth daily 90 tablet 1    Naproxen Sodium (ALEVE PO) Take by mouth       No current facility-administered medications for this visit. There were no vitals taken for this visit. Wt Readings from Last 3 Encounters:   08/11/22 (!) 510 lb (231.3 kg)   07/08/21 (!) 503 lb (228.2 kg)   03/09/20 (!) 465 lb 6.4 oz (211.1 kg)     Neck Circumference - 14 in; Mallampati Score - 4       Objective:   Physical Exam  Nursing note reviewed. Exam conducted with a chaperone present. Constitutional:       General: She is not in acute distress. Appearance: She is obese. She is not toxic-appearing. HENT:      Head: Normocephalic. Nose: Nose normal. No congestion. Mouth/Throat:      Mouth: Mucous membranes are moist.      Comments: Large tongue, crowded pharynx, mallampati class 4    Eyes:      Extraocular Movements: Extraocular movements intact. Pupils: Pupils are equal, round, and reactive to light. Cardiovascular:      Rate and Rhythm: Normal rate and regular rhythm. Heart sounds: No murmur heard. No gallop. Pulmonary:      Effort: Pulmonary effort is normal.      Breath sounds: Normal breath sounds. Abdominal:      Palpations: Abdomen is soft. Musculoskeletal:      Cervical back: Normal range of motion and neck supple. Right lower leg: No edema. Left lower leg: No edema. Skin:     Capillary Refill: Capillary refill takes less than 2 seconds. Neurological:      General: No focal deficit present. Mental Status: She is alert and oriented to person, place, and time. CT chest:    Impression   1. Limited exam.  No central pulmonary embolism suspected. 2.  Diffuse bilateral airspace opacities consistent with pneumonia. 3.  Herniation of a portion of the right upper lobe through the anterior    chest wall.        This document has been electronically signed by: Ramu Mayfield MD on    07/08/2021 06:44 AM       All CTs at this facility use dose modulation techniques and iterative    reconstructions, and/or weight-based dosing   when appropriate to reduce radiation to a low as reasonably achievable. PFTs:          Six Minute Walk Test  Anay Toledo 1985    Six minute walk test done in my office today by my medical assistant. Anay's oxygen saturation at rest on room air was 90 %. Her oxygen saturation dropped to 86 % on room air with exertion after walking 108 feet and within 1.15  minutes. The six minute walk test was repeated with oxygen supplementation. Oxygen supplementation was started with 1 LPM via nasal cannula and titrated to 1 LPM via nasal cannula. At the end of the test Anay Toledo's oxygen saturation remained at 94% on 1 LPM with exertion. She is mobile in the home and requires oxygen as outlined above. Patient ambulated a total of 216 feet with oxygen. Resting Dyspnea/Dee Dee score was 3  and 5 upon completion of the walk. Resting heart rate was  83 bpm and 138 bpm upon completing the walk. Nasal Oxygen order:  1 lpm to be used with:  Rest: No.  Walking: Yes. Sleep: No.   Continuous flow: Yes. DME Medical Necessity Documentation    Giovany Caba was seen in the office on 9/14/2022 for the diagnosis  morbid obesity . I am prescribing oxygen because the diagnosis and testing requires the patient to have oxygen in the home. her condition will improve or be benefited by oxygen use. The patient is able to perform good mobility in a home setting and therefore does require the use of a portable oxygen system. Assessment:       Diagnosis Orders   1. Pneumonia due to COVID-19 virus  6 Minute Walk Test    CT CHEST WO CONTRAST    DME Order for Home Oxygen as OP      2. Pulmonary infiltrates  6 Minute Walk Test    CT CHEST WO CONTRAST      3. Hypersomnia  CT CHEST WO CONTRAST    Baseline Diagnostic Sleep Study      4. Morbid obesity ([address]  CT CHEST WO CONTRAST    Baseline Diagnostic Sleep Study      5. Loud snoring  CT CHEST WO CONTRAST    Baseline Diagnostic Sleep Study      6. Herniation of right lung  CT CHEST WO CONTRAST      7.  SOB (shortness of breath) 8. Restrictive lung disease  DME Order for Home Oxygen as OP      9. Mixed restrictive and obstructive lung disease [address] [address] [facility]                 PLAN:  Orders Placed This Encounter   Procedures    CT CHEST WO CONTRAST     Standing Status:   Future     Standing Expiration Date:   9/14/2023    6 Minute Walk Test     Standing Status:   Future     Number of Occurrences:   1     Standing Expiration Date:   9/14/2023    Baseline Diagnostic Sleep Study     Standing Status:   Future     Standing Expiration Date:   9/14/2023     Order Specific Question:   Adult or Pediatric     Answer:   Adult Study (>7 Years)     Order Specific Question:   Location For Sleep Study     Answer:   [address]     Order Specific Question:   Select Sleep Lab Location     Answer:   [address]    DME Order for Home Oxygen as OP     You must complete the order parameters below and add the medical necessity documentation for this DME in a separate note.     Stationary Oxygen Concentrator at 1 lpm via Nasal Cannula    Stationary Prescribed at:  Non Continuous while walking or exercise    Portable Gaseous O2 System and contents at 1 lpm via Nasal Cannula    1-2hrs/day    Diagnosis: Restrictive lung disease    Length of need: 3 Months      Advised to stop cannabis  RTC 4 weeks

## 2022-09-15 RX ORDER — CEFADROXIL 500 MG/1
500 CAPSULE ORAL 2 TIMES DAILY
Qty: 20 CAPSULE | Refills: 0 | Status: SHIPPED | OUTPATIENT
Start: 2022-09-15 | End: 2022-09-25

## 2022-09-15 NOTE — TELEPHONE ENCOUNTER
From: Wendee Severs  To: Aiden Hough  Sent: 9/14/2022 6:11 PM EDT  Subject: Leg sores    Hello. I was writing because I had issues in the past with skin infection. My gynecologist had gave me fluconazole last time I had seen her. I tried taking the refills i had left, but it isn't helping. Tried powders, diaper rash creams and antibiotic ointment. Its on the very back part of my leg.

## 2022-09-16 ENCOUNTER — CARE COORDINATION (OUTPATIENT)
Dept: CARE COORDINATION | Age: 37
End: 2022-09-16

## 2022-09-16 NOTE — CARE COORDINATION
Ambulatory Care Coordination Note  9/16/2022    ACC: Zoie Pruitt, RN    Summary Note: Lon Zurita is being followed by care coordination for education and assistance in managing her chronic conditions and assisting with healthcare needs. Spoke with Lon Zurita today. Pt has h/o: obesity, COVID, recent miscarriage. Skin infection on leg-being treated with Duricef. Encouraged to keep area clean and dry. Has had 2 doses. Has f/u with PCP on 9/28. PULM-had appt with PULM. Pt requires 1 liter of oxygen with activity. Received oxygen through [facility]. Has been wearing. Has CT chest on 10/14. Completed PFT's. Was switched from Symbicort to another inhaler. Pt unsure name of inhaler. Unable to find it in OV notes. Was provided with samples. I advised pt to call office when getting low. Has albuterol inhaler to use PRN. Encouraged to carry with her at all times which pt states that she does. Has sleep study for eval of SANTIAGO on 10/17  Pt will f/u with PULM in Nov.    Encouraged deep breathing exercises and compliance with oxygen. Obesity-completed wt mgmt webinar. Has appt with Dr. Richie Major on 10/7  Pain mgmt appt 10/11  Continues to feel better on Abilify. Tolerating medication without difficulty. Plan of care:  Encouraged to f/u with providers as scheduled  Reviewed all upcoming testing and appts  Deep breathing exercises  Keep back of leg clean and dry. Take Duricef until gone. If skin not improving call prior to f/u appt. Wear oxygen with activity as ordered. General Assessment    Do you have any symptoms that are causing concern?: Yes  Progression since Onset: Unchanged  Reported Symptoms:  (Comment: SOB with activity. now on 1 liter of oxygen with activity. encouraged deep breathing exercises. established with PULM.   will be having sleep study and CT chest completed in upcoming wks.)         Lab Results       None            Care Coordination Interventions    Referral from Primary Care Provider: No  Suggested Interventions and Community Resources          Goals Addressed                   This Visit's Progress     Behavioral Health   No change     I will work towards the following Behavioral Health goals: I will take my medications daily as prescribed. and will consider getting established with mental health provider. Barriers: lack of support, overwhelmed by complexity of regimen, and stress  Plan for overcoming my barriers: support and education from PCP, ACM, SW. Resource info provided on different providers and services in the area. Confidence: 8/10  Anticipated Goal Completion Date: 11/12/22              Prior to Admission medications    Medication Sig Start Date End Date Taking? Authorizing Provider   cefadroxil (DURICEF) 500 MG capsule Take 1 capsule by mouth 2 times daily for 10 days 9/15/22 9/25/22  Doreene Fail, APRN - CNP   albuterol sulfate HFA (PROVENTIL;VENTOLIN;PROAIR) 108 (90 Base) MCG/ACT inhaler Inhale 2 puffs into the lungs every 4 hours as needed for Wheezing or Shortness of Breath (cough) 8/11/22   Doreene Fail, APRN - CNP   hydrOXYzine HCl (ATARAX) 25 MG tablet Take 1 tablet by mouth every 8 hours as needed for Anxiety 8/11/22   Doreene Fail, APRN - CNP   budesonide-formoterol (SYMBICORT) 80-4.5 MCG/ACT AERO Inhale 2 puffs into the lungs in the morning and 2 puffs in the evening. Rinse mouth after use. .  Patient not taking: Reported on 9/16/2022 8/11/22   Doreene Fail, APRN - CNP   DULoxetine (CYMBALTA) 30 MG extended release capsule Take 1 capsule by mouth in the morning. 8/11/22   Doreene Fail, APRN - CNP   ARIPiprazole (ABILIFY) 5 MG tablet Take 1 tablet by mouth in the morning.  8/11/22   Doreene Fail, APRN - CNP   aspirin 81 MG chewable tablet Take 1 tablet by mouth daily 7/14/21   KODAK Hernandez   famotidine (PEPCID) 20 MG tablet Take 1 tablet by mouth 2 times daily  Patient taking differently: Take 20 mg by mouth as needed 4/1/21   Doreene Fail, APRN - CNP   solifenacin (VESICARE) 10 MG tablet Take 1 tablet by mouth daily 2/11/21   Merry Mackenzie, APRN - CNP   Naproxen Sodium (ALEVE PO) Take by mouth    Historical Provider, MD       Future Appointments   Date Time Provider Jo Milton   9/28/2022  7:45 AM MALCOM Cronin - [place]   10/7/2022  2:30 PM Linh Mullen MD N SRPX WT MG [facility] [place]   10/11/2022  2:00 PM MALCOM Amador - CNP N SRPX Pain [facility] [place]   10/14/2022  1:00 PM STR DELPHOS CT IMAGING STRZ DEL CT STR [place]   10/17/2022  8:00 PM SCHEDULE, STR SLEEP TECH [facility]   11/9/2022 10:20 AM Huy Salgado, [address]   11/10/2022  9:20 AM MALCOM Carrasco - JOHN [address] [place] [address]

## 2022-09-28 ENCOUNTER — TELEMEDICINE (OUTPATIENT)
Dept: FAMILY MEDICINE CLINIC | Age: 37
End: 2022-09-28
Payer: MEDICARE

## 2022-09-28 DIAGNOSIS — U09.9 POST-COVID CHRONIC DYSPNEA: ICD-10-CM

## 2022-09-28 DIAGNOSIS — R06.09 POST-COVID CHRONIC DYSPNEA: ICD-10-CM

## 2022-09-28 DIAGNOSIS — Z87.81 HISTORY OF FRACTURE OF LEFT ANKLE: ICD-10-CM

## 2022-09-28 DIAGNOSIS — G89.29 CHRONIC PAIN OF LEFT ANKLE: ICD-10-CM

## 2022-09-28 DIAGNOSIS — M25.572 CHRONIC PAIN OF LEFT ANKLE: ICD-10-CM

## 2022-09-28 DIAGNOSIS — E66.2 OBESITY HYPOVENTILATION SYNDROME (HCC): ICD-10-CM

## 2022-09-28 DIAGNOSIS — E66.01 MORBID OBESITY WITH BMI OF 70 AND OVER, ADULT (HCC): ICD-10-CM

## 2022-09-28 DIAGNOSIS — N32.81 OAB (OVERACTIVE BLADDER): ICD-10-CM

## 2022-09-28 DIAGNOSIS — E55.9 VITAMIN D DEFICIENCY: ICD-10-CM

## 2022-09-28 DIAGNOSIS — F32.2 SEVERE DEPRESSION (HCC): Primary | ICD-10-CM

## 2022-09-28 DIAGNOSIS — R06.02 SOB (SHORTNESS OF BREATH): ICD-10-CM

## 2022-09-28 PROCEDURE — G8427 DOCREV CUR MEDS BY ELIG CLIN: HCPCS | Performed by: NURSE PRACTITIONER

## 2022-09-28 PROCEDURE — 99214 OFFICE O/P EST MOD 30 MIN: CPT | Performed by: NURSE PRACTITIONER

## 2022-09-28 ASSESSMENT — ENCOUNTER SYMPTOMS
COUGH: 0
CHEST TIGHTNESS: 0
BACK PAIN: 0
NAUSEA: 0
ABDOMINAL PAIN: 0
SHORTNESS OF BREATH: 0
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
## 2022-09-30 ENCOUNTER — CARE COORDINATION (OUTPATIENT)
Dept: CARE COORDINATION | Age: 37
End: 2022-09-30

## 2022-09-30 NOTE — CARE COORDINATION
Attempted to reach patient for continued Care Coordination follow up and education. Patient was unavailable at the time of my call, and a generic voicemail message was left asking patient to return my call at [phone].

## 2022-10-03 DIAGNOSIS — F32.2 SEVERE DEPRESSION (HCC): ICD-10-CM

## 2022-10-03 RX ORDER — ARIPIPRAZOLE 5 MG/1
5 TABLET ORAL DAILY
Qty: 90 TABLET | Refills: 3 | Status: SHIPPED | OUTPATIENT
Start: 2022-10-03

## 2022-10-03 RX ORDER — DULOXETIN HYDROCHLORIDE 30 MG/1
30 CAPSULE, DELAYED RELEASE ORAL DAILY
Qty: 90 CAPSULE | Refills: 3 | Status: SHIPPED | OUTPATIENT
Start: 2022-10-03

## 2022-10-03 NOTE — TELEPHONE ENCOUNTER
Fax received from [facility] requesting a refills on the patients aripiprazole 5mg and duloxetine 30mg. Orders pended for your signature.

## 2022-10-04 ENCOUNTER — PATIENT MESSAGE (OUTPATIENT)
Dept: FAMILY MEDICINE CLINIC | Age: 37
End: 2022-10-04

## 2022-10-04 DIAGNOSIS — N76.89 OTHER SPECIFIED INFLAMMATION OF VAGINA AND VULVA: ICD-10-CM

## 2022-10-04 DIAGNOSIS — Z72.51 HIGH RISK SEXUAL BEHAVIOR, UNSPECIFIED TYPE: Primary | ICD-10-CM

## 2022-10-04 DIAGNOSIS — B99.8 OTHER INFECTIOUS DISEASE: ICD-10-CM

## 2022-10-04 DIAGNOSIS — G43.109 MIGRAINE WITH AURA AND WITHOUT STATUS MIGRAINOSUS, NOT INTRACTABLE: ICD-10-CM

## 2022-10-04 DIAGNOSIS — R63.5 ABNORMAL WEIGHT GAIN: ICD-10-CM

## 2022-10-05 NOTE — TELEPHONE ENCOUNTER
From: Henry Mahoney  To: Mike Aleman  Sent: 10/4/2022 6:17 PM EDT  Subject: Screening     I wanted to see about getting an Sti Screening done. I wasn't sure if I had to be referred to do that or if I could have it done at Ambulatory care in [place]. No noticeable issues, just want to test for my peace of mind.

## 2022-10-06 RX ORDER — DOXYCYCLINE HYCLATE 100 MG
100 TABLET ORAL 2 TIMES DAILY
Qty: 14 TABLET | Refills: 0 | Status: SHIPPED | OUTPATIENT
Start: 2022-10-06 | End: 2022-10-13

## 2022-10-06 RX ORDER — BUTALBITAL, ACETAMINOPHEN AND CAFFEINE 50; 325; 40 MG/1; MG/1; MG/1
1 TABLET ORAL EVERY 6 HOURS PRN
Qty: 15 TABLET | Refills: 0 | Status: SHIPPED | OUTPATIENT
Start: 2022-10-06

## 2022-10-07 ENCOUNTER — OFFICE VISIT (OUTPATIENT)
Dept: BARIATRICS/WEIGHT MGMT | Age: 37
End: 2022-10-07
Payer: MEDICARE

## 2022-10-07 VITALS
TEMPERATURE: 98.4 F | BODY MASS INDEX: 51.91 KG/M2 | SYSTOLIC BLOOD PRESSURE: 128 MMHG | WEIGHT: 293 LBS | HEIGHT: 63 IN | HEART RATE: 80 BPM | DIASTOLIC BLOOD PRESSURE: 86 MMHG

## 2022-10-07 DIAGNOSIS — E66.01 MORBID OBESITY (HCC): Primary | ICD-10-CM

## 2022-10-07 DIAGNOSIS — F32.A DEPRESSION, UNSPECIFIED DEPRESSION TYPE: ICD-10-CM

## 2022-10-07 DIAGNOSIS — F41.9 ANXIETY: ICD-10-CM

## 2022-10-07 PROCEDURE — G8417 CALC BMI ABV UP PARAM F/U: HCPCS | Performed by: SURGERY

## 2022-10-07 PROCEDURE — G8427 DOCREV CUR MEDS BY ELIG CLIN: HCPCS | Performed by: SURGERY

## 2022-10-07 PROCEDURE — 99203 OFFICE O/P NEW LOW 30 MIN: CPT | Performed by: SURGERY

## 2022-10-07 PROCEDURE — 1036F TOBACCO NON-USER: CPT | Performed by: SURGERY

## 2022-10-07 PROCEDURE — G8484 FLU IMMUNIZE NO ADMIN: HCPCS | Performed by: SURGERY

## 2022-10-08 ASSESSMENT — ENCOUNTER SYMPTOMS
EYE REDNESS: 0
SHORTNESS OF BREATH: 1
ABDOMINAL DISTENTION: 0
EYE ITCHING: 0
CONSTIPATION: 0
BLOOD IN STOOL: 0
WHEEZING: 0
SORE THROAT: 0
DIARRHEA: 1
EYE DISCHARGE: 0
TROUBLE SWALLOWING: 0
NAUSEA: 0
CHEST TIGHTNESS: 0
CHOKING: 0
RECTAL PAIN: 0
ANAL BLEEDING: 0
FACIAL SWELLING: 0
COLOR CHANGE: 0
RHINORRHEA: 0
VOICE CHANGE: 0
PHOTOPHOBIA: 0
ABDOMINAL PAIN: 0
BACK PAIN: 1
SINUS PRESSURE: 0
APNEA: 0
STRIDOR: 0
VOMITING: 0
COUGH: 0
ALLERGIC/IMMUNOLOGIC NEGATIVE: 1
EYE PAIN: 0

## 2022-10-08 NOTE — PROGRESS NOTES
Anay Toledo (:  1985)     ASSESSMENT:  Morbid obesity (BMI 86)  2. Anxiety  3. Depression  4. Herniation right upper lobe of the liver through anterior chest wall    PLAN:  1. Long discussion about the pros and cons of weight loss surgery. The risks benefits and alternatives to laparoscopic adjustable band, gastric sleeve and gastric Farzana-en-Y bypass were discussed in detail. The pros and cons of robotic assisted, laparoscopic and open techniques were discussed. 2.  Behavior modification discussed in detail in regards to dietary habits. 3.  Nutritional education occurred during visit. Will set up a consultation/evaluation with dietitian for further evaluation. 4.  Options for medical management of morbid obesity discussed. 5.  Improvement in fitness/exercise discussed with patient and the need for this with/without surgery. 6.  Obtain medical necessity letter from PCP as needed. 7.  Follow-up in one month at weight management program at [address] [place] [address]. 8.  Signs and symptoms reviewed with patient that would be concerning and need her to return to office for re-evaluation. Patient states she will call if she has questions or concerns. 9. Multivitamin  10. Psychology evaluation  11. EGD prior to any surgical intervention  12. Encouraged support groups  13. Encouraged Naturally Slim/Rev It Up  14. Discussed the pros and cons along with possible side effects/complications of weight loss medications. All questions answered. Patient states that if she is not able to lose enough adequate excess body weight with medical management only then she would be like to proceed with surgical intervention such as sleeve gastrectomy/gastric bypass for further weight loss. More than 50 minutes spent with patient today. Greater than 50% of the time was involved counseling, educaton and coordinating care.     SUBJECTIVE/OBJECTIVE:    Chief Complaint   Patient presents with    Bariatric, Initial Visit     6 mon. Reshilpa. - mimi HUMPHRIES  Michael Jacobs is a 59-year-old female who presents for initial evaluation at the weight management program secondary to her morbid obesity. BMI 86. Current weight 495 pounds. Denies having a lot of significant medical issues other than anxiety and depression. Has chronic lower back pain secondary to excess body weight. Lower extremity joint aching because of the excess body weight. She feels she is not able to do some things physically that she would like to do because of the excess body weight. If no effect of the muscle socially/mentally. Denies tobacco abuse. Bang score 3-4. No children. She states she has been overweight more than 30 years. She has tried multiple attempts at weight loss with no long-term success. She has tried meal replacement programs such as Vianey as well as hypnosis. She is also tried specific diets such as the ketogenic diet. She has never been able to lose a fair amount of weight and keep it off long-term. She has no weekly physical activity regimen. Denies current chest or abdominal pain. No urinary complaints. No hematochezia or melena. Some shortness of breath with increased activity because of the excess body weight. She admits that if she is not able to lose enough adequate excess body weight with medical management only then she would like to proceed with surgical intervention. Review of Systems   Constitutional:  Positive for fatigue. Negative for activity change, appetite change, chills, diaphoresis, fever and unexpected weight change. HENT:  Negative for congestion, dental problem, drooling, ear discharge, ear pain, facial swelling, hearing loss, mouth sores, nosebleeds, postnasal drip, rhinorrhea, sinus pressure, sneezing, sore throat, tinnitus, trouble swallowing and voice change. Eyes:  Negative for photophobia, pain, discharge, redness, itching and visual disturbance.    Respiratory:  Positive for shortness of breath. Negative for apnea, cough, choking, chest tightness, wheezing and stridor. Cardiovascular:  Negative for chest pain, palpitations and leg swelling. Gastrointestinal:  Positive for diarrhea. Negative for abdominal distention, abdominal pain, anal bleeding, blood in stool, constipation, nausea, rectal pain and vomiting. Endocrine: Negative. Genitourinary:  Positive for decreased urine volume. Negative for difficulty urinating, dyspareunia, dysuria, enuresis, flank pain, frequency, genital sores, hematuria, menstrual problem, pelvic pain, urgency, vaginal bleeding, vaginal discharge and vaginal pain. Musculoskeletal:  Positive for back pain and joint swelling. Negative for arthralgias, gait problem, myalgias, neck pain and neck stiffness. Skin:  Negative for color change, pallor, rash and wound. Allergic/Immunologic: Negative. Neurological:  Positive for headaches. Negative for dizziness, tremors, seizures, syncope, facial asymmetry, speech difficulty, weakness, light-headedness and numbness. Hematological:  Negative for adenopathy. Bruises/bleeds easily. Psychiatric/Behavioral:  Negative for agitation, behavioral problems, confusion, decreased concentration, dysphoric mood, hallucinations, self-injury, sleep disturbance and suicidal ideas. The patient is not nervous/anxious and is not hyperactive.       Past Medical History:   Diagnosis Date    Anxiety and depression 7/9/2021    Back pain     Morbid obesity [facility])        Past Surgical History:   Procedure Laterality Date    ADENOIDECTOMY      ANKLE FRACTURE SURGERY Left 9/19/2017    LEFT ANKLE ORIF, I & D right posterior upper leg performed by Angela Conde MD at [address] [place] [address]  02/09/2016    [place] OFFICE/OUTPT VISIT,PROCEDURE ONLY Left 8/2/2018    LEFT SUBTALAR JOINT FUSION, MEDIAL COLUMN FUSION, GASTROC RECESSION, POSS DELTOID REPAIR performed by Sameer Breaux DPM at [facility]       Current Outpatient Medications Medication Sig Dispense Refill    butalbital-acetaminophen-caffeine (FIORICET, ESGIC) -40 MG per tablet Take 1 tablet by mouth every 6 hours as needed for Migraine 15 tablet 0    doxycycline hyclate (VIBRA-TABS) 100 MG tablet Take 1 tablet by mouth 2 times daily for 7 days 14 tablet 0    ARIPiprazole (ABILIFY) 5 MG tablet Take 1 tablet by mouth daily 90 tablet 3    DULoxetine (CYMBALTA) 30 MG extended release capsule Take 1 capsule by mouth daily 90 capsule 3    glycopyrrolate-formoterol (BEVESPI AEROSPHERE) 9-4.8 MCG/ACT AERO Inhale 2 puffs into the lungs 2 times daily 2 each 0    albuterol sulfate HFA (PROVENTIL;VENTOLIN;PROAIR) 108 (90 Base) MCG/ACT inhaler Inhale 2 puffs into the lungs every 4 hours as needed for Wheezing or Shortness of Breath (cough) 1 each 0    hydrOXYzine HCl (ATARAX) 25 MG tablet Take 1 tablet by mouth every 8 hours as needed for Anxiety 60 tablet 1    famotidine (PEPCID) 20 MG tablet Take 1 tablet by mouth 2 times daily (Patient taking differently: Take 20 mg by mouth as needed) 60 tablet 11    aspirin 81 MG chewable tablet Take 1 tablet by mouth daily (Patient not taking: Reported on 10/7/2022) 30 tablet 3    solifenacin (VESICARE) 10 MG tablet Take 1 tablet by mouth daily (Patient not taking: Reported on 10/7/2022) 90 tablet 1    Naproxen Sodium (ALEVE PO) Take by mouth (Patient not taking: Reported on 10/7/2022)       No current facility-administered medications for this visit.        Allergies   Allergen Reactions    Morphine Itching     Felt like she was on fire       Family History   Problem Relation Age of Onset    Cancer Mother     Depression Mother     Thyroid Disease Mother     Arthritis Father     High Blood Pressure Father     Depression Sister     Depression Sister     Diabetes Paternal Grandfather     Cancer Paternal Grandmother     Cancer Maternal Grandfather     Cancer Maternal Grandmother        Social History     Socioeconomic History    Marital status:  Spouse name: Not on file    Number of children: Not on file    Years of education: Not on file    Highest education level: Not on file   Occupational History    Not on file   Tobacco Use    Smoking status: Former     Types: Cigarettes     Quit date: 3/4/2014     Years since quittin.6    Smokeless tobacco: Never   Substance and Sexual Activity    Alcohol use: Yes     Comment: social    Drug use: Yes     Types: Marijuana [facility])    Sexual activity: Not on file   Other Topics Concern    Not on file   Social History Narrative    Not on file     Social Determinants of Health     Financial Resource Strain: Medium Risk    Difficulty of Paying Living Expenses: Somewhat hard   Food Insecurity: Food Insecurity Present    Worried About Running Out of Food in the Last Year: Often true    Ran Out of Food in the Last Year: Never true   Transportation Needs: Not on file   Physical Activity: Not on file   Stress: Not on file   Social Connections: Not on file   Intimate Partner Violence: Not on file   Housing Stability: Not on file     Vitals:    10/07/22 1430   BP: 128/86   Site: Right Lower Arm   Position: Sitting   Cuff Size: Large Adult   Pulse: 80   Temp: 98.4 °F (36.9 °C)   TempSrc: Oral   Weight: (!) 495 lb (224.5 kg)   Height: 5' 3.25\" (1.607 m)     Body mass index is 86.99 kg/m². Wt Readings from Last 3 Encounters:   10/07/22 (!) 495 lb (224.5 kg)   22 (!) 502 lb 6.4 oz (227.9 kg)   22 (!) 510 lb (231.3 kg)     Physical Exam  Vitals reviewed. Constitutional:       General: She is not in acute distress. Appearance: She is well-developed. She is not diaphoretic. HENT:      Head: Normocephalic and atraumatic. Right Ear: External ear normal.      Left Ear: External ear normal.      Nose: Nose normal.   Eyes:      General: No scleral icterus. Right eye: No discharge. Left eye: No discharge.       Conjunctiva/sclera: Conjunctivae normal.   Cardiovascular:      Rate and Rhythm: Normal rate and regular rhythm. Heart sounds: Normal heart sounds. Pulmonary:      Effort: Pulmonary effort is normal. No respiratory distress. Breath sounds: Normal breath sounds. No wheezing or rales. Chest:      Chest wall: No tenderness. Abdominal:      General: Bowel sounds are normal. There is no distension. Palpations: Abdomen is soft. There is no mass. Tenderness: There is no abdominal tenderness. There is no guarding or rebound. Musculoskeletal:         General: No tenderness. Normal range of motion. Cervical back: Normal range of motion and neck supple. Skin:     General: Skin is warm and dry. Coloration: Skin is not pale. Findings: No erythema or rash. Neurological:      Mental Status: She is alert and oriented to person, place, and time. Cranial Nerves: No cranial nerve deficit. Psychiatric:         Behavior: Behavior normal.         Thought Content: Thought content normal.         Judgment: Judgment normal.     Lab Results   Component Value Date    WBC 6.1 07/11/2021    HGB 10.3 (L) 07/11/2021    HCT 36.0 (L) 07/11/2021    MCV 75.2 (L) 07/11/2021     07/11/2021     Lab Results   Component Value Date     07/12/2021    K 4.5 07/12/2021    CL 98 07/12/2021    CO2 31 07/12/2021     Lab Results   Component Value Date    CREATININE 0.4 07/12/2021     Lab Results   Component Value Date    ALT 55 07/12/2021    AST 40 07/12/2021    ALKPHOS 46 07/12/2021    BILITOT 0.4 07/12/2021     Lab Results   Component Value Date    LIPASE 10.1 07/08/2021       Patient Active Problem List   Diagnosis    Closed fracture of lateral malleolus of left fibula    Morbid obesity with BMI of 70 and over, adult ([facility] [address]    Vitamin D deficiency    Equinus deformity of foot    Pneumonia due to COVID-19 virus    Herniation of right lung    Anxiety and depression    Acute respiratory failure with hypoxia [facility])     Narrative   CT angiography chest with contrast. 3D Postprocessing. Comparison:  CT,KOSR  - CT CHEST WO CONTRAST  - 11/16/2017 01:32 PM EST       Findings: The study is limited by artifact related to patient body habitus and poor    bolus timing. Aorta is nonaneurysmal with no dissection. No indication of central    pulmonary embolism. Distal branches cannot be well evaluated. There is herniation of a portion of the right upper lobe through the    anterior chest wall with the hernia measuring approximately 7.7 x 3.8 cm. There are diffuse somewhat nodular airspace opacities seen throughout the    lungs consistent with pneumonia. No pleural effusion. Mildly enlarged AP window lymph nodes are likely reactive. No acute finding in the upper abdomen. No acute or aggressive osseous    lesion. Impression   1. Limited exam.  No central pulmonary embolism suspected. 2.  Diffuse bilateral airspace opacities consistent with pneumonia. 3.  Herniation of a portion of the right upper lobe through the anterior    chest wall. This document has been electronically signed by: Jennifer Reed MD on    07/08/2021 06:44 AM       All CTs at this facility use dose modulation techniques and iterative    reconstructions, and/or weight-based dosing   when appropriate to reduce radiation to a low as reasonably achievable. An electronic signature was used to authenticate this note.     --Shahida Hopkins MD

## 2022-10-11 ENCOUNTER — CARE COORDINATION (OUTPATIENT)
Dept: CARE COORDINATION | Age: 37
End: 2022-10-11

## 2022-10-11 ENCOUNTER — OFFICE VISIT (OUTPATIENT)
Dept: PHYSICAL MEDICINE AND REHAB | Age: 37
End: 2022-10-11
Payer: MEDICARE

## 2022-10-11 VITALS
WEIGHT: 293 LBS | SYSTOLIC BLOOD PRESSURE: 132 MMHG | HEIGHT: 63 IN | DIASTOLIC BLOOD PRESSURE: 82 MMHG | BODY MASS INDEX: 51.91 KG/M2

## 2022-10-11 DIAGNOSIS — M79.2 NEUROPATHIC PAIN: Primary | ICD-10-CM

## 2022-10-11 DIAGNOSIS — G89.29 CHRONIC PAIN OF LEFT ANKLE: ICD-10-CM

## 2022-10-11 DIAGNOSIS — G89.4 CHRONIC PAIN SYNDROME: ICD-10-CM

## 2022-10-11 DIAGNOSIS — M25.572 CHRONIC PAIN OF LEFT ANKLE: ICD-10-CM

## 2022-10-11 PROCEDURE — 1036F TOBACCO NON-USER: CPT | Performed by: NURSE PRACTITIONER

## 2022-10-11 PROCEDURE — G8417 CALC BMI ABV UP PARAM F/U: HCPCS | Performed by: NURSE PRACTITIONER

## 2022-10-11 PROCEDURE — G8484 FLU IMMUNIZE NO ADMIN: HCPCS | Performed by: NURSE PRACTITIONER

## 2022-10-11 PROCEDURE — 99204 OFFICE O/P NEW MOD 45 MIN: CPT | Performed by: NURSE PRACTITIONER

## 2022-10-11 PROCEDURE — G8427 DOCREV CUR MEDS BY ELIG CLIN: HCPCS | Performed by: NURSE PRACTITIONER

## 2022-10-11 RX ORDER — LIDOCAINE 50 MG/G
2 PATCH TOPICAL DAILY
Qty: 60 PATCH | Refills: 0 | Status: SHIPPED | OUTPATIENT
Start: 2022-10-11 | End: 2022-11-10
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
## 2022-10-11 NOTE — CARE COORDINATION
Ambulatory Care Coordination Note  10/11/2022    ACC: Nichole Servin, RN    Wade Rubio is being followed by care coordination for education and assistance in managing her chronic conditions and assisting with healthcare needs. Spoke with Wade Rubio today. Pt has h/o: obesity, COVID,     Offered patient enrollment in the Remote Patient Monitoring (RPM) program for in-home monitoring: NA.  Anay had appt with [facility] last wk. Pt left appt feeling frustrated. Pt states that she did not feel like anyone listened to her wishes. Pt has since decided to pursue seeing a different provider. Pt has appt with [facility] on 11/16. Appt cancelled with [facility] per pt's request.   Pt ultimately wanting to try medications, exercise, diet for wt loss vs a procedure at this time. Support and encouragement provided. PULM-now on home oxygen at 1 liter with activity. Tolerating. Encouraged deep breathing exercises. Has upcoming sleep study. Still not sleeping well at night. Sleeping in semi-upright position. Has upcoming PULM appt  Has Pain mgmt appt today. Verified time with pt. Pt has a couple more doses of atb prescribed for open areas to back of leg. Advised to monitor closely and call with any new concerns. Depression: pt reports that she is doing well on medication. Feels she is in a good place mentally  Plan of care:  F/u with [facility] as scheduled  Work to stay active  Eating small frequent meals, cutting back portion sizes, drinking more water, snacking less. Discussed healthy snack options  Deep breathing exercises  Complete sleep study as scheduled  F/u with PULM as scheduled  Arranged 3mth f/u with PCP. General Assessment    Do you have any symptoms that are causing concern?: Yes  Progression since Onset: Gradually Improving  Reported Symptoms: Other, Weight Loss (Comment: obesity.  now going to follow with [facility])           Lab Results None            Care Coordination Interventions    Referral from Primary Care Provider: No  Suggested Interventions and Community Resources  Medi Set or Pill Pack: Declined          Goals Addressed                   This Visit's Progress     Behavioral Health   Improving     I will work towards the following Behavioral Health goals: I will take my medications daily as prescribed. and will consider getting established with mental health provider. Barriers: lack of support, overwhelmed by complexity of regimen, and stress  Plan for overcoming my barriers: support and education from PCP, ACM, SW. Resource info provided on different providers and services in the area. Confidence: 8/10  Anticipated Goal Completion Date: 11/12/22              Prior to Admission medications    Medication Sig Start Date End Date Taking?  Authorizing Provider   butalbital-acetaminophen-caffeine (FIORICET, ESGIC) -28 MG per tablet Take 1 tablet by mouth every 6 hours as needed for Migraine 10/6/22   Heddie Justin, APRN - CNP   doxycycline hyclate (VIBRA-TABS) 100 MG tablet Take 1 tablet by mouth 2 times daily for 7 days 10/6/22 10/13/22  Heddie Justin, APRN - CNP   ARIPiprazole (ABILIFY) 5 MG tablet Take 1 tablet by mouth daily 10/3/22   Heddie Justin, APRN - CNP   DULoxetine (CYMBALTA) 30 MG extended release capsule Take 1 capsule by mouth daily 10/3/22   Heddie Justin, APRN - CNP   glycopyrrolate-formoterol (BEVESPI AEROSPHERE) 9-4.8 MCG/ACT AERO Inhale 2 puffs into the lungs 2 times daily 9/28/22   Heddie Justin, APRN - CNP   albuterol sulfate HFA (PROVENTIL;VENTOLIN;PROAIR) 108 (90 Base) MCG/ACT inhaler Inhale 2 puffs into the lungs every 4 hours as needed for Wheezing or Shortness of Breath (cough) 8/11/22   Heddie Justin, APRN - CNP   hydrOXYzine HCl (ATARAX) 25 MG tablet Take 1 tablet by mouth every 8 hours as needed for Anxiety 8/11/22   Heddie Justin, APRN - CNP   aspirin 81 MG chewable tablet Take 1 tablet by mouth daily  Patient not taking: Reported on 10/7/2022 7/14/21   KODAK Sotelo   famotidine (PEPCID) 20 MG tablet Take 1 tablet by mouth 2 times daily  Patient taking differently: Take 20 mg by mouth as needed 4/1/21   MALCOM Oneal CNP   solifenacin (VESICARE) 10 MG tablet Take 1 tablet by mouth daily  Patient not taking: Reported on 10/7/2022 2/11/21   MALCOM Oneal CNP   Naproxen Sodium (ALEVE PO) Take by mouth  Patient not taking: Reported on 10/7/2022    Historical Provider, MD       Future Appointments   Date Time Provider [place]   10/11/2022  2:00 PM MALCOM Martinez CNP N [facility] Pain [address]   10/14/2022  1:00 PM STR DELPHOS CT IMAGING STRZ DEL CT STR [place]   10/17/2022  8:00 PM SCHEDULE, STR SLEEP TECH 03 [facility] 1 HOD   11/9/2022 10:20 AM Rajni Rivera, [address]   11/10/2022  9:20 AM MALCOM Pimentel [address]   12/28/2022  1:00 PM MALCOM Oneal CNP [address]

## 2022-10-14 ENCOUNTER — HOSPITAL ENCOUNTER (OUTPATIENT)
Dept: CT IMAGING | Age: 37
Discharge: HOME | End: 2022-10-14
Payer: MEDICARE

## 2022-10-14 ENCOUNTER — HOSPITAL ENCOUNTER (OUTPATIENT)
Age: 37
Discharge: HOME | End: 2022-10-14
Payer: MEDICARE

## 2022-10-14 ENCOUNTER — TELEPHONE (OUTPATIENT)
Dept: PHYSICAL MEDICINE AND REHAB | Age: 37
End: 2022-10-14

## 2022-10-14 DIAGNOSIS — B99.8 OTHER INFECTIOUS DISEASE: ICD-10-CM

## 2022-10-14 DIAGNOSIS — E55.9 VITAMIN D DEFICIENCY: ICD-10-CM

## 2022-10-14 DIAGNOSIS — R91.8 PULMONARY INFILTRATES: ICD-10-CM

## 2022-10-14 DIAGNOSIS — U07.1 PNEUMONIA DUE TO COVID-19 VIRUS: ICD-10-CM

## 2022-10-14 DIAGNOSIS — J12.82 PNEUMONIA DUE TO COVID-19 VIRUS: ICD-10-CM

## 2022-10-14 DIAGNOSIS — J98.4 HERNIATION OF RIGHT LUNG: ICD-10-CM

## 2022-10-14 DIAGNOSIS — G47.10 HYPERSOMNIA: ICD-10-CM

## 2022-10-14 DIAGNOSIS — R63.5 ABNORMAL WEIGHT GAIN: ICD-10-CM

## 2022-10-14 DIAGNOSIS — N76.89 OTHER SPECIFIED INFLAMMATION OF VAGINA AND VULVA: ICD-10-CM

## 2022-10-14 DIAGNOSIS — E66.2 OBESITY HYPOVENTILATION SYNDROME (HCC): ICD-10-CM

## 2022-10-14 DIAGNOSIS — E66.01 MORBID OBESITY (HCC): ICD-10-CM

## 2022-10-14 DIAGNOSIS — Z72.51 HIGH RISK SEXUAL BEHAVIOR, UNSPECIFIED TYPE: ICD-10-CM

## 2022-10-14 DIAGNOSIS — R06.83 LOUD SNORING: ICD-10-CM

## 2022-10-14 LAB
AVERAGE GLUCOSE: 117 MG/DL (ref 70–126)
HBA1C MFR BLD: 5.9 % (ref 4.4–6.4)
TRICHOMONAS PREP: NEGATIVE

## 2022-10-14 PROCEDURE — 87591 N.GONORRHOEAE DNA AMP PROB: CPT

## 2022-10-14 PROCEDURE — 87389 HIV-1 AG W/HIV-1&-2 AB AG IA: CPT

## 2022-10-14 PROCEDURE — 82306 VITAMIN D 25 HYDROXY: CPT

## 2022-10-14 PROCEDURE — 87491 CHLMYD TRACH DNA AMP PROBE: CPT

## 2022-10-14 PROCEDURE — 86592 SYPHILIS TEST NON-TREP QUAL: CPT

## 2022-10-14 PROCEDURE — 84443 ASSAY THYROID STIM HORMONE: CPT

## 2022-10-14 PROCEDURE — 80074 ACUTE HEPATITIS PANEL: CPT

## 2022-10-14 PROCEDURE — 80053 COMPREHEN METABOLIC PANEL: CPT

## 2022-10-14 PROCEDURE — 83036 HEMOGLOBIN GLYCOSYLATED A1C: CPT

## 2022-10-14 PROCEDURE — 84439 ASSAY OF FREE THYROXINE: CPT

## 2022-10-14 PROCEDURE — 80061 LIPID PANEL: CPT

## 2022-10-14 PROCEDURE — 36415 COLL VENOUS BLD VENIPUNCTURE: CPT

## 2022-10-14 PROCEDURE — 85027 COMPLETE CBC AUTOMATED: CPT

## 2022-10-14 PROCEDURE — 87808 TRICHOMONAS ASSAY W/OPTIC: CPT

## 2022-10-14 NOTE — TELEPHONE ENCOUNTER
PA SENT TO PLAN  (Key: [id number])  lidocaine (LIDODERM) 5 %      Your information has been submitted to [place] [employer] Part D. [employer] Part D will review the request and will issue a decision, typically within 1-3 days from your submission.

## 2022-10-15 LAB
ALBUMIN SERPL-MCNC: 3.7 G/DL (ref 3.5–5.1)
ALP BLD-CCNC: 76 U/L (ref 38–126)
ALT SERPL-CCNC: 20 U/L (ref 11–66)
ANION GAP SERPL CALCULATED.3IONS-SCNC: 11 MEQ/L (ref 8–16)
AST SERPL-CCNC: 21 U/L (ref 5–40)
BILIRUB SERPL-MCNC: 0.3 MG/DL (ref 0.3–1.2)
BUN BLDV-MCNC: 18 MG/DL (ref 7–22)
CALCIUM SERPL-MCNC: 9 MG/DL (ref 8.5–10.5)
CHLAMYDIA TRACHOMATIS BY RT-PCR: NOT DETECTED
CHLORIDE BLD-SCNC: 100 MEQ/L (ref 98–111)
CHOLESTEROL, TOTAL: 182 MG/DL (ref 100–199)
CO2: 29 MEQ/L (ref 23–33)
CREAT SERPL-MCNC: 0.6 MG/DL (ref 0.4–1.2)
CT/NG SOURCE: NORMAL
ERYTHROCYTE [DISTWIDTH] IN BLOOD BY AUTOMATED COUNT: 21.4 % (ref 11.5–14.5)
ERYTHROCYTE [DISTWIDTH] IN BLOOD BY AUTOMATED COUNT: 54.2 FL (ref 35–45)
GFR SERPL CREATININE-BSD FRML MDRD: > 90 ML/MIN/1.73M2
GLUCOSE BLD-MCNC: 82 MG/DL (ref 70–108)
HAV IGM SER IA-ACNC: NEGATIVE
HCT VFR BLD CALC: 43.9 % (ref 37–47)
HDLC SERPL-MCNC: 55 MG/DL
HEMOGLOBIN: 11.8 GM/DL (ref 12–16)
HEPATITIS B CORE IGM ANTIBODY: NEGATIVE
HEPATITIS B SURFACE ANTIGEN: NEGATIVE
HEPATITIS C ANTIBODY: NEGATIVE
HIV AG/AB: NONREACTIVE
LDL CHOLESTEROL CALCULATED: 110 MG/DL
MCH RBC QN AUTO: 20 PG (ref 26–33)
MCHC RBC AUTO-ENTMCNC: 26.9 GM/DL (ref 32.2–35.5)
MCV RBC AUTO: 74.5 FL (ref 81–99)
NEISSERIA GONORRHOEAE BY RT-PCR: NOT DETECTED
PLATELET # BLD: 277 THOU/MM3 (ref 130–400)
PMV BLD AUTO: 11.3 FL (ref 9.4–12.4)
POTASSIUM SERPL-SCNC: 4.2 MEQ/L (ref 3.5–5.2)
RBC # BLD: 5.89 MILL/MM3 (ref 4.2–5.4)
RPR: NONREACTIVE
SCAN OF BLOOD SMEAR: NORMAL
SODIUM BLD-SCNC: 140 MEQ/L (ref 135–145)
T4 FREE: 1.31 NG/DL (ref 0.93–1.76)
TOTAL PROTEIN: 7.4 G/DL (ref 6.1–8)
TRIGL SERPL-MCNC: 87 MG/DL (ref 0–199)
TSH SERPL DL<=0.05 MIU/L-ACNC: 4.71 UIU/ML (ref 0.4–4.2)
VITAMIN D 25-HYDROXY: 12 NG/ML (ref 30–100)
WBC # BLD: 11.1 THOU/MM3 (ref 4.8–10.8)

## 2022-10-16 ENCOUNTER — PATIENT MESSAGE (OUTPATIENT)
Dept: FAMILY MEDICINE CLINIC | Age: 37
End: 2022-10-16

## 2022-10-16 DIAGNOSIS — D50.9 IRON DEFICIENCY ANEMIA, UNSPECIFIED IRON DEFICIENCY ANEMIA TYPE: ICD-10-CM

## 2022-10-16 DIAGNOSIS — E55.9 VITAMIN D DEFICIENCY: Primary | ICD-10-CM

## 2022-10-17 ENCOUNTER — HOSPITAL ENCOUNTER (OUTPATIENT)
Dept: SLEEP CENTER | Age: 37
Discharge: HOME | End: 2022-10-19
Payer: MEDICARE

## 2022-10-17 DIAGNOSIS — G47.10 HYPERSOMNIA: ICD-10-CM

## 2022-10-17 DIAGNOSIS — R06.83 LOUD SNORING: ICD-10-CM

## 2022-10-17 DIAGNOSIS — E66.01 MORBID OBESITY (HCC): ICD-10-CM

## 2022-10-17 PROCEDURE — 95810 POLYSOM 6/> YRS 4/> PARAM: CPT

## 2022-10-17 RX ORDER — ERGOCALCIFEROL 1.25 MG/1
50000 CAPSULE ORAL WEEKLY
Qty: 12 CAPSULE | Refills: 3 | Status: SHIPPED | OUTPATIENT
Start: 2022-10-17

## 2022-10-17 RX ORDER — FERROUS SULFATE 325(65) MG
325 TABLET ORAL 2 TIMES DAILY
Qty: 180 TABLET | Refills: 3 | Status: SHIPPED | OUTPATIENT
Start: 2022-10-17

## 2022-10-17 NOTE — TELEPHONE ENCOUNTER
From: Olinda Price  Sent: 10/16/2022 4:14 PM EDT  To: [facility] Clinical Support Pool  Subject: Question regarding HEMOGLOBIN A1C    Sorry misread.  No I don't take iron, but have in the past.

## 2022-10-18 ENCOUNTER — TELEPHONE (OUTPATIENT)
Dept: SLEEP CENTER | Age: 37
End: 2022-10-18

## 2022-10-18 ENCOUNTER — APPOINTMENT (OUTPATIENT)
Dept: CT IMAGING | Age: 37
End: 2022-10-18
Payer: MEDICARE

## 2022-10-18 ENCOUNTER — HOSPITAL ENCOUNTER (EMERGENCY)
Age: 37
Discharge: HOME | End: 2022-10-18
Attending: STUDENT IN AN ORGANIZED HEALTH CARE EDUCATION/TRAINING PROGRAM
Payer: MEDICARE

## 2022-10-18 ENCOUNTER — APPOINTMENT (OUTPATIENT)
Dept: GENERAL RADIOLOGY | Age: 37
End: 2022-10-18
Payer: MEDICARE

## 2022-10-18 VITALS
BODY MASS INDEX: 51.91 KG/M2 | WEIGHT: 293 LBS | HEIGHT: 63 IN | SYSTOLIC BLOOD PRESSURE: 147 MMHG | HEART RATE: 92 BPM | DIASTOLIC BLOOD PRESSURE: 83 MMHG | RESPIRATION RATE: 21 BRPM | TEMPERATURE: 98.1 F | OXYGEN SATURATION: 94 %

## 2022-10-18 DIAGNOSIS — R06.02 SHORTNESS OF BREATH: ICD-10-CM

## 2022-10-18 DIAGNOSIS — G47.33 OSA (OBSTRUCTIVE SLEEP APNEA): Primary | ICD-10-CM

## 2022-10-18 DIAGNOSIS — R09.02 HYPOXIA: Primary | ICD-10-CM

## 2022-10-18 LAB
ANION GAP SERPL CALCULATED.3IONS-SCNC: 6 MEQ/L (ref 8–16)
BASOPHILS # BLD: 0.4 %
BASOPHILS ABSOLUTE: 0 THOU/MM3 (ref 0–0.1)
BUN BLDV-MCNC: 14 MG/DL (ref 7–22)
CALCIUM SERPL-MCNC: 8.8 MG/DL (ref 8.5–10.5)
CHLORIDE BLD-SCNC: 102 MEQ/L (ref 98–111)
CO2: 33 MEQ/L (ref 23–33)
CREAT SERPL-MCNC: 0.6 MG/DL (ref 0.4–1.2)
D-DIMER QUANTITATIVE: 644 NG/ML FEU (ref 0–500)
EKG ATRIAL RATE: 80 BPM
EKG P AXIS: 42 DEGREES
EKG P-R INTERVAL: 172 MS
EKG Q-T INTERVAL: 444 MS
EKG QRS DURATION: 80 MS
EKG QTC CALCULATION (BAZETT): 512 MS
EKG R AXIS: 137 DEGREES
EKG T AXIS: 55 DEGREES
EKG VENTRICULAR RATE: 80 BPM
EOSINOPHIL # BLD: 2.1 %
EOSINOPHILS ABSOLUTE: 0.2 THOU/MM3 (ref 0–0.4)
ERYTHROCYTE [DISTWIDTH] IN BLOOD BY AUTOMATED COUNT: 21 % (ref 11.5–14.5)
ERYTHROCYTE [DISTWIDTH] IN BLOOD BY AUTOMATED COUNT: 53.1 FL (ref 35–45)
GFR SERPL CREATININE-BSD FRML MDRD: > 60 ML/MIN/1.73M2
GLUCOSE BLD-MCNC: 98 MG/DL (ref 70–108)
HCT VFR BLD CALC: 38.7 % (ref 37–47)
HEMOGLOBIN: 10.7 GM/DL (ref 12–16)
HYPOCHROMIA: PRESENT
IMMATURE GRANS (ABS): 0.03 THOU/MM3 (ref 0–0.07)
IMMATURE GRANULOCYTES: 0.3 %
LYMPHOCYTES # BLD: 22.5 %
LYMPHOCYTES ABSOLUTE: 2.1 THOU/MM3 (ref 1–4.8)
MCH RBC QN AUTO: 20.3 PG (ref 26–33)
MCHC RBC AUTO-ENTMCNC: 27.6 GM/DL (ref 32.2–35.5)
MCV RBC AUTO: 73.6 FL (ref 81–99)
MONOCYTES # BLD: 7.8 %
MONOCYTES ABSOLUTE: 0.7 THOU/MM3 (ref 0.4–1.3)
NUCLEATED RED BLOOD CELLS: 0 /100 WBC
OSMOLALITY CALCULATION: 281.7 MOSMOL/KG (ref 275–300)
PLATELET # BLD: 230 THOU/MM3 (ref 130–400)
PMV BLD AUTO: 10.6 FL (ref 9.4–12.4)
POTASSIUM REFLEX MAGNESIUM: 4.2 MEQ/L (ref 3.5–5.2)
PRO-BNP: 665.1 PG/ML (ref 0–450)
RBC # BLD: 5.26 MILL/MM3 (ref 4.2–5.4)
SEG NEUTROPHILS: 66.9 %
SEGMENTED NEUTROPHILS ABSOLUTE COUNT: 6.3 THOU/MM3 (ref 1.8–7.7)
SODIUM BLD-SCNC: 141 MEQ/L (ref 135–145)
STATUS: NORMAL
TROPONIN T: 0.01 NG/ML
WBC # BLD: 9.4 THOU/MM3 (ref 4.8–10.8)

## 2022-10-18 PROCEDURE — 71275 CT ANGIOGRAPHY CHEST: CPT

## 2022-10-18 PROCEDURE — 84484 ASSAY OF TROPONIN QUANT: CPT

## 2022-10-18 PROCEDURE — 71046 X-RAY EXAM CHEST 2 VIEWS: CPT

## 2022-10-18 PROCEDURE — 83880 ASSAY OF NATRIURETIC PEPTIDE: CPT

## 2022-10-18 PROCEDURE — 6360000004 HC RX CONTRAST MEDICATION: Performed by: STUDENT IN AN ORGANIZED HEALTH CARE EDUCATION/TRAINING PROGRAM

## 2022-10-18 PROCEDURE — 36415 COLL VENOUS BLD VENIPUNCTURE: CPT

## 2022-10-18 PROCEDURE — 85025 COMPLETE CBC W/AUTO DIFF WBC: CPT

## 2022-10-18 PROCEDURE — 93005 ELECTROCARDIOGRAM TRACING: CPT | Performed by: STUDENT IN AN ORGANIZED HEALTH CARE EDUCATION/TRAINING PROGRAM

## 2022-10-18 PROCEDURE — 85379 FIBRIN DEGRADATION QUANT: CPT

## 2022-10-18 PROCEDURE — 93010 ELECTROCARDIOGRAM REPORT: CPT | Performed by: INTERNAL MEDICINE

## 2022-10-18 PROCEDURE — 80048 BASIC METABOLIC PNL TOTAL CA: CPT

## 2022-10-18 PROCEDURE — 99285 EMERGENCY DEPT VISIT HI MDM: CPT

## 2022-10-18 RX ADMIN — IOPAMIDOL 80 ML: 755 INJECTION, SOLUTION INTRAVENOUS at 07:29

## 2022-10-18 ASSESSMENT — PAIN DESCRIPTION - LOCATION: LOCATION: HEAD

## 2022-10-18 ASSESSMENT — PAIN - FUNCTIONAL ASSESSMENT
PAIN_FUNCTIONAL_ASSESSMENT: NONE - DENIES PAIN
PAIN_FUNCTIONAL_ASSESSMENT: 0-10
PAIN_FUNCTIONAL_ASSESSMENT: NONE - DENIES PAIN

## 2022-10-18 ASSESSMENT — PAIN SCALES - GENERAL: PAINLEVEL_OUTOF10: 8

## 2022-10-18 NOTE — ED NOTES
Patient resting in bed. No distress noted. Call light within reach.      Rupali Arnett RN  10/18/22 [id number]

## 2022-10-18 NOTE — ED PROVIDER NOTES
Transfer of Care Note:   I have personally performed a face to face diagnostic evaluation on this patient. I have personally performed a face to face diagnostic evaluation on this patient. The patient's initial evaluation and plan have been discussed with the prior provider who initially evaluated the patient. Nursing Notes, Past Medical Hx, Past Surgical Hx, Social Hx, Allergies, and Family Hx were all reviewed. (Please note that portions of this note were completed with a voice recognition program.  Efforts were made to edit the dictations but occasionally words are mis-transcribed.)    11:06 AM EDT: The patient was evaluated. Sabrina Enriquez is a 40 y.o. female who presents to the Emergency Department for the evaluation of hypoxia during a sleep study session. Exam:  Refer to Dr. Gay Spatz note. All EKG's are interpreted by the Emergency Department Physician who either signs or Co-signs this chart in the absence of a cardiologist.    RADIOLOGY: non-plain film images(s) such as CT, Ultrasound and MRI are read by the radiologist.  CTA Chest W WO Contrast   Final Result       1. No pulmonary emboli. 2. Cardiomegaly with scattered ground glass attenuation. This can indicate mild pulmonary edema versus nonspecific inflammation. **This report has been created using voice recognition software. It may contain minor errors which are inherent in voice recognition technology. **      Final report electronically signed by Dr. Curly Raman on 10/18/2022 8:08 AM      XR CHEST (2 VW)   Final Result   Streaky densities in the left lung base are concerning for pneumonia or    pneumonitis.       This document has been electronically signed by: Zaida Wood MD    on 10/18/2022 05:46 AM          ED LABS:  Labs Reviewed   CBC WITH AUTO DIFFERENTIAL - Abnormal; Notable for the following components:       Result Value    Hemoglobin 10.7 (*)     MCV 73.6 (*)     MCH 20.3 (*)     MCHC 27.6 (*) RDW-CV 21.0 (*)     RDW-SD 53.1 (*)     All other components within normal limits   TROPONIN - Abnormal; Notable for the following components:    Troponin T 0.015 (*)     All other components within normal limits   D-DIMER, QUANTITATIVE - Abnormal; Notable for the following components:    D-Dimer, Quant 644.00 (*)     All other components within normal limits   BRAIN NATRIURETIC PEPTIDE - Abnormal; Notable for the following components:    Pro-.1 (*)     All other components within normal limits   ANION GAP - Abnormal; Notable for the following components:    Anion Gap 6.0 (*)     All other components within normal limits   BASIC METABOLIC PANEL W/ REFLEX TO MG FOR LOW K   GLOMERULAR FILTRATION RATE, ESTIMATED   OSMOLALITY       MDM:  Patient presenting with hypoxia during sleep study, patient has history of chronic respiratory failure is on oxygen at home, reports 1 L while awake. She is had to increase it to 3 L today to get adequate pulse ox reading, above 94%. On my reevaluation, patient has no work of breathing, however, she becomes hypoxic whenever she is asleep. I called patient's pulmonologist, Dr. Murtaza Atwood, we made plans to have patient increase her oxygen setting to  3 L, she'll also be tested for BiPAP machine tomorrow through Dr. Maureen Pena office. FINAL IMPRESSION      1. Hypoxia    2. Shortness of breath        Care of this patient was transferred from Dr Bernard Danielson to myself at shift change.     (Please note that portions of this note were completed with a voice recognition program.  Efforts were made to edit the dictations but occasionally words are mis-transcribed.)         Aron Rea DO  10/18/22 1505

## 2022-10-18 NOTE — ED PROVIDER NOTES
tablet by mouth daily    BUTALBITAL-ACETAMINOPHEN-CAFFEINE (FIORICET, ESGIC) -40 MG PER TABLET    Take 1 tablet by mouth every 6 hours as needed for Migraine    DULOXETINE (CYMBALTA) 30 MG EXTENDED RELEASE CAPSULE    Take 1 capsule by mouth daily    FAMOTIDINE (PEPCID) 20 MG TABLET    Take 1 tablet by mouth 2 times daily    FERROUS SULFATE (IRON 325) 325 (65 FE) MG TABLET    Take 1 tablet by mouth 2 times daily    GLYCOPYRROLATE-FORMOTEROL (BEVESPI AEROSPHERE) 9-4.8 MCG/ACT AERO    Inhale 2 puffs into the lungs 2 times daily    HYDROXYZINE HCL (ATARAX) 25 MG TABLET    Take 1 tablet by mouth every 8 hours as needed for Anxiety    LIDOCAINE (LIDODERM) 5 %    Place 2 patches onto the skin daily 12 hours on, 12 hours off.     MULTIPLE VITAMIN (MULTIVITAMIN ADULT PO)    Take by mouth    NAPROXEN SODIUM (ALEVE PO)    Take by mouth    SOLIFENACIN (VESICARE) 10 MG TABLET    Take 1 tablet by mouth daily    VITAMIN D (ERGOCALCIFEROL) 1.25 MG (66038 UT) CAPS CAPSULE    Take 1 capsule by mouth once a week       ALLERGIES     Morphine    FAMILY HISTORY       Family History   Problem Relation Age of Onset    Cancer Mother     Depression Mother     Thyroid Disease Mother     Arthritis Father     High Blood Pressure Father     Depression Sister     Depression Sister     Diabetes Paternal Grandfather     Cancer Paternal Grandmother     Cancer Maternal Grandfather     Cancer Maternal Grandmother         SOCIAL HISTORY       Social History     Socioeconomic History    Marital status:      Spouse name: None    Number of children: None    Years of education: None    Highest education level: None   Tobacco Use    Smoking status: Former     Types: Cigarettes     Quit date: 3/4/2014     Years since quittin.6    Smokeless tobacco: Never   Substance and Sexual Activity    Alcohol use: Yes     Comment: social    Drug use: Yes     Types: Marijuana (Weed)     Social Determinants of Health     Financial Resource Strain: Medium Risk    Difficulty of Paying Living Expenses: Somewhat hard   Food Insecurity: Food Insecurity Present    Worried About Running Out of Food in the Last Year: Often true    Ran Out of Food in the Last Year: Never true       REVIEW OF SYSTEMS     Review of Systems  - CONSTITUTIONAL: Denies weight loss, fever and chills. - HEENT: Denies changes in vision and hearing.    -   RESPIRATORY: Reports shortness of breath      - CV: Denies palpitations and CP.       - GI: Denies abdominal pain, nausea, vomiting and diarrhea.      - : Denies dysuria and urinary frequency. - MSK: Denies myalgia and joint pain. - SKIN: Denies rash and pruritus.    -   NEUROLOGICAL: Denies headache and syncope. - PSYCHIATRIC: Denies recent changes in mood. Denies anxiety and depression. Except as noted above the remainder of the review of systems was reviewed and is.    PHYSICAL EXAM    (up to 7 for level 4, 8 or more for level 5)     ED Triage Vitals   BP Temp Temp Source Heart Rate Resp SpO2 Height Weight   10/18/22 0417 10/18/22 0416 10/18/22 0416 10/18/22 0416 10/18/22 0416 10/18/22 0416 10/18/22 0416 10/18/22 0416   (!) 145/60 98.1 °F (36.7 °C) Oral (!) 102 24 91 % 5' 3.25\" (1.607 m) (!) 495 lb (224.5 kg)       Physical Exam  Constitutional:  Well developed, well nourished, no acute distress, non-toxic appearance, morbidly obese  Eyes:  conjunctiva normal   HENT:  Atraumatic, external ears normal, nose normal, oropharynx moist,   Neck: No JVD  Respiratory:  No respiratory distress, normal breath sounds, no rales, no wheezing   Cardiovascular: Tachycardia, normal rhythm, no murmurs, no gallops, no rubs   GI:  Soft, nontender,   Musculoskeletal: Unable to assess secondary to habitus back  Integument:  Well hydrated, no rash   Neurologic:  Alert & oriented x 3, CN 2-12 normal, normal motor function, normal sensory function, no focal deficits noted   Psychiatric:  Speech and behavior appropriate  DIAGNOSTIC RESULTS EKG:(none if blank)  All [facility]'s are [facility] who either signs or Co-signs this chart in the absence of a cardiologist.    Sinus rhythm with sinus arrhythmia, normal axis, T wave inversion in lead III, T wave flattening in multiple leads, normal intervals. RADIOLOGY: (none if blank)   Interpretation per the Radiologistbelow, if available at the time of this note:    XR CHEST (2 VW)   Final Result   Streaky densities in the left lung base are concerning for pneumonia or    pneumonitis. This document has been electronically signed by: Thelma Sampson MD    on 10/18/2022 05:46 AM      CTA Chest W WO Contrast    (Results Pending)       LABS:  Labs Reviewed   CBC WITH AUTO DIFFERENTIAL - Abnormal; Notable for the following components:       Result Value    Hemoglobin 10.7 (*)     MCV 73.6 (*)     MCH 20.3 (*)     MCHC 27.6 (*)     RDW-CV 21.0 (*)     RDW-SD 53.1 (*)     All other components within normal limits   TROPONIN - Abnormal; Notable for the following components:    Troponin T 0.015 (*)     All other components within normal limits   D-DIMER, QUANTITATIVE - Abnormal; Notable for the following components:    D-Dimer, Quant 644.00 (*)     All other components within normal limits   BRAIN NATRIURETIC PEPTIDE - Abnormal; Notable for the following components:    Pro-.1 (*)     All other components within normal limits   ANION GAP - Abnormal; Notable for the following components:    Anion Gap 6.0 (*)     All other components within normal limits   BASIC METABOLIC PANEL W/ REFLEX TO MG FOR LOW K   GLOMERULAR FILTRATION RATE, ESTIMATED   OSMOLALITY       All other labs were within normal range or not returned as of this dictation. Please note, any cultures that may have been sent were not resulted at the time of this patient visit.     EMERGENCY DEPARTMENT COURSE andMedical Decision Making:     MDM  /   Slightly elevated troponin, slightly elevated BNP, slightly elevated D-dimer. In the setting of tachycardia and hypoxia the concern is for a submassive pulmonary embolism. Hemodynamically stable. O2 sats normal on 2 L nasal cannula. CT angio chest ordered to rule out PE. Patient signed out to my dayshift colleague pending CTA chest.      ED Medications administered this visit:  Medications - No data to display      Procedures: (None if blank)       CLINICAL       1. Hypoxia    2. Shortness of breath          DISPOSITION/PLAN   DISPOSITION    Signed out to my dayshift colleague            (Please note that portions of this note were completed with a voice recognition program.  Efforts were made to edit the dictations but occasionallywords are mis-transcribed. )      Rogers Green MD, (electronically signed)  Attending Physician, Emergency Department         Rogers Green MD  10/18/22 1957

## 2022-10-18 NOTE — TELEPHONE ENCOUNTER
Anay is scheduled to come in for a CPAP titration on 10/19/22. I spoke with Dr. Eleni Wolfe regarding her PSG and he would like her to do an in-lab titration ASAP. Her F/U with [facility] on 11/10/22 will need to be R/S. Thanks.

## 2022-10-18 NOTE — ED NOTES
PT discharged with home oxygen at this time. Verbalized understanding.      Rupali Arnett RN  10/18/22 1126

## 2022-10-18 NOTE — ED NOTES
Patient presents to ED via lobby after being recommended to come here from her sleep study test. Pt was referred to do sleep study by pulmonologist Dr. Shukri Olvera because she has been having issues getting a good nights sleep. Pt states during the sleep study, her oxygen kept dropping into the 80s, but per pt, when she would wake up, her oxygen would return to normal. Pt arrives on 1 lpm via NC.       Leena Harris  10/18/22 0423

## 2022-10-18 NOTE — ED NOTES
Pt ambulated to bathroom with minimal assistance, tolerated well. Pt now back in bed, remains on 3L NC at this time.      Princess Arnett RN  10/18/22 [id number]

## 2022-10-19 ENCOUNTER — CARE COORDINATION (OUTPATIENT)
Dept: CARE COORDINATION | Age: 37
End: 2022-10-19

## 2022-10-19 ENCOUNTER — TELEPHONE (OUTPATIENT)
Dept: FAMILY MEDICINE CLINIC | Age: 37
End: 2022-10-19

## 2022-10-19 ENCOUNTER — HOSPITAL ENCOUNTER (OUTPATIENT)
Dept: SLEEP CENTER | Age: 37
Discharge: HOME | End: 2022-10-21
Payer: MEDICARE

## 2022-10-19 DIAGNOSIS — G47.33 OSA (OBSTRUCTIVE SLEEP APNEA): ICD-10-CM

## 2022-10-19 PROCEDURE — 95811 POLYSOM 6/>YRS CPAP 4/> PARM: CPT

## 2022-10-19 NOTE — CARE COORDINATION
Attempted to reach [facility] today for care coordination f/u. No answer. Message left to return call.

## 2022-10-20 LAB — STATUS: NORMAL

## 2022-10-20 NOTE — PROGRESS NOTES
She currently uses [facility] as her HME provider. She wore the F30 size small during her study, she tolerated this well.

## 2022-10-20 NOTE — PROGRESS NOTES
Title:  CPAP/BiLevel Titration's    Approved by:  Tatianna Gifford MD      Approval Date:  December, 2019 Next Review:  December, 2021     Responsible Party:  [facility] Institution/Entities Applies to:   Muna Sanchez Number:  None    Document Type:  Such as Guideline, Policy, Policy & Procedure, or Procedure, Instructions  Manual:  Policy and Procedures    Section: IV Policy Start Date: October, 1640           POLICY: Positive airway pressure device is used to treat patients with sleep related breathing disorders characterized by full or partial occlusion of the upper airway during sleep. A patient must have undergone polysomnography and diagnosed with obstructive sleep apnea. All individuals who record sleep studies must follow best practices for CPAP/bilevel/ASV titrations in order to attain the ideal pressure setting for their patients. Too low of pressure may cause patients to either be sub-optimally treated or to wake up in a panic. Too much pressure may cause the patient to experience bloating or mask leakage. Determining the appropriate pressure setting for each patient will lead to improved adherence and outcome. Titrations are not an exact science, and it is understood that technologists may need to make minor changes for individual patients. The procedures outlined below are meant to be a guideline and follow the spirit of the AASM clinical guidelines. PROCEDURE:    CPAP:    Review the patients pertinent medical al history, previous sleep study or studies to ass the severity of sleep disordered breathing. Review of pertinent information will help to attain a better titration.    Applications of electrodes, montages, filters, sensitivities and scoring will be performed according to the current version of the AASM Scoring Manual.   Prior to initiating study collect all appropriate PAP supplies  Tubing   Humidifier (filled with distilled water)  Masks   The technologist should assess and measure patient for most appropriate mask prior to start of study. CPAP should be initiated at 5 cm H20. More pressure at start of study may be necessary if patient is morbidly obese or unable to fall asleep on a pressure of 5 cm H20   If apneas or frequent hypopneas are present, pressure settings should be increased by 2 cm H20. If occasional hypopneas, snoring, or mask flow limitation are present, pressure settings should be increased by 1 cm H20 and maintained for at least fie minutes to determine if events improve or resolve. Pressure settings may need to be increased more quickly during REM sleep given the limited amount of REM during sleep and the need to treat events during this stage. If a mask leak occurs, the tech should first fix the leakage before raising the pressure. Otherwise, the final pressure setting chosen for the patient may be too high. Once the mask leak has been fixed, decrease the pressure to the last setting where mouth breathing and/or mask leakage was not present, and then re-titrate as indicated. Make sure to document directly on the study the steps taken to resolve the leak and the type of masks used. Pressure setting usually do not need to be set as high with a nasal-mask than with a full-face mask. The recording technologist should document directly on the study at least every 30 minutes. If the patient takes a break from wearing the mask, do not decrease the CPAP pressure on attempted return to sleep unless the patient remains awake for 15 minutes or the patient specifically requests that the pressure be lowered. Do not raise pressure for central apneas. If the patient develops central apneas, pressure setting may need to be lowered.    If the patient is unable to tolerate CPAP secondary due to:  Persistent mouth breathing despite use of a full-face mask/chin strap  Inability to exhale against higher expiratory pressures (typically beginning anywhere from 15 to 20 cm of H20. Has frequent central apneas, the use of bilevel positive airway pressure may be indicated. Document directly on study why the patient is being switched from CPAP to bilevel. Ensure that supine sleep has been seen on the chosen setting. Going above the chosen setting 1 or 2 cm H20 to show range may be helpful to ensure that the correct pressure has been established. BiLEVEL:    Technologist may change from CPAP to bilevel during a study if proven the patient is unable to tolerate CPAP. Review the patients pertinent medical al history, previous sleep study or studies to ass the severity of sleep disordered breathing. Review of pertinent information will help to attain a better titration. Applications of electrodes, montages, filters, sensitivities and scoring will be performed according to the current version of the AASM Scoring Manual.   Prior to initiating study collect all appropriate PAP supplies  Tubing   Humidifier (filled with distilled water)  Masks   The technologist should assess and measure patient for most appropriate mask prior to start of study. If the patient has not previously been on CPAP, beginning pressures should be 8/4 cm H20 or higher if patient is morbidly obese or unable to fall asleep at lower pressures. If the patient has been previously successfully treated on CPAP start expiratory pressure at therapeutic setting and set inspiratory pressure 4 cm H20 higher. If advancing from CPAP to bilevel during a study expiratory pressure should be set at most successful CPAP setting and inspiratory pressure set 4 cm h20 higher. The standard differential pressure utilized during bilevel titrations typically ranges from 3 to 5 cm H20, with 4 cm H20 being the most common. Higher differential pressures may be needed in patients who are morbidly obese or who have neuromuscular diseases.    If apneas or frequent hypopneas are present, inspiratory and expiratory pressure settings should be increased by 2 cm H20. If occasional hypopneas, snoring, or mask flow limitation are present inspiratory and expiratory pressures settings should be increased by 1 cm h20 and maintained for at least 5 minutes to determine if events improve or resolve. Pressure settings may need to be increased more quickly during REM sleep given the limited amount of REM during sleep and the need to treat events during this stage. If a mask leak occurs, the tech should first fix the leakage before raising the pressure. Otherwise, the final pressure setting chosen for the patient may be too high. Once the mask leak has been fixed, decrease the pressure to the last setting where mouth breathing and/or mask leakage was not present, and then re-titrate as indicated. Make sure to document directly on the study the steps taken to resolve the leak and the type of masks used. Pressure setting usually do not need to be set as high with a nasal-mask than with a full-face mask. The recording technologist should document directly on the study at least every 30 minutes. If the patient takes a break from wearing the mask, do not decrease the CPAP pressure on attempted return to sleep unless the patient remains awake for 15 minutes or the patient specifically requests that the pressure be lowered. Do not raise pressure for central apneas. If the patient develops central apneas, pressure setting may need to be lowered. If the patient has central apneas on bilevel, the use of spontaneous (ST) mode may be indicated. ST mode may only be used in 2 cases  An order for ST mode with a primary diagnosis of central sleep apnea  During a titration if obstructive events are less than 5/ hour and centrals must be greater than 50% of total respiratory events. Ensure that supine sleep has been seen on the chosen setting.  Going above the chosen setting 1 or 2 cm H20 to show range may be helpful to ensure that the correct pressure has been established.

## 2022-10-21 NOTE — PROGRESS NOTES
[address] [place] [address]                               SLEEP STUDY REPORT    PATIENT NAME: Baird Ormond                     :        1985  MED REC NO:   [id number]                           ROOM:  ACCOUNT NO:   [de-identified]                           ADMIT DATE: 10/17/2022  PROVIDER:     Maryanne Britton M.D.    Tomi China STUDY:  10/17/2022    DIAGNOSTIC POLYSOMNOGRAM REPORT    REFERRING PROVIDER:  Lcuinda June CNP    HISTORY OF PRESENT ILLNESS:  The patient is a 75-year-old morbidly obese  with post COVID syndrome and she has chronic respiratory failure and  supplemental oxygen. She is coming to the sleep lab for diagnostic  polysomnogram.  Weight 502 pounds, height 63 inches, BMI 38.9. METHODS:  The patient underwent digital polysomnography in compliance  with the standards and specifications from the AASM Manual including the  simultaneous recording of 3 EEG channels (F4-M1, C4-M1, and O2-M1 with  back up electrodes F3-M2, C3-M2, and O1-M2), 2 EOG channels (E1-M2, and  E2-M1,), EMG (chin, left & right leg), EKG, Nonin pulse oximetry with   less than 2 second averaging time, body position, airflow recorded by  oral-nasal thermal sensor and nasal air pressure transducer, plus  respiratory effort recorded by calibrated respiratory inductance  plethysmography (RIP), flow volume loop, sound and video. Sleep staging  and scoring followed the standard put forth by the American Academy of  Sleep Medicine and utilized the 1B obstructive hypopnea event  desaturation of 4 percent or greater. DETAILS OF THE STUDY:  Lights out 08:29 p.m., lights on 03:28 a.m. Total recording time 418 minutes, sleep period time 411 minutes, total  sleep time 330 minutes, sleep efficiency 78.9%. Latency to sleep 6.8  minutes, wake after sleep onset 81 minutes. Latency to  minutes.     SLEEP STAGING:  The patient slept 2.5 minutes or 0.8% of total sleep  time in N1 sleep, 146 minutes or 44% in N2 sleep, 140 minutes or 42% in  N3 sleep, 41 minutes or 12.4 % in REM sleep. RESPIRATORY SUMMARY:  1 central apnea, 11 obstructive apneas, 315  obstructive hypopneas for an AHI of 59.6. BODY POSITION SUMMARY:  330 minutes of supine position. SLEEP DISTURBANCE SUMMARY:  There were 241 arousals for an arousal index  of 43.8, 166 of these arousals were caused by respiratory events. PERIODIC LIMB MOVEMENT SUMMARY:  There was none. PULSE OXIMETRY SUMMARY:  Mean oxygen saturation on room air during  wakefulness was 86.5%, during sleep 83.8%. Lowest oxygen saturation  67%, there were 291 minutes of oxygen saturation below 88%. ECG SUMMARY:  Normal sinus rhythm with frequent PVCs some of them in  bigeminy pattern. ASSESSMENT:  1. Severe obstructive sleep apnea associated to a sleep disturbance and  nocturnal oxygen desaturation. Low oxygen saturation even before lights  out during wakefulness. The patient is on supplemental oxygen at home;  however, the study was initiated without supplemental oxygen which was  added to 3 liters per minute during the recording. Frequent PVCs with  bigeminy pattern. 2.  The patient had great breathing difficulties through the night and  eventually she went to the emergency department after leaving the sleep  lab to be evaluated. RECOMMENDATIONS:  Due to the severity of the findings, PAP therapies are  strongly recommended. The patient will be brought back to the sleep lab  over the next couple of days for a PAP titration study.         Skinny Wayne M.D.    D: 10/19/2022 21:25:49       T: 10/20/2022 17:15:59     [email or web address]  Job#: [id number]     Doc#: [id number]

## 2022-10-27 ENCOUNTER — CARE COORDINATION (OUTPATIENT)
Dept: CARE COORDINATION | Age: 37
End: 2022-10-27

## 2022-10-27 DIAGNOSIS — R60.0 FLUID RETENTION IN LEGS: Primary | ICD-10-CM

## 2022-10-27 RX ORDER — BUMETANIDE 2 MG/1
2 TABLET ORAL DAILY
Qty: 3 TABLET | Refills: 0 | Status: SHIPPED | OUTPATIENT
Start: 2022-10-27 | End: 2022-11-08

## 2022-10-27 RX ORDER — POTASSIUM CHLORIDE 20 MEQ/1
20 TABLET, EXTENDED RELEASE ORAL DAILY
Qty: 3 TABLET | Refills: 0 | Status: SHIPPED | OUTPATIENT
Start: 2022-10-27 | End: 2022-11-08

## 2022-10-27 NOTE — CARE COORDINATION
Pt has CPAP titration on 10/19. Pt has not heard anything since titration and does not have an appt with Dr. Jg Fields until 11-9. Has home oxygen. She reported to me today that she has been having to use it more often to maintain SPO2 readings in 90's. Any availability for pt to be seen sooner? Please advise.

## 2022-10-27 NOTE — TELEPHONE ENCOUNTER
Pt notified to take Bumex and potassium for the next 3 days (prior to her appt on Monday). Pt verbalizes understanding.

## 2022-10-27 NOTE — CARE COORDINATION
Ambulatory Care Coordination Note  10/27/2022    ACC: Dimitrios Ramos, SAILAJA    [facility] is being followed by care coordination for education and assistance in managing her chronic conditions and healthcare needs. Pt has h/o: obesity, COVID. Spoke with [facility] today. Pt had  sleep study 10/17. Was sent to ED after sleep study d/t concerns of SPO2 readings dropping despite having oxygen on. Work up was completed. No new orders. Pt currently wearing oxygen 2- 3 liters majority of time t/o day and is wearing at HS. Pt has CPAP titration on 10/19. Has not heard anything since. Does not have PUL appt until 11/9. Attempted to reach [facility] to see if pt can be seen sooner. Unable to get through or leave message at the office. Pt has SPO2 monitors. On RA sats will 87% with oxygen at 2 liters sats are 96%. Denies cough or worsening SOB. Pt shares that she has been having increased edema in ble. Has gotten worse since her visit to ED. Pt found some Lasix and potassium that had been prescribed to her previously and took a dose yesterday did not notice any improvement and did not have increased urine output after taking it. I advised pt to hold off taking medication unless instructed by provider. Encouraged to elevate ble. Educated on limiting sodium to 2gms or less per day and limiting fluids to 2 liters. Pt agreeable in seeing provider at PCP office. First available was 10/31 with Dr. Jason Browne. Has appt with [facility] on 11/16. Plan of care:  Message routed to [facility] to see if earlier appt can be arranged  Wear oxygen and monitor SPO2 readings closely. Educated on what and when to report to ED. Limit sodium to 2 gms and fluid to 2 liters d/t increased edema   Elevate ble as much as possible. F/u with PCP office on 10/31 as scheduled  .   General Assessment    Do you have any symptoms that are causing concern?: Yes  Progression since Onset: Unchanged  Reported Symptoms: Other (Comment: edema ble) Offered patient enrollment in the Remote Patient Monitoring (RPM) program for in-home monitoring: NA. Lab Results       None            Care Coordination Interventions    Referral from Primary Care Provider: No  Suggested Interventions and Community Resources  Medi Set or Pill Pack: Declined          Goals Addressed                   This Visit's Progress     Behavioral Health   On track     I will work towards the following Behavioral Health goals: I will take my medications daily as prescribed. and will consider getting established with mental health provider. Barriers: lack of support, overwhelmed by complexity of regimen, and stress  Plan for overcoming my barriers: support and education from PCP, ACM, SW. Resource info provided on different providers and services in the area. Confidence: 8/10  Anticipated Goal Completion Date: 11/12/22              Prior to Admission medications    Medication Sig Start Date End Date Taking?  Authorizing Provider   ferrous sulfate (IRON 325) 325 (65 Fe) MG tablet Take 1 tablet by mouth 2 times daily 10/17/22   MALCOM Alvarado CNP   vitamin D (ERGOCALCIFEROL) 1.25 MG (69938 UT) CAPS capsule Take 1 capsule by mouth once a week 10/17/22   MALCOM Alvarado CNP   Multiple Vitamin (MULTIVITAMIN ADULT PO) Take by mouth    Historical Provider, MD   lidocaine (LIDODERM) 5 % Place 2 patches onto the skin daily 12 hours on, 12 hours off. 10/11/22 11/10/22  Alissa ExposeMALCOM CNP   butalbital-acetaminophen-caffeine (FIORICET, ESGIC) -11 MG per tablet Take 1 tablet by mouth every 6 hours as needed for Migraine 10/6/22   MALCOM Alvarado CNP   ARIPiprazole (ABILIFY) 5 MG tablet Take 1 tablet by mouth daily 10/3/22   MALCOM Alvarado CNP   DULoxetine (CYMBALTA) 30 MG extended release capsule Take 1 capsule by mouth daily 10/3/22   MALCOM Alvarado CNP   glycopyrrolate-formoterol (BEVESPI AEROSPHERE) 9-4.8 MCG/ACT AERO Inhale 2 puffs into the lungs 2 times daily 9/28/22   MALCOM Winters CNP   albuterol sulfate HFA (PROVENTIL;VENTOLIN;PROAIR) 108 (90 Base) MCG/ACT inhaler Inhale 2 puffs into the lungs every 4 hours as needed for Wheezing or Shortness of Breath (cough) 8/11/22   MALCOM Winters CNP   hydrOXYzine HCl (ATARAX) 25 MG tablet Take 1 tablet by mouth every 8 hours as needed for Anxiety 8/11/22   MALCOM Winters CNP   aspirin 81 MG chewable tablet Take 1 tablet by mouth daily  Patient not taking: Reported on 10/11/2022 7/14/21   KODAK Waterman   famotidine (PEPCID) 20 MG tablet Take 1 tablet by mouth 2 times daily  Patient taking differently: Take 20 mg by mouth as needed 4/1/21   MALCOM Winters CNP   solifenacin (VESICARE) 10 MG tablet Take 1 tablet by mouth daily 2/11/21   MALCOM Winters CNP   Naproxen Sodium (ALEVE PO) Take by mouth    Historical Provider, MD       Future Appointments   Date Time Provider [place]   10/31/2022 11:45 AM Severa Dark, DO [address]   11/9/2022 10:20 AM Patti Wills, [address]   11/14/2022  1:00 PM MALCOM Brown CNP [facility] [address]   12/28/2022  1:00 PM MALCOM Winters   1/24/2023  3:00 PM MALCOM Garcia 37

## 2022-10-28 ENCOUNTER — TRANSCRIBE ORDERS (OUTPATIENT)
Dept: PULMONOLOGY | Age: 37
End: 2022-10-28

## 2022-10-28 ENCOUNTER — CARE COORDINATION (OUTPATIENT)
Dept: CARE COORDINATION | Age: 37
End: 2022-10-28

## 2022-10-28 DIAGNOSIS — E66.01 MORBID OBESITY WITH BMI OF 70 AND OVER, ADULT (HCC): ICD-10-CM

## 2022-10-28 DIAGNOSIS — G47.33 OSA (OBSTRUCTIVE SLEEP APNEA): Primary | ICD-10-CM

## 2022-10-28 NOTE — TELEPHONE ENCOUNTER
Phoned and spoke with patient regarding BIPAP/O2 orders. Patients questions were answered. Orders were sent to [address].

## 2022-10-31 ENCOUNTER — APPOINTMENT (OUTPATIENT)
Dept: INTERVENTIONAL RADIOLOGY/VASCULAR | Age: 37
DRG: 286 | End: 2022-10-31
Payer: MEDICARE

## 2022-10-31 ENCOUNTER — HOSPITAL ENCOUNTER (INPATIENT)
Age: 37
LOS: 3 days | Discharge: HOME | DRG: 286 | End: 2022-11-04
Attending: STUDENT IN AN ORGANIZED HEALTH CARE EDUCATION/TRAINING PROGRAM | Admitting: INTERNAL MEDICINE
Payer: MEDICARE

## 2022-10-31 ENCOUNTER — APPOINTMENT (OUTPATIENT)
Dept: GENERAL RADIOLOGY | Age: 37
DRG: 286 | End: 2022-10-31
Payer: MEDICARE

## 2022-10-31 DIAGNOSIS — J45.41 MODERATE PERSISTENT ASTHMA WITH EXACERBATION: ICD-10-CM

## 2022-10-31 DIAGNOSIS — R07.9 CHEST PAIN, UNSPECIFIED TYPE: Primary | ICD-10-CM

## 2022-10-31 DIAGNOSIS — R77.8 ELEVATED TROPONIN: ICD-10-CM

## 2022-10-31 DIAGNOSIS — I50.33 ACUTE ON CHRONIC DIASTOLIC CHF (CONGESTIVE HEART FAILURE) (HCC): ICD-10-CM

## 2022-10-31 PROBLEM — M51.379 DEGENERATION OF LUMBOSACRAL INTERVERTEBRAL DISC: Status: ACTIVE | Noted: 2022-10-31

## 2022-10-31 PROBLEM — O20.9 BLEEDING IN EARLY PREGNANCY: Status: ACTIVE | Noted: 2022-10-31

## 2022-10-31 PROBLEM — M43.10 ACQUIRED SPONDYLOLISTHESIS: Status: ACTIVE | Noted: 2022-10-31

## 2022-10-31 PROBLEM — N93.9 ABNORMAL UTERINE BLEEDING (AUB): Status: ACTIVE | Noted: 2022-10-31

## 2022-10-31 PROBLEM — M51.37 DEGENERATION OF LUMBOSACRAL INTERVERTEBRAL DISC: Status: ACTIVE | Noted: 2022-10-31

## 2022-10-31 PROBLEM — O03.9 SPONTANEOUS ABORTION: Status: ACTIVE | Noted: 2021-10-31

## 2022-10-31 LAB
ALBUMIN SERPL-MCNC: 3.2 G/DL (ref 3.5–5.1)
ALP BLD-CCNC: 61 U/L (ref 38–126)
ALT SERPL-CCNC: 19 U/L (ref 11–66)
ANION GAP SERPL CALCULATED.3IONS-SCNC: 12 MEQ/L (ref 8–16)
AST SERPL-CCNC: 30 U/L (ref 5–40)
BASOPHILS # BLD: 0.5 %
BASOPHILS ABSOLUTE: 0.1 THOU/MM3 (ref 0–0.1)
BILIRUB SERPL-MCNC: 0.6 MG/DL (ref 0.3–1.2)
BILIRUBIN DIRECT: < 0.2 MG/DL (ref 0–0.3)
BUN BLDV-MCNC: 13 MG/DL (ref 7–22)
CALCIUM SERPL-MCNC: 9.1 MG/DL (ref 8.5–10.5)
CHLORIDE BLD-SCNC: 96 MEQ/L (ref 98–111)
CO2: 33 MEQ/L (ref 23–33)
CREAT SERPL-MCNC: 0.6 MG/DL (ref 0.4–1.2)
EKG ATRIAL RATE: 73 BPM
EKG P AXIS: 59 DEGREES
EKG P-R INTERVAL: 172 MS
EKG Q-T INTERVAL: 412 MS
EKG QRS DURATION: 98 MS
EKG QTC CALCULATION (BAZETT): 453 MS
EKG R AXIS: 113 DEGREES
EKG T AXIS: 22 DEGREES
EKG VENTRICULAR RATE: 73 BPM
EOSINOPHIL # BLD: 2.5 %
EOSINOPHILS ABSOLUTE: 0.3 THOU/MM3 (ref 0–0.4)
ERYTHROCYTE [DISTWIDTH] IN BLOOD BY AUTOMATED COUNT: 21.6 % (ref 11.5–14.5)
ERYTHROCYTE [DISTWIDTH] IN BLOOD BY AUTOMATED COUNT: 53 FL (ref 35–45)
GFR SERPL CREATININE-BSD FRML MDRD: > 60 ML/MIN/1.73M2
GLUCOSE BLD-MCNC: 74 MG/DL (ref 70–108)
HCT VFR BLD CALC: 37.1 % (ref 37–47)
HEMOGLOBIN: 10.2 GM/DL (ref 12–16)
HYPOCHROMIA: PRESENT
IMMATURE GRANS (ABS): 0.04 THOU/MM3 (ref 0–0.07)
IMMATURE GRANULOCYTES: 0.4 %
LV EF: 60 %
LVEF MODALITY: NORMAL
LYMPHOCYTES # BLD: 27 %
LYMPHOCYTES ABSOLUTE: 2.9 THOU/MM3 (ref 1–4.8)
MCH RBC QN AUTO: 20.1 PG (ref 26–33)
MCHC RBC AUTO-ENTMCNC: 27.5 GM/DL (ref 32.2–35.5)
MCV RBC AUTO: 73.2 FL (ref 81–99)
MONOCYTES # BLD: 8.2 %
MONOCYTES ABSOLUTE: 0.9 THOU/MM3 (ref 0.4–1.3)
NUCLEATED RED BLOOD CELLS: 0 /100 WBC
OSMOLALITY CALCULATION: 280 MOSMOL/KG (ref 275–300)
PLATELET # BLD: 208 THOU/MM3 (ref 130–400)
PMV BLD AUTO: 10.4 FL (ref 9.4–12.4)
POTASSIUM REFLEX MAGNESIUM: 4.4 MEQ/L (ref 3.5–5.2)
PRO-BNP: 969.2 PG/ML (ref 0–450)
RBC # BLD: 5.07 MILL/MM3 (ref 4.2–5.4)
SEG NEUTROPHILS: 61.4 %
SEGMENTED NEUTROPHILS ABSOLUTE COUNT: 6.6 THOU/MM3 (ref 1.8–7.7)
SODIUM BLD-SCNC: 141 MEQ/L (ref 135–145)
TOTAL PROTEIN: 7.2 G/DL (ref 6.1–8)
TROPONIN T: 0.01 NG/ML
TROPONIN T: 0.02 NG/ML
TROPONIN T: 0.02 NG/ML
WBC # BLD: 10.7 THOU/MM3 (ref 4.8–10.8)

## 2022-10-31 PROCEDURE — 94640 AIRWAY INHALATION TREATMENT: CPT

## 2022-10-31 PROCEDURE — 84484 ASSAY OF TROPONIN QUANT: CPT

## 2022-10-31 PROCEDURE — 93306 TTE W/DOPPLER COMPLETE: CPT

## 2022-10-31 PROCEDURE — 99222 1ST HOSP IP/OBS MODERATE 55: CPT | Performed by: NUCLEAR MEDICINE

## 2022-10-31 PROCEDURE — 2580000003 HC RX 258: Performed by: PHYSICIAN ASSISTANT

## 2022-10-31 PROCEDURE — 93970 EXTREMITY STUDY: CPT

## 2022-10-31 PROCEDURE — G0378 HOSPITAL OBSERVATION PER HR: HCPCS

## 2022-10-31 PROCEDURE — 6370000000 HC RX 637 (ALT 250 FOR IP): Performed by: NURSE PRACTITIONER

## 2022-10-31 PROCEDURE — 96376 TX/PRO/DX INJ SAME DRUG ADON: CPT

## 2022-10-31 PROCEDURE — 36415 COLL VENOUS BLD VENIPUNCTURE: CPT

## 2022-10-31 PROCEDURE — 93010 ELECTROCARDIOGRAM REPORT: CPT | Performed by: INTERNAL MEDICINE

## 2022-10-31 PROCEDURE — 83880 ASSAY OF NATRIURETIC PEPTIDE: CPT

## 2022-10-31 PROCEDURE — 71046 X-RAY EXAM CHEST 2 VIEWS: CPT

## 2022-10-31 PROCEDURE — 99285 EMERGENCY DEPT VISIT HI MDM: CPT

## 2022-10-31 PROCEDURE — 6370000000 HC RX 637 (ALT 250 FOR IP): Performed by: PHYSICIAN ASSISTANT

## 2022-10-31 PROCEDURE — 2500000003 HC RX 250 WO HCPCS: Performed by: PHYSICIAN ASSISTANT

## 2022-10-31 PROCEDURE — 94760 N-INVAS EAR/PLS OXIMETRY 1: CPT

## 2022-10-31 PROCEDURE — 96374 THER/PROPH/DIAG INJ IV PUSH: CPT

## 2022-10-31 PROCEDURE — 85025 COMPLETE CBC W/AUTO DIFF WBC: CPT

## 2022-10-31 PROCEDURE — 6360000002 HC RX W HCPCS: Performed by: PHYSICIAN ASSISTANT

## 2022-10-31 PROCEDURE — 80076 HEPATIC FUNCTION PANEL: CPT

## 2022-10-31 PROCEDURE — 2700000000 HC OXYGEN THERAPY PER DAY

## 2022-10-31 PROCEDURE — 80048 BASIC METABOLIC PNL TOTAL CA: CPT

## 2022-10-31 PROCEDURE — 93005 ELECTROCARDIOGRAM TRACING: CPT | Performed by: NURSE PRACTITIONER

## 2022-10-31 PROCEDURE — 96372 THER/PROPH/DIAG INJ SC/IM: CPT

## 2022-10-31 RX ORDER — BUMETANIDE 0.25 MG/ML
2 INJECTION, SOLUTION INTRAMUSCULAR; INTRAVENOUS 2 TIMES DAILY
Status: DISCONTINUED | OUTPATIENT
Start: 2022-10-31 | End: 2022-11-03

## 2022-10-31 RX ORDER — POTASSIUM CHLORIDE 7.45 MG/ML
10 INJECTION INTRAVENOUS PRN
Status: DISCONTINUED | OUTPATIENT
Start: 2022-10-31 | End: 2022-11-04 | Stop reason: HOSPADM

## 2022-10-31 RX ORDER — ACETAMINOPHEN 325 MG/1
650 TABLET ORAL EVERY 6 HOURS PRN
Status: DISCONTINUED | OUTPATIENT
Start: 2022-10-31 | End: 2022-11-03 | Stop reason: SDUPTHER

## 2022-10-31 RX ORDER — POTASSIUM CHLORIDE 20 MEQ/1
20 TABLET, EXTENDED RELEASE ORAL DAILY
Status: DISCONTINUED | OUTPATIENT
Start: 2022-10-31 | End: 2022-11-04 | Stop reason: HOSPADM

## 2022-10-31 RX ORDER — SODIUM CHLORIDE 0.9 % (FLUSH) 0.9 %
10 SYRINGE (ML) INJECTION PRN
Status: DISCONTINUED | OUTPATIENT
Start: 2022-10-31 | End: 2022-11-04 | Stop reason: HOSPADM

## 2022-10-31 RX ORDER — BUTALBITAL, ACETAMINOPHEN AND CAFFEINE 50; 325; 40 MG/1; MG/1; MG/1
1 TABLET ORAL EVERY 6 HOURS PRN
Status: DISCONTINUED | OUTPATIENT
Start: 2022-10-31 | End: 2022-11-04 | Stop reason: HOSPADM

## 2022-10-31 RX ORDER — ONDANSETRON 4 MG/1
4 TABLET, ORALLY DISINTEGRATING ORAL EVERY 8 HOURS PRN
Status: DISCONTINUED | OUTPATIENT
Start: 2022-10-31 | End: 2022-11-04 | Stop reason: HOSPADM

## 2022-10-31 RX ORDER — DULOXETIN HYDROCHLORIDE 30 MG/1
30 CAPSULE, DELAYED RELEASE ORAL DAILY
Status: DISCONTINUED | OUTPATIENT
Start: 2022-10-31 | End: 2022-11-04 | Stop reason: HOSPADM

## 2022-10-31 RX ORDER — SODIUM CHLORIDE 9 MG/ML
INJECTION, SOLUTION INTRAVENOUS PRN
Status: DISCONTINUED | OUTPATIENT
Start: 2022-10-31 | End: 2022-11-04 | Stop reason: HOSPADM

## 2022-10-31 RX ORDER — LIDOCAINE 4 G/G
2 PATCH TOPICAL DAILY
Status: DISCONTINUED | OUTPATIENT
Start: 2022-10-31 | End: 2022-11-04 | Stop reason: HOSPADM

## 2022-10-31 RX ORDER — MAGNESIUM SULFATE IN WATER 40 MG/ML
2000 INJECTION, SOLUTION INTRAVENOUS PRN
Status: DISCONTINUED | OUTPATIENT
Start: 2022-10-31 | End: 2022-11-04 | Stop reason: HOSPADM

## 2022-10-31 RX ORDER — HYDROXYZINE HYDROCHLORIDE 25 MG/1
25 TABLET, FILM COATED ORAL EVERY 8 HOURS PRN
Status: DISCONTINUED | OUTPATIENT
Start: 2022-10-31 | End: 2022-11-04 | Stop reason: HOSPADM

## 2022-10-31 RX ORDER — ONDANSETRON 2 MG/ML
4 INJECTION INTRAMUSCULAR; INTRAVENOUS EVERY 6 HOURS PRN
Status: DISCONTINUED | OUTPATIENT
Start: 2022-10-31 | End: 2022-11-04 | Stop reason: HOSPADM

## 2022-10-31 RX ORDER — FAMOTIDINE 20 MG/1
20 TABLET, FILM COATED ORAL DAILY PRN
Status: DISCONTINUED | OUTPATIENT
Start: 2022-10-31 | End: 2022-11-04 | Stop reason: HOSPADM

## 2022-10-31 RX ORDER — ASPIRIN 81 MG/1
324 TABLET, CHEWABLE ORAL ONCE
Status: COMPLETED | OUTPATIENT
Start: 2022-10-31 | End: 2022-10-31

## 2022-10-31 RX ORDER — POTASSIUM CHLORIDE 20 MEQ/1
40 TABLET, EXTENDED RELEASE ORAL PRN
Status: DISCONTINUED | OUTPATIENT
Start: 2022-10-31 | End: 2022-11-04 | Stop reason: HOSPADM

## 2022-10-31 RX ORDER — BUMETANIDE 1 MG/1
2 TABLET ORAL DAILY
Status: DISCONTINUED | OUTPATIENT
Start: 2022-10-31 | End: 2022-11-04 | Stop reason: HOSPADM

## 2022-10-31 RX ORDER — ARIPIPRAZOLE 5 MG/1
5 TABLET ORAL DAILY
Status: DISCONTINUED | OUTPATIENT
Start: 2022-10-31 | End: 2022-11-04 | Stop reason: HOSPADM

## 2022-10-31 RX ORDER — ALBUTEROL SULFATE 90 UG/1
2 AEROSOL, METERED RESPIRATORY (INHALATION) 2 TIMES DAILY
Status: DISCONTINUED | OUTPATIENT
Start: 2022-10-31 | End: 2022-11-02

## 2022-10-31 RX ORDER — FERROUS SULFATE 325(65) MG
325 TABLET ORAL 2 TIMES DAILY
Status: DISCONTINUED | OUTPATIENT
Start: 2022-10-31 | End: 2022-11-04 | Stop reason: HOSPADM

## 2022-10-31 RX ORDER — ASPIRIN 81 MG/1
81 TABLET, CHEWABLE ORAL DAILY
Status: DISCONTINUED | OUTPATIENT
Start: 2022-10-31 | End: 2022-11-04 | Stop reason: HOSPADM

## 2022-10-31 RX ORDER — SODIUM CHLORIDE 0.9 % (FLUSH) 0.9 %
10 SYRINGE (ML) INJECTION EVERY 12 HOURS SCHEDULED
Status: DISCONTINUED | OUTPATIENT
Start: 2022-10-31 | End: 2022-11-04 | Stop reason: HOSPADM

## 2022-10-31 RX ORDER — ENOXAPARIN SODIUM 100 MG/ML
60 INJECTION SUBCUTANEOUS 2 TIMES DAILY
Status: DISCONTINUED | OUTPATIENT
Start: 2022-10-31 | End: 2022-11-04 | Stop reason: HOSPADM

## 2022-10-31 RX ORDER — POLYETHYLENE GLYCOL 3350 17 G/17G
17 POWDER, FOR SOLUTION ORAL DAILY PRN
Status: DISCONTINUED | OUTPATIENT
Start: 2022-10-31 | End: 2022-11-04 | Stop reason: HOSPADM

## 2022-10-31 RX ORDER — TROSPIUM CHLORIDE 20 MG/1
20 TABLET, FILM COATED ORAL
Status: DISCONTINUED | OUTPATIENT
Start: 2022-10-31 | End: 2022-11-04 | Stop reason: HOSPADM

## 2022-10-31 RX ORDER — ALBUTEROL SULFATE 90 UG/1
2 AEROSOL, METERED RESPIRATORY (INHALATION) EVERY 4 HOURS PRN
Status: DISCONTINUED | OUTPATIENT
Start: 2022-10-31 | End: 2022-11-04 | Stop reason: HOSPADM

## 2022-10-31 RX ADMIN — ALBUTEROL SULFATE 2 PUFF: 90 AEROSOL, METERED RESPIRATORY (INHALATION) at 17:51

## 2022-10-31 RX ADMIN — ASPIRIN 81 MG 324 MG: 81 TABLET ORAL at 05:18

## 2022-10-31 RX ADMIN — ASPIRIN 81 MG CHEWABLE TABLET 81 MG: 81 TABLET CHEWABLE at 15:37

## 2022-10-31 RX ADMIN — BUMETANIDE 2 MG: 0.25 INJECTION INTRAMUSCULAR; INTRAVENOUS at 20:19

## 2022-10-31 RX ADMIN — FERROUS SULFATE TAB 325 MG (65 MG ELEMENTAL FE) 325 MG: 325 (65 FE) TAB at 21:57

## 2022-10-31 RX ADMIN — ALBUTEROL SULFATE 2 PUFF: 90 AEROSOL, METERED RESPIRATORY (INHALATION) at 12:35

## 2022-10-31 RX ADMIN — ENOXAPARIN SODIUM 60 MG: 100 INJECTION SUBCUTANEOUS at 20:19

## 2022-10-31 RX ADMIN — BUMETANIDE 2 MG: 0.25 INJECTION INTRAMUSCULAR; INTRAVENOUS at 15:37

## 2022-10-31 RX ADMIN — SODIUM CHLORIDE, PRESERVATIVE FREE 10 ML: 5 INJECTION INTRAVENOUS at 15:55

## 2022-10-31 RX ADMIN — TIOTROPIUM BROMIDE AND OLODATEROL 2 PUFF: 3.124; 2.736 SPRAY, METERED RESPIRATORY (INHALATION) at 12:37

## 2022-10-31 RX ADMIN — ENOXAPARIN SODIUM 60 MG: 100 INJECTION SUBCUTANEOUS at 15:39

## 2022-10-31 RX ADMIN — SODIUM CHLORIDE, PRESERVATIVE FREE 10 ML: 5 INJECTION INTRAVENOUS at 20:21

## 2022-10-31 ASSESSMENT — PAIN SCALES - GENERAL
PAINLEVEL_OUTOF10: 4

## 2022-10-31 ASSESSMENT — PAIN - FUNCTIONAL ASSESSMENT
PAIN_FUNCTIONAL_ASSESSMENT: 0-10

## 2022-10-31 ASSESSMENT — ENCOUNTER SYMPTOMS
SORE THROAT: 0
EYE PAIN: 0
COLOR CHANGE: 0
DIARRHEA: 0
SHORTNESS OF BREATH: 0
NAUSEA: 0
WHEEZING: 0
RHINORRHEA: 0
EYE DISCHARGE: 0
VOMITING: 0
ABDOMINAL DISTENTION: 0
CONSTIPATION: 0
COUGH: 0

## 2022-10-31 ASSESSMENT — PAIN DESCRIPTION - PAIN TYPE: TYPE: ACUTE PAIN

## 2022-10-31 NOTE — ED NOTES
Pt transported to 8B30 by cart in stable condition. Called [facility] and informed Jodene Brothers that the patient was on their way to the unit.       Wm Salgado, PIERCE  27/80/81 1018

## 2022-10-31 NOTE — ED PROVIDER NOTES
[facility] ENCOUNTER          Pt Name: Rayo Santillan  MRN: [id number]  [place] 1985  Date of evaluation: 10/31/2022  Treating Resident Physician: Jaime Bobo MD  Supervising Physician: Alvarado Rao MD      History obtained from the patient. CHIEF COMPLAINT       Chief Complaint   Patient presents with    Chest Pain    Leg Swelling           HISTORY OF PRESENT ILLNESS    HPI  Rayo Santillan is a 40 y.o. female who presents to the emergency department for evaluation of chest pain. Patient stated that she has had chest pain intermittently since 10 AM yesterday. Patient is also complaining that she is having increased swelling to lower extremity and difficulty ambulating. Patient started on Bumex last week by PCP. Patient also has associated shortness of breath. Patient is routinely on O2 2 L at baseline but noted at 90 to 92% on arrival to increase to 3 L. The patient has no other acute complaints at this time. REVIEW OF SYSTEMS   Review of Systems   Constitutional:  Negative for fatigue and fever. HENT:  Negative for ear pain, rhinorrhea and sore throat. Eyes:  Negative for pain and discharge. Respiratory:  Negative for cough, shortness of breath and wheezing. Cardiovascular:  Positive for chest pain and leg swelling. Negative for palpitations. Gastrointestinal:  Negative for abdominal distention, constipation, diarrhea, nausea and vomiting. Endocrine: Negative for polydipsia and polyuria. Genitourinary:  Negative for difficulty urinating and dysuria. Musculoskeletal:  Negative for arthralgias. Skin:  Negative for color change, pallor and rash. Neurological:  Negative for dizziness, seizures, syncope, weakness and numbness. Psychiatric/Behavioral:  Negative for agitation and confusion.         PAST MEDICAL AND SURGICAL HISTORY     Past Medical History:   Diagnosis Date    Anxiety and depression 7/9/2021 Back pain     Morbid obesity ([facility] [address]      Past Surgical History:   Procedure Laterality Date    ADENOIDECTOMY      ANKLE FRACTURE SURGERY Left 9/19/2017    LEFT ANKLE ORIF, I & D right posterior upper leg performed by Pratik Rodriguez MD at [address] [place] [address]  02/09/2016    ND OFFICE/OUTPT VISIT,PROCEDURE ONLY Left 8/2/2018    LEFT SUBTALAR JOINT FUSION, MEDIAL COLUMN FUSION, GASTROC RECESSION, POSS DELTOID REPAIR performed by Padmini Nguyen DPM at [address]   No current facility-administered medications for this encounter.     Current Outpatient Medications:     bumetanide (BUMEX) 2 MG tablet, Take 1 tablet by mouth daily, Disp: 3 tablet, Rfl: 0    potassium chloride (KLOR-CON M) 20 MEQ extended release tablet, Take 1 tablet by mouth daily, Disp: 3 tablet, Rfl: 0    ferrous sulfate (IRON 325) 325 (65 Fe) MG tablet, Take 1 tablet by mouth 2 times daily, Disp: 180 tablet, Rfl: 3    vitamin D (ERGOCALCIFEROL) 1.25 MG (07514 UT) CAPS capsule, Take 1 capsule by mouth once a week, Disp: 12 capsule, Rfl: 3    Multiple Vitamin (MULTIVITAMIN ADULT PO), Take by mouth, Disp: , Rfl:     lidocaine (LIDODERM) 5 %, Place 2 patches onto the skin daily 12 hours on, 12 hours off., Disp: 60 patch, Rfl: 0    butalbital-acetaminophen-caffeine (FIORICET, ESGIC) -40 MG per tablet, Take 1 tablet by mouth every 6 hours as needed for Migraine, Disp: 15 tablet, Rfl: 0    ARIPiprazole (ABILIFY) 5 MG tablet, Take 1 tablet by mouth daily, Disp: 90 tablet, Rfl: 3    DULoxetine (CYMBALTA) 30 MG extended release capsule, Take 1 capsule by mouth daily, Disp: 90 capsule, Rfl: 3    glycopyrrolate-formoterol (BEVESPI AEROSPHERE) 9-4.8 MCG/ACT AERO, Inhale 2 puffs into the lungs 2 times daily, Disp: 2 each, Rfl: 0    albuterol sulfate HFA (PROVENTIL;VENTOLIN;PROAIR) 108 (90 Base) MCG/ACT inhaler, Inhale 2 puffs into the lungs every 4 hours as needed for Wheezing or Shortness of Breath (cough), Disp: 1 each, Rfl: 0    hydrOXYzine HCl (ATARAX) 25 MG tablet, Take 1 tablet by mouth every 8 hours as needed for Anxiety, Disp: 60 tablet, Rfl: 1    aspirin 81 MG chewable tablet, Take 1 tablet by mouth daily (Patient not taking: Reported on 10/11/2022), Disp: 30 tablet, Rfl: 3    famotidine (PEPCID) 20 MG tablet, Take 1 tablet by mouth 2 times daily (Patient taking differently: Take 20 mg by mouth as needed), Disp: 60 tablet, Rfl: 11    solifenacin (VESICARE) 10 MG tablet, Take 1 tablet by mouth daily, Disp: 90 tablet, Rfl: 1    Naproxen Sodium (ALEVE PO), Take by mouth, Disp: , Rfl:       SOCIAL HISTORY     Social History     Social History Narrative    Not on file     Social History     Tobacco Use    Smoking status: Former     Types: Cigarettes     Quit date: 3/4/2014     Years since quittin.6    Smokeless tobacco: Never   Substance Use Topics    Alcohol use: Yes     Comment: social    Drug use: Yes     Types: Marijuana (Weed)         ALLERGIES     Allergies   Allergen Reactions    Morphine Itching     Felt like she was on fire         FAMILY HISTORY     Family History   Problem Relation Age of Onset    Cancer Mother     Depression Mother     Thyroid Disease Mother     Arthritis Father     High Blood Pressure Father     Depression Sister     Depression Sister     Diabetes Paternal Grandfather     Cancer Paternal Grandmother     Cancer Maternal Grandfather     Cancer Maternal Grandmother          PREVIOUS RECORDS   Previous records reviewed:  10/18/22 thru current . PHYSICAL EXAM     ED Triage Vitals [10/31/22 0409]   BP Temp Temp Source Heart Rate Resp SpO2 Height Weight   (!) 157/62 97.5 °F (36.4 °C) Oral 70 24 93 % 5' 3\" (1.6 m) (!) 495 lb (224.5 kg)     Initial vital signs and nursing assessment reviewed and hypertension. Body mass index is 87.69 kg/m². Pulsoximetry is  per my interpretation.     Additional Vital Signs:  Vitals:    10/31/22 0624   BP: (!) 147/63   Pulse: 71   Resp: 20   Temp:    SpO2: 97% Physical Exam  Constitutional:       Appearance: Normal appearance. HENT:      Head: Normocephalic. Right Ear: External ear normal.      Left Ear: External ear normal.      Nose: Nose normal.      Mouth/Throat:      Mouth: Mucous membranes are moist.      Pharynx: Oropharynx is clear. Eyes:      Extraocular Movements: Extraocular movements intact. Conjunctiva/sclera: Conjunctivae normal.      Pupils: Pupils are equal, round, and reactive to light. Cardiovascular:      Rate and Rhythm: Normal rate and regular rhythm. Pulses: Normal pulses. Heart sounds: Normal heart sounds. Pulmonary:      Effort: Pulmonary effort is normal.      Breath sounds: Normal breath sounds. Abdominal:      General: Bowel sounds are normal.      Palpations: Abdomen is soft. Musculoskeletal:         General: Normal range of motion. Cervical back: Normal range of motion and neck supple. Skin:     General: Skin is warm and dry. Capillary Refill: Capillary refill takes less than 2 seconds. Neurological:      General: No focal deficit present. Mental Status: She is alert and oriented to person, place, and time. Psychiatric:         Mood and Affect: Mood normal.         Behavior: Behavior normal.           MEDICAL DECISION MAKING   Initial Assessment:     Patient is a 45-year-old female with past medical history of morbid obesity who presents to the ED with complaint of chest pain and leg swelling. Patient was recently started on Bumex. Patient differential diagnosis includes but is not limited to chest pain acute, congestive heart failure, ACS, pedal edema, deconditioning and PE. Plan:   Labs  Chest x-ray  EKG  Cardiac monitor    Patient was recently started on Bumex but continues to be short of breath and have swelling to lower extremities.   Patient noted with increased oxygen demand normally on O2 at 2 L per nasal clear requiring 3L per nasal cannula today to maintain sat greater than 94%. Patient noted with elevated troponin and increase in BNP. Is also at increased risk for secondary to morbid obesity. Patient will be admitted at this time for further evaluation and treatment. ED RESULTS   Laboratory results:  Labs Reviewed   CBC WITH AUTO DIFFERENTIAL - Abnormal; Notable for the following components:       Result Value    Hemoglobin 10.2 (*)     MCV 73.2 (*)     MCH 20.1 (*)     MCHC 27.5 (*)     RDW-CV 21.6 (*)     RDW-SD 53.0 (*)     All other components within normal limits   BASIC METABOLIC PANEL W/ REFLEX TO MG FOR LOW K - Abnormal; Notable for the following components:    Chloride 96 (*)     All other components within normal limits   HEPATIC FUNCTION PANEL - Abnormal; Notable for the following components:    Albumin 3.2 (*)     All other components within normal limits   TROPONIN - Abnormal; Notable for the following components:    Troponin T 0.019 (*)     All other components within normal limits   BRAIN NATRIURETIC PEPTIDE - Abnormal; Notable for the following components:    Pro-.2 (*)     All other components within normal limits   GLOMERULAR FILTRATION RATE, ESTIMATED   ANION GAP   OSMOLALITY       Radiologic studies results:  XR CHEST (2 VW)   Final Result   Impression:   Changes from comparison suggesting some more complex subacute and chronic    pleural fluid collections. Though there is general improvement, CT may be    helpful in further interrogating the nature of these collections which    appear to be loculated by plain radiography. This document has been electronically signed by: Jane Douglas MD on    10/31/2022 06:23 AM          ED Medications administered this visit:   Medications   aspirin chewable tablet 324 mg (324 mg Oral Given 10/31/22 0518)         ED COURSE         Strict return precautions and follow up instructions were discussed with the patient prior to discharge, with which the patient agrees.       MEDICATION CHANGES     New Prescriptions    No medications on file         FINAL DISPOSITION     Final diagnoses:   Chest pain, unspecified type   Elevated troponin     Condition: condition: good  Dispo: Admit to telemetry      This transcription was electronically signed. Parts of this transcriptions may have been dictated by use of voice recognition software and electronically transcribed, and parts may have been transcribed with the assistance of an ED scribe. The transcription may contain errors not detected in proofreading. Please refer to my supervising physician's documentation if my documentation differs.     Electronically Signed: Leann Sánchez MD, 10/31/22, 6:52 AM        Leann Sánchez MD  Resident  10/31/22 6494

## 2022-10-31 NOTE — CONSULTS
[address] [place] [address]                                  CONSULTATION    PATIENT NAME: Jurgen Raza                     :        1985  MED REC NO:   [id number]                           ROOM:       0030  ACCOUNT NO:   [de-identified]                           ADMIT DATE: 10/31/2022  PROVIDER:     DMITRY [place]:  10/31/2022    REASON FOR CONSULTATION:  Lower extremity edema. REFERRING PROVIDER:  Hospitalist Service. HISTORY OF PRESENT ILLNESS:  A 40-year-old patient who is morbidly  obese, who apparently has had lower extremity edema for the last several  weeks with some shortness of breath. Was given diuretics as an  outpatient with no response and then subsequently she is admitted for  diuresis and management. She is quite limited because of extensive  obesity, lately diagnosed with sleep apnea, however, not started on  CPAP. She denies any cardiac history, was not taking any specific  medication. She did have COVID last year and pretty much had the same  situation after that. Has had some symptoms of chest discomfort at  times. REVIEW OF SYSTEMS:  No fever, chills or weight loss. No hematuria or  dysuria. No abdominal pain, nausea, vomiting. No obvious active psych  problems or suicidal ideation. No skin rashes. No obvious dizziness,  lightheadedness or loss of consciousness. No recent trauma. No  bleeding problem. No HEENT-related problem. PAST MEDICAL HISTORY:  1.  Morbid obesity. 2.  Sleep apnea. ALLERGIES:  MORPHINE. MEDICATIONS:  Bumex 2 mg IV twice a day. SOCIAL HISTORY:  No tobacco, no drugs, no alcohol. FAMILY HISTORY:  Significant for coronary artery disease. PHYSICAL EXAMINATION:  VITAL SIGNS:  Showed a blood pressure 130/80, heart rate of 70. GENERAL APPEARANCE:  Pleasant lady in no obvious acute distress. NECK:  No JVD.   LUNGS:  Decreased air entry.  HEART:  Normal S1, S2.  EXTREMITIES:  Significant obesity with difficulty evaluating for pitting  edema because of extensive obesity. PSYCHIATRIC:  No obvious active psychosis. SKIN:  No rashes. LABORATORY DATA:  Showed sodium 141, potassium 4.4, BUN 13, creatinine  0.6. White count 10.7, hemoglobin 10.2, hematocrit 37.1, platelets 008. EKG showed sinus rhythm with diffuse nonspecific ST-T wave changes. IMPRESSION:  This is a very difficult to evaluate patient who is  extensively obese and morbidly obese who has untreated sleep apnea, who  has significant fat obesity of the lower extremities which makes it very  difficult to evaluate for real edema. She did not respond to diuretics  based on her report. Had an echo last year which showed normal ejection  fraction. RECOMMENDATIONS:  As follows:  1. We are going to obtain an echo to assess LV function. 2.  This is likely right sided and sleep apnea related, however, cannot  completely exclude other causes. We will consider a stress test after  some diuresis to assess for any underlying ischemia and we will decide  accordingly.         Anila Quezada M.D.    D: 10/31/2022 9:51:52       T: 10/31/2022 9:54:43     [email or web address]  Job#: [id number]     Doc#: [id number]    CC:

## 2022-10-31 NOTE — ED NOTES
ED to inpatient nurses report    Chief Complaint   Patient presents with    Chest Pain    Leg Swelling      Present to ED from home  LOC: alert and orientated to name, place, date  Vital signs   Vitals:    10/31/22 0409 10/31/22 0519 10/31/22 0624   BP: (!) 157/62 (!) 136/51 (!) 147/63   Pulse: 70 70 71   Resp: 24 17 20   Temp: 97.5 °F (36.4 °C)     TempSrc: Oral     SpO2: 93% 98% 97%   Weight: (!) 495 lb (224.5 kg)     Height: 5' 3\" (1.6 m)        Oxygen Baseline 3L NC    Current needs required 3L NC Bipap/Cpap No  LDAs:   Peripheral IV 10/31/22 Left Hand (Active)   Site Assessment Clean, dry & intact 10/31/22 0624   Line Status Normal saline locked 10/31/22 0624   Phlebitis Assessment No symptoms 10/31/22 0624   Infiltration Assessment 0 10/31/22 0624   Dressing Status Clean, dry & intact 10/31/22 0624   Dressing Type Transparent 10/31/22 0417   Dressing Intervention New 10/31/22 0417     Mobility: Requires assistance * 2  Pending ED orders: NA  Present condition: STABLE  C-SSRS Risk of Suicide: No Risk  Swallow Screening    Preferred Language: [place]     Electronically signed by Angi Bowser, RN on 10/31/2022 at 7:29 AM       Luanne Castaneda RN  10/31/22 4219

## 2022-10-31 NOTE — ED TRIAGE NOTES
Pt  comes to ED through triage with c/o chest pain and edema in legs. Pt reports that CP began yesterday at 1000. PT reports that she has been experiencing edema in bilateral legs that has caused difficulty walking. Pt reports that her PCP started her on Bumex on Friday. Pt complains of SOB and is currently on 2L NC which is baseline.

## 2022-10-31 NOTE — PLAN OF CARE
Problem: Discharge Planning  Goal: Discharge to home or other facility with appropriate resources  Outcome: Progressing  Flowsheets (Taken 10/31/2022 1528)  Discharge to home or other facility with appropriate resources:   Identify barriers to discharge with patient and caregiver   Arrange for needed discharge resources and transportation as appropriate   Identify discharge learning needs (meds, wound care, etc)   Refer to discharge planning if patient needs post-hospital services based on physician order or complex needs related to functional status, cognitive ability or social support system     Problem: Pain  Goal: Verbalizes/displays adequate comfort level or baseline comfort level  Outcome: Progressing  Flowsheets (Taken 10/31/2022 1528)  Verbalizes/displays adequate comfort level or baseline comfort level:   Encourage patient to monitor pain and request assistance   Assess pain using appropriate pain scale   Administer analgesics based on type and severity of pain and evaluate response   Implement non-pharmacological measures as appropriate and evaluate response   Notify Licensed Independent Practitioner if interventions unsuccessful or patient reports new pain     Problem: Safety - Adult  Goal: Free from fall injury  Outcome: Progressing  Flowsheets (Taken 10/31/2022 1528)  Free From Fall Injury: Instruct family/caregiver on patient safety     Problem: Respiratory - Adult  Goal: Clear lung sounds  10/31/2022 1528 by Emanuel Oneil RN  Outcome: Progressing  10/31/2022 1243 by Tracy Bhatia RCP  Outcome: Progressing  Note: Mdi to improve lung aeration. Patient mutually agreed on goals. Care plan reviewed with patient. Patient verbalizes understanding of the plan of care and contribute to goal setting.

## 2022-10-31 NOTE — RT PROTOCOL NOTE
RT Inhaler-Nebulizer Bronchodilator Protocol Note    There is a bronchodilator order in the chart from a provider indicating to follow the RT Bronchodilator Protocol and there is an Initiate RT Inhaler-Nebulizer Bronchodilator Protocol order as well (see protocol at bottom of note). CXR Findings:  No results found. The findings from the last RT Protocol Assessment were as follows:   History Pulmonary Disease: Smoker 15 pack years or more  Respiratory Pattern: Dyspnea on exertion or RR 21-25 bpm  Breath Sounds: Slightly diminished and/or crackles  Cough: Strong, spontaneous, non-productive  Indication for Bronchodilator Therapy:    Bronchodilator Assessment Score: 5    Aerosolized bronchodilator medication orders have been revised according to the RT Inhaler-Nebulizer Bronchodilator Protocol below. Respiratory Therapist to perform RT Therapy Protocol Assessment initially then follow the protocol. Repeat RT Therapy Protocol Assessment PRN for score 0-3 or on second treatment, BID, and PRN for scores above 3. No Indications - adjust the frequency to every 6 hours PRN wheezing or bronchospasm, if no treatments needed after 48 hours then discontinue using Per Protocol order mode. If indication present, adjust the RT bronchodilator orders based on the Bronchodilator Assessment Score as indicated below. Use Inhaler orders unless patient has one or more of the following: on home nebulizer, not able to hold breath for 10 seconds, is not alert and oriented, cannot activate and use MDI correctly, or respiratory rate 25 breaths per minute or more, then use the equivalent nebulizer order(s) with same Frequency and PRN reasons based on the score. If a patient is on this medication at home then do not decrease Frequency below that used at home.     0-3 - enter or revise RT bronchodilator order(s) to equivalent RT Bronchodilator order with Frequency of every 4 hours PRN for wheezing or increased work of breathing using Per Protocol order mode. 4-6 - enter or revise RT Bronchodilator order(s) to two equivalent RT bronchodilator orders with one order with BID Frequency and one order with Frequency of every 4 hours PRN wheezing or increased work of breathing using Per Protocol order mode. 7-10 - enter or revise RT Bronchodilator order(s) to two equivalent RT bronchodilator orders with one order with TID Frequency and one order with Frequency of every 4 hours PRN wheezing or increased work of breathing using Per Protocol order mode. 11-13 - enter or revise RT Bronchodilator order(s) to one equivalent RT bronchodilator order with QID Frequency and an Albuterol order with Frequency of every 4 hours PRN wheezing or increased work of breathing using Per Protocol order mode. Greater than 13 - enter or revise RT Bronchodilator order(s) to one equivalent RT bronchodilator order with every 4 hours Frequency and an Albuterol order with Frequency of every 2 hours PRN wheezing or increased work of breathing using Per Protocol order mode. RT to enter RT Home Evaluation for COPD & MDI Assessment order using Per Protocol order mode.     Electronically signed by Eliu Lyon RCP on 10/31/2022 at 12:33 PM

## 2022-10-31 NOTE — ED NOTES
Pt reassessed at this time. Pt resting comfortably in bed. PT updated on POC.       Sylvie Lobato RN  10/31/22 9623

## 2022-10-31 NOTE — PLAN OF CARE
Problem: Respiratory - Adult  Goal: Clear lung sounds  Outcome: Progressing  Note: Mdi to improve lung aeration. Patient mutually agreed on goals.

## 2022-10-31 NOTE — H&P
frequently after taking the Bumex. She reports orthopnea, which is chronic. Patient also reports increased SOB with exertion. Patient states she began having chest pain yesterday morning. Described as pressure on the left side of her chest. Pt states pain was intermittent yesterday, random and occurred at rest. Today, she states the pain is constant. It is worse with palpation. Patient reports a h/o lung herniation which will at times be painful if she is sick but that is located on the right side of her chest and this does not feel similar. Patient admitted to med/tele for observation. Review of Systems: Pertinent positives as noted in the HPI. All other systems reviewed and negative. Past Medical History:        Diagnosis Date    Anxiety and depression 7/9/2021    Back pain     Degeneration of lumbosacral intervertebral disc 10/31/2022    Morbid obesity [facility])        Past Surgical History:        Procedure Laterality Date    ADENOIDECTOMY      ANKLE FRACTURE SURGERY Left 9/19/2017    LEFT ANKLE ORIF, I & D right posterior upper leg performed by Noreen Villavicencio MD at [address] [place] [address]  02/09/2016    MT OFFICE/OUTPT VISIT,PROCEDURE ONLY Left 8/2/2018    LEFT SUBTALAR JOINT FUSION, MEDIAL COLUMN FUSION, GASTROC RECESSION, POSS DELTOID REPAIR performed by Teagan Lundy DPM at [address] [place] [address] Medications:   No current facility-administered medications on file prior to encounter.      Current Outpatient Medications on File Prior to Encounter   Medication Sig Dispense Refill    bumetanide (BUMEX) 2 MG tablet Take 1 tablet by mouth daily 3 tablet 0    potassium chloride (KLOR-CON M) 20 MEQ extended release tablet Take 1 tablet by mouth daily 3 tablet 0    ferrous sulfate (IRON 325) 325 (65 Fe) MG tablet Take 1 tablet by mouth 2 times daily 180 tablet 3    vitamin D (ERGOCALCIFEROL) 1.25 MG (32652 UT) CAPS capsule Take 1 capsule by mouth once a week 12 capsule 3    Multiple Vitamin (MULTIVITAMIN ADULT PO) Take by mouth      lidocaine (LIDODERM) 5 % Place 2 patches onto the skin daily 12 hours on, 12 hours off. 60 patch 0    butalbital-acetaminophen-caffeine (FIORICET, ESGIC) -40 MG per tablet Take 1 tablet by mouth every 6 hours as needed for Migraine 15 tablet 0    ARIPiprazole (ABILIFY) 5 MG tablet Take 1 tablet by mouth daily 90 tablet 3    DULoxetine (CYMBALTA) 30 MG extended release capsule Take 1 capsule by mouth daily 90 capsule 3    glycopyrrolate-formoterol (BEVESPI AEROSPHERE) 9-4.8 MCG/ACT AERO Inhale 2 puffs into the lungs 2 times daily 2 each 0    albuterol sulfate HFA (PROVENTIL;VENTOLIN;PROAIR) 108 (90 Base) MCG/ACT inhaler Inhale 2 puffs into the lungs every 4 hours as needed for Wheezing or Shortness of Breath (cough) 1 each 0    hydrOXYzine HCl (ATARAX) 25 MG tablet Take 1 tablet by mouth every 8 hours as needed for Anxiety 60 tablet 1    aspirin 81 MG chewable tablet Take 1 tablet by mouth daily (Patient not taking: Reported on 10/11/2022) 30 tablet 3    famotidine (PEPCID) 20 MG tablet Take 1 tablet by mouth 2 times daily (Patient taking differently: Take 20 mg by mouth as needed) 60 tablet 11    solifenacin (VESICARE) 10 MG tablet Take 1 tablet by mouth daily 90 tablet 1    Naproxen Sodium (ALEVE PO) Take by mouth         Allergies:    Morphine    Social History:    reports that she quit smoking about 8 years ago. Her smoking use included cigarettes. She has never used smokeless tobacco. She reports current alcohol use. She reports current drug use. Drug: Marijuana Dietra Due).     Family History:       Problem Relation Age of Onset    Cancer Mother     Depression Mother     Thyroid Disease Mother     Arthritis Father     High Blood Pressure Father     Depression Sister     Depression Sister     Diabetes Paternal Grandfather     Cancer Paternal Grandmother     Cancer Maternal Grandfather     Cancer Maternal Grandmother        Diet:  No diet orders on file      Physical Exam:  BP (!) 142/70   Pulse 74   Temp 97.4 °F (36.3 °C) (Oral)   Resp 24   Ht 5' 3\" (1.6 m)   Wt (!) 495 lb (224.5 kg)   SpO2 98%   BMI 87.69 kg/m²   General:   Pleasant, obese female. NAD. HEENT:  normocephalic and atraumatic. No scleral icterus. PERR. Neck: supple. No JVD. No thyromegaly. Lungs: clear to auscultation. No retractions  Cardiac: RRR without murmur. Abdomen: soft. Nontender. Bowel sounds positive. Extremities:  No clubbing, cyanosis, or edema x 4. BLE swelling, non pitting. Vasculature: capillary refill < 3 seconds. Palpable LE pulses bilaterally. Skin:  warm and dry. No rashes or lesions. Psych:  Alert and oriented x3. Affect appropriate  Lymph:  No supraclavicular adenopathy. Neurologic:  No focal deficit. No seizures. Data: (All radiographs, tracings, PFTs, and imaging are personally viewed and interpreted unless otherwise noted)  Labs:   Recent Labs     10/31/22  0627   WBC 10.7   HGB 10.2*   HCT 37.1        Recent Labs     10/31/22  0450      K 4.4   CL 96*   CO2 33   BUN 13   CREATININE 0.6   CALCIUM 9.1     Recent Labs     10/31/22  0450   AST 30   ALT 19   BILIDIR <0.2   BILITOT 0.6   ALKPHOS 61     No results for input(s): INR in the last 72 hours. No results for input(s): Pamla Clines in the last 72 hours. Urinalysis:   Lab Results   Component Value Date/Time    NITRU POSITIVE 09/23/2017 06:00 AM    WBCUA 25-50 09/23/2017 06:00 AM    BACTERIA MANY 09/23/2017 06:00 AM    RBCUA > 200 09/23/2017 06:00 AM    BLOODU LARGE 09/23/2017 06:00 AM    SPECGRAV 1.027 09/18/2017 06:07 PM    GLUCOSEU NEGATIVE 09/23/2017 06:00 AM       EKG: normal sinus rhythm and anterior T-wave inversion    Radiology:  XR CHEST (2 VW)   Final Result   Impression:   Changes from comparison suggesting some more complex subacute and chronic    pleural fluid collections.  Though there is general improvement, CT may be    helpful in further interrogating the nature of these collections which    appear to be loculated by plain radiography. This document has been electronically signed by: Jones Duverney, MD on    10/31/2022 06:23 AM        XR CHEST (2 VW)    Result Date: 10/31/2022  2 view chest Comparison: 10/18/2022 Findings: Brassiere clips are noted overlying the mid thoracic spine. Moderate cardiomegaly is redemonstrated. There is interval clearing of infiltrate and atelectasis from the right upper lobe. Slight decrease in left midlung atelectasis is noted on the current exam. More confluent density laterally at the left mid chest may indicate developing infiltrate. Prior exam suggests there was some interfissural fluid that has decreased in volume. There is persistent curvilinear density across the right upper chest at the periphery indicating loculated effusion. Consider CT follow-up to better assess pleural collections. Impression: Changes from comparison suggesting some more complex subacute and chronic pleural fluid collections. Though there is general improvement, CT may be helpful in further interrogating the nature of these collections which appear to be loculated by plain radiography. This document has been electronically signed by: Jones Duverney, MD on 10/31/2022 06:23 AM        Tele:   [x] yes             [] no      Thank you MALCOM Pretty CNP for the opportunity to be involved in this patient's care.     Electronically signed by Chrissie Flaherty PA-C on 10/31/2022 at 9:39 AM

## 2022-10-31 NOTE — ED NOTES
Pt updated on POC at this time. Call light in reach. Pt on telemetry.       Constance Garza RN  10/31/22 [id number]

## 2022-10-31 NOTE — CARE COORDINATION
Case Management Assessment  Initial Evaluation    Date/Time of Evaluation: 10/31/2022 2:19 PM  Assessment Completed by: Nicola Knox RN    If patient is discharged prior to next notation, then this note serves as note for discharge by case management. Patient Name: German Harris                   YOB: 1985  Diagnosis: Chest pain [R07.9]  Elevated troponin [R77.8]  Chest pain, unspecified type [R07.9]                   Date / Time: 10/31/2022  4:03 AM    Patient Admission Status: Observation     Current PCP: MALCOM Lorenzana CNP  PCP verified by CM? Chart Reviewed: Yes      Patient Orientation:      Patient Cognition:    History Provided by:      Hospitalization in the last 30 days (Readmission):  No    If yes, Readmission Assessment in  Navigator will be completed. Advance Directives:     Code Status: Full Code       Discharge Planning  Patient lives with: Spouse/Significant Other Type of Home: House   Primary Caregiver:    Patient Support Systems include: Spouse/Significant Other, Family Members   Current Financial resources:    Current community resources:  No.  Current services prior to admission: Oxygen Therapy per [facility]   Type of Home Care services:  None    ADLS  Prior functional level:    Current functional level:        Family can provide assistance at DC: Would you like Case Management to discuss the discharge plan with any other family members/significant others, and if so, who?     Plans to Return to Present Housing:    Other Identified Issues/Barriers to RETURNING to current housing: No.  Potential Assistance needed at discharge: N/A  Patient expects to discharge to: [address] [place] [address] for transportation at discharge:      Financial  Payor: [employer] / Plan: MEDICARE PART A AND B / Product Type: *No Product type* /     Does insurance require precert for SNF: No    Potential assistance Purchasing Medications: No  Meds-to-Beds request: Yes      [address] [place] [address] [id number]  Osmany Coronel  [address] [facility] [address] [address]  Phone: [phone] Fax: [phone]      Factors facilitating achievement of predicted outcomes: Family support    Barriers to discharge: Resolution and investigation of Chest pain. Pt morbidly obese. Has home O2 but has recently increased her liter flow. Cardiology consulted. Echo ordered. Additional Case Management Notes: Admitted per ER with c/o CP and edema to legs. The Plan for Transition of Care is related to the following treatment goals of Chest pain [R07.9]  Elevated troponin [R77.8]  Chest pain, unspecified type [R07.9]    Patient Goals/Plan/Treatment Preferences: Plans return home with spouse. Current home O2 with [facility]. Transportation/Food Security/Housekeeping Addressed:  No issues identified.      Darrick Sweeney RN  Case Management Department

## 2022-11-01 ENCOUNTER — TELEPHONE (OUTPATIENT)
Dept: SLEEP CENTER | Age: 37
End: 2022-11-01

## 2022-11-01 PROBLEM — J96.21 ACUTE ON CHRONIC RESPIRATORY FAILURE WITH HYPOXIA (HCC): Status: ACTIVE | Noted: 2022-11-01

## 2022-11-01 PROBLEM — G47.33 OSA (OBSTRUCTIVE SLEEP APNEA): Status: ACTIVE | Noted: 2022-11-01

## 2022-11-01 PROBLEM — I50.33 ACUTE ON CHRONIC DIASTOLIC CHF (CONGESTIVE HEART FAILURE) (HCC): Status: ACTIVE | Noted: 2022-11-01

## 2022-11-01 PROBLEM — D64.9 CHRONIC ANEMIA: Status: ACTIVE | Noted: 2022-11-01

## 2022-11-01 PROBLEM — R60.0 BILATERAL LEG EDEMA: Status: ACTIVE | Noted: 2022-11-01

## 2022-11-01 LAB
ANION GAP SERPL CALCULATED.3IONS-SCNC: 9 MEQ/L (ref 8–16)
BUN BLDV-MCNC: 10 MG/DL (ref 7–22)
CALCIUM SERPL-MCNC: 8.5 MG/DL (ref 8.5–10.5)
CHLORIDE BLD-SCNC: 93 MEQ/L (ref 98–111)
CO2: 37 MEQ/L (ref 23–33)
CREAT SERPL-MCNC: 0.6 MG/DL (ref 0.4–1.2)
EKG ATRIAL RATE: 86 BPM
EKG P AXIS: 41 DEGREES
EKG P-R INTERVAL: 160 MS
EKG Q-T INTERVAL: 388 MS
EKG QRS DURATION: 100 MS
EKG QTC CALCULATION (BAZETT): 464 MS
EKG R AXIS: 108 DEGREES
EKG T AXIS: 3 DEGREES
EKG VENTRICULAR RATE: 86 BPM
ERYTHROCYTE [DISTWIDTH] IN BLOOD BY AUTOMATED COUNT: 21.3 % (ref 11.5–14.5)
ERYTHROCYTE [DISTWIDTH] IN BLOOD BY AUTOMATED COUNT: 54.4 FL (ref 35–45)
GFR SERPL CREATININE-BSD FRML MDRD: > 60 ML/MIN/1.73M2
GLUCOSE BLD-MCNC: 87 MG/DL (ref 70–108)
HCT VFR BLD CALC: 39.6 % (ref 37–47)
HEMOGLOBIN: 10.8 GM/DL (ref 12–16)
MCH RBC QN AUTO: 20.2 PG (ref 26–33)
MCHC RBC AUTO-ENTMCNC: 27.3 GM/DL (ref 32.2–35.5)
MCV RBC AUTO: 74.2 FL (ref 81–99)
PLATELET # BLD: 211 THOU/MM3 (ref 130–400)
PMV BLD AUTO: 10.4 FL (ref 9.4–12.4)
POTASSIUM SERPL-SCNC: 4.2 MEQ/L (ref 3.5–5.2)
PRO-BNP: 613.5 PG/ML (ref 0–450)
RBC # BLD: 5.34 MILL/MM3 (ref 4.2–5.4)
SODIUM BLD-SCNC: 139 MEQ/L (ref 135–145)
TROPONIN T: 0.02 NG/ML
WBC # BLD: 8.5 THOU/MM3 (ref 4.8–10.8)

## 2022-11-01 PROCEDURE — 94640 AIRWAY INHALATION TREATMENT: CPT

## 2022-11-01 PROCEDURE — 96372 THER/PROPH/DIAG INJ SC/IM: CPT

## 2022-11-01 PROCEDURE — 36415 COLL VENOUS BLD VENIPUNCTURE: CPT

## 2022-11-01 PROCEDURE — 99233 SBSQ HOSP IP/OBS HIGH 50: CPT | Performed by: FAMILY MEDICINE

## 2022-11-01 PROCEDURE — 99232 SBSQ HOSP IP/OBS MODERATE 35: CPT | Performed by: PHYSICIAN ASSISTANT

## 2022-11-01 PROCEDURE — 2700000000 HC OXYGEN THERAPY PER DAY

## 2022-11-01 PROCEDURE — 94060 EVALUATION OF WHEEZING: CPT

## 2022-11-01 PROCEDURE — 80048 BASIC METABOLIC PNL TOTAL CA: CPT

## 2022-11-01 PROCEDURE — 99223 1ST HOSP IP/OBS HIGH 75: CPT | Performed by: INTERNAL MEDICINE

## 2022-11-01 PROCEDURE — 96376 TX/PRO/DX INJ SAME DRUG ADON: CPT

## 2022-11-01 PROCEDURE — G0378 HOSPITAL OBSERVATION PER HR: HCPCS

## 2022-11-01 PROCEDURE — 6360000002 HC RX W HCPCS: Performed by: PHYSICIAN ASSISTANT

## 2022-11-01 PROCEDURE — 6370000000 HC RX 637 (ALT 250 FOR IP): Performed by: STUDENT IN AN ORGANIZED HEALTH CARE EDUCATION/TRAINING PROGRAM

## 2022-11-01 PROCEDURE — 93005 ELECTROCARDIOGRAM TRACING: CPT | Performed by: PHYSICIAN ASSISTANT

## 2022-11-01 PROCEDURE — 2580000003 HC RX 258: Performed by: PHYSICIAN ASSISTANT

## 2022-11-01 PROCEDURE — 1200000003 HC TELEMETRY R&B

## 2022-11-01 PROCEDURE — 85027 COMPLETE CBC AUTOMATED: CPT

## 2022-11-01 PROCEDURE — 83880 ASSAY OF NATRIURETIC PEPTIDE: CPT

## 2022-11-01 PROCEDURE — 94010 BREATHING CAPACITY TEST: CPT

## 2022-11-01 PROCEDURE — 93010 ELECTROCARDIOGRAM REPORT: CPT | Performed by: INTERNAL MEDICINE

## 2022-11-01 PROCEDURE — 6370000000 HC RX 637 (ALT 250 FOR IP): Performed by: PHYSICIAN ASSISTANT

## 2022-11-01 PROCEDURE — 2500000003 HC RX 250 WO HCPCS: Performed by: PHYSICIAN ASSISTANT

## 2022-11-01 PROCEDURE — 84484 ASSAY OF TROPONIN QUANT: CPT

## 2022-11-01 RX ORDER — PREDNISONE 20 MG/1
40 TABLET ORAL DAILY
Status: DISCONTINUED | OUTPATIENT
Start: 2022-11-01 | End: 2022-11-04 | Stop reason: HOSPADM

## 2022-11-01 RX ADMIN — SODIUM CHLORIDE, PRESERVATIVE FREE 10 ML: 5 INJECTION INTRAVENOUS at 19:48

## 2022-11-01 RX ADMIN — TROSPIUM CHLORIDE 20 MG: 20 TABLET, FILM COATED ORAL at 15:46

## 2022-11-01 RX ADMIN — ENOXAPARIN SODIUM 60 MG: 100 INJECTION SUBCUTANEOUS at 10:29

## 2022-11-01 RX ADMIN — ASPIRIN 81 MG CHEWABLE TABLET 81 MG: 81 TABLET CHEWABLE at 10:30

## 2022-11-01 RX ADMIN — PREDNISONE 40 MG: 20 TABLET ORAL at 15:46

## 2022-11-01 RX ADMIN — ALBUTEROL SULFATE 2 PUFF: 90 AEROSOL, METERED RESPIRATORY (INHALATION) at 08:02

## 2022-11-01 RX ADMIN — DULOXETINE HYDROCHLORIDE 30 MG: 30 CAPSULE, DELAYED RELEASE ORAL at 10:30

## 2022-11-01 RX ADMIN — FERROUS SULFATE TAB 325 MG (65 MG ELEMENTAL FE) 325 MG: 325 (65 FE) TAB at 19:49

## 2022-11-01 RX ADMIN — BUMETANIDE 2 MG: 0.25 INJECTION INTRAMUSCULAR; INTRAVENOUS at 19:48

## 2022-11-01 RX ADMIN — TIOTROPIUM BROMIDE AND OLODATEROL 2 PUFF: 3.124; 2.736 SPRAY, METERED RESPIRATORY (INHALATION) at 08:02

## 2022-11-01 RX ADMIN — MOMETASONE FUROATE AND FORMOTEROL FUMARATE DIHYDRATE 2 PUFF: 200; 5 AEROSOL RESPIRATORY (INHALATION) at 17:56

## 2022-11-01 RX ADMIN — POTASSIUM CHLORIDE 20 MEQ: 1500 TABLET, EXTENDED RELEASE ORAL at 10:30

## 2022-11-01 RX ADMIN — BUMETANIDE 2 MG: 0.25 INJECTION INTRAMUSCULAR; INTRAVENOUS at 10:30

## 2022-11-01 RX ADMIN — FERROUS SULFATE TAB 325 MG (65 MG ELEMENTAL FE) 325 MG: 325 (65 FE) TAB at 10:30

## 2022-11-01 RX ADMIN — ENOXAPARIN SODIUM 60 MG: 100 INJECTION SUBCUTANEOUS at 19:48

## 2022-11-01 RX ADMIN — ARIPIPRAZOLE 5 MG: 5 TABLET ORAL at 10:30

## 2022-11-01 RX ADMIN — TROSPIUM CHLORIDE 20 MG: 20 TABLET, FILM COATED ORAL at 05:13

## 2022-11-01 RX ADMIN — SODIUM CHLORIDE, PRESERVATIVE FREE 10 ML: 5 INJECTION INTRAVENOUS at 10:31

## 2022-11-01 RX ADMIN — ALBUTEROL SULFATE 2 PUFF: 90 AEROSOL, METERED RESPIRATORY (INHALATION) at 17:54

## 2022-11-01 NOTE — PLAN OF CARE
Problem: Discharge Planning  Goal: Discharge to home or other facility with appropriate resources  10/31/2022 2018 by Chichi Berry RN  Outcome: Progressing  10/31/2022 1528 by Corinne Lesch, RN  Outcome: Progressing  Flowsheets (Taken 10/31/2022 1528)  Discharge to home or other facility with appropriate resources:   Identify barriers to discharge with patient and caregiver   Arrange for needed discharge resources and transportation as appropriate   Identify discharge learning needs (meds, wound care, etc)   Refer to discharge planning if patient needs post-hospital services based on physician order or complex needs related to functional status, cognitive ability or social support system     Problem: Pain  Goal: Verbalizes/displays adequate comfort level or baseline comfort level  10/31/2022 2018 by Chichi Berry RN  Outcome: Progressing  10/31/2022 1528 by Corinne Lesch, RN  Outcome: Progressing  Flowsheets (Taken 10/31/2022 1528)  Verbalizes/displays adequate comfort level or baseline comfort level:   Encourage patient to monitor pain and request assistance   Assess pain using appropriate pain scale   Administer analgesics based on type and severity of pain and evaluate response   Implement non-pharmacological measures as appropriate and evaluate response   Notify Licensed Independent Practitioner if interventions unsuccessful or patient reports new pain     Problem: Safety - Adult  Goal: Free from fall injury  10/31/2022 2018 by Chichi Berry RN  Outcome: Progressing  10/31/2022 1528 by Corinne Lesch, RN  Outcome: Progressing  Flowsheets (Taken 10/31/2022 1528)  Free From Fall Injury: Instruct family/caregiver on patient safety     Problem: Respiratory - Adult  Goal: Clear lung sounds  10/31/2022 2018 by Chichi Berry RN  Outcome: Progressing  10/31/2022 1528 by Corinne Lesch, RN  Outcome: Progressing  10/31/2022 1243 by Annelise Vasquez RCP  Outcome: Progressing  Note: Mdi to improve lung aeration. Patient mutually agreed on goals.

## 2022-11-01 NOTE — PLAN OF CARE
Problem: Respiratory - Adult  Goal: Clear lung sounds  11/1/2022 1758 by Mukesh Srinivasan RCP  Outcome: Progressing   Pt is scoring for respiratory medication BID per protocol. Pt is on 4L NC at this time. Patient mutually agreed on goals.

## 2022-11-01 NOTE — PLAN OF CARE
Problem: Discharge Planning  Goal: Discharge to home or other facility with appropriate resources  Outcome: Progressing     Problem: Pain  Goal: Verbalizes/displays adequate comfort level or baseline comfort level  Outcome: Progressing     Problem: Safety - Adult  Goal: Free from fall injury  Outcome: Progressing     Problem: Respiratory - Adult  Goal: Clear lung sounds  11/1/2022 0805 by Ebony Carl RCP  Outcome: Progressing  Note: Goals agreed with patient

## 2022-11-01 NOTE — PROGRESS NOTES
[address] [place] [address]                               SLEEP STUDY REPORT    PATIENT NAME: Ana Christian                     :        1985  MED REC NO:   [id number]                           ROOM:  ACCOUNT NO:   [de-identified]                           ADMIT DATE: 10/19/2022  PROVIDER:     DMITRY Benavidez STUDY:  10/19/2022    CPAP TITRATION POLYSOMNOGRAM    HISTORY OF PRESENT ILLNESS:  The patient is a morbidly obese lady with  weight of 510 pounds with obstructive sleep apnea. She was brought to  the sleep lab for PAP titration polysomnogram.    METHODS:  The patient underwent digital polysomnography in compliance  with the standards and specifications from the AASM Manual including the  simultaneous recording of 3 EEG channels (F4-M1, C4-M1, and O2-M1 with  back up electrodes F3-M2, C3-M2, and O1-M2), 2 EOG channels (E1-M2, and  E2-M1,), EMG (chin, left & right leg), EKG, Nonin pulse oximetry with   less than 2 second averaging time, body position, airflow recorded by  oral-nasal thermal sensor and nasal air pressure transducer, plus  respiratory effort recorded by calibrated respiratory inductance  plethysmography (RIP), flow volume loop, sound and video. Sleep staging  and scoring followed the standard put forth by the American Academy of  Sleep Medicine and utilized the 1B obstructive hypopnea event  desaturation of 4 percent or greater. DETAILS OF THE STUDY:  Lights out 09:40 p.m., lights on 04:07 a.m. Total recording time 386 minutes, sleep period time 385 minutes, total  sleep time 369 minutes, sleep efficiency 95.6%. Latency to sleep 1.7  minutes. Wake after sleep onset 50 minutes. Latency to REM 50 minutes. SLEEP STAGING:  The patient slept 79 minutes or 21.4% in N2 sleep, 170  minutes or 46% in N3 sleep, 120 minutes or 32.6% in REM sleep.     BODY POSITION SUMMARY:  369.7 minutes of supine sleep.    PERIODIC LIMB MOVEMENT SUMMARY:  There was none. ECG SUMMARY:  Normal sinus rhythm with PVCs and periods of sinus tach. PAP TITRATION SUMMARY:  This was difficult titration due to a high  pressure required as well as persistent nocturnal oxygen desaturation in  maximal pressures of BiPAP. CPAP was initiated at 5 cm of water  pressure and increased to 15 cm of water pressure. This pressure was  inadequate to control the obstructive events and the patient was  switched to bilevel PAP at highest pressure it did improve the frequency  of the obstructive events even though not completely controlling it. The maximal pressure of 25/21 was used for 2 hours and 1 minute. There  were 28 minutes of REM sleep and 1 hour and 20 minutes of  non-REM  sleep. There were obstructive hypopneas for an AHI of 11. Lowest  oxygen saturation at this pressure was 85%. The final pressure as just  described was completed with addition of supplemental oxygen 2 liters  per minute. ASSESSMENT:  1. Severe obstructive sleep apnea. 2.  Improve control of the obstructive events with maximal pressures of  BiPAP was improved with residual AHI of 11. The patient slept in supine  position throughout the study. 3.  Nocturnal hypoventilation not completely corrected with BiPAP  therefore, supplemental oxygen was added at 2 liters per minute at the  last BiPAP pressure. Normal sinus rhythm with PVCs and episodes of  sinus tachycardia. RECOMMENDATIONS:  Would initiate BiPAP treatment with a pressure of  25/21 with addition of supplemental oxygen at 3 liters per minute. Would recommend to refer the patient for bariatric surgery as her  massive weight of 510 pounds makes it extremely difficult to be able to  titrate her correctly. Recommend to elevate her of the bed. Would  consider referral to ENT to explore surgical intervention in addition to  PAP treatment.   Followup in the sleep clinic eight weeks after  initiating BiPAP and supplemental oxygen to review of download.         Ayleen Thibodeaux M.D.    D: 10/30/2022 17:29:30       T: 10/31/2022 15:20:59     [email or web address]  Job#: [id number]     Doc#: [id number]
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
## 2022-11-01 NOTE — PLAN OF CARE
Problem: Discharge Planning  Goal: Discharge to home or other facility with appropriate resources  11/1/2022 1911 by Samantha Crowley RN  Outcome: Progressing  11/1/2022 1531 by Omar Tucker RN  Outcome: Progressing     Problem: Pain  Goal: Verbalizes/displays adequate comfort level or baseline comfort level  11/1/2022 1911 by Samantha Crowley RN  Outcome: Progressing  11/1/2022 1531 by Omar Tucker RN  Outcome: Progressing     Problem: Safety - Adult  Goal: Free from fall injury  11/1/2022 1911 by Samantha Crowley RN  Outcome: Progressing  11/1/2022 1531 by Omar Tucker RN  Outcome: Progressing     Problem: Respiratory - Adult  Goal: Clear lung sounds  11/1/2022 1911 by Samantha Crowley RN  Outcome: Progressing  11/1/2022 1758 by Damon Lentz RCP  Outcome: Progressing  11/1/2022 0805 by Vic Gonzalez RCP  Outcome: Progressing  Note: Goals agreed with patient

## 2022-11-01 NOTE — RT PROTOCOL NOTE
RT Inhaler-Nebulizer Bronchodilator Protocol Note    There is a bronchodilator order in the chart from a provider indicating to follow the RT Bronchodilator Protocol and there is an Initiate RT Inhaler-Nebulizer Bronchodilator Protocol order as well (see protocol at bottom of note). CXR Findings:  No results found. The findings from the last RT Protocol Assessment were as follows:   History Pulmonary Disease: Smoker 15 pack years or more  Respiratory Pattern: Dyspnea on exertion or RR 21-25 bpm  Breath Sounds: Slightly diminished and/or crackles  Cough: Strong, spontaneous, non-productive  Indication for Bronchodilator Therapy: Decreased or absent breath sounds  Bronchodilator Assessment Score: 5    Aerosolized bronchodilator medication orders have been revised according to the RT Inhaler-Nebulizer Bronchodilator Protocol below. Respiratory Therapist to perform RT Therapy Protocol Assessment initially then follow the protocol. Repeat RT Therapy Protocol Assessment PRN for score 0-3 or on second treatment, BID, and PRN for scores above 3. No Indications - adjust the frequency to every 6 hours PRN wheezing or bronchospasm, if no treatments needed after 48 hours then discontinue using Per Protocol order mode. If indication present, adjust the RT bronchodilator orders based on the Bronchodilator Assessment Score as indicated below. Use Inhaler orders unless patient has one or more of the following: on home nebulizer, not able to hold breath for 10 seconds, is not alert and oriented, cannot activate and use MDI correctly, or respiratory rate 25 breaths per minute or more, then use the equivalent nebulizer order(s) with same Frequency and PRN reasons based on the score. If a patient is on this medication at home then do not decrease Frequency below that used at home.     0-3 - enter or revise RT bronchodilator order(s) to equivalent RT Bronchodilator order with Frequency of every 4 hours PRN for wheezing or increased work of breathing using Per Protocol order mode. 4-6 - enter or revise RT Bronchodilator order(s) to two equivalent RT bronchodilator orders with one order with BID Frequency and one order with Frequency of every 4 hours PRN wheezing or increased work of breathing using Per Protocol order mode. 7-10 - enter or revise RT Bronchodilator order(s) to two equivalent RT bronchodilator orders with one order with TID Frequency and one order with Frequency of every 4 hours PRN wheezing or increased work of breathing using Per Protocol order mode. 11-13 - enter or revise RT Bronchodilator order(s) to one equivalent RT bronchodilator order with QID Frequency and an Albuterol order with Frequency of every 4 hours PRN wheezing or increased work of breathing using Per Protocol order mode. Greater than 13 - enter or revise RT Bronchodilator order(s) to one equivalent RT bronchodilator order with every 4 hours Frequency and an Albuterol order with Frequency of every 2 hours PRN wheezing or increased work of breathing using Per Protocol order mode. RT to enter RT Home Evaluation for COPD & MDI Assessment order using Per Protocol order mode.     Electronically signed by Natalee Fang RCP on 11/1/2022 at 8:05 AM
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
## 2022-11-01 NOTE — PROGRESS NOTES
Cardiology Progress Note      Patient:  Davi Moseley  YOB: 1985  MRN: [id number]   Acct: [de-identified]  Admit Date:  10/31/2022  Primary Cardiologist:  none    Note per dr Henrry Christine:  Lower extremity edema. REFERRING PROVIDER:  Hospitalist Service. HISTORY OF PRESENT ILLNESS:  A 80-year-old patient who is morbidly  obese, who apparently has had lower extremity edema for the last several  weeks with some shortness of breath. Was given diuretics as an  outpatient with no response and then subsequently she is admitted for  diuresis and management. She is quite limited because of extensive  obesity, lately diagnosed with sleep apnea, however, not started on  CPAP. She denies any cardiac history, was not taking any specific  medication. She did have COVID last year and pretty much had the same  situation after that. Has had some symptoms of chest discomfort at  times. \"    Subjective (Events in last 24 hours): pt awake and alert. NAD. Pt with more persistent substernal chest pressure 5/10, mildly worse with deep inspiration. Mild cough. Had night sweats last night. No fever or chills.  Doesn't feel too much sob, but requiring more O2 up to 5 l/min O2    Net I/o -3.4 L    Objective:   /62   Pulse 99   Temp 97.9 °F (36.6 °C) (Oral)   Resp 18   Ht 5' 3\" (1.6 m)   Wt (!) 495 lb (224.5 kg)   SpO2 92%   BMI 87.69 kg/m²        TELEMETRY: nsr    Physical Exam:  General Appearance: alert and oriented to person, place and time, in no acute distress, significant obesity  Cardiovascular: normal rate, regular rhythm, normal S1 and S2, no murmurs, rubs, clicks, or gallops, distal pulses intact, no carotid bruits, no JVD  Pulmonary/Chest: clear to auscultation bilaterally- no wheezes, rales or rhonchi, normal air movement, no respiratory distress  Abdomen: soft, non-tender, non-distended, normal bowel sounds, no masses Extremities: trace pedal edema, pulse   Skin: warm and dry  Head: normocephalic and atraumatic  Eyes: pupils equal, round, and reactive to light  Neck: supple and non-tender without mass, no thyromegaly   Neurological: alert, oriented, normal speech, no focal findings or movement disorder noted    Medications:    ARIPiprazole  5 mg Oral Daily    aspirin  81 mg Oral Daily    [Held by provider] bumetanide  2 mg Oral Daily    DULoxetine  30 mg Oral Daily    ferrous sulfate  325 mg Oral BID    tiotropium-olodaterol  2 puff Inhalation Daily    potassium chloride  20 mEq Oral Daily    trospium  20 mg Oral BID AC    lidocaine  2 patch Topical Daily    sodium chloride flush  10 mL IntraVENous 2 times per day    enoxaparin  60 mg SubCUTAneous BID    bumetanide  2 mg IntraVENous BID    albuterol sulfate HFA  2 puff Inhalation BID      sodium chloride       albuterol sulfate HFA, 2 puff, Q4H PRN  butalbital-acetaminophen-caffeine, 1 tablet, Q6H PRN  famotidine, 20 mg, Daily PRN  hydrOXYzine HCl, 25 mg, Q8H PRN  sodium chloride flush, 10 mL, PRN  sodium chloride, , PRN  ondansetron, 4 mg, Q8H PRN   Or  ondansetron, 4 mg, Q6H PRN  polyethylene glycol, 17 g, Daily PRN  acetaminophen, 650 mg, Q6H PRN   Or  acetaminophen, 650 mg, Q6H PRN  potassium chloride, 40 mEq, PRN   Or  potassium alternative oral replacement, 40 mEq, PRN   Or  potassium chloride, 10 mEq, PRN  magnesium sulfate, 2,000 mg, PRN        Diagnostics:  TTE 10/31/22  Summary   Technically difficult study due to poor acoustic windows. Ejection fraction is visually estimated at 60%. Overall left ventricular function is normal.      Signature      ----------------------------------------------------------------   Electronically signed by Shama Chou MD (Interpreting   physician) on 10/31/2022 at 04:01 PM      Lab Data:    Cardiac Enzymes:  No results for input(s): CKTOTAL, CKMB, CKMBINDEX, TROPONINI in the last 72 hours.     CBC:   Lab Results   Component Value Date/Time    WBC 8.5 11/01/2022 04:35 AM    RBC 5.34 11/01/2022 04:35 AM    HGB 10.8 11/01/2022 04:35 AM    HCT 39.6 11/01/2022 04:35 AM     11/01/2022 04:35 AM       CMP:    Lab Results   Component Value Date/Time     11/01/2022 04:35 AM    K 4.2 11/01/2022 04:35 AM    K 4.4 10/31/2022 04:50 AM    CL 93 11/01/2022 04:35 AM    CO2 37 11/01/2022 04:35 AM    BUN 10 11/01/2022 04:35 AM    CREATININE 0.6 11/01/2022 04:35 AM    LABGLOM >60 10/31/2022 06:07 PM    GLUCOSE 87 11/01/2022 04:35 AM    CALCIUM 8.5 11/01/2022 04:35 AM       Hepatic Function Panel:    Lab Results   Component Value Date/Time    ALKPHOS 61 10/31/2022 04:50 AM    ALT 19 10/31/2022 04:50 AM    AST 30 10/31/2022 04:50 AM    PROT 7.2 10/31/2022 04:50 AM    BILITOT 0.6 10/31/2022 04:50 AM    BILIDIR <0.2 10/31/2022 04:50 AM    LABALBU 3.2 10/31/2022 04:50 AM       Magnesium:    Lab Results   Component Value Date/Time    MG 2.4 07/09/2021 04:35 AM       PT/INR:    Lab Results   Component Value Date/Time    INR 1.01 09/18/2017 06:10 PM       HgBA1c:    Lab Results   Component Value Date/Time    LABA1C 5.9 10/14/2022 01:01 PM       FLP:    Lab Results   Component Value Date/Time    TRIG 87 10/14/2022 01:00 PM    HDL 55 10/14/2022 01:00 PM    LDLCALC 110 10/14/2022 01:00 PM       TSH:    Lab Results   Component Value Date/Time    TSH 4.710 10/14/2022 01:00 PM         Assessment:    Chest pain  Mildly elevated troponins  Acute on chronic diastolic CHF  Ef 60 per TTE 10/31/22  Acute on  Chronic hypoxic resp failure  Morbid obesity  SANTIAGO  ? OHS      Plan:    Daily I/o and weights  2 liter fluid restriction and 2gm sodium diet  Keep mag >2 and K >4  Cont diuresis  Daily BMP  Ekg, trop today  Agree with pulmonary consult, ?cta chest - pulm to decide  Will need ischemic w/up once O2 improved       Electronically signed by Enoc King PA-C on 11/1/2022 at 10:20 AM

## 2022-11-01 NOTE — CARE COORDINATION
11/1/22, 9:12 AM EDT    DISCHARGE ON GOING EVALUATION    [employer] day: 0  Location: 6K-28/028-A Reason for admit: Chest pain [R07.9]  Elevated troponin [R77.8]  Chest pain, unspecified type [R07.9]   Procedure:   10/31 CXR Changes from comparison suggesting some more complex subacute and chronic   pleural fluid collections. Though there is general improvement, CT may be   helpful in further interrogating the nature of these collections which   appear to be loculated by plain radiography. 10/31 ECHO EF 60%  Barriers to Discharge: Cardiology following, telemetry, Asa, IV Bumex, Lovenox, Pepcid, electrolyte replacement protocols. PCP: MALCOM Fajardo - CNP   %  Patient Goals/Plan/Treatment Preferences: Plans home with spouse. Current with [address] [place] [address] for home O2. Will follow.

## 2022-11-02 LAB
ANION GAP SERPL CALCULATED.3IONS-SCNC: 7 MEQ/L (ref 8–16)
BUN BLDV-MCNC: 12 MG/DL (ref 7–22)
CALCIUM SERPL-MCNC: 8.8 MG/DL (ref 8.5–10.5)
CHLORIDE BLD-SCNC: 92 MEQ/L (ref 98–111)
CO2: 39 MEQ/L (ref 23–33)
CREAT SERPL-MCNC: 0.6 MG/DL (ref 0.4–1.2)
GFR SERPL CREATININE-BSD FRML MDRD: > 60 ML/MIN/1.73M2
GLUCOSE BLD-MCNC: 93 MG/DL (ref 70–108)
MAGNESIUM: 2.2 MG/DL (ref 1.6–2.4)
POTASSIUM SERPL-SCNC: 4.4 MEQ/L (ref 3.5–5.2)
SODIUM BLD-SCNC: 138 MEQ/L (ref 135–145)

## 2022-11-02 PROCEDURE — 36415 COLL VENOUS BLD VENIPUNCTURE: CPT

## 2022-11-02 PROCEDURE — 6370000000 HC RX 637 (ALT 250 FOR IP): Performed by: PHYSICIAN ASSISTANT

## 2022-11-02 PROCEDURE — 94761 N-INVAS EAR/PLS OXIMETRY MLT: CPT

## 2022-11-02 PROCEDURE — 6360000002 HC RX W HCPCS: Performed by: PHYSICIAN ASSISTANT

## 2022-11-02 PROCEDURE — 2580000003 HC RX 258: Performed by: PHYSICIAN ASSISTANT

## 2022-11-02 PROCEDURE — 94660 CPAP INITIATION&MGMT: CPT

## 2022-11-02 PROCEDURE — 2500000003 HC RX 250 WO HCPCS: Performed by: PHYSICIAN ASSISTANT

## 2022-11-02 PROCEDURE — 83735 ASSAY OF MAGNESIUM: CPT

## 2022-11-02 PROCEDURE — 2700000000 HC OXYGEN THERAPY PER DAY

## 2022-11-02 PROCEDURE — 99232 SBSQ HOSP IP/OBS MODERATE 35: CPT | Performed by: PHYSICIAN ASSISTANT

## 2022-11-02 PROCEDURE — 6370000000 HC RX 637 (ALT 250 FOR IP): Performed by: STUDENT IN AN ORGANIZED HEALTH CARE EDUCATION/TRAINING PROGRAM

## 2022-11-02 PROCEDURE — 1200000003 HC TELEMETRY R&B

## 2022-11-02 PROCEDURE — 99232 SBSQ HOSP IP/OBS MODERATE 35: CPT | Performed by: INTERNAL MEDICINE

## 2022-11-02 PROCEDURE — 80048 BASIC METABOLIC PNL TOTAL CA: CPT

## 2022-11-02 PROCEDURE — 94640 AIRWAY INHALATION TREATMENT: CPT

## 2022-11-02 RX ORDER — ALBUTEROL SULFATE 90 UG/1
2 AEROSOL, METERED RESPIRATORY (INHALATION) EVERY 4 HOURS PRN
Status: DISCONTINUED | OUTPATIENT
Start: 2022-11-02 | End: 2022-11-04 | Stop reason: HOSPADM

## 2022-11-02 RX ADMIN — FERROUS SULFATE TAB 325 MG (65 MG ELEMENTAL FE) 325 MG: 325 (65 FE) TAB at 20:01

## 2022-11-02 RX ADMIN — ENOXAPARIN SODIUM 60 MG: 100 INJECTION SUBCUTANEOUS at 20:01

## 2022-11-02 RX ADMIN — ALBUTEROL SULFATE 2 PUFF: 90 AEROSOL, METERED RESPIRATORY (INHALATION) at 08:41

## 2022-11-02 RX ADMIN — ARIPIPRAZOLE 5 MG: 5 TABLET ORAL at 08:37

## 2022-11-02 RX ADMIN — SODIUM CHLORIDE, PRESERVATIVE FREE 10 ML: 5 INJECTION INTRAVENOUS at 20:01

## 2022-11-02 RX ADMIN — FERROUS SULFATE TAB 325 MG (65 MG ELEMENTAL FE) 325 MG: 325 (65 FE) TAB at 08:37

## 2022-11-02 RX ADMIN — ENOXAPARIN SODIUM 60 MG: 100 INJECTION SUBCUTANEOUS at 08:37

## 2022-11-02 RX ADMIN — TROSPIUM CHLORIDE 20 MG: 20 TABLET, FILM COATED ORAL at 08:36

## 2022-11-02 RX ADMIN — DULOXETINE HYDROCHLORIDE 30 MG: 30 CAPSULE, DELAYED RELEASE ORAL at 08:37

## 2022-11-02 RX ADMIN — BUMETANIDE 2 MG: 0.25 INJECTION INTRAMUSCULAR; INTRAVENOUS at 08:39

## 2022-11-02 RX ADMIN — MOMETASONE FUROATE AND FORMOTEROL FUMARATE DIHYDRATE 2 PUFF: 200; 5 AEROSOL RESPIRATORY (INHALATION) at 08:41

## 2022-11-02 RX ADMIN — ASPIRIN 81 MG CHEWABLE TABLET 81 MG: 81 TABLET CHEWABLE at 08:36

## 2022-11-02 RX ADMIN — POTASSIUM CHLORIDE 20 MEQ: 1500 TABLET, EXTENDED RELEASE ORAL at 08:36

## 2022-11-02 RX ADMIN — BUMETANIDE 2 MG: 0.25 INJECTION INTRAMUSCULAR; INTRAVENOUS at 20:01

## 2022-11-02 RX ADMIN — PREDNISONE 40 MG: 20 TABLET ORAL at 08:36

## 2022-11-02 RX ADMIN — ALBUTEROL SULFATE 2 PUFF: 90 AEROSOL, METERED RESPIRATORY (INHALATION) at 18:01

## 2022-11-02 RX ADMIN — MOMETASONE FUROATE AND FORMOTEROL FUMARATE DIHYDRATE 2 PUFF: 200; 5 AEROSOL RESPIRATORY (INHALATION) at 18:01

## 2022-11-02 RX ADMIN — SODIUM CHLORIDE, PRESERVATIVE FREE 10 ML: 5 INJECTION INTRAVENOUS at 08:36

## 2022-11-02 ASSESSMENT — PAIN DESCRIPTION - LOCATION: LOCATION: CHEST

## 2022-11-02 ASSESSMENT — PAIN SCALES - GENERAL: PAINLEVEL_OUTOF10: 2

## 2022-11-02 NOTE — PLAN OF CARE
Problem: Discharge Planning  Goal: Discharge to home or other facility with appropriate resources  Outcome: Progressing  Patient plans to be discharged home with  when medically stable. Problem: Pain  Goal: Verbalizes/displays adequate comfort level or baseline comfort level  Outcome: Progressing  Patient free from pain this shift. Pain rated on 0-10 pain rating scale. Pain goal 3/10. Will continue to reassess. Problem: Safety - Adult  Goal: Free from fall injury  Outcome: Progressing  Call light within reach. Side rails up x2. Bed alarm on. Non skid slippers available. Problem: Respiratory - Adult  Goal: Clear lung sounds  Outcome: Progressing  Patient's lung sounds clear- will continue to monitor. Problem: Skin/Tissue Integrity  Goal: Absence of new skin breakdown  Description: 1. Monitor for areas of redness and/or skin breakdown  2. Assess vascular access sites hourly  3. Every 4-6 hours minimum:  Change oxygen saturation probe site  4. Every 4-6 hours:  If on nasal continuous positive airway pressure, respiratory therapy assess nares and determine need for appliance change or resting period. Outcome: Progressing  Patient free from new skin breakdown. Patient turns self and makes frequent positional changes. Will continue to monitor. Problem: Chronic Conditions and Co-morbidities  Goal: Patient's chronic conditions and co-morbidity symptoms are monitored and maintained or improved  Outcome: Progressing    Care plan reviewed with patient and family. Patient and family verbalize understanding of the plan of care and contribute to goal setting.

## 2022-11-02 NOTE — PLAN OF CARE
Problem: Discharge Planning  Goal: Discharge to home or other facility with appropriate resources  11/2/2022 1954 by Mikey To RN  Outcome: Progressing  11/2/2022 1440 by Sade Austin RN  Outcome: Progressing     Problem: Pain  Goal: Verbalizes/displays adequate comfort level or baseline comfort level  11/2/2022 1954 by Mikey To RN  Outcome: Progressing  11/2/2022 1440 by Sade Austin RN  Outcome: Progressing     Problem: Safety - Adult  Goal: Free from fall injury  11/2/2022 1954 by Mikey To RN  Outcome: Progressing  11/2/2022 1440 by Sade Austin RN  Outcome: Progressing     Problem: Respiratory - Adult  Goal: Clear lung sounds  11/2/2022 1954 by Mikey To RN  Outcome: Progressing  11/2/2022 1806 by Lizet Prince RCP  Outcome: Progressing  11/2/2022 1440 by Sade Austin RN  Outcome: Progressing     Problem: Skin/Tissue Integrity  Goal: Absence of new skin breakdown  Description: 1. Monitor for areas of redness and/or skin breakdown  2. Assess vascular access sites hourly  3. Every 4-6 hours minimum:  Change oxygen saturation probe site  4. Every 4-6 hours:  If on nasal continuous positive airway pressure, respiratory therapy assess nares and determine need for appliance change or resting period.   11/2/2022 1954 by Mikey To RN  Outcome: Progressing  11/2/2022 1440 by Sade Austin RN  Outcome: Progressing     Problem: Chronic Conditions and Co-morbidities  Goal: Patient's chronic conditions and co-morbidity symptoms are monitored and maintained or improved  11/2/2022 1954 by Mikey To RN  Outcome: Progressing  11/2/2022 1440 by Sade Austin RN  Outcome: Progressing

## 2022-11-02 NOTE — PROGRESS NOTES
Cardiology Progress Note      Patient:  Rita Chong  YOB: 1985  MRN: [id number]   Acct: [de-identified]  Admit Date:  10/31/2022  Primary Cardiologist:  none    Note per dr Christa Yañez:  Lower extremity edema. REFERRING PROVIDER:  Hospitalist Service. HISTORY OF PRESENT ILLNESS:  A 26-year-old patient who is morbidly  obese, who apparently has had lower extremity edema for the last several  weeks with some shortness of breath. Was given diuretics as an  outpatient with no response and then subsequently she is admitted for  diuresis and management. She is quite limited because of extensive  obesity, lately diagnosed with sleep apnea, however, not started on  CPAP. She denies any cardiac history, was not taking any specific  medication. She did have COVID last year and pretty much had the same  situation after that. Has had some symptoms of chest discomfort at  times. \"    Subjective (Events in last 24 hours):   Pt awake and alert. NAD. Chest pain has resolved.   Breathing has improved and swelling has improved    On 4 l/min O2    Net I/o -6.3 L    Objective:   /67   Pulse 76   Temp 98.4 °F (36.9 °C) (Oral)   Resp 21   Ht 5' 3\" (1.6 m)   Wt (!) 495 lb (224.5 kg)   SpO2 100%   BMI 87.69 kg/m²        TELEMETRY: nsr    Physical Exam:  General Appearance: alert and oriented to person, place and time, in no acute distress, significant obesity  Cardiovascular: normal rate, regular rhythm, normal S1 and S2, no murmurs, rubs, clicks, or gallops, distal pulses intact, no carotid bruits, no JVD  Pulmonary/Chest: clear to auscultation bilaterally- no wheezes, rales or rhonchi, normal air movement, no respiratory distress  Abdomen: soft, non-tender, non-distended, normal bowel sounds, no masses Extremities: trace pedal edema, pulse   Skin: warm and dry  Head: normocephalic and atraumatic  Eyes: pupils equal, round, and reactive to light  Neck: supple and non-tender without mass, no thyromegaly   Neurological: alert, oriented, normal speech, no focal findings or movement disorder noted    Medications:    mometasone-formoterol  2 puff Inhalation BID    predniSONE  40 mg Oral Daily    ARIPiprazole  5 mg Oral Daily    aspirin  81 mg Oral Daily    [Held by provider] bumetanide  2 mg Oral Daily    DULoxetine  30 mg Oral Daily    ferrous sulfate  325 mg Oral BID    [Held by provider] tiotropium-olodaterol  2 puff Inhalation Daily    potassium chloride  20 mEq Oral Daily    trospium  20 mg Oral BID AC    lidocaine  2 patch Topical Daily    sodium chloride flush  10 mL IntraVENous 2 times per day    enoxaparin  60 mg SubCUTAneous BID    bumetanide  2 mg IntraVENous BID    albuterol sulfate HFA  2 puff Inhalation BID      sodium chloride       albuterol sulfate HFA, 2 puff, Q4H PRN  butalbital-acetaminophen-caffeine, 1 tablet, Q6H PRN  famotidine, 20 mg, Daily PRN  hydrOXYzine HCl, 25 mg, Q8H PRN  sodium chloride flush, 10 mL, PRN  sodium chloride, , PRN  ondansetron, 4 mg, Q8H PRN   Or  ondansetron, 4 mg, Q6H PRN  polyethylene glycol, 17 g, Daily PRN  acetaminophen, 650 mg, Q6H PRN   Or  acetaminophen, 650 mg, Q6H PRN  potassium chloride, 40 mEq, PRN   Or  potassium alternative oral replacement, 40 mEq, PRN   Or  potassium chloride, 10 mEq, PRN  magnesium sulfate, 2,000 mg, PRN      Diagnostics:  TTE 10/31/22  Summary   Technically difficult study due to poor acoustic windows. Ejection fraction is visually estimated at 60%. Overall left ventricular function is normal.      Signature      ----------------------------------------------------------------   Electronically signed by Jyotsna Lebron MD (Interpreting   physician) on 10/31/2022 at 04:01 PM      Lab Data:    Cardiac Enzymes:  No results for input(s): CKTOTAL, CKMB, CKMBINDEX, TROPONINI in the last 72 hours.     CBC:   Lab Results   Component Value Date/Time    WBC 8.5 11/01/2022 04:35 AM    RBC 5.34 11/01/2022 04:35 AM HGB 10.8 11/01/2022 04:35 AM    HCT 39.6 11/01/2022 04:35 AM     11/01/2022 04:35 AM       CMP:    Lab Results   Component Value Date/Time     11/02/2022 07:37 AM    K 4.4 11/02/2022 07:37 AM    K 4.4 10/31/2022 04:50 AM    CL 92 11/02/2022 07:37 AM    CO2 39 11/02/2022 07:37 AM    BUN 12 11/02/2022 07:37 AM    CREATININE 0.6 11/02/2022 07:37 AM    LABGLOM >60 11/02/2022 07:26 AM    GLUCOSE 93 11/02/2022 07:37 AM    CALCIUM 8.8 11/02/2022 07:37 AM       Hepatic Function Panel:    Lab Results   Component Value Date/Time    ALKPHOS 61 10/31/2022 04:50 AM    ALT 19 10/31/2022 04:50 AM    AST 30 10/31/2022 04:50 AM    PROT 7.2 10/31/2022 04:50 AM    BILITOT 0.6 10/31/2022 04:50 AM    BILIDIR <0.2 10/31/2022 04:50 AM    LABALBU 3.2 10/31/2022 04:50 AM       Magnesium:    Lab Results   Component Value Date/Time    MG 2.2 11/02/2022 07:37 AM       PT/INR:    Lab Results   Component Value Date/Time    INR 1.01 09/18/2017 06:10 PM       HgBA1c:    Lab Results   Component Value Date/Time    LABA1C 5.9 10/14/2022 01:01 PM       FLP:    Lab Results   Component Value Date/Time    TRIG 87 10/14/2022 01:00 PM    HDL 55 10/14/2022 01:00 PM    LDLCALC 110 10/14/2022 01:00 PM       TSH:    Lab Results   Component Value Date/Time    TSH 4.710 10/14/2022 01:00 PM         Assessment:    Chest pain - resolved  Mildly elevated troponins  Acute on chronic diastolic CHF - improving  Ef 60 per TTE 10/31/22  Acute on  Chronic hypoxic resp failure  Morbid obesity  SANTIAGO- wearing bipap at night  Asthma exacerbation - pulm following  ? OHS      Plan:    Daily I/o and weights  2 liter fluid restriction and 2gm sodium diet  Keep mag >2 and K >4  Cont diuresis  Daily BMP  Npo after midnight  Lexiscan stress test in am       Electronically signed by Ciaran Morrell PA-C on 11/2/2022 at 11:54 AM
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
## 2022-11-02 NOTE — FLOWSHEET NOTE
11/02/22 1104   Safe Environment   Safety Measures Other (comment)  (Virtual nurse round complete)   Virtual nurse introduced via audio. Patient permits camera. Patient in bed. Denies any needs at this time. Call light in reach.

## 2022-11-02 NOTE — CARE COORDINATION
11/2/22, 9:10 AM EDT    DISCHARGE ON GOING EVALUATION    Anay Corneliusgoran Che day: 1  Location: -28/028-A Reason for admit: Chest pain [R07.9]  Elevated troponin [R77.8]  Chest pain, unspecified type [R07.9]  Acute on chronic respiratory failure with hypoxia ([address] [J96.21]   Procedure:   10/31 CXR Changes from comparison suggesting some more complex subacute and chronic   pleural fluid collections. Though there is general improvement, CT may be   helpful in further interrogating the nature of these collections which   appear to be loculated by plain radiography. 10/31 ECHO EF 60%  Barriers to Discharge: Cardiology, Pulmonology following. Asa, Bumex, Lovenox, Pepcid, Prednisone, electrolyte replacement protocols. Pt is on oxygen at baseline. PCP: MALCOM Anderson CNP  Readmission Risk Score: 14.9%  Patient Goals/Plan/Treatment Preferences: Plan is to return home with spouse. Denies needs. Will follow.

## 2022-11-02 NOTE — PLAN OF CARE
Problem: Respiratory - Adult  Goal: Clear lung sounds  11/2/2022 1806 by Nathaly Luna RCP  Outcome: Progressing   Pt is on home oxygen settings at this time on 3L. Pt states she wears 2-2L at home. Per protocol pt scores for treatments PRN at this time. Patient mutually agreed on goals.

## 2022-11-03 ENCOUNTER — APPOINTMENT (OUTPATIENT)
Dept: CARDIAC CATH/INVASIVE PROCEDURES | Age: 37
DRG: 286 | End: 2022-11-03
Payer: MEDICARE

## 2022-11-03 LAB
ALLEN TEST: POSITIVE
ANION GAP SERPL CALCULATED.3IONS-SCNC: 13 MEQ/L (ref 8–16)
ANION GAP SERPL CALCULATED.3IONS-SCNC: 7 MEQ/L (ref 8–16)
BASE EXCESS (CALCULATED): 15.3 MMOL/L (ref -2.5–2.5)
BUN BLDV-MCNC: 16 MG/DL (ref 7–22)
BUN BLDV-MCNC: 16 MG/DL (ref 7–22)
CALCIUM SERPL-MCNC: 8.4 MG/DL (ref 8.5–10.5)
CALCIUM SERPL-MCNC: 8.9 MG/DL (ref 8.5–10.5)
CHLORIDE BLD-SCNC: 89 MEQ/L (ref 98–111)
CHLORIDE BLD-SCNC: 89 MEQ/L (ref 98–111)
CO2: 34 MEQ/L (ref 23–33)
CO2: 41 MEQ/L (ref 23–33)
COLLECTED BY:: ABNORMAL
CREAT SERPL-MCNC: 0.5 MG/DL (ref 0.4–1.2)
CREAT SERPL-MCNC: 0.6 MG/DL (ref 0.4–1.2)
GFR SERPL CREATININE-BSD FRML MDRD: > 60 ML/MIN/1.73M2
GFR SERPL CREATININE-BSD FRML MDRD: > 60 ML/MIN/1.73M2
GLUCOSE BLD-MCNC: 89 MG/DL (ref 70–108)
GLUCOSE BLD-MCNC: 90 MG/DL (ref 70–108)
HCO3: 44 MMOL/L (ref 23–28)
IFIO2: 3
O2 SATURATION: 85 %
PCO2: 75 MMHG (ref 35–45)
PH BLOOD GAS: 7.38 (ref 7.35–7.45)
PO2: 54 MMHG (ref 71–104)
POTASSIUM SERPL-SCNC: 4 MEQ/L (ref 3.5–5.2)
POTASSIUM SERPL-SCNC: 4.3 MEQ/L (ref 3.5–5.2)
SODIUM BLD-SCNC: 136 MEQ/L (ref 135–145)
SODIUM BLD-SCNC: 137 MEQ/L (ref 135–145)
SOURCE, BLOOD GAS: ABNORMAL

## 2022-11-03 PROCEDURE — 2500000003 HC RX 250 WO HCPCS: Performed by: PHYSICIAN ASSISTANT

## 2022-11-03 PROCEDURE — 1200000003 HC TELEMETRY R&B

## 2022-11-03 PROCEDURE — 6360000004 HC RX CONTRAST MEDICATION: Performed by: INTERNAL MEDICINE

## 2022-11-03 PROCEDURE — 6360000002 HC RX W HCPCS

## 2022-11-03 PROCEDURE — 6360000002 HC RX W HCPCS: Performed by: PHYSICIAN ASSISTANT

## 2022-11-03 PROCEDURE — 2700000000 HC OXYGEN THERAPY PER DAY

## 2022-11-03 PROCEDURE — B2151ZZ FLUOROSCOPY OF LEFT HEART USING LOW OSMOLAR CONTRAST: ICD-10-PCS | Performed by: INTERNAL MEDICINE

## 2022-11-03 PROCEDURE — 94660 CPAP INITIATION&MGMT: CPT

## 2022-11-03 PROCEDURE — 93458 L HRT ARTERY/VENTRICLE ANGIO: CPT | Performed by: INTERNAL MEDICINE

## 2022-11-03 PROCEDURE — 2580000003 HC RX 258: Performed by: PHYSICIAN ASSISTANT

## 2022-11-03 PROCEDURE — 99232 SBSQ HOSP IP/OBS MODERATE 35: CPT | Performed by: INTERNAL MEDICINE

## 2022-11-03 PROCEDURE — 99232 SBSQ HOSP IP/OBS MODERATE 35: CPT | Performed by: PHYSICIAN ASSISTANT

## 2022-11-03 PROCEDURE — 36415 COLL VENOUS BLD VENIPUNCTURE: CPT

## 2022-11-03 PROCEDURE — 94640 AIRWAY INHALATION TREATMENT: CPT

## 2022-11-03 PROCEDURE — 2580000003 HC RX 258: Performed by: INTERNAL MEDICINE

## 2022-11-03 PROCEDURE — 80048 BASIC METABOLIC PNL TOTAL CA: CPT

## 2022-11-03 PROCEDURE — C1769 GUIDE WIRE: HCPCS

## 2022-11-03 PROCEDURE — B2111ZZ FLUOROSCOPY OF MULTIPLE CORONARY ARTERIES USING LOW OSMOLAR CONTRAST: ICD-10-PCS | Performed by: INTERNAL MEDICINE

## 2022-11-03 PROCEDURE — 36600 WITHDRAWAL OF ARTERIAL BLOOD: CPT

## 2022-11-03 PROCEDURE — 4A023N7 MEASUREMENT OF CARDIAC SAMPLING AND PRESSURE, LEFT HEART, PERCUTANEOUS APPROACH: ICD-10-PCS | Performed by: INTERNAL MEDICINE

## 2022-11-03 PROCEDURE — C1894 INTRO/SHEATH, NON-LASER: HCPCS

## 2022-11-03 PROCEDURE — 82803 BLOOD GASES ANY COMBINATION: CPT

## 2022-11-03 PROCEDURE — 6370000000 HC RX 637 (ALT 250 FOR IP): Performed by: PHYSICIAN ASSISTANT

## 2022-11-03 PROCEDURE — 93458 L HRT ARTERY/VENTRICLE ANGIO: CPT

## 2022-11-03 PROCEDURE — 6370000000 HC RX 637 (ALT 250 FOR IP): Performed by: STUDENT IN AN ORGANIZED HEALTH CARE EDUCATION/TRAINING PROGRAM

## 2022-11-03 PROCEDURE — 2500000003 HC RX 250 WO HCPCS

## 2022-11-03 RX ORDER — SODIUM CHLORIDE 9 MG/ML
INJECTION, SOLUTION INTRAVENOUS PRN
Status: DISCONTINUED | OUTPATIENT
Start: 2022-11-03 | End: 2022-11-04 | Stop reason: HOSPADM

## 2022-11-03 RX ORDER — SODIUM CHLORIDE 9 MG/ML
INJECTION, SOLUTION INTRAVENOUS PRN
OUTPATIENT
Start: 2022-11-03

## 2022-11-03 RX ORDER — ACETAMINOPHEN 325 MG/1
650 TABLET ORAL EVERY 4 HOURS PRN
Status: DISCONTINUED | OUTPATIENT
Start: 2022-11-03 | End: 2022-11-04 | Stop reason: HOSPADM

## 2022-11-03 RX ORDER — NITROGLYCERIN 0.4 MG/1
0.4 TABLET SUBLINGUAL EVERY 5 MIN PRN
OUTPATIENT
Start: 2022-11-03

## 2022-11-03 RX ORDER — SODIUM CHLORIDE 9 MG/ML
INJECTION, SOLUTION INTRAVENOUS CONTINUOUS
OUTPATIENT
Start: 2022-11-03

## 2022-11-03 RX ORDER — SODIUM CHLORIDE 0.9 % (FLUSH) 0.9 %
5-40 SYRINGE (ML) INJECTION EVERY 12 HOURS SCHEDULED
Status: DISCONTINUED | OUTPATIENT
Start: 2022-11-03 | End: 2022-11-04 | Stop reason: HOSPADM

## 2022-11-03 RX ORDER — SODIUM CHLORIDE 0.9 % (FLUSH) 0.9 %
5-40 SYRINGE (ML) INJECTION PRN
OUTPATIENT
Start: 2022-11-03

## 2022-11-03 RX ORDER — ASPIRIN 325 MG
325 TABLET ORAL ONCE
OUTPATIENT
Start: 2022-11-03 | End: 2022-11-03

## 2022-11-03 RX ORDER — SODIUM CHLORIDE 0.9 % (FLUSH) 0.9 %
5-40 SYRINGE (ML) INJECTION PRN
Status: DISCONTINUED | OUTPATIENT
Start: 2022-11-03 | End: 2022-11-04 | Stop reason: HOSPADM

## 2022-11-03 RX ORDER — SODIUM CHLORIDE 0.9 % (FLUSH) 0.9 %
5-40 SYRINGE (ML) INJECTION EVERY 12 HOURS SCHEDULED
OUTPATIENT
Start: 2022-11-03

## 2022-11-03 RX ADMIN — MOMETASONE FUROATE AND FORMOTEROL FUMARATE DIHYDRATE 2 PUFF: 200; 5 AEROSOL RESPIRATORY (INHALATION) at 18:54

## 2022-11-03 RX ADMIN — TROSPIUM CHLORIDE 20 MG: 20 TABLET, FILM COATED ORAL at 17:46

## 2022-11-03 RX ADMIN — ENOXAPARIN SODIUM 60 MG: 100 INJECTION SUBCUTANEOUS at 09:04

## 2022-11-03 RX ADMIN — IOPAMIDOL 60 ML: 755 INJECTION, SOLUTION INTRAVENOUS at 14:50

## 2022-11-03 RX ADMIN — PREDNISONE 40 MG: 20 TABLET ORAL at 09:03

## 2022-11-03 RX ADMIN — ENOXAPARIN SODIUM 60 MG: 100 INJECTION SUBCUTANEOUS at 20:46

## 2022-11-03 RX ADMIN — SODIUM CHLORIDE, PRESERVATIVE FREE 10 ML: 5 INJECTION INTRAVENOUS at 20:46

## 2022-11-03 RX ADMIN — SODIUM CHLORIDE, PRESERVATIVE FREE 10 ML: 5 INJECTION INTRAVENOUS at 09:27

## 2022-11-03 RX ADMIN — MOMETASONE FUROATE AND FORMOTEROL FUMARATE DIHYDRATE 2 PUFF: 200; 5 AEROSOL RESPIRATORY (INHALATION) at 08:10

## 2022-11-03 RX ADMIN — POTASSIUM CHLORIDE 20 MEQ: 1500 TABLET, EXTENDED RELEASE ORAL at 09:03

## 2022-11-03 RX ADMIN — DULOXETINE HYDROCHLORIDE 30 MG: 30 CAPSULE, DELAYED RELEASE ORAL at 09:03

## 2022-11-03 RX ADMIN — BUMETANIDE 2 MG: 0.25 INJECTION INTRAMUSCULAR; INTRAVENOUS at 09:04

## 2022-11-03 RX ADMIN — FERROUS SULFATE TAB 325 MG (65 MG ELEMENTAL FE) 325 MG: 325 (65 FE) TAB at 20:46

## 2022-11-03 RX ADMIN — FERROUS SULFATE TAB 325 MG (65 MG ELEMENTAL FE) 325 MG: 325 (65 FE) TAB at 09:03

## 2022-11-03 RX ADMIN — ARIPIPRAZOLE 5 MG: 5 TABLET ORAL at 09:03

## 2022-11-03 RX ADMIN — TROSPIUM CHLORIDE 20 MG: 20 TABLET, FILM COATED ORAL at 09:04

## 2022-11-03 RX ADMIN — ASPIRIN 81 MG CHEWABLE TABLET 81 MG: 81 TABLET CHEWABLE at 09:03

## 2022-11-03 ASSESSMENT — PAIN DESCRIPTION - LOCATION: LOCATION: CHEST

## 2022-11-03 ASSESSMENT — PAIN SCALES - GENERAL
PAINLEVEL_OUTOF10: 5
PAINLEVEL_OUTOF10: 0
PAINLEVEL_OUTOF10: 0

## 2022-11-03 NOTE — H&P
[facility]  Sedation/Analgesia History & Physical    Pt Name: Zia Graves  Account number: [de-identified]  MRN: [id number]  YOB: 1985  Provider Performing Procedure: Ivan Mcclain MD MD  Referring Provider: Rosalia Gerber PA-C   Primary Care Physician: MALCOM Velasquez - JOHN  Date: 11/3/2022    PRE-PROCEDURE    Code Status: FULL CODE  Brief History/Pre-Procedure Diagnosis:    Chest pain  Mild troponin elevation   Acute HFpEF  Consent: : I have discussed with the patient risks, benefits, and alternatives (and relevant risks, benefits, and side effects related to alternatives or not receiving care), and likelihood of the success. The patient and/or representative appear to understand and agree to proceed. The discussion encompasses risks, benefits, and side effects related to the alternatives and the risks related to not receiving the proposed care, treatment, and services. The indication, risks and benefits of the procedure and possible therapeutic consequences and alternatives were discussed with the patient. The patient was given the opportunity to ask questions and to have them answered to his/her satisfaction. Risks of the procedure include but are not limited to the following: Bleeding, hematoma including retroperitoneal hemmorhage, infection, pain and discomfort, injury to the aorta and other blood vessels, rhythm disturbance, low blood pressure, myocardial infarction, stroke, kidney damage/failure, myocardial perforation, allergic reactions to sedatives/contrast material, loss of pulse/vascular compromise requiring surgery, aneurysm/pseudoaneurysm formation, possible loss of a limb/hand/leg, needing blood transfusion, requiring emergent open heart surgery or emergent coronary intervention, the need for intubation/respiratory support, the requirement for defibrillation/cardioversion, and death. Alternatives to and omission of the suggested procedure were discussed. The patient had no further questions and wished to proceed; the consent form was signed. MEDICAL HISTORY  []ASHD/ANGINA/MI/CHF   []Hypertension  []Diabetes  []Hyperlipidemia  []Smoking  []Family Hx of ASHD  []Additional information:       has a past medical history of Acute on chronic diastolic CHF (congestive heart failure) ([facility] [address] Anxiety and depression, Back pain, Degeneration of lumbosacral intervertebral disc, and Morbid obesity ([facility] [address] SURGICAL HISTORY   has a past surgical history that includes Adenoidectomy; laparoscopic appendectomy (02/09/2016); Ankle fracture surgery (Left, 9/19/2017); and pr office/outpt visit,procedure only (Left, 8/2/2018).   Additional information:       ALLERGIES   Allergies as of 10/31/2022 - Fully Reviewed 10/31/2022   Allergen Reaction Noted    Morphine Itching 02/17/2016     Additional information:       MEDICATIONS   Aspirin  [] 81 mg  [] 325 mg  [] None  Antiplatelet drug therapy use last 5 days  []No []Yes  Coumadin Use Last 5 Days []No []Yes  Other anticoagulant use last 5 days  []No []Yes    Current Facility-Administered Medications:     albuterol sulfate HFA (PROVENTIL;VENTOLIN;PROAIR) 108 (90 Base) MCG/ACT inhaler 2 puff, 2 puff, Inhalation, Q4H PRN, Pao Moran PA-C    mometasone-formoterol (DULERA) 200-5 MCG/ACT inhaler 2 puff, 2 puff, Inhalation, BID, Sharyle Domino, MD, 2 puff at 11/03/22 0810    predniSONE (DELTASONE) tablet 40 mg, 40 mg, Oral, Daily, Keily Sen MD, 40 mg at 11/03/22 0903    albuterol sulfate HFA (PROVENTIL;VENTOLIN;PROAIR) 108 (90 Base) MCG/ACT inhaler 2 puff, 2 puff, Inhalation, Q4H PRN, Kay Hubbard PA-C, 2 puff at 10/31/22 1235    ARIPiprazole (ABILIFY) tablet 5 mg, 5 mg, Oral, Daily, Kay Hubbard PA-C, 5 mg at 11/03/22 0193    aspirin chewable tablet 81 mg, 81 mg, Oral, Daily, Kay Hubbard PA-C, 81 mg at 11/03/22 0903    bumetanide (BUMEX) tablet 2 mg, 2 mg, Oral, Daily, Noel Clemons PA-C butalbital-acetaminophen-caffeine (FIORICET, ESGIC) per tablet 1 tablet, 1 tablet, Oral, Q6H PRN, KODAK Chan-C    DULoxetine (CYMBALTA) extended release capsule 30 mg, 30 mg, Oral, Daily, Kay Hubbard, PA-C, 30 mg at 11/03/22 0903    famotidine (PEPCID) tablet 20 mg, 20 mg, Oral, Daily PRN, Kay Hubbard PA-C    ferrous sulfate (IRON 325) tablet 325 mg, 325 mg, Oral, BID, Kay Hubbard, PA-C, 325 mg at 11/03/22 0903    hydrOXYzine HCl (ATARAX) tablet 25 mg, 25 mg, Oral, Q8H PRN, Kay Hubbard PA-C    potassium chloride (KLOR-CON M) extended release tablet 20 mEq, 20 mEq, Oral, Daily, KODAK Chan-C, 20 mEq at 11/03/22 0903    trospium (SANCTURA) tablet 20 mg, 20 mg, Oral, BID AC, Kay Hubbard, PA-C, 20 mg at 11/03/22 0904    lidocaine 4 % external patch 2 patch, 2 patch, Topical, Daily, Kay Hubbard PA-C, 2 patch at 11/01/22 1030    sodium chloride flush 0.9 % injection 10 mL, 10 mL, IntraVENous, 2 times per day, KODAK Breaux-C, 10 mL at 11/03/22 0927    sodium chloride flush 0.9 % injection 10 mL, 10 mL, IntraVENous, PRN, Kay Hubbard PA-C    0.9 % sodium chloride infusion, , IntraVENous, PRN, Kay Hubbard PA-C    enoxaparin (LOVENOX) injection 60 mg, 60 mg, SubCUTAneous, BID, Kay Hubbard, PA-C, 60 mg at 11/03/22 0904    ondansetron (ZOFRAN-ODT) disintegrating tablet 4 mg, 4 mg, Oral, Q8H PRN **OR** ondansetron (ZOFRAN) injection 4 mg, 4 mg, IntraVENous, Q6H PRN, Kay Hubbard PA-C    polyethylene glycol (GLYCOLAX) packet 17 g, 17 g, Oral, Daily PRN, Kay Hubbard PA-C    acetaminophen (TYLENOL) tablet 650 mg, 650 mg, Oral, Q6H PRN **OR** acetaminophen (TYLENOL) suppository 650 mg, 650 mg, Rectal, Q6H PRN, Kay Hubbard PA-C    potassium chloride (KLOR-CON M) extended release tablet 40 mEq, 40 mEq, Oral, PRN **OR** potassium bicarb-citric acid (EFFER-K) effervescent tablet 40 mEq, 40 mEq, Oral, PRN **OR** potassium chloride 10 mEq/100 mL IVPB (Peripheral Line), 10 mEq, IntraVENous, PRN, Kay Hubbard PA-C    magnesium sulfate 2000 mg in 50 mL IVPB premix, 2,000 mg, IntraVENous, PRN, Kerry To PA-C  Prior to Admission medications    Medication Sig Start Date End Date Taking?  Authorizing Provider   bumetanide (BUMEX) 2 MG tablet Take 1 tablet by mouth daily 10/27/22   MALCOM Garvey CNP   potassium chloride (KLOR-CON M) 20 MEQ extended release tablet Take 1 tablet by mouth daily 10/27/22   MALCOM Garvey CNP   ferrous sulfate (IRON 325) 325 (65 Fe) MG tablet Take 1 tablet by mouth 2 times daily 10/17/22   MALCOM Garvey CNP   vitamin D (ERGOCALCIFEROL) 1.25 MG (32835 UT) CAPS capsule Take 1 capsule by mouth once a week 10/17/22   MALCOM Garvey CNP   Multiple Vitamin (MULTIVITAMIN ADULT PO) Take by mouth    Historical Provider, MD   lidocaine (LIDODERM) 5 % Place 2 patches onto the skin daily 12 hours on, 12 hours off. 10/11/22 11/10/22  MALCOM Guy CNP   butalbital-acetaminophen-caffeine (FIORICET, ESGIC) -62 MG per tablet Take 1 tablet by mouth every 6 hours as needed for Migraine 10/6/22   MALCOM Garvey CNP   ARIPiprazole (ABILIFY) 5 MG tablet Take 1 tablet by mouth daily 10/3/22   MALCOM Garvey CNP   DULoxetine (CYMBALTA) 30 MG extended release capsule Take 1 capsule by mouth daily 10/3/22   MALCOM Garvey CNP   glycopyrrolate-formoterol (BEVESPI AEROSPHERE) 9-4.8 MCG/ACT AERO Inhale 2 puffs into the lungs 2 times daily 9/28/22   MALCOM Garvey CNP   albuterol sulfate HFA (PROVENTIL;VENTOLIN;PROAIR) 108 (90 Base) MCG/ACT inhaler Inhale 2 puffs into the lungs every 4 hours as needed for Wheezing or Shortness of Breath (cough) 8/11/22   MALCOM Garvey CNP   hydrOXYzine HCl (ATARAX) 25 MG tablet Take 1 tablet by mouth every 8 hours as needed for Anxiety 8/11/22   MALCOM Garvey CNP   aspirin 81 MG chewable tablet Take 1 tablet by mouth daily  Patient not taking: No sig reported 7/14/21   KODAK Daniels   famotidine (PEPCID) 20 MG tablet Take 1 tablet by mouth 2 times daily  Patient taking differently: Take 20 mg by mouth as needed 4/1/21   MALCOM Tracy CNP   solifenacin (VESICARE) 10 MG tablet Take 1 tablet by mouth daily  Patient not taking: Reported on 10/31/2022 2/11/21   MALCOM Tracy CNP   Naproxen Sodium (ALEVE PO) Take by mouth    Historical Provider, MD     Additional information:       VITAL SIGNS   Vitals:    11/03/22 1045   BP: 127/66   Pulse: 77   Resp: 18   Temp: 98.2 °F (36.8 °C)   SpO2: 93%       PHYSICAL:   General: No acute distress  HEENT:  Unremarkable for age  Neck: without increased JVD, carotid pulses 2+ bilaterally without bruits  Heart: RRR, S1 & S2 WNL. No murmurs    Lungs: Clear to auscultation    Abdomen: BS present, without HSM, masses, or tenderness    Extremities: without C,C,E.  Pulses 2+ bilaterally   Mental Status: Alert & Oriented        PLANNED PROCEDURE   [x]Cath  [x]PCI                []Pacemaker/AICD  []JEREMIE             []Cardioversion []Peripheral angiography/PTA  []Other:      SEDATION  Planned agent:[x]Midazolam []Meperidine []Sublimaze []Morphine  []Diazepam  []Other:     ASA Classification:  []1 []2 [x]3 []4 []5   Class 1: A normal healthy patient  Class 2: Pt with mild to moderate systemic disease  Class 3: Severe systemic disease or disturbance  Class 4: Severe systemic disorders that are already life threatening. Class 5: Moribund pt with little chances of survival, for more than 24 hours. Mallampati I Airway Classification:   []1 []2 []3 [x]4     [x]Pre-procedure diagnostic studies complete and results available. Comment:    [x]Previous sedation/anesthesia experiences assessed. Comment:    [x]The patient is an appropriate candidate to undergo the planned procedure sedation and anesthesia.  (Refer to nursing sedation/analgesia documentation record)  [x]Formulation and discussion of sedation/procedure plan, risks, and expectations with patient and/or responsible adult completed. [x]Patient examined immediately prior to the procedure.  (Refer to nursing sedation/analgesia documentation record)      Elizabeth Koo MD, [facility]    Electronically signed 11/3/2022 at 2:11 PM

## 2022-11-03 NOTE — PROCEDURES
[address] [place] [address]                            CARDIAC CATHETERIZATION    PATIENT NAME: Philippe Yoder                     :        1985  MED REC NO:   [id number]                           ROOM:       0028  ACCOUNT NO:   [de-identified]                           ADMIT DATE: 10/31/2022  PROVIDER:     Wai Monae MD    DATE OF PROCEDURE:  2022    INDICATION FOR PROCEDURE:  1. Chest pain. 2.  Mildly elevated troponin. 3.  Acute congestive heart failure with preserved ejection fraction. PROCEDURES PERFORMED:  1. Left cardiac catheterization, selective coronary angiogram.  2.  Left ventriculogram.    DESCRIPTION OF PROCEDURE:  After informed consent, the patient was  brought to the cardiac catheterization room. She was prepped and draped  in a sterile fashion. Lidocaine 2% was injected in the skin and  subcutaneous tissue overlying the right radial artery. Under ultrasound  guidance using modified Seldinger technique, access was obtained in the  right radial artery. A 5/6 slender sheath was inserted. Standard  antithrombotic/antispasmodic medications were given. I used 5-Setswana  JR4 and 5-Setswana JL-3.5 diagnostic catheters to complete the procedure. FINDINGS:  LEFT VENTRICULOGRAM:  No regional wall motion abnormality. Ejection  fraction estimated at 50-55%. HEMODYNAMIC RESULTS:  Left ventricular end-diastolic pressure was 26  mmHg. No significant pressure gradient across the aortic valve upon  pullback. CORONARY ANGIOGRAM  1. Right coronary artery large dominant vessel, mildly ectatic, minimal  luminal irregularities, otherwise patent with no significant stenotic  lesions. 2.  Left main coronary artery patent. No significant stenosis. Gives  rise to left anterior descending and left circumflex arteries. 3.  Left circumflex artery patent. No significant stenosis.   4.  Left anterior descending artery patent. No significant stenotic  lesions. MEDICATIONS:  See MAR. COMPLICATIONS:  None. ESTIMATED BLOOD LOSS:  Less than 50 ml. ACCESS:  Right radial artery access. Vasc Band was applied. Hemostasis  was achieved. IMPRESSION:  1. No significant coronary artery disease. 2.  Acute congestive heart failure with preserved ejection fraction. 3.  LVEDP 26 mmHg. RECOMMENDATIONS:  Medical management.         Devonte Glass MD    D: 11/03/2022 14:54:22       T: 11/03/2022 14:58:07     [email or web address]  Job#: [id number]     Doc#: [id number]    CC:

## 2022-11-03 NOTE — PLAN OF CARE
Problem: Respiratory - Adult  Goal: Clear lung sounds  11/3/2022 0822 by Sabina Gomez RCP  Outcome: Progressing
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
## 2022-11-03 NOTE — PLAN OF CARE
Problem: Discharge Planning  Goal: Discharge to home or other facility with appropriate resources  Outcome: Progressing  Flowsheets (Taken 11/3/2022 1438)  Discharge to home or other facility with appropriate resources:   Identify barriers to discharge with patient and caregiver   Identify discharge learning needs (meds, wound care, etc)   Arrange for needed discharge resources and transportation as appropriate  Note: Patient plans to be discharged home with  when medically stable. PT/OT on to evaluate and treat accordingly. Problem: Pain  Goal: Verbalizes/displays adequate comfort level or baseline comfort level  Outcome: Progressing  Flowsheets (Taken 11/3/2022 1438)  Verbalizes/displays adequate comfort level or baseline comfort level:   Encourage patient to monitor pain and request assistance   Assess pain using appropriate pain scale   Administer analgesics based on type and severity of pain and evaluate response   Implement non-pharmacological measures as appropriate and evaluate response  Note: Patient having intermittent chest pain/pressure. Has been improved since morning. Patient scheduled for heart catheterization. Problem: Safety - Adult  Goal: Free from fall injury  Outcome: Progressing  Flowsheets (Taken 11/3/2022 1438)  Free From Fall Injury:   Instruct family/caregiver on patient safety   Based on caregiver fall risk screen, instruct family/caregiver to ask for assistance with transferring infant if caregiver noted to have fall risk factors  Note: Patient remains free from falls during shift. Call light within reach. Side rails up x2. Bed in lowest position and bed alarm on. Non skid slippers applied. Problem: Respiratory - Adult  Goal: Clear lung sounds  11/3/2022 1438 by Nolan Roberto RN  Outcome: Progressing  Note: Lung sounds clear and patient denies SOB. Patient currently on 3 L nasal cannula with exertion and 1 L at rest which is her baseline.    11/3/2022 4763 by Carmelina Ernst Meredith July, [facility]  Outcome: Progressing     Problem: Skin/Tissue Integrity  Goal: Absence of new skin breakdown  Description: 1. Monitor for areas of redness and/or skin breakdown  2. Assess vascular access sites hourly  3. Every 4-6 hours minimum:  Change oxygen saturation probe site  4. Every 4-6 hours:  If on nasal continuous positive airway pressure, respiratory therapy assess nares and determine need for appliance change or resting period. Outcome: Progressing  Note: Patient has old wounds bilateral legs. Leaving open to air and making frequent turns. Problem: Chronic Conditions and Co-morbidities  Goal: Patient's chronic conditions and co-morbidity symptoms are monitored and maintained or improved  Outcome: Progressing  Flowsheets (Taken 11/3/2022 0918)  Care Plan - Patient's Chronic Conditions and Co-Morbidity Symptoms are Monitored and Maintained or Improved:   Monitor and assess patient's chronic conditions and comorbid symptoms for stability, deterioration, or improvement   Collaborate with multidisciplinary team to address chronic and comorbid conditions and prevent exacerbation or deterioration   Update acute care plan with appropriate goals if chronic or comorbid symptoms are exacerbated and prevent overall improvement and discharge  Note: Patient has history of CHF. Started on IV bumex and switched to oral today 11/3. BLE edema decreasing. Care plan reviewed with patient. Patient verbalizes understanding of the plan of care and contributes to goal setting.
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
## 2022-11-03 NOTE — CARE COORDINATION
11/3/22, 12:11 PM EDT    DISCHARGE ON GOING EVALUATION    Anay Corneliusgoran Che day: 2  Location: -28/028-A Reason for admit: Chest pain [R07.9]  Elevated troponin [R77.8]  Chest pain, unspecified type [R07.9]  Acute on chronic respiratory failure with hypoxia ([address] [J96.21]   Procedure:   10/31 CXR Changes from comparison suggesting some more complex subacute and chronic   pleural fluid collections. Though there is general improvement, CT may be   helpful in further interrogating the nature of these collections which   appear to be loculated by plain radiography. 10/31 ECHO EF 60%  11/3 Cardiac Cath pending  Barriers to Discharge: Cardiology and Pulmonology following, nasal cannula oxygen at 3 liters with saturations at 93%. Asa, Bumex, Lovenox, Pepcid, electrolyte replacement protocols, Prednisone. PCP: MALCOM Perrin CNP  Readmission Risk Score: 14.4%  Patient Goals/Plan/Treatment Preferences: Plan is home with spouse. Denies needs. Will follow.

## 2022-11-03 NOTE — PROGRESS NOTES
Cardiology Progress Note      Patient:  Malini Sullivan  YOB: 1985  MRN: [id number]   Acct: [de-identified]  Admit Date:  10/31/2022  Primary Cardiologist:  none    Note per dr Morgan Hernandez:  Lower extremity edema. REFERRING PROVIDER:  Hospitalist Service. HISTORY OF PRESENT ILLNESS:  A 60-year-old patient who is morbidly  obese, who apparently has had lower extremity edema for the last several  weeks with some shortness of breath. Was given diuretics as an  outpatient with no response and then subsequently she is admitted for  diuresis and management. She is quite limited because of extensive  obesity, lately diagnosed with sleep apnea, however, not started on  CPAP. She denies any cardiac history, was not taking any specific  medication. She did have COVID last year and pretty much had the same  situation after that. Has had some symptoms of chest discomfort at  times. \"    Subjective (Events in last 24 hours):   Pt awake and alert. NAD. Has intermittent chest pressure, nonradiating. Comes and goes. Not tender. Sometimes worse with deep inspiration.   Feels edema is gone    On 3 l/min O2    Net I/o -9.7 L    Objective:   /66   Pulse 77   Temp 98.2 °F (36.8 °C) (Oral)   Resp 18   Ht 5' 3\" (1.6 m)   Wt (!) 495 lb (224.5 kg)   SpO2 93%   BMI 87.69 kg/m²        TELEMETRY: nsr    Physical Exam:  General Appearance: alert and oriented to person, place and time, in no acute distress, significant obesity  Cardiovascular: normal rate, regular rhythm, normal S1 and S2, no murmurs, rubs, clicks, or gallops, distal pulses intact, no carotid bruits, no JVD  Pulmonary/Chest: clear to auscultation bilaterally- no wheezes, rales or rhonchi, normal air movement, no respiratory distress  Abdomen: soft, non-tender, non-distended, normal bowel sounds, no masses Extremities: trace pedal edema, pulse   Skin: warm and dry  Head: normocephalic and atraumatic  Eyes: pupils equal, round, and reactive to light  Neck: supple and non-tender without mass, no thyromegaly   Neurological: alert, oriented, normal speech, no focal findings or movement disorder noted    Medications:    mometasone-formoterol  2 puff Inhalation BID    predniSONE  40 mg Oral Daily    ARIPiprazole  5 mg Oral Daily    aspirin  81 mg Oral Daily    [Held by provider] bumetanide  2 mg Oral Daily    DULoxetine  30 mg Oral Daily    ferrous sulfate  325 mg Oral BID    potassium chloride  20 mEq Oral Daily    trospium  20 mg Oral BID AC    lidocaine  2 patch Topical Daily    sodium chloride flush  10 mL IntraVENous 2 times per day    enoxaparin  60 mg SubCUTAneous BID    bumetanide  2 mg IntraVENous BID      sodium chloride       albuterol sulfate HFA, 2 puff, Q4H PRN  albuterol sulfate HFA, 2 puff, Q4H PRN  butalbital-acetaminophen-caffeine, 1 tablet, Q6H PRN  famotidine, 20 mg, Daily PRN  hydrOXYzine HCl, 25 mg, Q8H PRN  sodium chloride flush, 10 mL, PRN  sodium chloride, , PRN  ondansetron, 4 mg, Q8H PRN   Or  ondansetron, 4 mg, Q6H PRN  polyethylene glycol, 17 g, Daily PRN  acetaminophen, 650 mg, Q6H PRN   Or  acetaminophen, 650 mg, Q6H PRN  potassium chloride, 40 mEq, PRN   Or  potassium alternative oral replacement, 40 mEq, PRN   Or  potassium chloride, 10 mEq, PRN  magnesium sulfate, 2,000 mg, PRN      Diagnostics:  TTE 10/31/22  Summary   Technically difficult study due to poor acoustic windows. Ejection fraction is visually estimated at 60%. Overall left ventricular function is normal.      Signature      ----------------------------------------------------------------   Electronically signed by Natividad Burger MD (Interpreting   physician) on 10/31/2022 at 04:01 PM      Lab Data:    Cardiac Enzymes:  No results for input(s): CKTOTAL, CKMB, CKMBINDEX, TROPONINI in the last 72 hours.     CBC:   Lab Results   Component Value Date/Time    WBC 8.5 11/01/2022 04:35 AM    RBC 5.34 11/01/2022 04:35 AM    HGB 10.8 11/01/2022 04:35 AM    HCT 39.6 11/01/2022 04:35 AM     11/01/2022 04:35 AM       CMP:    Lab Results   Component Value Date/Time     11/03/2022 05:17 AM    K 4.0 11/03/2022 05:17 AM    K 4.4 10/31/2022 04:50 AM    CL 89 11/03/2022 05:17 AM    CO2 41 11/03/2022 05:17 AM    BUN 16 11/03/2022 05:17 AM    CREATININE 0.6 11/03/2022 05:17 AM    LABGLOM >60 11/03/2022 05:17 AM    GLUCOSE 89 11/03/2022 05:17 AM    CALCIUM 8.9 11/03/2022 05:17 AM       Hepatic Function Panel:    Lab Results   Component Value Date/Time    ALKPHOS 61 10/31/2022 04:50 AM    ALT 19 10/31/2022 04:50 AM    AST 30 10/31/2022 04:50 AM    PROT 7.2 10/31/2022 04:50 AM    BILITOT 0.6 10/31/2022 04:50 AM    BILIDIR <0.2 10/31/2022 04:50 AM    LABALBU 3.2 10/31/2022 04:50 AM       Magnesium:    Lab Results   Component Value Date/Time    MG 2.2 11/02/2022 07:37 AM       PT/INR:    Lab Results   Component Value Date/Time    INR 1.01 09/18/2017 06:10 PM       HgBA1c:    Lab Results   Component Value Date/Time    LABA1C 5.9 10/14/2022 01:01 PM       FLP:    Lab Results   Component Value Date/Time    TRIG 87 10/14/2022 01:00 PM    HDL 55 10/14/2022 01:00 PM    LDLCALC 110 10/14/2022 01:00 PM       TSH:    Lab Results   Component Value Date/Time    TSH 4.710 10/14/2022 01:00 PM         Assessment:    Intermittent Chest pain /?[place]   Mildly elevated troponins  Acute on chronic diastolic CHF - improving  Ef 60 per TTE 10/31/22  Acute on  Chronic hypoxic resp failure  Morbid obesity  SANTIAGO- wearing bipap at night  Asthma exacerbation - pulm following  ? [facility]      Plan:    Daily I/o and weights  2 liter fluid restriction and 2gm sodium diet  Keep mag >2 and K >4  Change bumex to po  Daily BMP  Considered stress test, however our stress lab table limit is 440 lb and unable to complete here at [facility].   Due to recurrent chest pain, possible [place], will schedule for LHC today  Npo  Pt agrees with plan    A decision was made to proceed with cardiac catheterization as it it the recommended procedure for the patient. The indication, risks and benefits of the procedure and possible therapeutic consequences and alternatives were discussed with the patient. The patient was given the opportunity to ask questions and to have them answered to his/her satisfaction. Risks of the procedure include but are not limited to the following: Bleeding, hematoma including retroperitoneal hematoma, infection, pain and discomfort, injury to the aorta and other blood vessels, rhythm disturbance, low blood pressure, myocardial infarction, stroke, kidney damage/failure, myocardial perforation, allergic reactions to sedatives/contrast material, loss of pulse/vascular compromise requiring surgery, aneurysm/pseudoaneurysm formation, possible loss of a limb/hand/leg, death. Alternatives to and omission of the suggested procedure were discussed. The patient had no further questions and wished to proceed; the consent form was signed.             Electronically signed by Corazon Blair PA-C on 11/3/2022 at 11:26 AM

## 2022-11-03 NOTE — BRIEF OP NOTE
[address] [address]  Sedation/Analgesia Post Sedation Record    Pt Name: Rafael Haji  Account number: [de-identified]  MRN: [id number]  YOB: 1985  Procedure Performed By: Ish Romo MD MD   Primary Care Physician: Nadeen Antony, APRN - CNP  Date: 11/3/2022    POST-PROCEDURE    Physicians/Assistants: Ish Romo MD MD     Procedure Performed:Cath      Sedation/Anesthesia: Versed/ Fentanyl and 2% xylocaine local anesthesia. Estimated Blood Loss: < 50 ml. Specimens Removed: None         Disposition of Specimen: N/A        Complications: No Immediate Complications. Post-procedure Diagnosis/Findings:       No significant CAD  LVEDP 26 mmHg  Acute HFpEF     Recommendations:  Medical management      Above findings and plan of care were discussed with patient, questions were answered, agreeable with plan.        Ish Romo MD, Monet Mason   Electronically signed 11/3/2022 at 2:46 PM  Interventional Cardiology

## 2022-11-04 VITALS
OXYGEN SATURATION: 94 % | HEIGHT: 63 IN | BODY MASS INDEX: 51.91 KG/M2 | TEMPERATURE: 98.6 F | DIASTOLIC BLOOD PRESSURE: 65 MMHG | HEART RATE: 85 BPM | WEIGHT: 293 LBS | SYSTOLIC BLOOD PRESSURE: 133 MMHG | RESPIRATION RATE: 22 BRPM

## 2022-11-04 LAB
ANION GAP SERPL CALCULATED.3IONS-SCNC: 8 MEQ/L (ref 8–16)
BUN BLDV-MCNC: 19 MG/DL (ref 7–22)
CALCIUM SERPL-MCNC: 8.8 MG/DL (ref 8.5–10.5)
CHLORIDE BLD-SCNC: 93 MEQ/L (ref 98–111)
CO2: 39 MEQ/L (ref 23–33)
CREAT SERPL-MCNC: 0.6 MG/DL (ref 0.4–1.2)
GFR SERPL CREATININE-BSD FRML MDRD: > 60 ML/MIN/1.73M2
GLUCOSE BLD-MCNC: 83 MG/DL (ref 70–108)
POTASSIUM SERPL-SCNC: 4.7 MEQ/L (ref 3.5–5.2)
SODIUM BLD-SCNC: 140 MEQ/L (ref 135–145)

## 2022-11-04 PROCEDURE — 6370000000 HC RX 637 (ALT 250 FOR IP): Performed by: STUDENT IN AN ORGANIZED HEALTH CARE EDUCATION/TRAINING PROGRAM

## 2022-11-04 PROCEDURE — 36415 COLL VENOUS BLD VENIPUNCTURE: CPT

## 2022-11-04 PROCEDURE — 94760 N-INVAS EAR/PLS OXIMETRY 1: CPT

## 2022-11-04 PROCEDURE — 80048 BASIC METABOLIC PNL TOTAL CA: CPT

## 2022-11-04 PROCEDURE — 6370000000 HC RX 637 (ALT 250 FOR IP): Performed by: PHYSICIAN ASSISTANT

## 2022-11-04 PROCEDURE — 99239 HOSP IP/OBS DSCHRG MGMT >30: CPT | Performed by: PHYSICIAN ASSISTANT

## 2022-11-04 PROCEDURE — 2580000003 HC RX 258: Performed by: PHYSICIAN ASSISTANT

## 2022-11-04 PROCEDURE — 97530 THERAPEUTIC ACTIVITIES: CPT

## 2022-11-04 PROCEDURE — 6360000002 HC RX W HCPCS: Performed by: PHYSICIAN ASSISTANT

## 2022-11-04 PROCEDURE — 94660 CPAP INITIATION&MGMT: CPT

## 2022-11-04 PROCEDURE — 2700000000 HC OXYGEN THERAPY PER DAY

## 2022-11-04 PROCEDURE — 94640 AIRWAY INHALATION TREATMENT: CPT

## 2022-11-04 PROCEDURE — 99222 1ST HOSP IP/OBS MODERATE 55: CPT | Performed by: INTERNAL MEDICINE

## 2022-11-04 PROCEDURE — 97116 GAIT TRAINING THERAPY: CPT

## 2022-11-04 PROCEDURE — 97162 PT EVAL MOD COMPLEX 30 MIN: CPT

## 2022-11-04 RX ORDER — METOPROLOL SUCCINATE 25 MG/1
25 TABLET, EXTENDED RELEASE ORAL DAILY
Qty: 30 TABLET | Refills: 3 | Status: SHIPPED | OUTPATIENT
Start: 2022-11-04

## 2022-11-04 RX ORDER — PREDNISONE 20 MG/1
40 TABLET ORAL DAILY
Qty: 2 TABLET | Refills: 0 | Status: SHIPPED | OUTPATIENT
Start: 2022-11-05 | End: 2022-11-06

## 2022-11-04 RX ORDER — METOPROLOL SUCCINATE 25 MG/1
25 TABLET, EXTENDED RELEASE ORAL DAILY
Status: DISCONTINUED | OUTPATIENT
Start: 2022-11-04 | End: 2022-11-04 | Stop reason: HOSPADM

## 2022-11-04 RX ADMIN — MOMETASONE FUROATE AND FORMOTEROL FUMARATE DIHYDRATE 2 PUFF: 200; 5 AEROSOL RESPIRATORY (INHALATION) at 10:31

## 2022-11-04 RX ADMIN — ARIPIPRAZOLE 5 MG: 5 TABLET ORAL at 07:46

## 2022-11-04 RX ADMIN — POTASSIUM CHLORIDE 20 MEQ: 1500 TABLET, EXTENDED RELEASE ORAL at 07:46

## 2022-11-04 RX ADMIN — PREDNISONE 40 MG: 20 TABLET ORAL at 07:46

## 2022-11-04 RX ADMIN — FERROUS SULFATE TAB 325 MG (65 MG ELEMENTAL FE) 325 MG: 325 (65 FE) TAB at 07:46

## 2022-11-04 RX ADMIN — DULOXETINE HYDROCHLORIDE 30 MG: 30 CAPSULE, DELAYED RELEASE ORAL at 07:45

## 2022-11-04 RX ADMIN — BUMETANIDE 2 MG: 1 TABLET ORAL at 07:45

## 2022-11-04 RX ADMIN — ASPIRIN 81 MG CHEWABLE TABLET 81 MG: 81 TABLET CHEWABLE at 07:46

## 2022-11-04 RX ADMIN — TROSPIUM CHLORIDE 20 MG: 20 TABLET, FILM COATED ORAL at 05:16

## 2022-11-04 RX ADMIN — SODIUM CHLORIDE, PRESERVATIVE FREE 10 ML: 5 INJECTION INTRAVENOUS at 07:46

## 2022-11-04 RX ADMIN — ENOXAPARIN SODIUM 60 MG: 100 INJECTION SUBCUTANEOUS at 07:46

## 2022-11-04 NOTE — PROGRESS NOTES
[address] [address]  INPATIENT PHYSICAL THERAPY  EVALUATION  [facility] RENAL TELEMETRY 6K - 6K-28/028-A    Time In: 9877  Time Out: 1025  Timed Code Treatment Minutes: 25 Minutes  Minutes: 33          Date: 2022  Patient Name: Amanuel Lr,  Gender:  female        MRN: [id number]  : 1985  (40 y.o.)      Referring Practitioner: Sean Vallejo PA-C  Diagnosis: Chest pain  Additional Pertinent Hx: Per HPI, \"Anya Benson is a 40 y.o. female with PMHx of chronic hypoxia, depression, obesity who presented to [address] [place] [address] with chief complaint of chest pain, swelling. Patient states that approx 5 days ago she began having increased BLE swelling. She states that she started taking prn lasix she had at home without improvement. Pt states that Friday, her PCP prescribed Bumex but that her swelling has continued to worsen and now it is painful to walk. She states that she does urinate frequently after taking the Bumex. She reports orthopnea, which is chronic. Patient also reports increased SOB with exertion. Patient states she began having chest pain yesterday morning. Described as pressure on the left side of her chest. Pt states pain was intermittent yesterday, random and occurred at rest. Today, she states the pain is constant. It is worse with palpation. Patient reports a h/o lung herniation which will at times be painful if she is sick but that is located on the right side of her chest and this does not feel similar. Patient admitted to med/tele for observation. \"     Restrictions/Precautions:  Restrictions/Precautions: Fall Risk, General Precautions    Subjective:  Chart Reviewed: Yes  Patient assessed for rehabilitation services?: Yes  Subjective: Pt resting in bed and agrees to therapy. Pt looking forward to going home later today. RN approved session and reports that pt is to be discharged home and needs to be seen to ensure her mobility is safe.     General:     Vision: Impaired  Vision Exceptions: Wears glasses at all times  Hearing: Within functional limits       Pain: denies      Vitals: Vitals not assessed per clinical judgement, see nursing flowsheet    Social/Functional History:    Lives With: Spouse  Type of Home: House  Home Layout: Two level, Able to Live on Main level with bedroom/bathroom  Home Access: Stairs to enter with rails  Entrance Stairs - Number of Steps: 3  with 2 rails  Home Equipment: Wheelchair-manual, Oxygen, Walker, rolling                   Ambulation Assistance: Independent  Transfer Assistance: Independent    Active : Yes          OBJECTIVE:  Range of Motion:  Bilateral Lower Extremity: WFL    Strength:  Bilateral Lower Extremity: WFL    Balance:  Generally steady    Bed Mobility:  Supine to Sit: Supervision, with head of bed raised, with rail, with increased time for completion    Transfers:  Sit to Stand: Modified Independent  Stand to Sit:Modified Independent    Ambulation:  Modified Independent, Supervision  Distance: 15 ft and 20 ft  Surface: Level Tile  Device:No Device  Gait Deviations:  Decreased Step Length Bilaterally, Decreased Gait Speed, and Wide Base of Support    Exercise:  Patient was guided in 1 set(s) 10 reps of exercise to both lower extremities. Ankle pumps, Seated marches, and Long arc quads. Exercises were completed for increased independence with functional mobility. Functional Outcome Measures: Completed  AM-PAC Inpatient Mobility Raw Score : 18  AM-PAC Inpatient T-Scale Score : 43.63    ASSESSMENT:  Activity Tolerance:  Patient tolerance of  treatment: good. Treatment Initiated: Treatment and education initiated within context of evaluation. Evaluation time included review of current medical information, gathering information related to past medical, social and functional history, completion of standardized testing, formal and informal observation of tasks, assessment of data and development of plan of care and goals.   Treatment time included skilled education and facilitation of tasks to increase safety and independence with functional mobility for improved independence and quality of life. Assessment: Body Structures, Functions, Activity Limitations Requiring Skilled Therapeutic Intervention: Decreased functional mobility , Decreased endurance, Decreased strength  Assessment: Pt is a 41 yo female that is severely morbidly obese and participated well with mobility and exercises. Pt reports planning to work with dietitian and wanting to lose weight. Pt was receptive of tips for progressing mobility and exercises. Pt would benefit from getting with a  or exercise  to help her progress towards her weight loss goals. Therapy Prognosis: Good    Requires PT Follow-Up: No (Pt discharging home today.)    Discharge Recommendations:  Discharge Recommendations: Home with assist PRN    Patient Education:      . Patient Education  Education Given To: Patient  Education Provided: Role of Therapy, Home Exercise Program  Education Method: Demonstration, Verbal  Barriers to Learning: None  Education Outcome: Verbalized understanding, Demonstrated understanding       Equipment Recommendations:  Equipment Needed: No    Plan:  Current Treatment Recommendations: Gait training, Home exercise program    Goals:  Patient Goals : go home  Short Term Goals  Time Frame for Short Term Goals: not set due to pt being discharged this afternoon. Following session, patient left in safe position with all fall risk precautions in place. Carmen Douglas.  Javier Ramos [address]

## 2022-11-04 NOTE — PLAN OF CARE
Problem: Discharge Planning  Goal: Discharge to home or other facility with appropriate resources  11/3/2022 2003 by Sherin Elena  Outcome: Progressing  11/3/2022 1438 by Kelli Colón RN  Outcome: Progressing  Flowsheets (Taken 11/3/2022 1438)  Discharge to home or other facility with appropriate resources:   Identify barriers to discharge with patient and caregiver   Identify discharge learning needs (meds, wound care, etc)   Arrange for needed discharge resources and transportation as appropriate  Note: Patient plans to be discharged home with  when medically stable. PT/OT on to evaluate and treat accordingly. Problem: Pain  Goal: Verbalizes/displays adequate comfort level or baseline comfort level  11/3/2022 2003 by Sherin Elena  Outcome: Progressing  11/3/2022 1438 by Kelli Colón RN  Outcome: Progressing  Flowsheets (Taken 11/3/2022 1438)  Verbalizes/displays adequate comfort level or baseline comfort level:   Encourage patient to monitor pain and request assistance   Assess pain using appropriate pain scale   Administer analgesics based on type and severity of pain and evaluate response   Implement non-pharmacological measures as appropriate and evaluate response  Note: Patient having intermittent chest pain/pressure. Has been improved since morning. Patient scheduled for heart catheterization. Problem: Safety - Adult  Goal: Free from fall injury  11/3/2022 2003 by Sherin Elena  Outcome: Progressing  11/3/2022 1438 by Kelli Colón RN  Outcome: Progressing  Flowsheets (Taken 11/3/2022 1438)  Free From Fall Injury:   Rakel Mall family/caregiver on patient safety   Based on caregiver fall risk screen, instruct family/caregiver to ask for assistance with transferring infant if caregiver noted to have fall risk factors  Note: Patient remains free from falls during shift. Call light within reach. Side rails up x2. Bed in lowest position and bed alarm on. Non skid slippers applied. Problem: Respiratory - Adult  Goal: Clear lung sounds  11/3/2022 2003 by Janet Metz  Outcome: Progressing  11/3/2022 1438 by Ryan Kang RN  Outcome: Progressing  Note: Lung sounds clear and patient denies SOB. Patient currently on 3 L nasal cannula with exertion and 1 L at rest which is her baseline. 11/3/2022 0822 by Julieta Alford RCP  Outcome: Progressing     Problem: Skin/Tissue Integrity  Goal: Absence of new skin breakdown  Description: 1. Monitor for areas of redness and/or skin breakdown  2. Assess vascular access sites hourly  3. Every 4-6 hours minimum:  Change oxygen saturation probe site  4. Every 4-6 hours:  If on nasal continuous positive airway pressure, respiratory therapy assess nares and determine need for appliance change or resting period. 11/3/2022 2003 by Janet Metz  Outcome: Progressing  11/3/2022 1438 by Ryan Kang RN  Outcome: Progressing  Note: Patient has old wounds bilateral legs. Leaving open to air and making frequent turns. Problem: Chronic Conditions and Co-morbidities  Goal: Patient's chronic conditions and co-morbidity symptoms are monitored and maintained or improved  11/3/2022 2003 by Janet Metz  Outcome: Progressing  11/3/2022 1438 by Ryan Kang RN  Outcome: Progressing  Flowsheets (Taken 11/3/2022 1438)  Care Plan - Patient's Chronic Conditions and Co-Morbidity Symptoms are Monitored and Maintained or Improved:   Monitor and assess patient's chronic conditions and comorbid symptoms for stability, deterioration, or improvement   Collaborate with multidisciplinary team to address chronic and comorbid conditions and prevent exacerbation or deterioration   Update acute care plan with appropriate goals if chronic or comorbid symptoms are exacerbated and prevent overall improvement and discharge  Note: Patient has history of CHF. Started on IV bumex and switched to oral today 11/3. BLE edema decreasing.

## 2022-11-04 NOTE — PROGRESS NOTES
Cardiology Progress Note      Patient:  Sameer Mcconnell  YOB: 1985  MRN: [id number]   Acct: [de-identified]  Admit Date:  10/31/2022  Primary Cardiologist:  none  seen by dr. Benigno Rubio    Per prior cardiology consult note-  REASON FOR CONSULTATION:  Lower extremity edema. REFERRING PROVIDER:  Hospitalist Service. HISTORY OF PRESENT ILLNESS:  A 27-year-old patient who is morbidly  obese, who apparently has had lower extremity edema for the last several  weeks with some shortness of breath. Was given diuretics as an  outpatient with no response and then subsequently she is admitted for  diuresis and management. She is quite limited because of extensive  obesity, lately diagnosed with sleep apnea, however, not started on  CPAP. She denies any cardiac history, was not taking any specific  medication. She did have COVID last year and pretty much had the same  situation after that. Has had some symptoms of chest discomfort at  times.     Subjective (Events in last 24 hours): pt sitting in chair   She is going home today     She has no Chest pain or SOB c/o     VSS    RT radial cath site - no ecchymosis or hematoma     Objective:   /62   Pulse 97   Temp 98 °F (36.7 °C) (Oral)   Resp 20   Ht 5' 3\" (1.6 m)   Wt (!) 495 lb (224.5 kg)   SpO2 94%   BMI 87.69 kg/m²        TELEMETRY: SR     Physical Exam:  General Appearance: alert and oriented to person, place and time, in no acute distress  Cardiovascular: normal rate, regular rhythm, normal S1 and S2, no murmurs, rubs, clicks, or gallops, distal pulses intact,   Pulmonary/Chest: clear to auscultation bilaterally- no wheezes, rales or rhonchi, normal air movement, no respiratory distress  Abdomen: soft, non-tender, non-distended, normal bowel sounds, no masses Extremities: no cyanosis, clubbing or edema, pulses present   Skin: warm and dry, no rash or erythema  Musculoskeletal: normal range of motion, no joint swelling, deformity or tenderness  Neurological: alert, oriented, normal speech, no focal findings or movement disorder noted    Medications:    sodium chloride flush  5-40 mL IntraVENous 2 times per day    mometasone-formoterol  2 puff Inhalation BID    predniSONE  40 mg Oral Daily    ARIPiprazole  5 mg Oral Daily    aspirin  81 mg Oral Daily    bumetanide  2 mg Oral Daily    DULoxetine  30 mg Oral Daily    ferrous sulfate  325 mg Oral BID    potassium chloride  20 mEq Oral Daily    trospium  20 mg Oral BID AC    lidocaine  2 patch Topical Daily    sodium chloride flush  10 mL IntraVENous 2 times per day    enoxaparin  60 mg SubCUTAneous BID      sodium chloride      sodium chloride       sodium chloride flush, 5-40 mL, PRN  sodium chloride, , PRN  acetaminophen, 650 mg, Q4H PRN  albuterol sulfate HFA, 2 puff, Q4H PRN  albuterol sulfate HFA, 2 puff, Q4H PRN  butalbital-acetaminophen-caffeine, 1 tablet, Q6H PRN  famotidine, 20 mg, Daily PRN  hydrOXYzine HCl, 25 mg, Q8H PRN  sodium chloride flush, 10 mL, PRN  sodium chloride, , PRN  ondansetron, 4 mg, Q8H PRN   Or  ondansetron, 4 mg, Q6H PRN  polyethylene glycol, 17 g, Daily PRN  acetaminophen, 650 mg, Q6H PRN  potassium chloride, 40 mEq, PRN   Or  potassium alternative oral replacement, 40 mEq, PRN   Or  potassium chloride, 10 mEq, PRN  magnesium sulfate, 2,000 mg, PRN        Diagnostics:      Echo:   Electronically signed by Misael Nguyen MD (Interpreting   physician) on 10/31/2022 at 04:01 PM   ----------------------------------------------------------------      Findings      Mitral Valve   Trace mitral regurgitation is present. Aortic Valve   The aortic valve leaflets were not well visualized. Aortic valve appears tricuspid. Aortic valve leaflets are somewhat thickened. Tricuspid Valve   Trivial tricuspid regurgitation visualized. Pulmonic Valve   The pulmonic valve was not well visualized . Trivial pulmonic regurgitation visualized.       Left Atrium   Left atrial size was normal.      Left Ventricle   Ejection fraction is visually estimated at 60%. Overall left ventricular function is normal.      Right Atrium   Right atrial size was normal.      Right Ventricle   The right ventricular size was normal with normal systolic function and   wall thickness. Pericardial Effusion   The pericardium was normal in appearance with no evidence of a pericardial   effusion. Pleural Effusion   No evidence of pleural effusion. Aorta / Great Vessels   -Aortic root dimension within normal limits.   -The Pulmonary artery is within normal limits. -IVC size is within normal limits with normal respiratory phasic changes. Stress:     Left Heart Cath: FINDINGS:  LEFT VENTRICULOGRAM:  No regional wall motion abnormality. Ejection  fraction estimated at 50-55%. HEMODYNAMIC RESULTS:  Left ventricular end-diastolic pressure was 26  mmHg. No significant pressure gradient across the aortic valve upon  pullback. CORONARY ANGIOGRAM  1. Right coronary artery large dominant vessel, mildly ectatic, minimal  luminal irregularities, otherwise patent with no significant stenotic  lesions. 2.  Left main coronary artery patent. No significant stenosis. Gives  rise to left anterior descending and left circumflex arteries. 3.  Left circumflex artery patent. No significant stenosis. 4.  Left anterior descending artery patent. No significant stenotic  lesions. IMPRESSION:  1. No significant coronary artery disease. 2.  Acute congestive heart failure with preserved ejection fraction. 3.  LVEDP 26 mmHg. RECOMMENDATIONS:  Medical management. Bernadette Coyne MD     D: 11/03/2022     Lab Data:    Cardiac Enzymes:  No results for input(s): CKTOTAL, CKMB, CKMBINDEX, TROPONINI in the last 72 hours.     CBC:   Lab Results   Component Value Date/Time    WBC 8.5 11/01/2022 04:35 AM    RBC 5.34 11/01/2022 04:35 AM    HGB 10.8 11/01/2022 04:35 AM    HCT 39.6 11/01/2022 04:35 AM     11/01/2022 04:35 AM       CMP:    Lab Results   Component Value Date/Time     11/04/2022 04:05 AM    K 4.7 11/04/2022 04:05 AM    K 4.4 10/31/2022 04:50 AM    CL 93 11/04/2022 04:05 AM    CO2 39 11/04/2022 04:05 AM    BUN 19 11/04/2022 04:05 AM    CREATININE 0.6 11/04/2022 04:05 AM    LABGLOM >60 11/04/2022 04:05 AM    GLUCOSE 83 11/04/2022 04:05 AM    CALCIUM 8.8 11/04/2022 04:05 AM       Hepatic Function Panel:    Lab Results   Component Value Date/Time    ALKPHOS 61 10/31/2022 04:50 AM    ALT 19 10/31/2022 04:50 AM    AST 30 10/31/2022 04:50 AM    PROT 7.2 10/31/2022 04:50 AM    BILITOT 0.6 10/31/2022 04:50 AM    BILIDIR <0.2 10/31/2022 04:50 AM    LABALBU 3.2 10/31/2022 04:50 AM       Magnesium:    Lab Results   Component Value Date/Time    MG 2.2 11/02/2022 07:37 AM       PT/INR:    Lab Results   Component Value Date/Time    INR 1.01 09/18/2017 06:10 PM       HgBA1c:    Lab Results   Component Value Date/Time    LABA1C 5.9 10/14/2022 01:01 PM       FLP:    Lab Results   Component Value Date/Time    TRIG 87 10/14/2022 01:00 PM    HDL 55 10/14/2022 01:00 PM    LDLCALC 110 10/14/2022 01:00 PM       TSH:    Lab Results   Component Value Date/Time    TSH 4.710 10/14/2022 01:00 PM         Assessment:    Intermittent CP   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   3-6 weeks sp cath diastolic chf          Electronically signed by MALCOM Montalvo CNP on 11/4/2022 at 11:47 AM
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
## 2022-11-04 NOTE — PROGRESS NOTES
A home oxygen evaluation has been completed. [x]Patient is an inpatient. It is expected that the patient will be discharged within the next 48 hours. Qualified provider to write order for home prescription if patient qualifies. Social service/care managers will arrange for home oxygen. If patient is active, arrange for Home Medical supplier to assess for Oxygen Conserving Device per pulse oximetry. []Patient is an outpatient. Results will be faxed to the ordering provider. Qualified provider to write order for home prescription if patient qualifies and arranges for home oxygen. Patient was placed on room air . SpO2 was 80 % on room air at rest. Patients SpO2 was below 89% and qualified for home oxygen. Oxygen was applied at 3 lpm via nasal cannula to maintain a SpO2 between 90-92% while at rest. Actual SpO2 was 91 %. Patient can ambulate for exercise flow rate. Patients was ambulated, SpO2 was 92% on 6 lpm to maintain SpO2 between 90-92% while exercising. If oxygen need is greater than 4 lpm the SpO2 on 4 lpm was 87. Note: For any SpO2 at 61% see policy and procedure for possible qualifications.

## 2022-11-04 NOTE — DISCHARGE INSTRUCTIONS
2 liter fluid restriction and 2gm sodium diet    Weigh yourself daily: Should you gain 2-3 pounds in 1 days time or 3-5 pounds in 2 days time-- call our office ([phone]) for further recommendations

## 2022-11-04 NOTE — PROGRESS NOTES
DC AVS gone over with pt, who voiced no questions/concerns about new meds started, stopping her Bevespi inhaler, follow up appts, or low sodium diets. Discussed topics such as asthma triggers, and preventing readmission through diet, exercise, and medication adherence.

## 2022-11-04 NOTE — CARE COORDINATION
11/4/22, 1:48 PM EDT    Patient goals/plan/ treatment preferences discussed by  and . Patient goals/plan/ treatment preferences reviewed with patient/ family. Patient/ family verbalize understanding of discharge plan and are in agreement with goal/plan/treatment preferences. Understanding was demonstrated using the teach back method. AVS provided by RN at time of discharge, which includes all necessary medical information pertaining to the patients current course of illness, treatment, post-discharge goals of care, and treatment preferences. Services At/After Discharge: DME. Pt discharged to home with spouse. Antionette Plascencia from [facility] notified of oxygen flow liter change. She denies any other needs or services. IMM Letter  IMM Letter given to Patient/Family/Significant other/Guardian/POA/by[de-identified] CM  IMM Letter date given[de-identified] 11/04/22  IMM Letter time given[de-identified] 7891  Observation Status Letter date given[de-identified] 10/31/22  Observation Status Letter time given[de-identified] 0624  Observation Status Letter given to Patient/Family/Significant other/Guardian/POA/by[de-identified] Pt Access.
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
## 2022-11-04 NOTE — PLAN OF CARE
Problem: Respiratory - Adult  Goal: Clear lung sounds  Outcome: Progressing   Continue Mdi to improve lung sounds. Patient mutually agrees on goals.

## 2022-11-07 ENCOUNTER — CARE COORDINATION (OUTPATIENT)
Dept: CARE COORDINATION | Age: 37
End: 2022-11-07

## 2022-11-07 NOTE — CARE COORDINATION
Attempted to reach [facility] for care coordination f/u. No answer. Message left to return call.

## 2022-11-08 ENCOUNTER — CARE COORDINATION (OUTPATIENT)
Dept: CARE COORDINATION | Age: 37
End: 2022-11-08

## 2022-11-08 ENCOUNTER — TELEPHONE (OUTPATIENT)
Dept: CARDIOLOGY CLINIC | Age: 37
End: 2022-11-08

## 2022-11-08 ENCOUNTER — TELEMEDICINE (OUTPATIENT)
Dept: FAMILY MEDICINE CLINIC | Age: 37
End: 2022-11-08

## 2022-11-08 DIAGNOSIS — D50.9 IRON DEFICIENCY ANEMIA, UNSPECIFIED IRON DEFICIENCY ANEMIA TYPE: ICD-10-CM

## 2022-11-08 DIAGNOSIS — G89.29 CHRONIC PAIN OF BOTH ANKLES: ICD-10-CM

## 2022-11-08 DIAGNOSIS — J96.21 ACUTE ON CHRONIC RESPIRATORY FAILURE WITH HYPOXIA (HCC): ICD-10-CM

## 2022-11-08 DIAGNOSIS — J45.40 MODERATE PERSISTENT ASTHMA WITHOUT COMPLICATION: ICD-10-CM

## 2022-11-08 DIAGNOSIS — I50.33 ACUTE ON CHRONIC DIASTOLIC CHF (CONGESTIVE HEART FAILURE) (HCC): ICD-10-CM

## 2022-11-08 DIAGNOSIS — M25.572 CHRONIC PAIN OF BOTH ANKLES: ICD-10-CM

## 2022-11-08 DIAGNOSIS — G47.33 OSA (OBSTRUCTIVE SLEEP APNEA): ICD-10-CM

## 2022-11-08 DIAGNOSIS — F32.2 SEVERE DEPRESSION (HCC): ICD-10-CM

## 2022-11-08 DIAGNOSIS — E66.2 OBESITY HYPOVENTILATION SYNDROME (HCC): ICD-10-CM

## 2022-11-08 DIAGNOSIS — I50.32 CHRONIC DIASTOLIC CONGESTIVE HEART FAILURE (HCC): ICD-10-CM

## 2022-11-08 DIAGNOSIS — M25.571 CHRONIC PAIN OF BOTH ANKLES: ICD-10-CM

## 2022-11-08 DIAGNOSIS — E66.01 MORBID OBESITY WITH BMI OF 70 AND OVER, ADULT (HCC): ICD-10-CM

## 2022-11-08 DIAGNOSIS — Z09 HOSPITAL DISCHARGE FOLLOW-UP: Primary | ICD-10-CM

## 2022-11-08 PROBLEM — R60.0 BILATERAL LEG EDEMA: Status: RESOLVED | Noted: 2022-11-01 | Resolved: 2022-11-08

## 2022-11-08 PROBLEM — R07.9 CHEST PAIN: Status: RESOLVED | Noted: 2022-10-31 | Resolved: 2022-11-08

## 2022-11-08 PROBLEM — M43.10 ACQUIRED SPONDYLOLISTHESIS: Status: RESOLVED | Noted: 2022-10-31 | Resolved: 2022-11-08

## 2022-11-08 PROBLEM — O20.9 BLEEDING IN EARLY PREGNANCY: Status: RESOLVED | Noted: 2022-10-31 | Resolved: 2022-11-08

## 2022-11-08 PROBLEM — M21.6X9 EQUINUS DEFORMITY OF FOOT: Status: RESOLVED | Noted: 2018-08-02 | Resolved: 2022-11-08

## 2022-11-08 PROBLEM — M51.37 DEGENERATION OF LUMBOSACRAL INTERVERTEBRAL DISC: Status: RESOLVED | Noted: 2022-10-31 | Resolved: 2022-11-08

## 2022-11-08 PROBLEM — F41.9 ANXIETY AND DEPRESSION: Status: RESOLVED | Noted: 2021-07-09 | Resolved: 2022-11-08

## 2022-11-08 PROBLEM — N93.9 ABNORMAL UTERINE BLEEDING (AUB): Status: RESOLVED | Noted: 2022-10-31 | Resolved: 2022-11-08

## 2022-11-08 PROBLEM — I50.22 CHRONIC SYSTOLIC (CONGESTIVE) HEART FAILURE (HCC): Status: ACTIVE | Noted: 2022-11-08

## 2022-11-08 PROBLEM — F32.A ANXIETY AND DEPRESSION: Status: RESOLVED | Noted: 2021-07-09 | Resolved: 2022-11-08

## 2022-11-08 PROBLEM — M51.379 DEGENERATION OF LUMBOSACRAL INTERVERTEBRAL DISC: Status: RESOLVED | Noted: 2022-10-31 | Resolved: 2022-11-08

## 2022-11-08 PROBLEM — S82.62XA CLOSED FRACTURE OF LATERAL MALLEOLUS OF LEFT FIBULA: Status: RESOLVED | Noted: 2017-09-19 | Resolved: 2022-11-08

## 2022-11-08 PROBLEM — O03.9 SPONTANEOUS ABORTION: Status: RESOLVED | Noted: 2021-10-31 | Resolved: 2022-11-08

## 2022-11-08 ASSESSMENT — ENCOUNTER SYMPTOMS
NAUSEA: 0
SHORTNESS OF BREATH: 1
ABDOMINAL PAIN: 0
COUGH: 0
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
## 2022-11-08 NOTE — TELEPHONE ENCOUNTER
NP referral from Rosita Quijano NP-    I50.33 (ICD-10-CM) - Acute on chronic diastolic CHF (congestive heart failure) (HCC)   I50.32 (ICD-10-CM) - Chronic diastolic congestive heart failure (HCC)   LM for pt to return call.

## 2022-11-08 NOTE — TELEPHONE ENCOUNTER
Service Details  Procedure Modifiers Provider Requested Approved   [id number] - AMB REFERRAL TO CARDIOLOGY None Sonia Sotomayor MD 1 1     Diagnosis Information    Diagnosis   I50.33 (ICD-10-CM) - Acute on chronic diastolic CHF (congestive heart failure) (HCC)   I50.32 (ICD-10-CM) - Chronic diastolic congestive heart failure (HCC)     LM for patient to call our office back.

## 2022-11-08 NOTE — CARE COORDINATION
Ambulatory Care Coordination Note  2022    ACC: Doug Melchor, RN    Gusdevika Cabrera is being followed by care coordination for education and assistance in managing her chronic conditions and healthcare needs. Pt has h/o: obesity, COVID, CHF, SANTIAGO. Spoke with Star Cabrera today. Pt hospitalized 10/31-. D/c home. SANTIAGO: Now has bipap. Wearing nightly. Hypoxia-on home oxygen 3 liters at rest and 6 with activity. Pt has SPO2 monitor. Reports that she has been 98% with 3 liters on at rest.    Started on advair. Educated on rinsing and spitting with each use. Has albuterol inhaler to use PRN  Completed dose of prednisone  CHF: newly dx. Was on bumex during hospitalization but not on any currently. Reports edema in ble resolved. Reinforced low sodium diet of 2gms or less per day and 2 liter fluid restriction. Pt obtained scales that go up to 560 pounds. Current wt is 487. 9.  has been monitoring daily. Heart Failure Education outreach Date/Time: 2022 4:15 PM    Ambulatory Care Manager (ACM) contacted the patient by telephone to perform Ambulatory Care Coordination. Verified name and  with patient as identifiers. Provided introduction to self, and explanation of the Ambulatory Care Manager's role. ACM reviewed that a Health Healthy tips for the Holiday packet has been sent to [employer]. ACM reviewed CHF zones, daily weights, fluid restriction, the importance of low sodium diet, and healthy tips packet with the patient. Instructed patient to call their  PCP/cardiologist  if they have a weight gain of 3 lbs in 2 days or 5 lbs in a week. Patient reminded that there is a physician on call 24 hours a day / 7 days a week should the patient have questions or concerns. The patient verbalized understanding. Offered patient enrollment in the Remote Patient Monitoring (RPM) program for in-home monitoring: NA.  Pt had VV f/u with PCP today.     Plan of care:  Reviewed upcoming appts: 11/10 cardio,  [facility] b[facility]ric appt. Pt concerned about going that far and running out of oxygen. I advised pt to contact their office to see if they can switch her over to one of their tanks while she is there. Advised her to call if they are not able to accommodate her oxygen needs. 11/17 Bipap download  11/30 pain mgmt. Continue daily wt monitoring  Follow and reinforce   HF zones  Wear oxygen as prescribed  Monitor SPO2 levels  Call with any new concerns  Limit sodium to 2gms daily  Fluids to 2 liters per day  Congestive Heart Failure Assessment    Are you currently restricting fluids?: 2000cc  Do you understand a low sodium diet?: Yes  Do you understand how to read food labels?: Yes  How many restaurant meals do you eat per week?: 0  Do you salt your food before tasting it?: No         Symptoms:  CHF associated angina: Neg, CHF associated dyspnea on exertion: Pos, CHF associated fatigue: Pos, CHF associated leg swelling: Neg, CHF associated orthostatic hypotension: Neg, CHF associated PND: Neg, CHF associated shortness of breath: Neg, CHF associated weakness: Neg      Symptom course: improving  Patient-reported weight (lb): 487.9  Weight trend: stable  Salt intake watch compared to last visit: improved      and   General Assessment    Do you have any symptoms that are causing concern?: Yes  Progression since Onset: Gradually Improving  Reported Symptoms:  (Comment: SANTIAGO-now on Bipap.  wearing nightly. has noticed improvement in energy level since using)         Lab Results       None            Care Coordination Interventions    Referral from Primary Care Provider: No  Suggested Interventions and Community Resources  Medi Set or Pill Pack: Declined          Goals Addressed                   This Visit's Progress     Behavioral Health   On track     I will work towards the following Behavioral Health goals: I will take my medications daily as prescribed.  and will consider getting established with mental health provider. Barriers: lack of support, overwhelmed by complexity of regimen, and stress  Plan for overcoming my barriers: support and education from PCP, ACM, SW. Resource info provided on different providers and services in the area. Confidence: 8/10  Anticipated Goal Completion Date: 11/12/22       Self Monitoring        Daily Weights - I will weight myself as directed - Daily and write down weights  I will notify my provider of any increase in weight by 3 or more pounds in 2 days OR 5 or more pounds in a week. None Recently Recorded    Barriers: lack of support, overwhelmed by complexity of regimen, and stress  Plan for overcoming my barriers: education on HF zones, get established with cardiology. Support from [employer] [facility], PCP, specialists  Confidence: 9/10  Anticipated Goal Completion Date: 2/8/23                Prior to Admission medications    Medication Sig Start Date End Date Taking?  Authorizing Provider   fluticasone-salmeterol (ADVAIR HFA) 115-21 MCG/ACT inhaler Inhale 2 puffs into the lungs 2 times daily Rinse and spit after doses 11/4/22   MALCOM Montero CNP   metoprolol succinate (TOPROL XL) 25 MG extended release tablet Take 1 tablet by mouth daily 11/4/22   MALCOM Orellana CNP   ferrous sulfate (IRON 325) 325 (65 Fe) MG tablet Take 1 tablet by mouth 2 times daily 10/17/22   MALCOM Schofield CNP   vitamin D (ERGOCALCIFEROL) 1.25 MG (36143 UT) CAPS capsule Take 1 capsule by mouth once a week 10/17/22   MALCOM Schofield CNP   Multiple Vitamin (MULTIVITAMIN ADULT PO) Take by mouth    Historical Provider, MD   lidocaine (LIDODERM) 5 % Place 2 patches onto the skin daily 12 hours on, 12 hours off. 10/11/22 11/10/22  MaviswaMALCOM Montano CNP   butalbital-acetaminophen-caffeine (FIORICET, ESGIC) -40 MG per tablet Take 1 tablet by mouth every 6 hours as needed for Migraine 10/6/22   MALCOM Schofield CNP   ARIPiprazole (ABILIFY) 5 MG tablet Take 1 tablet by mouth daily 10/3/22   MALCOM Santacruz CNP   DULoxetine (CYMBALTA) 30 MG extended release capsule Take 1 capsule by mouth daily 10/3/22   MALCOM Santacruz CNP   albuterol sulfate HFA (PROVENTIL;VENTOLIN;PROAIR) 108 (90 Base) MCG/ACT inhaler Inhale 2 puffs into the lungs every 4 hours as needed for Wheezing or Shortness of Breath (cough) 8/11/22   MALCOM Santacruz CNP   hydrOXYzine HCl (ATARAX) 25 MG tablet Take 1 tablet by mouth every 8 hours as needed for Anxiety 8/11/22   MALCOM Santacruz CNP   aspirin 81 MG chewable tablet Take 1 tablet by mouth daily 7/14/21   KODAK Hernandez   famotidine (PEPCID) 20 MG tablet Take 1 tablet by mouth 2 times daily  Patient taking differently: Take 20 mg by mouth as needed 4/1/21   MALCOM Santacruz CNP   Naproxen Sodium (ALEVE PO) Take by mouth    Historical Provider, MD       Future Appointments   Date Time Provider Jo Milton   11/10/2022  2:00 PM MD ROZ [facility] [place] [email or web address]   11/17/2022  1:00 PM Elisa Roche, [facility]   11/30/2022  3:30 PM MALCOM Stroud CNP SRPX Pain [facility] [email or web address]   12/8/2022  3:00 PM MALCOM Monroe CNP Oncology [facility] [place]   12/28/2022  1:00 PM MALCOM Santacruz   1/5/2023 11:00 AM Itzel Elliott, [address]   1/24/2023  3:00 PM MALCOM Parker

## 2022-11-10 ENCOUNTER — OFFICE VISIT (OUTPATIENT)
Dept: CARDIOLOGY CLINIC | Age: 37
End: 2022-11-10
Payer: MEDICARE

## 2022-11-10 VITALS
WEIGHT: 293 LBS | DIASTOLIC BLOOD PRESSURE: 65 MMHG | HEART RATE: 68 BPM | SYSTOLIC BLOOD PRESSURE: 118 MMHG | HEIGHT: 63 IN | BODY MASS INDEX: 51.91 KG/M2

## 2022-11-10 DIAGNOSIS — I50.32 CHRONIC DIASTOLIC CONGESTIVE HEART FAILURE (HCC): Primary | ICD-10-CM

## 2022-11-10 DIAGNOSIS — E66.01 MORBID OBESITY WITH BMI OF 70 AND OVER, ADULT (HCC): ICD-10-CM

## 2022-11-10 DIAGNOSIS — J96.11 CHRONIC RESPIRATORY FAILURE WITH HYPOXIA (HCC): ICD-10-CM

## 2022-11-10 DIAGNOSIS — E66.2 OBESITY HYPOVENTILATION SYNDROME (HCC): ICD-10-CM

## 2022-11-10 DIAGNOSIS — Z98.890 S/P CARDIAC CATH: ICD-10-CM

## 2022-11-10 DIAGNOSIS — G47.33 OSA (OBSTRUCTIVE SLEEP APNEA): ICD-10-CM

## 2022-11-10 PROBLEM — J96.10 CHRONIC RESPIRATORY FAILURE (HCC): Status: ACTIVE | Noted: 2022-11-10

## 2022-11-10 PROBLEM — J96.21 ACUTE ON CHRONIC RESPIRATORY FAILURE WITH HYPOXIA (HCC): Status: RESOLVED | Noted: 2022-11-01 | Resolved: 2022-11-10

## 2022-11-10 PROBLEM — I50.22 CHRONIC SYSTOLIC (CONGESTIVE) HEART FAILURE (HCC): Status: RESOLVED | Noted: 2022-11-08 | Resolved: 2022-11-10

## 2022-11-10 PROBLEM — J96.01 ACUTE RESPIRATORY FAILURE WITH HYPOXIA (HCC): Status: RESOLVED | Noted: 2021-07-09 | Resolved: 2022-11-10

## 2022-11-10 PROCEDURE — G8417 CALC BMI ABV UP PARAM F/U: HCPCS | Performed by: INTERNAL MEDICINE

## 2022-11-10 PROCEDURE — 99214 OFFICE O/P EST MOD 30 MIN: CPT | Performed by: INTERNAL MEDICINE

## 2022-11-10 PROCEDURE — G8484 FLU IMMUNIZE NO ADMIN: HCPCS | Performed by: INTERNAL MEDICINE

## 2022-11-10 PROCEDURE — 1111F DSCHRG MED/CURRENT MED MERGE: CPT | Performed by: INTERNAL MEDICINE

## 2022-11-10 PROCEDURE — 1036F TOBACCO NON-USER: CPT | Performed by: INTERNAL MEDICINE

## 2022-11-10 PROCEDURE — G8427 DOCREV CUR MEDS BY ELIG CLIN: HCPCS | Performed by: INTERNAL MEDICINE

## 2022-11-10 RX ORDER — TRIAMTERENE AND HYDROCHLOROTHIAZIDE 37.5; 25 MG/1; MG/1
0.5 TABLET ORAL DAILY
Qty: 30 TABLET | Refills: 2 | Status: SHIPPED | OUTPATIENT
Start: 2022-11-10

## 2022-11-10 NOTE — PROGRESS NOTES
Chief Complaint   Patient presents with    Establish Cardiologist    New Patient    Congestive Heart Failure       NP here- CHF  Was admitted in hospital for  chf , leg edema and chest pain  Diuresed and had cath and sent home on bumex 2 mg po qd   Post d/c pat did not take bumex   No leg edema  Seen in hospital by dr. Chao Webber    No wt gain  Lost 9 lb in hospital    On home O2 3 liter and 6 on activity since 2022    Denied dizziness , edema or palpitation    Continue to have occasional chest pain  before and after cath    Sob on exertion chonic stable      EKG done 10-31-22       Nonsmoker    FHx  None for CAD      Past Surgical History:   Procedure Laterality Date    ADENOIDECTOMY      ANKLE FRACTURE SURGERY Left 2017    LEFT ANKLE ORIF, I & D right posterior upper leg performed by Jeremy Bragg MD at [facility]  2016    AK OFFICE/OUTPT VISIT,PROCEDURE ONLY Left 2018    LEFT SUBTALAR JOINT FUSION, MEDIAL COLUMN FUSION, GASTROC RECESSION, POSS DELTOID REPAIR performed by Umer Bourgeois DPM at [address] [facility]    Morphine Itching     Felt like she was on fire        Family History   Problem Relation Age of Onset    Cancer Mother     Depression Mother     Thyroid Disease Mother     Arthritis Father     High Blood Pressure Father     Depression Sister     Depression Sister     Diabetes Paternal Grandfather     Cancer Paternal Grandmother     Cancer Maternal Grandfather     Cancer Maternal Grandmother         Social History     Socioeconomic History    Marital status:      Spouse name: Not on file    Number of children: Not on file    Years of education: Not on file    Highest education level: Not on file   Occupational History    Not on file   Tobacco Use    Smoking status: Former     Types: Cigarettes     Quit date: 3/4/2014     Years since quittin.6    Smokeless tobacco: Never   Substance and Sexual Activity    Alcohol use:  Yes Comment: social    Drug use: Yes     Types: Marijuana Janelle Hikes)    Sexual activity: Not on file   Other Topics Concern    Not on file   Social History Narrative    Not on file     Social Determinants of Health     Financial Resource Strain: Medium Risk    Difficulty of Paying Living Expenses: Somewhat hard   Food Insecurity: Food Insecurity Present    Worried About Running Out of Food in the Last Year: Often true    Ran Out of Food in the Last Year: Never true   Transportation Needs: Not on file   Physical Activity: Not on file   Stress: Not on file   Social Connections: Not on file   Intimate Partner Violence: Not on file   Housing Stability: Not on file       Current Outpatient Medications   Medication Sig Dispense Refill    triamterene-hydroCHLOROthiazide (MAXZIDE-25) 37.5-25 MG per tablet Take 0.5 tablets by mouth daily 30 tablet 2    fluticasone-salmeterol (ADVAIR HFA) 115-21 MCG/ACT inhaler Inhale 2 puffs into the lungs 2 times daily Rinse and spit after doses 1 each 11    metoprolol succinate (TOPROL XL) 25 MG extended release tablet Take 1 tablet by mouth daily 30 tablet 3    ferrous sulfate (IRON 325) 325 (65 Fe) MG tablet Take 1 tablet by mouth 2 times daily 180 tablet 3    vitamin D (ERGOCALCIFEROL) 1.25 MG (49127 UT) CAPS capsule Take 1 capsule by mouth once a week 12 capsule 3    Multiple Vitamin (MULTIVITAMIN ADULT PO) Take by mouth      lidocaine (LIDODERM) 5 % Place 2 patches onto the skin daily 12 hours on, 12 hours off.  60 patch 0    butalbital-acetaminophen-caffeine (FIORICET, ESGIC) -40 MG per tablet Take 1 tablet by mouth every 6 hours as needed for Migraine 15 tablet 0    ARIPiprazole (ABILIFY) 5 MG tablet Take 1 tablet by mouth daily 90 tablet 3    DULoxetine (CYMBALTA) 30 MG extended release capsule Take 1 capsule by mouth daily 90 capsule 3    albuterol sulfate HFA (PROVENTIL;VENTOLIN;PROAIR) 108 (90 Base) MCG/ACT inhaler Inhale 2 puffs into the lungs every 4 hours as needed for Wheezing or Shortness of Breath (cough) 1 each 0    hydrOXYzine HCl (ATARAX) 25 MG tablet Take 1 tablet by mouth every 8 hours as needed for Anxiety 60 tablet 1    aspirin 81 MG chewable tablet Take 1 tablet by mouth daily 30 tablet 3    famotidine (PEPCID) 20 MG tablet Take 1 tablet by mouth 2 times daily (Patient taking differently: Take 20 mg by mouth as needed) 60 tablet 11    Naproxen Sodium (ALEVE PO) Take by mouth       No current facility-administered medications for this visit. Review of Systems -     General ROS: negative  Psychological ROS: negative  Hematological and Lymphatic ROS: No history of blood clots or bleeding disorder. Respiratory ROS: no cough,  or wheezing, the rest see HPI  Cardiovascular ROS: See HPI  Gastrointestinal ROS: negative  Genito-Urinary ROS: no dysuria, trouble voiding, or hematuria  Musculoskeletal ROS: negative  Neurological ROS: no TIA or stroke symptoms  Dermatological ROS: negative      Blood pressure 118/65, pulse 68, height 5' 3\" (1.6 m), weight (!) 486 lb (220.4 kg), not currently breastfeeding.         Physical Examination:    General appearance - alert, well appearing, and in no distress  HEENT- Pink conjunctiva  , Non-icteri sclera,PERRLA  Mental status - alert, oriented to person, place, and time  Neck - supple, no significant adenopathy, no JVD, or carotid bruits  Chest - clear to auscultation, no wheezes, rales or rhonchi, symmetric air entry  Heart - normal rate, regular rhythm, normal S1, S2, no murmurs, rubs, clicks or gallops  Abdomen - soft, nontender, nondistended, no masses or organomegaly  VALDEZ- no CVA or flank tenderness, no suprapubic tenderness  Neurological - alert, oriented, normal speech, no focal findings or movement disorder noted  Musculoskeletal/limbs - no joint tenderness, deformity or swelling   - peripheral pulses normal, no pedal edema, no clubbing or cyanosis  Skin - normal coloration and turgor, no rashes, no suspicious skin lesions noted  Psych- appropriate mood and affect    Lab  No results for input(s): CKTOTAL, CKMB, CKMBINDEX, TROPONINI in the last 72 hours. CBC:   Lab Results   Component Value Date/Time    WBC 8.5 11/01/2022 04:35 AM    RBC 5.34 11/01/2022 04:35 AM    HGB 10.8 11/01/2022 04:35 AM    HCT 39.6 11/01/2022 04:35 AM    MCV 74.2 11/01/2022 04:35 AM    MCH 20.2 11/01/2022 04:35 AM    MCHC 27.3 11/01/2022 04:35 AM    RDW 16.6 09/24/2017 06:03 AM     11/01/2022 04:35 AM    MPV 10.4 11/01/2022 04:35 AM     BMP:    Lab Results   Component Value Date/Time     11/04/2022 04:05 AM    K 4.7 11/04/2022 04:05 AM    K 4.4 10/31/2022 04:50 AM    CL 93 11/04/2022 04:05 AM    CO2 39 11/04/2022 04:05 AM    BUN 19 11/04/2022 04:05 AM    LABALBU 3.2 10/31/2022 04:50 AM    CREATININE 0.6 11/04/2022 04:05 AM    CALCIUM 8.8 11/04/2022 04:05 AM    LABGLOM >60 11/04/2022 04:05 AM    GLUCOSE 83 11/04/2022 04:05 AM     Hepatic Function Panel:    Lab Results   Component Value Date/Time    ALKPHOS 61 10/31/2022 04:50 AM    ALT 19 10/31/2022 04:50 AM    AST 30 10/31/2022 04:50 AM    PROT 7.2 10/31/2022 04:50 AM    BILITOT 0.6 10/31/2022 04:50 AM    BILIDIR <0.2 10/31/2022 04:50 AM    LABALBU 3.2 10/31/2022 04:50 AM     Magnesium:    Lab Results   Component Value Date/Time    MG 2.2 11/02/2022 07:37 AM     Warfarin PT/INR:  No components found for: PTPATWAR, PTINRWAR  HgBA1c:    Lab Results   Component Value Date/Time    LABA1C 5.9 10/14/2022 01:01 PM     FLP:    Lab Results   Component Value Date/Time    TRIG 87 10/14/2022 01:00 PM    HDL 55 10/14/2022 01:00 PM    LDLCALC 110 10/14/2022 01:00 PM     TSH:    Lab Results   Component Value Date/Time    TSH 4.710 10/14/2022 01:00 PM       Conclusions      Summary   Technically difficult study due to poor acoustic windows. Ejection fraction is visually estimated at 60%.    Overall left ventricular function is normal.      Signature ----------------------------------------------------------------   Electronically signed by Hoda Morataya MD (Interpreting   physician) on 10/31/2022 at 04:01 PM   ----------------------------------------------------------------      Left Heart Cath: FINDINGS:  LEFT VENTRICULOGRAM:  No regional wall motion abnormality. Ejection  fraction estimated at 50-55%. HEMODYNAMIC RESULTS:  Left ventricular end-diastolic pressure was 26  mmHg. No significant pressure gradient across the aortic valve upon  pullback. CORONARY ANGIOGRAM  1. Right coronary artery large dominant vessel, mildly ectatic, minimal  luminal irregularities, otherwise patent with no significant stenotic  lesions. 2.  Left main coronary artery patent. No significant stenosis. Gives  rise to left anterior descending and left circumflex arteries. 3.  Left circumflex artery patent. No significant stenosis. 4.  Left anterior descending artery patent. No significant stenotic  lesions. IMPRESSION:  1. No significant coronary artery disease. 2.  Acute congestive heart failure with preserved ejection fraction. 3.  LVEDP 26 mmHg. RECOMMENDATIONS:  Medical management. James Reyes MD     D: 11/03/2022     Ekg 11/1/22  Normal sinus rhythm  Incomplete right bundle branch block  T wave abnormality, consider anterior ischemia  Prolonged QT interval or tu fusion, consider myocardial disease, electrolyte imbalance, or drug effects  Abnormal ECG  When compared with ECG of 31-OCT-2022       Assessment   Diagnosis Orders   1. Chronic diastolic congestive heart failure (HCC)  Basic Metabolic Panel    Magnesium      2. Obesity hypoventilation syndrome (HCC)  Basic Metabolic Panel    Magnesium      3. SANTIAGO (obstructive sleep apnea)  Basic Metabolic Panel    Magnesium      4. Morbid obesity with BMI of 70 and over, adult ([address]        5. Chronic respiratory failure with hypoxia (HCC) on Home O2  Basic Metabolic Panel    Magnesium      6.  S/P cardiac cath Nov 2022 - nonobstructive  Basic Metabolic Panel    Magnesium            Recent 10/2022 hospital assess  Intermittent CP   Sp cardiac cath 11/3/22: non-obstructive CAD      Acute on chronic diastolic chf - resolved      Acute on chronic resp failure      Hx SANTIAGO - PAP      Plan     Continue the current treatment and with constant vigilance to changes in symptoms and also any potential side effects. Return for care or seek medical attention immediately if symptoms got worse and/or develop new symptoms. Patient Seen, Chart, Consults notes, Labs, Radiology studies reviewed. Congestive heart failure: no evidence of fluid overload today, no recent hospitalization for CHF  Resume low dose  diuretic  She took bumex for 3 days course given by pcp prior to admission and diuresed IV in hospital and sent home with out script but to cont bumex   Did not take diuretics since d/c-as she had none at home  No leg edema now  Start maxzied 25 1/2 tab po qd    Cath site look good    Under pulm care  D/w the pat the plan of care    Feel better  and stable    Continue the current treatment and with constant vigilance to changes in symptoms and also any potential side effects. Return for care or seek medical attention immediately if symptoms got worse and/or develop new symptoms.       RTc in 3 month with BMP and mG      Dariana Chou, [facility]

## 2022-11-17 ENCOUNTER — CARE COORDINATION (OUTPATIENT)
Dept: CARE COORDINATION | Age: 37
End: 2022-11-17

## 2022-11-17 ASSESSMENT — ENCOUNTER SYMPTOMS: DYSPNEA ASSOCIATED WITH: MINIMAL EXERTION

## 2022-11-17 NOTE — CARE COORDINATION
Ambulatory Care Coordination Note  11/17/2022    ACC: Isa Garrison RN    Ken Stewart is being followed by care coordination for education and assistance in managing her chronic conditions and healthcare needs. Pt has h/o: obesity, COVID, CHF, SANTIAGO. Spoke with Ken Stewart today. Pt reports that she is doing well overall. Had appt with cardio on 11/10. Placed on Maxzide. Tolerating well. Denies edema. COPD: remains on oxygen 3 liters and rest and 6 liters with activity. Sats have been 96%. SANTIAGO-wearing bipap at HS and if napping during the day. Waking up feeling much more rested. Was able to get over 8 hrs sleep last night. Reports having some sinus congestion today. Advised to monitor closely and stressed importance of early symptom recognition and reporting. Pt aware that if she starts developing additional s/s or sinus congestion worsens to call. Pt had appt at [facility]. Has dietican appt 12/1. Keeping food diary. Pt reports she had a very good visit. CHF: monitoring daily wts. Has lost more wt. No edema. SOB with activity but not any worse. Reviewed HF zones. Plan of care:  Reinforce limiting sodium   to mail out paper copy of CHF zone tools  Reinforce CHF and COPD tools with each call  Continue working on wt loss  F/u with dietician on 12/1  Pt has great support from family that is helping her with healthcare needs   Continue wearing oxygen and BIpap as ordered.    Daily wt monitoring  Congestive Heart Failure Assessment    Are you currently restricting fluids?: 2000cc  Do you understand a low sodium diet?: Yes  Do you understand how to read food labels?: Yes  How many restaurant meals do you eat per week?: 0  Do you salt your food before tasting it?: No         Symptoms:  CHF associated angina: Neg, CHF associated dyspnea on exertion: Pos, CHF associated fatigue: Pos, CHF associated leg swelling: Neg, CHF associated orthostatic hypotension: Neg, CHF associated PND: Neg, CHF associated shortness of breath: Neg, CHF associated weakness: Neg      Symptom course: stable  Patient-reported weight (lb): 483  Weight trend: stable  Salt intake watch compared to last visit: stable      and   COPD Assessment    Does the patient understand envrionmental exposure?: Yes  Is the patient able to verbalize Rescue vs. Long Acting medications?: Yes  Does the patient use a space with inhaled medications?: No            Symptoms:  None: Yes      Symptom course: stable  Breathlessness: minimal exertion  Increase use of rapid acting/rescue inhaled medications?: No  Change in chronic cough?: No/At Baseline  Change in sputum?: No/At Baseline  Self Monitoring - SaO2: Yes  Baseline SaO2 Readin (Comment: oxygen at 3 liters)  Have you had a recent diagnosis of pneumonia either by PCP or at a hospital?: No         Offered patient enrollment in the Remote Patient Monitoring (RPM) program for in-home monitoring: NA. Lab Results       None            Care Coordination Interventions    Referral from Primary Care Provider: No  Suggested Interventions and Community Resources  Medi Set or Pill Pack: Declined  Zone Management Tools: In Process (Comment: CHF)          Goals Addressed                   This Visit's Progress     Behavioral Health   On track     I will work towards the following Behavioral Health goals: I will take my medications daily as prescribed. and will consider getting established with mental health provider. Barriers: lack of support, overwhelmed by complexity of regimen, and stress  Plan for overcoming my barriers: support and education from PCP, ACM, SW. Resource info provided on different providers and services in the area.    Confidence: 8/10  Anticipated Goal Completion Date: 22       Self Monitoring   On track     Daily Weights - I will weight myself as directed - Daily and write down weights  I will notify my provider of any increase in weight by 3 or more pounds in 2 days OR 5 or more pounds in a week. None Recently Recorded    Barriers: lack of support, overwhelmed by complexity of regimen, and stress  Plan for overcoming my barriers: education on HF zones, get established with cardiology. Support from [facility], PCP, specialists  Confidence: 9/10  Anticipated Goal Completion Date: 2/8/23                Prior to Admission medications    Medication Sig Start Date End Date Taking?  Authorizing Provider   triamterene-hydroCHLOROthiazide (MAXZIDE-25) 37.5-25 MG per tablet Take 0.5 tablets by mouth daily 11/10/22   Page Cary MD   fluticasone-salmeterol (ADVAIR HFA) 751-17 MCG/ACT inhaler Inhale 2 puffs into the lungs 2 times daily Rinse and spit after doses 11/4/22   MALCOM Finch CNP   metoprolol succinate (TOPROL XL) 25 MG extended release tablet Take 1 tablet by mouth daily 11/4/22   MALCOM Macias CNP   ferrous sulfate (IRON 325) 325 (65 Fe) MG tablet Take 1 tablet by mouth 2 times daily 10/17/22   MALCOM Winters CNP   vitamin D (ERGOCALCIFEROL) 1.25 MG (22869 UT) CAPS capsule Take 1 capsule by mouth once a week 10/17/22   MALCOM Winters CNP   Multiple Vitamin (MULTIVITAMIN ADULT PO) Take by mouth    Historical Provider, MD   butalbital-acetaminophen-caffeine (FIORICET, ESGIC) -92 MG per tablet Take 1 tablet by mouth every 6 hours as needed for Migraine 10/6/22   MALCOM Winters CNP   ARIPiprazole (ABILIFY) 5 MG tablet Take 1 tablet by mouth daily 10/3/22   MALCOM Winters CNP   DULoxetine (CYMBALTA) 30 MG extended release capsule Take 1 capsule by mouth daily 10/3/22   MALCOM Winters CNP   albuterol sulfate HFA (PROVENTIL;VENTOLIN;PROAIR) 108 (90 Base) MCG/ACT inhaler Inhale 2 puffs into the lungs every 4 hours as needed for Wheezing or Shortness of Breath (cough) 8/11/22   MALCOM Winters CNP   hydrOXYzine HCl (ATARAX) 25 MG tablet Take 1 tablet by mouth every 8 hours as needed for Anxiety 8/11/22   Destiny Morales, APRN - CNP   aspirin 81 MG chewable tablet Take 1 tablet by mouth daily 7/14/21   KODAK Orosco   famotidine (PEPCID) 20 MG tablet Take 1 tablet by mouth 2 times daily  Patient taking differently: Take 20 mg by mouth as needed 4/1/21   Destiny Morales, APRN - CNP   Naproxen Sodium (ALEVE PO) Take by mouth    Historical Provider, MD       Future Appointments   Date Time Provider [place]   11/30/2022  3:30 PM MALCOM Aguilera CNP N [facility] [place]   12/8/2022  3:00 PM MALCOM Pan CNP N Oncology P - [place]   12/28/2022  1:00 PM MALCOM [place]   1/5/2023 11:00 AM Leland Kwong, [address]   1/24/2023  3:00 PM MALCOM [address]   2/9/2023  2:15 PM Daniele Reinoso MD N SRPX Heart P - [place]

## 2022-11-17 NOTE — CARE COORDINATION
[facility] for [facility]. No answer. Message left to return call.

## 2022-11-21 ENCOUNTER — CARE COORDINATION (OUTPATIENT)
Dept: CARE COORDINATION | Age: 37
End: 2022-11-21

## 2022-11-30 ENCOUNTER — TELEPHONE (OUTPATIENT)
Dept: PHYSICAL MEDICINE AND REHAB | Age: 37
End: 2022-11-30

## 2022-11-30 ENCOUNTER — OFFICE VISIT (OUTPATIENT)
Dept: PHYSICAL MEDICINE AND REHAB | Age: 37
End: 2022-11-30

## 2022-11-30 VITALS
DIASTOLIC BLOOD PRESSURE: 78 MMHG | HEIGHT: 63 IN | WEIGHT: 293 LBS | SYSTOLIC BLOOD PRESSURE: 118 MMHG | BODY MASS INDEX: 51.91 KG/M2

## 2022-11-30 DIAGNOSIS — M25.572 CHRONIC PAIN OF LEFT ANKLE: Primary | ICD-10-CM

## 2022-11-30 DIAGNOSIS — G89.4 CHRONIC PAIN SYNDROME: ICD-10-CM

## 2022-11-30 DIAGNOSIS — G89.29 CHRONIC PAIN OF LEFT ANKLE: Primary | ICD-10-CM

## 2022-11-30 DIAGNOSIS — M79.2 NEUROPATHIC PAIN: ICD-10-CM

## 2022-11-30 RX ORDER — PREGABALIN 75 MG/1
75 CAPSULE ORAL NIGHTLY
Qty: 30 CAPSULE | Refills: 0 | Status: SHIPPED | OUTPATIENT
Start: 2022-11-30 | End: 2022-12-30
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
## 2022-12-02 ENCOUNTER — CARE COORDINATION (OUTPATIENT)
Dept: CARE COORDINATION | Age: 37
End: 2022-12-02

## 2022-12-02 NOTE — CARE COORDINATION
Attempted to reach patient for continued Care Coordination follow up and education. Patient was unavailable at the time of my call, and a generic voicemail message was left asking patient to return my call at [phone].

## 2022-12-05 ENCOUNTER — TELEPHONE (OUTPATIENT)
Dept: PHYSICAL MEDICINE AND REHAB | Age: 37
End: 2022-12-05

## 2022-12-05 NOTE — TELEPHONE ENCOUNTER
[facility] called and need clarification on script for compound cream.The script they got was for both #5 and #3. Which one is to be filled.  Could not find copy of order in media

## 2022-12-08 ENCOUNTER — OFFICE VISIT (OUTPATIENT)
Dept: ONCOLOGY | Age: 37
End: 2022-12-08
Payer: MEDICARE

## 2022-12-08 ENCOUNTER — HOSPITAL ENCOUNTER (OUTPATIENT)
Dept: INFUSION THERAPY | Age: 37
Discharge: HOME | End: 2022-12-08
Payer: MEDICARE

## 2022-12-08 VITALS
SYSTOLIC BLOOD PRESSURE: 167 MMHG | BODY MASS INDEX: 51.91 KG/M2 | HEIGHT: 63 IN | DIASTOLIC BLOOD PRESSURE: 74 MMHG | TEMPERATURE: 97.6 F | RESPIRATION RATE: 22 BRPM | OXYGEN SATURATION: 98 % | HEART RATE: 79 BPM | WEIGHT: 293 LBS

## 2022-12-08 VITALS
BODY MASS INDEX: 51.91 KG/M2 | SYSTOLIC BLOOD PRESSURE: 167 MMHG | OXYGEN SATURATION: 98 % | HEART RATE: 79 BPM | TEMPERATURE: 97.6 F | HEIGHT: 63 IN | RESPIRATION RATE: 22 BRPM | DIASTOLIC BLOOD PRESSURE: 74 MMHG | WEIGHT: 293 LBS

## 2022-12-08 DIAGNOSIS — J96.11 CHRONIC RESPIRATORY FAILURE WITH HYPOXIA (HCC): ICD-10-CM

## 2022-12-08 DIAGNOSIS — E66.01 MORBID OBESITY WITH BMI OF 70 AND OVER, ADULT (HCC): ICD-10-CM

## 2022-12-08 DIAGNOSIS — D50.0 IRON DEFICIENCY ANEMIA DUE TO CHRONIC BLOOD LOSS: Primary | ICD-10-CM

## 2022-12-08 DIAGNOSIS — D50.0 IRON DEFICIENCY ANEMIA DUE TO CHRONIC BLOOD LOSS: ICD-10-CM

## 2022-12-08 LAB
ABSOLUTE IMMATURE GRANULOCYTE: 0.01 THOU/MM3 (ref 0–0.07)
ABSOLUTE RETIC #: 60 THOU/MM3 (ref 20–115)
ALBUMIN SERPL-MCNC: 3.5 G/DL (ref 3.5–5.1)
ALP BLD-CCNC: 56 U/L (ref 38–126)
ALT SERPL-CCNC: 22 U/L (ref 11–66)
AST SERPL-CCNC: 22 U/L (ref 5–40)
BASINOPHIL, AUTOMATED: 1 % (ref 0–3)
BASOPHILS ABSOLUTE: 0 THOU/MM3 (ref 0–0.1)
BILIRUB SERPL-MCNC: 0.4 MG/DL (ref 0.3–1.2)
BILIRUBIN DIRECT: < 0.2 MG/DL (ref 0–0.3)
BUN, WHOLE BLOOD: 14 MG/DL (ref 8–26)
CHLORIDE, WHOLE BLOOD: 102 MEQ/L (ref 98–109)
CREATININE, WHOLE BLOOD: 0.7 MG/DL (ref 0.5–1.2)
EOSINOPHILS ABSOLUTE: 0.2 THOU/MM3 (ref 0–0.4)
EOSINOPHILS RELATIVE PERCENT: 2 % (ref 0–4)
FERRITIN: 36 NG/ML (ref 10–291)
FOLATE: 13.3 NG/ML (ref 4.8–24.2)
GFR SERPL CREATININE-BSD FRML MDRD: > 60 ML/MIN/1.73M2
GLUCOSE, WHOLE BLOOD: 79 MG/DL (ref 70–108)
HCT VFR BLD CALC: 41.1 % (ref 37–47)
HEMOGLOBIN: 11.8 GM/DL (ref 12–16)
IMMATURE GRANULOCYTES: 0 %
IMMATURE RETIC FRACT: 20 % (ref 3–15.9)
IONIZED CALCIUM, WHOLE BLOOD: 1.14 MMOL/L (ref 1.12–1.32)
IRON SATURATION: 8 % (ref 20–50)
IRON: 27 UG/DL (ref 50–170)
LD: 228 U/L (ref 100–190)
LYMPHOCYTES # BLD: 26 % (ref 15–47)
LYMPHOCYTES ABSOLUTE: 2.1 THOU/MM3 (ref 1–4.8)
MCH RBC QN AUTO: 21.3 PG (ref 26–33)
MCHC RBC AUTO-ENTMCNC: 28.7 GM/DL (ref 32.2–35.5)
MCV RBC AUTO: 74 FL (ref 81–99)
MONOCYTES ABSOLUTE: 0.4 THOU/MM3 (ref 0.4–1.3)
MONOCYTES: 5 % (ref 0–12)
PDW BLD-RTO: 22 % (ref 11.5–14.5)
PLATELET # BLD: 242 THOU/MM3 (ref 130–400)
PMV BLD AUTO: 10 FL (ref 9.4–12.4)
POTASSIUM, WHOLE BLOOD: 4.3 MEQ/L (ref 3.5–4.9)
RBC # BLD: 5.54 MILL/MM3 (ref 4.2–5.4)
RETIC HEMOGLOBIN: 25.4 PG (ref 28.2–35.7)
RETICULOCYTE ABSOLUTE COUNT: 1.1 % (ref 0.5–2)
REVIEWED BY: NORMAL
SEG NEUTROPHILS: 66 % (ref 43–75)
SEGMENTED NEUTROPHILS ABSOLUTE COUNT: 5.3 THOU/MM3 (ref 1.8–7.7)
SMEAR REVIEW: NORMAL
SODIUM, WHOLE BLOOD: 140 MEQ/L (ref 138–146)
TOTAL CO2, WHOLE BLOOD: 31 MEQ/L (ref 23–33)
TOTAL IRON BINDING CAPACITY: 341 UG/DL (ref 171–450)
TOTAL PROTEIN: 7.6 G/DL (ref 6.1–8)
VITAMIN B-12: 675 PG/ML (ref 211–911)
WBC # BLD: 8 THOU/MM3 (ref 4.8–10.8)

## 2022-12-08 PROCEDURE — G8417 CALC BMI ABV UP PARAM F/U: HCPCS | Performed by: NURSE PRACTITIONER

## 2022-12-08 PROCEDURE — 83550 IRON BINDING TEST: CPT

## 2022-12-08 PROCEDURE — 80076 HEPATIC FUNCTION PANEL: CPT

## 2022-12-08 PROCEDURE — 83010 ASSAY OF HAPTOGLOBIN QUANT: CPT

## 2022-12-08 PROCEDURE — 1036F TOBACCO NON-USER: CPT | Performed by: NURSE PRACTITIONER

## 2022-12-08 PROCEDURE — 82746 ASSAY OF FOLIC ACID SERUM: CPT

## 2022-12-08 PROCEDURE — 85025 COMPLETE CBC W/AUTO DIFF WBC: CPT

## 2022-12-08 PROCEDURE — 82728 ASSAY OF FERRITIN: CPT

## 2022-12-08 PROCEDURE — 99204 OFFICE O/P NEW MOD 45 MIN: CPT | Performed by: NURSE PRACTITIONER

## 2022-12-08 PROCEDURE — 80047 BASIC METABLC PNL IONIZED CA: CPT

## 2022-12-08 PROCEDURE — 82607 VITAMIN B-12: CPT

## 2022-12-08 PROCEDURE — G8484 FLU IMMUNIZE NO ADMIN: HCPCS | Performed by: NURSE PRACTITIONER

## 2022-12-08 PROCEDURE — 99211 OFF/OP EST MAY X REQ PHY/QHP: CPT

## 2022-12-08 PROCEDURE — 82668 ASSAY OF ERYTHROPOIETIN: CPT

## 2022-12-08 PROCEDURE — G8427 DOCREV CUR MEDS BY ELIG CLIN: HCPCS | Performed by: NURSE PRACTITIONER

## 2022-12-08 PROCEDURE — 85046 RETICYTE/HGB CONCENTRATE: CPT

## 2022-12-08 PROCEDURE — 83615 LACTATE (LD) (LDH) ENZYME: CPT

## 2022-12-08 PROCEDURE — 83540 ASSAY OF IRON: CPT

## 2022-12-08 PROCEDURE — 36415 COLL VENOUS BLD VENIPUNCTURE: CPT
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
## 2022-12-09 ENCOUNTER — TELEPHONE (OUTPATIENT)
Dept: ONCOLOGY | Age: 37
End: 2022-12-09

## 2022-12-09 ENCOUNTER — CARE COORDINATION (OUTPATIENT)
Dept: CARE COORDINATION | Age: 37
End: 2022-12-09

## 2022-12-09 PROBLEM — D50.0 IRON DEFICIENCY ANEMIA DUE TO CHRONIC BLOOD LOSS: Status: ACTIVE | Noted: 2022-12-09

## 2022-12-09 RX ORDER — ONDANSETRON 2 MG/ML
8 INJECTION INTRAMUSCULAR; INTRAVENOUS
OUTPATIENT
Start: 2022-12-15

## 2022-12-09 RX ORDER — HEPARIN SODIUM (PORCINE) LOCK FLUSH IV SOLN 100 UNIT/ML 100 UNIT/ML
500 SOLUTION INTRAVENOUS PRN
OUTPATIENT
Start: 2022-12-15

## 2022-12-09 RX ORDER — ALBUTEROL SULFATE 90 UG/1
4 AEROSOL, METERED RESPIRATORY (INHALATION) PRN
OUTPATIENT
Start: 2022-12-15

## 2022-12-09 RX ORDER — SODIUM CHLORIDE 0.9 % (FLUSH) 0.9 %
5-40 SYRINGE (ML) INJECTION PRN
OUTPATIENT
Start: 2022-12-15

## 2022-12-09 RX ORDER — SODIUM CHLORIDE 9 MG/ML
5-250 INJECTION, SOLUTION INTRAVENOUS PRN
OUTPATIENT
Start: 2022-12-15

## 2022-12-09 RX ORDER — ACETAMINOPHEN 325 MG/1
650 TABLET ORAL
OUTPATIENT
Start: 2022-12-15

## 2022-12-09 RX ORDER — EPINEPHRINE 1 MG/ML
0.3 INJECTION, SOLUTION, CONCENTRATE INTRAVENOUS PRN
OUTPATIENT
Start: 2022-12-15

## 2022-12-09 RX ORDER — DIPHENHYDRAMINE HYDROCHLORIDE 50 MG/ML
50 INJECTION INTRAMUSCULAR; INTRAVENOUS
OUTPATIENT
Start: 2022-12-15

## 2022-12-09 RX ORDER — FAMOTIDINE 10 MG/ML
20 INJECTION, SOLUTION INTRAVENOUS
OUTPATIENT
Start: 2022-12-15

## 2022-12-09 RX ORDER — SODIUM CHLORIDE 9 MG/ML
INJECTION, SOLUTION INTRAVENOUS CONTINUOUS
OUTPATIENT
Start: 2022-12-15

## 2022-12-09 NOTE — TELEPHONE ENCOUNTER
Lab results reviewed with the patient. She will be scheduled to receive treatment with Injectafer 750 mg IV x2 doses, pending insurance approval.  The patient was notified of treatment plan. She verbalized an understanding.

## 2022-12-11 LAB
ERYTHROPOIETIN: 19 MU/ML (ref 4–27)
HAPTOGLOBIN: 206 MG/DL (ref 30–200)

## 2022-12-14 ENCOUNTER — HOSPITAL ENCOUNTER (OUTPATIENT)
Dept: INFUSION THERAPY | Age: 37
Discharge: HOME | End: 2022-12-14
Payer: MEDICARE

## 2022-12-14 VITALS
WEIGHT: 293 LBS | RESPIRATION RATE: 20 BRPM | DIASTOLIC BLOOD PRESSURE: 72 MMHG | HEART RATE: 68 BPM | BODY MASS INDEX: 51.91 KG/M2 | TEMPERATURE: 98.1 F | HEIGHT: 63 IN | SYSTOLIC BLOOD PRESSURE: 160 MMHG | OXYGEN SATURATION: 100 %

## 2022-12-14 DIAGNOSIS — D50.0 IRON DEFICIENCY ANEMIA DUE TO CHRONIC BLOOD LOSS: Primary | ICD-10-CM

## 2022-12-14 PROCEDURE — 6360000002 HC RX W HCPCS: Performed by: NURSE PRACTITIONER

## 2022-12-14 PROCEDURE — 96365 THER/PROPH/DIAG IV INF INIT: CPT

## 2022-12-14 PROCEDURE — 2580000003 HC RX 258: Performed by: NURSE PRACTITIONER

## 2022-12-14 RX ORDER — SODIUM CHLORIDE 9 MG/ML
5-250 INJECTION, SOLUTION INTRAVENOUS PRN
Status: DISCONTINUED | OUTPATIENT
Start: 2022-12-14 | End: 2022-12-15 | Stop reason: HOSPADM

## 2022-12-14 RX ORDER — HEPARIN SODIUM (PORCINE) LOCK FLUSH IV SOLN 100 UNIT/ML 100 UNIT/ML
500 SOLUTION INTRAVENOUS PRN
Status: CANCELLED | OUTPATIENT
Start: 2022-12-21

## 2022-12-14 RX ORDER — SODIUM CHLORIDE 9 MG/ML
5-250 INJECTION, SOLUTION INTRAVENOUS PRN
Status: CANCELLED | OUTPATIENT
Start: 2022-12-21

## 2022-12-14 RX ORDER — ACETAMINOPHEN 325 MG/1
650 TABLET ORAL
Status: CANCELLED | OUTPATIENT
Start: 2022-12-21

## 2022-12-14 RX ORDER — ONDANSETRON 2 MG/ML
8 INJECTION INTRAMUSCULAR; INTRAVENOUS
Status: CANCELLED | OUTPATIENT
Start: 2022-12-21

## 2022-12-14 RX ORDER — SODIUM CHLORIDE 9 MG/ML
INJECTION, SOLUTION INTRAVENOUS CONTINUOUS
Status: CANCELLED | OUTPATIENT
Start: 2022-12-21

## 2022-12-14 RX ORDER — SODIUM CHLORIDE 0.9 % (FLUSH) 0.9 %
5-40 SYRINGE (ML) INJECTION PRN
Status: CANCELLED | OUTPATIENT
Start: 2022-12-21

## 2022-12-14 RX ORDER — DIPHENHYDRAMINE HYDROCHLORIDE 50 MG/ML
50 INJECTION INTRAMUSCULAR; INTRAVENOUS
Status: CANCELLED | OUTPATIENT
Start: 2022-12-21

## 2022-12-14 RX ORDER — ALBUTEROL SULFATE 90 UG/1
4 AEROSOL, METERED RESPIRATORY (INHALATION) PRN
Status: CANCELLED | OUTPATIENT
Start: 2022-12-21

## 2022-12-14 RX ADMIN — SODIUM CHLORIDE 20 ML/HR: 9 INJECTION, SOLUTION INTRAVENOUS at 14:29

## 2022-12-14 RX ADMIN — FERRIC CARBOXYMALTOSE INJECTION 750 MG: 50 INJECTION, SOLUTION INTRAVENOUS at 14:31

## 2022-12-14 NOTE — PROGRESS NOTES
Patient assessed for the following post iron infusion:    Dizziness   No  Lightheadedness  No      Acute nausea/vomiting No  Headache   No  Chest pain/pressure  No  Rash/itching   No  Shortness of breath  No    Patient declined to stay for 20 minutes observation post infusion. Patient tolerated treatment Injectafer without any complications. Last vital signs:   BP (!) 160/72   Pulse 68   Temp 98.1 °F (36.7 °C) (Oral)   Resp 20   Ht 5' 3\" (1.6 m)   Wt (!) 472 lb 6.4 oz (214.3 kg)   SpO2 100%   BMI 83.68 kg/m²       Patient instructed if experience any of the above symptoms following today's infusion,he/she is to notify MD immediately or go to the emergency department. Oxygen transferred back to home supply at 3 LPM per NC. Discharge instructions given to patient. Verbalizes understanding. Ambulated off unit per self with belongings.

## 2022-12-14 NOTE — PLAN OF CARE
Problem: Chronic Conditions and Co-morbidities  Goal: Patient's chronic conditions and co-morbidity symptoms are monitored and maintained or improved  Outcome: Adequate for Discharge  Flowsheets (Taken 12/14/2022 1542)  Care Plan - Patient's Chronic Conditions and Co-Morbidity Symptoms are Monitored and Maintained or Improved:   Monitor and assess patient's chronic conditions and comorbid symptoms for stability, deterioration, or improvement   Collaborate with multidisciplinary team to address chronic and comorbid conditions and prevent exacerbation or deterioration  Note: Patient verbalizes understanding to verbal information given on Injectafer,action and possible side effects. Aware to call MD if develop complications. Problem: Safety - Adult  Goal: Free from fall injury  Outcome: Adequate for Discharge  Flowsheets (Taken 12/14/2022 1542)  Free From Fall Injury:   Instruct family/caregiver on patient safety   Based on caregiver fall risk screen, instruct family/caregiver to ask for assistance with transferring infant if caregiver noted to have fall risk factors  Note: Free from falls while in O.P. Oncology. Problem: Discharge Planning  Goal: Discharge to home or other facility with appropriate resources  Outcome: Adequate for Discharge  Flowsheets (Taken 12/14/2022 1542)  Discharge to home or other facility with appropriate resources:   Identify barriers to discharge with patient and caregiver   Arrange for needed discharge resources and transportation as appropriate   Identify discharge learning needs (meds, wound care, etc)  Note: Verbalize understanding of discharge instructions, follow up appointments, and when to call Physician. Care plan reviewed with patient. Patient  verbalize understanding of the plan of care and contribute to goal setting.

## 2022-12-14 NOTE — DISCHARGE INSTRUCTIONS
Please contact your Oncologist if you have any questions regarding the infusion of Injectafer that you received today. Patient instructed if experience any of the symptoms following today's infusion / to notify MD immediately or go to emergency department.     * dizziness/lightheadedness  *acute nausea/vomiting - not relieved with medication  *headache - not relieved from Tylenol/pain medication  *chest pain/pressure  *rash/itching  *shortness of breath        Drink fluids - 48oz fluids daily  Call if develop fever/ chills/ signs or symptoms of infection

## 2022-12-19 ENCOUNTER — CARE COORDINATION (OUTPATIENT)
Dept: CARE COORDINATION | Age: 37
End: 2022-12-19

## 2022-12-21 ENCOUNTER — HOSPITAL ENCOUNTER (OUTPATIENT)
Dept: INFUSION THERAPY | Age: 37
Discharge: HOME | End: 2022-12-21
Payer: MEDICARE

## 2022-12-21 VITALS
DIASTOLIC BLOOD PRESSURE: 66 MMHG | HEART RATE: 61 BPM | OXYGEN SATURATION: 100 % | HEIGHT: 63 IN | SYSTOLIC BLOOD PRESSURE: 137 MMHG | TEMPERATURE: 98.2 F | RESPIRATION RATE: 18 BRPM | WEIGHT: 293 LBS | BODY MASS INDEX: 51.91 KG/M2

## 2022-12-21 DIAGNOSIS — D50.0 IRON DEFICIENCY ANEMIA DUE TO CHRONIC BLOOD LOSS: Primary | ICD-10-CM

## 2022-12-21 PROCEDURE — 2580000003 HC RX 258: Performed by: NURSE PRACTITIONER

## 2022-12-21 PROCEDURE — 6360000002 HC RX W HCPCS: Performed by: NURSE PRACTITIONER

## 2022-12-21 PROCEDURE — 96365 THER/PROPH/DIAG IV INF INIT: CPT

## 2022-12-21 RX ORDER — SODIUM CHLORIDE 9 MG/ML
INJECTION, SOLUTION INTRAVENOUS CONTINUOUS
OUTPATIENT
Start: 2022-12-21

## 2022-12-21 RX ORDER — SODIUM CHLORIDE 9 MG/ML
5-250 INJECTION, SOLUTION INTRAVENOUS PRN
OUTPATIENT
Start: 2022-12-21

## 2022-12-21 RX ORDER — ONDANSETRON 2 MG/ML
8 INJECTION INTRAMUSCULAR; INTRAVENOUS
OUTPATIENT
Start: 2022-12-21

## 2022-12-21 RX ORDER — HEPARIN SODIUM (PORCINE) LOCK FLUSH IV SOLN 100 UNIT/ML 100 UNIT/ML
500 SOLUTION INTRAVENOUS PRN
OUTPATIENT
Start: 2022-12-21

## 2022-12-21 RX ORDER — SODIUM CHLORIDE 9 MG/ML
5-250 INJECTION, SOLUTION INTRAVENOUS PRN
Status: DISCONTINUED | OUTPATIENT
Start: 2022-12-21 | End: 2022-12-22 | Stop reason: HOSPADM

## 2022-12-21 RX ORDER — ACETAMINOPHEN 325 MG/1
650 TABLET ORAL
OUTPATIENT
Start: 2022-12-21

## 2022-12-21 RX ORDER — SODIUM CHLORIDE 0.9 % (FLUSH) 0.9 %
5-40 SYRINGE (ML) INJECTION PRN
OUTPATIENT
Start: 2022-12-21

## 2022-12-21 RX ORDER — DIPHENHYDRAMINE HYDROCHLORIDE 50 MG/ML
50 INJECTION INTRAMUSCULAR; INTRAVENOUS
OUTPATIENT
Start: 2022-12-21

## 2022-12-21 RX ORDER — SODIUM CHLORIDE 9 MG/ML
5-250 INJECTION, SOLUTION INTRAVENOUS PRN
Status: CANCELLED | OUTPATIENT
Start: 2022-12-21

## 2022-12-21 RX ORDER — ALBUTEROL SULFATE 90 UG/1
4 AEROSOL, METERED RESPIRATORY (INHALATION) PRN
OUTPATIENT
Start: 2022-12-21

## 2022-12-21 RX ADMIN — FERRIC CARBOXYMALTOSE INJECTION 750 MG: 50 INJECTION, SOLUTION INTRAVENOUS at 14:06

## 2022-12-21 RX ADMIN — SODIUM CHLORIDE 20 ML/HR: 9 INJECTION, SOLUTION INTRAVENOUS at 14:03

## 2022-12-21 NOTE — PROGRESS NOTES
Patient assessed for the following post iron infusion:    Dizziness   No  Lightheadedness  No      Acute nausea/vomiting No  Headache   No  Chest pain/pressure  No  Rash/itching   No  Shortness of breath  No    Patient declined to stay for 20 minutes observation post infusion. Patient tolerated treatment Injectafer without any complications. Last vital signs:   /66   Pulse 61   Temp 98.2 °F (36.8 °C) (Oral)   Resp 18   Ht 5' 3\" (1.6 m)   Wt (!) 459 lb 12.8 oz (208.6 kg)   SpO2 100%   BMI 81.45 kg/m²       Patient instructed if experience any of the above symptoms following today's infusion,he/she is to notify MD immediately or go to the emergency department. Discharge instructions given to patient. Verbalizes understanding. Ambulated off unit per self with belongings.

## 2022-12-21 NOTE — PLAN OF CARE
Problem: Safety - Adult  Goal: Free from fall injury  12/21/2022 1650 by Jeanine Parr RN  Outcome: Adequate for Discharge  Flowsheets (Taken 12/21/2022 1650)  Free From Fall Injury: Instruct family/caregiver on patient safety  Note: Patient verbalizes understanding of fall precautions. Patient free from falls this visit. 12/21/2022 1649 by Jeanine Parr RN  Outcome: Adequate for Discharge     Problem: Discharge Planning  Goal: Discharge to home or other facility with appropriate resources  12/21/2022 1650 by Jeanine Parr RN  Outcome: Adequate for Discharge  Flowsheets (Taken 12/21/2022 1650)  Discharge to home or other facility with appropriate resources: Identify barriers to discharge with patient and caregiver  Note: Verbalized understanding of discharge instructions, follow-up appointments, and when to call the physician. 12/21/2022 1649 by Jeanine Parr RN  Outcome: Adequate for Discharge     Care plan reviewed with patient. Patient verbalizes understanding of the plan of care and contribute to goal setting.

## 2022-12-27 ENCOUNTER — TELEPHONE (OUTPATIENT)
Dept: FAMILY MEDICINE CLINIC | Age: 37
End: 2022-12-27

## 2022-12-27 DIAGNOSIS — G89.4 CHRONIC PAIN SYNDROME: ICD-10-CM

## 2022-12-27 DIAGNOSIS — M25.572 CHRONIC PAIN OF LEFT ANKLE: ICD-10-CM

## 2022-12-27 DIAGNOSIS — G89.29 CHRONIC PAIN OF LEFT ANKLE: ICD-10-CM

## 2022-12-27 DIAGNOSIS — M79.2 NEUROPATHIC PAIN: ICD-10-CM

## 2022-12-27 RX ORDER — PREGABALIN 150 MG/1
150 CAPSULE ORAL NIGHTLY
Qty: 7 CAPSULE | Refills: 0 | Status: SHIPPED | OUTPATIENT
Start: 2022-12-27 | End: 2023-01-03

## 2022-12-27 NOTE — TELEPHONE ENCOUNTER
----- Message from Suzanne Bowen sent at 12/27/2022 10:13 AM EST -----  Subject: Message to Provider    QUESTIONS  Information for Provider? Patient would like to switch appt. to Virtual if   possible. please call back. ---------------------------------------------------------------------------  --------------  Emilee Craven [facility]  [phone]; OK to leave message on voicemail  ---------------------------------------------------------------------------  --------------  SCRIPT ANSWERS  Relationship to Patient?  Self

## 2022-12-27 NOTE — TELEPHONE ENCOUNTER
Called pt.and has been taking   2- 75mg at hs or 150mg. Does want to try something else but only has 1 pill left.

## 2022-12-27 NOTE — TELEPHONE ENCOUNTER
Can you check if she is taking 150 mg at bedtime or BID. In my message to her I accidentally stated to take 150 mg BID and meant to say nightly. She said the medication is expensive for her so I can change the medication at her visit Thursday if she would like. How much does she have left and how is she taking it? She will need to wean off if we do change to a different medication.      Med Sharp, APRN - CNP

## 2022-12-27 NOTE — TELEPHONE ENCOUNTER
OARRS reviewed. UDS: no data  . Last seen: 11/30/2022.  Follow-up:   Future Appointments   Date Time Provider [place]   12/28/2022  1:00 PM MALCOM Pretty   12/29/2022  2:00 PM MALCOM Almendarez - CNP N [facility] Pain [facility] - [place]   1/5/2023 11:00 AM Damion Logan, [address] [place] [address]   1/24/2023  3:00 PM MALCOM Pardo - CNP N Pulm Med P [place]   2/9/2023  2:15 PM Michelle Romo MD N [facility] Heart P [place]

## 2022-12-27 NOTE — TELEPHONE ENCOUNTER
Takes 75mg nightly until gone then she ca stop. I sent in for an additional 7 days. We can discuss new medication at her visit.

## 2022-12-28 ENCOUNTER — TELEMEDICINE (OUTPATIENT)
Dept: FAMILY MEDICINE CLINIC | Age: 37
End: 2022-12-28
Payer: MEDICARE

## 2022-12-28 DIAGNOSIS — E66.01 MORBID OBESITY WITH BMI OF 70 AND OVER, ADULT (HCC): Primary | ICD-10-CM

## 2022-12-28 DIAGNOSIS — F32.2 SEVERE DEPRESSION (HCC): ICD-10-CM

## 2022-12-28 DIAGNOSIS — E66.2 OBESITY HYPOVENTILATION SYNDROME (HCC): ICD-10-CM

## 2022-12-28 DIAGNOSIS — D50.9 IRON DEFICIENCY ANEMIA, UNSPECIFIED IRON DEFICIENCY ANEMIA TYPE: ICD-10-CM

## 2022-12-28 DIAGNOSIS — R73.01 IFG (IMPAIRED FASTING GLUCOSE): ICD-10-CM

## 2022-12-28 DIAGNOSIS — G89.29 CHRONIC PAIN OF BOTH ANKLES: ICD-10-CM

## 2022-12-28 DIAGNOSIS — I50.32 CHRONIC DIASTOLIC CONGESTIVE HEART FAILURE (HCC): ICD-10-CM

## 2022-12-28 DIAGNOSIS — E55.9 VITAMIN D DEFICIENCY: ICD-10-CM

## 2022-12-28 DIAGNOSIS — G47.33 OSA (OBSTRUCTIVE SLEEP APNEA): ICD-10-CM

## 2022-12-28 DIAGNOSIS — M25.572 CHRONIC PAIN OF BOTH ANKLES: ICD-10-CM

## 2022-12-28 DIAGNOSIS — J96.11 CHRONIC RESPIRATORY FAILURE WITH HYPOXIA (HCC): ICD-10-CM

## 2022-12-28 DIAGNOSIS — M25.571 CHRONIC PAIN OF BOTH ANKLES: ICD-10-CM

## 2022-12-28 PROCEDURE — 99214 OFFICE O/P EST MOD 30 MIN: CPT | Performed by: NURSE PRACTITIONER

## 2022-12-28 PROCEDURE — G8427 DOCREV CUR MEDS BY ELIG CLIN: HCPCS | Performed by: NURSE PRACTITIONER

## 2022-12-28 ASSESSMENT — ENCOUNTER SYMPTOMS
NAUSEA: 0
COUGH: 0
BACK PAIN: 0
CHEST TIGHTNESS: 0
SHORTNESS OF BREATH: 0
ABDOMINAL PAIN: 0
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
## 2022-12-29 ENCOUNTER — TELEPHONE (OUTPATIENT)
Dept: PHYSICAL MEDICINE AND REHAB | Age: 37
End: 2022-12-29

## 2022-12-29 ENCOUNTER — OFFICE VISIT (OUTPATIENT)
Dept: PHYSICAL MEDICINE AND REHAB | Age: 37
End: 2022-12-29
Payer: MEDICARE

## 2022-12-29 VITALS
WEIGHT: 293 LBS | SYSTOLIC BLOOD PRESSURE: 128 MMHG | DIASTOLIC BLOOD PRESSURE: 74 MMHG | HEIGHT: 63 IN | BODY MASS INDEX: 51.91 KG/M2

## 2022-12-29 DIAGNOSIS — M79.2 NEUROPATHIC PAIN: ICD-10-CM

## 2022-12-29 DIAGNOSIS — G89.4 CHRONIC PAIN SYNDROME: ICD-10-CM

## 2022-12-29 DIAGNOSIS — G89.29 CHRONIC PAIN OF LEFT ANKLE: Primary | ICD-10-CM

## 2022-12-29 DIAGNOSIS — M25.572 CHRONIC PAIN OF LEFT ANKLE: Primary | ICD-10-CM

## 2022-12-29 PROCEDURE — 1036F TOBACCO NON-USER: CPT | Performed by: NURSE PRACTITIONER

## 2022-12-29 PROCEDURE — G8427 DOCREV CUR MEDS BY ELIG CLIN: HCPCS | Performed by: NURSE PRACTITIONER

## 2022-12-29 PROCEDURE — G8417 CALC BMI ABV UP PARAM F/U: HCPCS | Performed by: NURSE PRACTITIONER

## 2022-12-29 PROCEDURE — 99213 OFFICE O/P EST LOW 20 MIN: CPT | Performed by: NURSE PRACTITIONER

## 2022-12-29 PROCEDURE — G8484 FLU IMMUNIZE NO ADMIN: HCPCS | Performed by: NURSE PRACTITIONER

## 2022-12-29 RX ORDER — TOPIRAMATE 50 MG/1
50 TABLET, FILM COATED ORAL 2 TIMES DAILY
Qty: 60 TABLET | Refills: 0 | Status: SHIPPED | OUTPATIENT
Start: 2022-12-29 | End: 2023-01-28
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
## 2023-01-03 ENCOUNTER — CARE COORDINATION (OUTPATIENT)
Dept: CARE COORDINATION | Age: 38
End: 2023-01-03

## 2023-01-03 NOTE — CARE COORDINATION
Ambulatory Care Coordination Note  1/3/2023    ACC: Mackenzie Guillermo, RN    Stephanie Rodriguez is being followed by care coordination for education and assistance in managing her chronic conditions and healthcare needs. Pt has h/o: obesity, COVID, CHF, SANTIAGO. Spoke with Stephanie Rodriguez today. Pt reports that she is doing well. Continues to work on wt loss. Following with [facility] wt mgmt center. Pt has lost appx 40 pounds over last 3 mths. Congratulated pt on her efforts. CHF has scales but admits that she has not been monitoring daily. Encouraged to do so. Limiting sodium. Denies edema to ext's. On home oxygen 3 liters at rest and 6 liters with activity. Pulse oximeter not working. Pt plans on replacing. Has appt with PULM at end of mth. Following with pain mgmt. Just switched from Lyrica to Topamax. Pt reports that she feels it's managing her pain much better. Plan of care:  Start monitoring wts at minimum few times per wk. Continue wearing oxygen as prescribed  F/u with PULM as scheduled later this mth  Continue f/u with [facility]. Next appt is 1/10/23  Reinforce CHF zones. Call with new concerns or questions. Congestive Heart Failure Assessment    Are you currently restricting fluids?: 2000cc  Do you understand a low sodium diet?: Yes  Do you understand how to read food labels?: Yes  How many restaurant meals do you eat per week?: 0  Do you salt your food before tasting it?: No         Symptoms:  CHF associated angina: Neg, CHF associated dyspnea on exertion: Pos, CHF associated fatigue: Neg, CHF associated leg swelling: Neg, CHF associated orthostatic hypotension: Neg, CHF associated PND: Neg, CHF associated shortness of breath: Neg, CHF associated weakness: Neg      Symptom course: stable  Patient-reported weight (lb):  (Comment: has not been weighing consistently at home even though she has scales.   encouraged to do so.)  Weight trend: decreasing steadily  Salt intake watch compared to last visit: stable           Offered patient enrollment in the Remote Patient Monitoring (RPM) program for in-home monitoring: Patient declined. Lab Results       None            Care Coordination Interventions    Referral from Primary Care Provider: No  Suggested Interventions and Community Resources  Medi Set or Pill Pack: Declined  Zone Management Tools: Completed (Comment: CHF)          Goals Addressed                   This Visit's Progress     Behavioral Health   On track     I will work towards the following Behavioral Health goals: I will take my medications daily as prescribed. and will consider getting established with mental health provider. Barriers: lack of support, overwhelmed by complexity of regimen, and stress  Plan for overcoming my barriers: support and education from PCP, ACM, SW. Resource info provided on different providers and services in the area. Confidence: 8/10  Anticipated Goal Completion Date: 11/12/22       Self Monitoring   Worsening     Daily Weights - I will weight myself as directed - Daily and write down weights  I will notify my provider of any increase in weight by 3 or more pounds in 2 days OR 5 or more pounds in a week. None Recently Recorded    Barriers: lack of support, overwhelmed by complexity of regimen, and stress  Plan for overcoming my barriers: education on HF zones, get established with cardiology. Support from [employer] [facility], PCP, specialists  Confidence: 9/10  Anticipated Goal Completion Date: 2/8/23                Prior to Admission medications    Medication Sig Start Date End Date Taking? Authorizing Provider   topiramate (TOPAMAX) 50 MG tablet Take 1 tablet by mouth 2 times daily 12/29/22 1/28/23  MALCOM Amador CNP   pregabalin (LYRICA) 150 MG capsule Take 1 capsule by mouth at bedtime for 7 days.  Max Daily Amount: 150 mg 12/27/22 1/3/23  MALCOM Amador CNP   triamterene-hydroCHLOROthiazide (MAXZIDE-25) 37.5-25 MG per tablet Take 0.5 tablets by mouth daily 11/10/22   Brittnee De Dios MD   fluticasone-salmeterol (ADVAIR HFA) 066-38 MCG/ACT inhaler Inhale 2 puffs into the lungs 2 times daily Rinse and spit after doses 11/4/22   AMLCOM Adams CNP   metoprolol succinate (TOPROL XL) 25 MG extended release tablet Take 1 tablet by mouth daily 11/4/22   Darion RosalesMALCOM CNP   ferrous sulfate (IRON 325) 325 (65 Fe) MG tablet Take 1 tablet by mouth 2 times daily 10/17/22   MALCOM Meek CNP   vitamin D (ERGOCALCIFEROL) 1.25 MG (20294 UT) CAPS capsule Take 1 capsule by mouth once a week 10/17/22   MALCOM Meek CNP   Multiple Vitamin (MULTIVITAMIN ADULT PO) Take by mouth    Historical Provider, MD   butalbital-acetaminophen-caffeine (FIORICET, ESGIC) -68 MG per tablet Take 1 tablet by mouth every 6 hours as needed for Migraine 10/6/22   MALCOM Meek CNP   ARIPiprazole (ABILIFY) 5 MG tablet Take 1 tablet by mouth daily 10/3/22   MALCOM Meek CNP   DULoxetine (CYMBALTA) 30 MG extended release capsule Take 1 capsule by mouth daily 10/3/22   MALCOM Meek CNP   albuterol sulfate HFA (PROVENTIL;VENTOLIN;PROAIR) 108 (90 Base) MCG/ACT inhaler Inhale 2 puffs into the lungs every 4 hours as needed for Wheezing or Shortness of Breath (cough) 8/11/22   MALCOM Meek CNP   hydrOXYzine HCl (ATARAX) 25 MG tablet Take 1 tablet by mouth every 8 hours as needed for Anxiety 8/11/22   MALCOM Meek CNP   aspirin 81 MG chewable tablet Take 1 tablet by mouth daily 7/14/21   KODAK Hernandez   famotidine (PEPCID) 20 MG tablet Take 1 tablet by mouth 2 times daily  Patient taking differently: Take 20 mg by mouth as needed 4/1/21   MALCOM Meek CNP   Naproxen Sodium (ALEVE PO) Take by mouth    Historical Provider, MD       Future Appointments   Date Time Provider Jo Milton   1/19/2023  1:20 PM MALCOM Timmons - CNP [facility]   1/24/2023  3:00 PM Essence Phoenix [place], APRN - CNP [facility] [place]   2/9/2023  2:15 PM Dustin Holman MD N [facility] [address]   2/14/2023  1:00 PM MALCOM [address]   2/23/2023  2:00 PM MALCOM Jefferson CNP N [facility] Pain [address]

## 2023-01-06 ENCOUNTER — TELEPHONE (OUTPATIENT)
Dept: PHYSICAL MEDICINE AND REHAB | Age: 38
End: 2023-01-06

## 2023-01-16 ENCOUNTER — CARE COORDINATION (OUTPATIENT)
Dept: CARE COORDINATION | Age: 38
End: 2023-01-16

## 2023-01-16 NOTE — CARE COORDINATION
Ambulatory Care Coordination Note  1/16/2023    ACC: Oli Roche RN    [facility] is being followed by care coordination for education and assistance in managing her chronic conditions and healthcare needs. Pt has h/o: obesity, COVID, CHF, SANTIAGO. Spoke with [facility] today. Pt reports that she is doing well overall. Continues to f/u with [facility]. Pt has lost appx 56 pounds over the last 5 mths. Congratulated pt on this. Ongoing support and encouragement given. Pt is on home oxygen as well as Bipap. Has upcoming sleep apnea appt next wk. Pt reports that she consistently wears her Bipap nightly. Has been able to go periods of time without her oxygen. Continues to monitor SPO2 readings. Reports that she has checked herself on RA and sats have been 95-98% RA at times. Still wearing up to 6 liters with activity. Advised to discuss with PULM at upcoming appt. Notes placed in appt desk. CHF: monitoring wts daily. Following low sodium diet. No edema in ble. Denies worsening SOB. Upcoming appt 1/19 with hematology. Plan of care:  Reinforce CHF zones  F/u with hematology on 1/19  F/u with PULM on 1/24  Continue monitoring SPO2 readings. Wear oxygen as prescribed and discuss with Pulm at f/u appt. Continue bipap at night. F/u with  HME re: download prior to appt. Continue working with wt mgm at [place] [address] monitoring daily wts. Will f/u with pt in 2 wks after upcoming appts. Will plan on graduating if remains stable at that time.    Congestive Heart Failure Assessment    Are you currently restricting fluids?: 2000cc  Do you understand a low sodium diet?: Yes  Do you understand how to read food labels?: Yes  How many restaurant meals do you eat per week?: 0  Do you salt your food before tasting it?: No         Symptoms:  CHF associated angina: Neg, CHF associated dyspnea on exertion: Pos, CHF associated fatigue: Neg, CHF associated leg swelling: Neg, CHF associated orthostatic hypotension: Neg, CHF associated PND: Neg, CHF associated shortness of breath: Neg, CHF associated weakness: Neg      Symptom course: stable  Patient-reported weight (lb): 446. 4  Weight trend: decreasing steadily  Salt intake watch compared to last visit: improved           Offered patient enrollment in the Remote Patient Monitoring (RPM) program for in-home monitoring: Patient declined. Lab Results       None            Care Coordination Interventions    Referral from Primary Care Provider: No  Suggested Interventions and Community Resources  Medi Set or Pill Pack: Declined  Zone Management Tools: Completed (Comment: CHF)          Goals Addressed                   This Visit's Progress     Self Monitoring   On track     Daily Weights - I will weight myself as directed - Daily and write down weights  I will notify my provider of any increase in weight by 3 or more pounds in 2 days OR 5 or more pounds in a week. None Recently Recorded    Barriers: lack of support, overwhelmed by complexity of regimen, and stress  Plan for overcoming my barriers: education on HF zones, get established with cardiology. Support from [facility], PCP, specialists  Confidence: 9/10  Anticipated Goal Completion Date: 2/8/23                Prior to Admission medications    Medication Sig Start Date End Date Taking? Authorizing Provider   topiramate (TOPAMAX) 50 MG tablet Take 1 tablet by mouth 2 times daily 12/29/22 1/28/23  MALCOM Guerin CNP   pregabalin (LYRICA) 150 MG capsule Take 1 capsule by mouth at bedtime for 7 days.  Max Daily Amount: 150 mg 12/27/22 1/3/23  MALCOM Guerin CNP   triamterene-hydroCHLOROthiazide [facility]) 37.5-25 MG per tablet Take 0.5 tablets by mouth daily 11/10/22   Amber Cameron MD   fluticasone-salmeterol (ADVAIR HFA) 382-15 MCG/ACT inhaler Inhale 2 puffs into the lungs 2 times daily Rinse and spit after doses 11/4/22   MALCOM Lowry CNP   metoprolol succinate (TOPROL XL) 25 MG extended release tablet Take 1 tablet by mouth daily 11/4/22   [place], MALCOM Newsome CNP   ferrous sulfate (IRON 325) 325 (65 Fe) MG tablet Take 1 tablet by mouth 2 times daily 10/17/22   Milton Norman, MALCOM - CNP   vitamin D (ERGOCALCIFEROL) 1.25 MG (55507 UT) CAPS capsule Take 1 capsule by mouth once a week 10/17/22   Faribafawn Norman, MALCOM - JOHN   Multiple Vitamin (MULTIVITAMIN ADULT PO) Take by mouth    Historical Provider, MD   butalbital-acetaminophen-caffeine (FIORICET, ESGIC) 31623-71 MG per tablet Take 1 tablet by mouth every 6 hours as needed for Migraine 10/6/22   Faribajean Comfort, MALCOM Newsome CNP   ARIPiprazole (ABILIFY) 5 MG tablet Take 1 tablet by mouth daily 10/3/22   Rojean Comfort, MALCOM - CNP   DULoxetine (CYMBALTA) 30 MG extended release capsule Take 1 capsule by mouth daily 10/3/22   Rojean Comfort, MALCOM - CNP   albuterol sulfate HFA (PROVENTIL;VENTOLIN;PROAIR) 108 (90 Base) MCG/ACT inhaler Inhale 2 puffs into the lungs every 4 hours as needed for Wheezing or Shortness of Breath (cough) 8/11/22   Faribafawn Norman, APRN - CNP   hydrOXYzine HCl (ATARAX) 25 MG tablet Take 1 tablet by mouth every 8 hours as needed for Anxiety 8/11/22   Rojean Comfort, MALCOM - CNP   aspirin 81 MG chewable tablet Take 1 tablet by mouth daily 7/14/21   KODAK Hernandez   famotidine (PEPCID) 20 MG tablet Take 1 tablet by mouth 2 times daily  Patient taking differently: Take 20 mg by mouth as needed 4/1/21   Faribaafwn Norman, APRN - CNP   Naproxen Sodium (ALEVE PO) Take by mouth    Historical Provider, MD       Future Appointments   Date Time Provider Jo Milton   1/19/2023  1:20 PM MALCOM Miranda CNP On[facility]   1/24/2023  3:00 PM Nathan Hopson   2/9/2023  2:15 PM Quin Concepcion MD N [facility]   2/14/2023  1:00 PM Lang Crigler, APRN - CNP N Pulm Med [address]   2/23/2023  2:00 PM MALCOM Corona CNP [facility] [place]

## 2023-01-16 NOTE — CARE COORDINATION
Attempted to reach [facility] for f/u. No answer. Message left to return call.

## 2023-01-18 DIAGNOSIS — D50.0 IRON DEFICIENCY ANEMIA DUE TO CHRONIC BLOOD LOSS: Primary | ICD-10-CM

## 2023-01-19 ENCOUNTER — HOSPITAL ENCOUNTER (OUTPATIENT)
Dept: INFUSION THERAPY | Age: 38
Discharge: HOME | End: 2023-01-19
Payer: MEDICARE

## 2023-01-19 ENCOUNTER — OFFICE VISIT (OUTPATIENT)
Dept: ONCOLOGY | Age: 38
End: 2023-01-19
Payer: MEDICARE

## 2023-01-19 VITALS
WEIGHT: 293 LBS | OXYGEN SATURATION: 96 % | SYSTOLIC BLOOD PRESSURE: 175 MMHG | HEART RATE: 79 BPM | HEIGHT: 63 IN | BODY MASS INDEX: 51.91 KG/M2 | TEMPERATURE: 98.2 F | RESPIRATION RATE: 20 BRPM | DIASTOLIC BLOOD PRESSURE: 67 MMHG

## 2023-01-19 VITALS
OXYGEN SATURATION: 96 % | HEIGHT: 63 IN | RESPIRATION RATE: 20 BRPM | HEART RATE: 79 BPM | SYSTOLIC BLOOD PRESSURE: 175 MMHG | DIASTOLIC BLOOD PRESSURE: 67 MMHG | WEIGHT: 293 LBS | BODY MASS INDEX: 51.91 KG/M2

## 2023-01-19 DIAGNOSIS — D50.0 IRON DEFICIENCY ANEMIA DUE TO CHRONIC BLOOD LOSS: Primary | ICD-10-CM

## 2023-01-19 DIAGNOSIS — D50.0 IRON DEFICIENCY ANEMIA DUE TO CHRONIC BLOOD LOSS: ICD-10-CM

## 2023-01-19 LAB
ALBUMIN SERPL BCG-MCNC: 3.8 G/DL (ref 3.5–5.1)
ALP SERPL-CCNC: 72 U/L (ref 38–126)
ALT SERPL W/O P-5'-P-CCNC: 19 U/L (ref 11–66)
ANISOCYTOSIS BLD QL SMEAR: PRESENT
AST SERPL-CCNC: 21 U/L (ref 5–40)
BASOPHILS ABSOLUTE: 0 THOU/MM3 (ref 0–0.1)
BASOPHILS NFR BLD AUTO: 0.5 %
BILIRUB CONJ SERPL-MCNC: < 0.2 MG/DL (ref 0–0.3)
BILIRUB SERPL-MCNC: 0.6 MG/DL (ref 0.3–1.2)
BUN, WHOLE BLOOD: 12 MG/DL (ref 8–26)
BURR CELLS BLD QL SMEAR: ABNORMAL
CHLORIDE, WHOLE BLOOD: 110 MEQ/L (ref 98–109)
CREATININE, WHOLE BLOOD: 0.7 MG/DL (ref 0.5–1.2)
DEPRECATED RDW RBC AUTO: 72.2 FL (ref 35–45)
ELLIPTOCYTES: ABNORMAL
EOSINOPHIL NFR BLD AUTO: 3.1 %
EOSINOPHILS ABSOLUTE: 0.2 THOU/MM3 (ref 0–0.4)
ERYTHROCYTE [DISTWIDTH] IN BLOOD BY AUTOMATED COUNT: 25.1 % (ref 11.5–14.5)
FERRITIN SERPL IA-MCNC: 282 NG/ML (ref 10–291)
GFR SERPL CREATININE-BSD FRML MDRD: > 60 ML/MIN/1.73M2
GLUCOSE, WHOLE BLOOD: 88 MG/DL (ref 70–108)
HCT VFR BLD AUTO: 49.2 % (ref 37–47)
HGB BLD-MCNC: 14.4 GM/DL (ref 12–16)
IMM GRANULOCYTES # BLD AUTO: 0.02 THOU/MM3 (ref 0–0.07)
IMM GRANULOCYTES NFR BLD AUTO: 0.3 %
IONIZED CALCIUM, WHOLE BLOOD: 1.12 MMOL/L (ref 1.12–1.32)
IRON SATN MFR SERPL: 17 % (ref 20–50)
IRON SERPL-MCNC: 51 UG/DL (ref 50–170)
LYMPHOCYTES ABSOLUTE: 1.9 THOU/MM3 (ref 1–4.8)
LYMPHOCYTES NFR BLD AUTO: 26.3 %
MCH RBC QN AUTO: 23.9 PG (ref 26–33)
MCHC RBC AUTO-ENTMCNC: 29.3 GM/DL (ref 32.2–35.5)
MCV RBC AUTO: 81.6 FL (ref 81–99)
MONOCYTES ABSOLUTE: 0.5 THOU/MM3 (ref 0.4–1.3)
MONOCYTES NFR BLD AUTO: 6.8 %
NEUTROPHILS NFR BLD AUTO: 63 %
NRBC BLD AUTO-RTO: 0 /100 WBC
PLATELET # BLD AUTO: 213 THOU/MM3 (ref 130–400)
PLATELET BLD QL SMEAR: ADEQUATE
POTASSIUM, WHOLE BLOOD: 3.9 MEQ/L (ref 3.5–4.9)
PROT SERPL-MCNC: 6.9 G/DL (ref 6.1–8)
RBC # BLD AUTO: 6.03 MILL/MM3 (ref 4.2–5.4)
SCAN OF BLOOD SMEAR: NORMAL
SEGMENTED NEUTROPHILS ABSOLUTE COUNT: 4.7 THOU/MM3 (ref 1.8–7.7)
SODIUM, WHOLE BLOOD: 141 MEQ/L (ref 138–146)
TIBC SERPL-MCNC: 302 UG/DL (ref 171–450)
TOTAL CO2, WHOLE BLOOD: 22 MEQ/L (ref 23–33)
WBC # BLD AUTO: 7.4 THOU/MM3 (ref 4.8–10.8)

## 2023-01-19 PROCEDURE — G8427 DOCREV CUR MEDS BY ELIG CLIN: HCPCS | Performed by: NURSE PRACTITIONER

## 2023-01-19 PROCEDURE — 82728 ASSAY OF FERRITIN: CPT

## 2023-01-19 PROCEDURE — G8484 FLU IMMUNIZE NO ADMIN: HCPCS | Performed by: NURSE PRACTITIONER

## 2023-01-19 PROCEDURE — G8417 CALC BMI ABV UP PARAM F/U: HCPCS | Performed by: NURSE PRACTITIONER

## 2023-01-19 PROCEDURE — 80047 BASIC METABLC PNL IONIZED CA: CPT

## 2023-01-19 PROCEDURE — 99214 OFFICE O/P EST MOD 30 MIN: CPT | Performed by: NURSE PRACTITIONER

## 2023-01-19 PROCEDURE — 36415 COLL VENOUS BLD VENIPUNCTURE: CPT

## 2023-01-19 PROCEDURE — 1036F TOBACCO NON-USER: CPT | Performed by: NURSE PRACTITIONER

## 2023-01-19 PROCEDURE — 85025 COMPLETE CBC W/AUTO DIFF WBC: CPT

## 2023-01-19 PROCEDURE — 83550 IRON BINDING TEST: CPT

## 2023-01-19 PROCEDURE — 99211 OFF/OP EST MAY X REQ PHY/QHP: CPT

## 2023-01-19 PROCEDURE — 80076 HEPATIC FUNCTION PANEL: CPT

## 2023-01-19 PROCEDURE — 83540 ASSAY OF IRON: CPT
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
## 2023-01-19 NOTE — PATIENT INSTRUCTIONS
Return to clinic in 3 months for follow up and labs  Notify the office of any new or worsening symptoms

## 2023-01-20 NOTE — TELEPHONE ENCOUNTER
OARRS reviewed. UDS: Negative. Last seen: 12/29/2022.  Follow-up:   Future Appointments   Date Time Provider Jo Milton   1/24/2023  3:00 PM MALCOM Sanchez CNP Med P - [place]   2/14/2023  1:00 PM MALCOM [address]   2/23/2023  2:00 PM Bolivar Buerger, APRN - CNP N SRPX Pain P - [place]   3/23/2023  3:00 PM MD ROZ Hernandez SRPX Heart P - [place]   4/20/2023  1:20 PM Robert Friday, APRN - CNP N Oncology P - [place]

## 2023-01-23 RX ORDER — TOPIRAMATE 50 MG/1
TABLET, FILM COATED ORAL
Qty: 60 TABLET | Refills: 0 | Status: SHIPPED | OUTPATIENT
Start: 2023-01-28 | End: 2023-02-21

## 2023-01-24 ENCOUNTER — OFFICE VISIT (OUTPATIENT)
Dept: PULMONOLOGY | Age: 38
End: 2023-01-24
Payer: MEDICARE

## 2023-01-24 VITALS
OXYGEN SATURATION: 100 % | WEIGHT: 293 LBS | DIASTOLIC BLOOD PRESSURE: 80 MMHG | BODY MASS INDEX: 51.91 KG/M2 | TEMPERATURE: 98.1 F | HEIGHT: 63 IN | HEART RATE: 57 BPM | SYSTOLIC BLOOD PRESSURE: 138 MMHG

## 2023-01-24 DIAGNOSIS — E66.01 MORBID OBESITY WITH BMI OF 70 AND OVER, ADULT (HCC): ICD-10-CM

## 2023-01-24 DIAGNOSIS — J43.9 MIXED RESTRICTIVE AND OBSTRUCTIVE LUNG DISEASE (HCC): ICD-10-CM

## 2023-01-24 DIAGNOSIS — I50.32 CHRONIC DIASTOLIC CONGESTIVE HEART FAILURE (HCC): ICD-10-CM

## 2023-01-24 DIAGNOSIS — J98.4 MIXED RESTRICTIVE AND OBSTRUCTIVE LUNG DISEASE (HCC): ICD-10-CM

## 2023-01-24 DIAGNOSIS — G47.33 OSA TREATED WITH BIPAP: Primary | ICD-10-CM

## 2023-01-24 PROBLEM — J44.9 MIXED RESTRICTIVE AND OBSTRUCTIVE LUNG DISEASE (HCC): Status: ACTIVE | Noted: 2023-01-24

## 2023-01-24 PROCEDURE — G8427 DOCREV CUR MEDS BY ELIG CLIN: HCPCS | Performed by: NURSE PRACTITIONER

## 2023-01-24 PROCEDURE — G8484 FLU IMMUNIZE NO ADMIN: HCPCS | Performed by: NURSE PRACTITIONER

## 2023-01-24 PROCEDURE — 1036F TOBACCO NON-USER: CPT | Performed by: NURSE PRACTITIONER

## 2023-01-24 PROCEDURE — 3023F SPIROM DOC REV: CPT | Performed by: NURSE PRACTITIONER

## 2023-01-24 PROCEDURE — G8417 CALC BMI ABV UP PARAM F/U: HCPCS | Performed by: NURSE PRACTITIONER

## 2023-01-24 PROCEDURE — 99213 OFFICE O/P EST LOW 20 MIN: CPT | Performed by: NURSE PRACTITIONER

## 2023-01-24 ASSESSMENT — ENCOUNTER SYMPTOMS
DIARRHEA: 0
COUGH: 0
ABDOMINAL PAIN: 0
VOMITING: 0
NAUSEA: 0
WHEEZING: 0
SHORTNESS OF BREATH: 0
EYES NEGATIVE: 1

## 2023-01-24 NOTE — PROGRESS NOTES
[facility]      Alesia Davila         [id number]  1/24/2023   Chief Complaint   Patient presents with    Follow-up     SANTIAOG follow up after Bipap set up         Pt of Dr. Adelaide LY Download:   Original or initial AHI: 59.6     Date of initial study: 10/17/2022     Compliant  100%     Noncompliant 0 %     PAP Type Spont   Level  25/21 cmH2O    Avg Hrs/Day 11 hours 28 minutes   AHI: 0.4   Recorded compliance dates , 12/25/2022  to 01/23/2023   Machine/Mfg:   [x] ResMed    [] Respironics/Dreamstation   Interface:   [] Nasal    [] Nasal pillows   [x] FFM      Provider:      [x] SR-HMELICEO     []Ramsey     [] Fam    [] [facility]    [] [place]               [] [facility]      [] Adaptive    [] [address]      [] Other    Neck Size: 14  Mallampati Mallampati 4  ESS:  1  SAQLI: 93    Here is a scan of the most recent download:                  Presentation:   Marija Quintero presents for sleep medicine follow up for obstructive sleep apnea  Since the last visit, Anay newly set up on BiPAP after titration. Using 3LPM O2 bleed in . Feels she is getting better quality sleep. ESS 1 ! Equipment issues: The pressure is  acceptable, the mask is acceptable     Progress History:   Since last visit any new medical issues? No  Sleep Related Issues? No  New ER or hospital visits? No  Any new or changes in medicines? No  Any new sleep medicines? No    Review of Systems -   Review of Systems   Constitutional:  Negative for activity change, appetite change, chills, fatigue, fever and unexpected weight change. HENT: Negative. Eyes: Negative. Respiratory:  Negative for cough, shortness of breath and wheezing. Cardiovascular:  Negative for chest pain, palpitations and leg swelling. Gastrointestinal:  Negative for abdominal pain, diarrhea, nausea and vomiting. Genitourinary: Negative. Musculoskeletal: Negative. Skin: Negative. Neurological: Negative.     Hematological: Negative. Psychiatric/Behavioral: Negative. Physical Exam:    BMI:  Body mass index is 77.23 kg/m². Wt Readings from Last 3 Encounters:   01/24/23 (!) 436 lb (197.8 kg)   01/19/23 (!) 438 lb 6.4 oz (198.9 kg)   01/19/23 (!) 438 lb 6.4 oz (198.9 kg)     Weight reduced : 23 lbs down in 1 month   ( Has been working with dietician)   Vitals: /80   Pulse 57   Temp 98.1 °F (36.7 °C)   Ht 5' 3\" (1.6 m)   Wt (!) 436 lb (197.8 kg)   SpO2 100% Comment: 2lpm  BMI 77.23 kg/m²       Physical Exam  Vitals and nursing note reviewed. Constitutional:       Appearance: Normal appearance. She is morbidly obese. Interventions: Nasal cannula in place. HENT:      Head: Normocephalic and atraumatic. Mouth/Throat:      Pharynx: Oropharynx is clear. Eyes:      Conjunctiva/sclera: Conjunctivae normal.   Pulmonary:      Effort: Pulmonary effort is normal. No tachypnea, bradypnea or respiratory distress. Skin:     Findings: No erythema or rash. Neurological:      Mental Status: She is alert and oriented to person, place, and time. Psychiatric:         Attention and Perception: Attention normal.         Mood and Affect: Mood normal.         Speech: Speech normal.         Behavior: Behavior normal.         Thought Content: Thought content normal.         Cognition and Memory: Cognition normal.         Judgment: Judgment normal.         ASSESSMENT/DIAGNOSIS     Diagnosis Orders   1. SANTIAGO treated with BiPAP Controlled       2. Mixed restrictive and obstructive lung disease ([address] Stable       3. Morbid obesity with BMI of 70 and over, adult ([address] Improving       4. Chronic diastolic congestive heart failure ([address]                 Plan   Do you need any equipment today? No    - Download reviewed and discussed with patient, AHI well controlled.    - She  was advised to continue current positive airway pressure therapy with above described pressure with 3LPM Bleed in oxygen   - She  advised to keep good compliance with current recommended pressure to get optimal results and clinical improvement  - Recommend 7-9 hours of sleep with PAP  - She was advised to call [employer] company regarding supplies if needed.   -She call my office for earlier appointment if needed for worsening of sleep symptoms.   - She was encouraged to continue working on wt loss   - Mckenna Peña was educated about my impression and plan. Patient verbalizesunderstanding.   We will see Sophia Bai back in: 6 months with download    Information added by my medical assistant/LPN was reviewed today    billing based on medical decision making     MALCOM Hernández-CNP   1/24/2023

## 2023-02-01 ENCOUNTER — CARE COORDINATION (OUTPATIENT)
Dept: CARE COORDINATION | Age: 38
End: 2023-02-01

## 2023-02-09 ENCOUNTER — CARE COORDINATION (OUTPATIENT)
Dept: CARE COORDINATION | Age: 38
End: 2023-02-09

## 2023-02-09 NOTE — CARE COORDINATION
Ambulatory Care Coordination Note  2023    Patient Current Location:  Home: [place] [id number]     ACM contacted the patient by telephone. Verified name and  with patient as identifiers. Provided introduction to self, and explanation of the ACM role. Challenges to be reviewed by the provider   Additional needs identified to be addressed with provider: No  none               Method of communication with provider: none. ACM: Tika Stokes RN       Kasandra Cardenas is being followed by care coordination for education and assistance in managing her chronic conditions and healthcare needs. Pt has h/o: obesity, COVID, CHF, SANTIAGO. Spoke with Kasandra Cardenas today. Pt continues to do well with wt loss. Currently wt is down to 425 # per her home scale. Continues to f/u with [facility] wt mgmt center. Has appt next wk. Reports that she has cut back on her portion sizes considerably. Has [place] on her phone that she uses to track her calories and her sodium consumption. SANTIAGO-had recent appt with pulm. No changes. Continues to wear CPAP with oxygen as instructed. Has appt with PULM next wk. Hoping to be re-evaluated for oxygen need. Pt reports that she has not had to wear oxygen during the day at home b/c her SPO2 readings have been % RA. Still wearing when away from home and continues to wear at night. CHF: DHILLON improving. Monitoring wts daily. Denies edema. Plan of care:  Congratulated pt regarding her wt loss. Encouraged continued f/u with wt mgmt at [facility]  F/u with PULM next wk  Will plan on graduating after appt. Continue monitoring SPO2 readings.     Continue working on increasing activity  Continue using [place] to help track calories and sodium consumption  Congestive Heart Failure Assessment    Are you currently restricting fluids?: 2000cc  Do you understand a low sodium diet?: Yes  Do you understand how to read food labels?: Yes  How many restaurant meals do you eat per week?: 0  Do you salt your food before tasting it?: No         Symptoms:  CHF associated angina: Neg, CHF associated dyspnea on exertion: Pos, CHF associated fatigue: Neg, CHF associated orthostatic hypotension: Neg, CHF associated PND: Neg, CHF associated shortness of breath: Neg, CHF associated weakness: Neg      Patient-reported weight (lb): 425  Weight trend: decreasing steadily  Salt intake watch compared to last visit: stable           Offered patient enrollment in the Remote Patient Monitoring (RPM) program for in-home monitoring: NA. Lab Results       None            Care Coordination Interventions    Referral from Primary Care Provider: No  Suggested Interventions and Community Resources  Medi Set or Pill Pack: Declined  Zone Management Tools: Completed (Comment: CHF)          Goals Addressed                   This Visit's Progress     Behavioral Health   On track     I will work towards the following Behavioral Health goals: I will take my medications daily as prescribed. and will consider getting established with mental health provider. Barriers: lack of support, overwhelmed by complexity of regimen, and stress  Plan for overcoming my barriers: support and education from PCP, ACM, SW. Resource info provided on different providers and services in the area. Confidence: 8/10  Anticipated Goal Completion Date: 11/12/22       Self Monitoring   On track     Daily Weights - I will weight myself as directed - Daily and write down weights  I will notify my provider of any increase in weight by 3 or more pounds in 2 days OR 5 or more pounds in a week. None Recently Recorded    Barriers: lack of support, overwhelmed by complexity of regimen, and stress  Plan for overcoming my barriers: education on HF zones, get established with cardiology.  Support from [facility], PCP, specialists  Confidence: 9/10  Anticipated Goal Completion Date: 2/8/23                Future Appointments   Date Time Provider Jo Milton 2/14/2023  1:00 PM Darrin Pelletier, APRN - [place] [address]   2/23/2023  2:00 PM Veola Pallas, APRN - CNP N SRPX Pain [facility] [place]   3/23/2023  3:00 PM Michelle Amaro MD N SRPX Heart [facility] [place]   4/20/2023  1:20 PM MALCOM Aguilar - CNP N Oncology [facility] [place]   7/25/2023  3:00 PM MALCOM Bermudez -  [address] [place] [address]

## 2023-02-14 ENCOUNTER — OFFICE VISIT (OUTPATIENT)
Dept: PULMONOLOGY | Age: 38
End: 2023-02-14
Payer: MEDICARE

## 2023-02-14 VITALS
WEIGHT: 293 LBS | OXYGEN SATURATION: 100 % | SYSTOLIC BLOOD PRESSURE: 132 MMHG | HEIGHT: 63 IN | TEMPERATURE: 98.2 F | HEART RATE: 58 BPM | DIASTOLIC BLOOD PRESSURE: 80 MMHG | BODY MASS INDEX: 51.91 KG/M2

## 2023-02-14 DIAGNOSIS — G47.33 OSA TREATED WITH BIPAP: ICD-10-CM

## 2023-02-14 DIAGNOSIS — J45.40 MODERATE PERSISTENT ASTHMA WITHOUT COMPLICATION: Primary | ICD-10-CM

## 2023-02-14 DIAGNOSIS — I50.32 CHRONIC DIASTOLIC CONGESTIVE HEART FAILURE (HCC): ICD-10-CM

## 2023-02-14 DIAGNOSIS — Z09 HOSPITAL DISCHARGE FOLLOW-UP: ICD-10-CM

## 2023-02-14 DIAGNOSIS — E66.01 MORBID OBESITY WITH BMI OF 70 AND OVER, ADULT (HCC): ICD-10-CM

## 2023-02-14 PROCEDURE — G8417 CALC BMI ABV UP PARAM F/U: HCPCS | Performed by: NURSE PRACTITIONER

## 2023-02-14 PROCEDURE — G8427 DOCREV CUR MEDS BY ELIG CLIN: HCPCS | Performed by: NURSE PRACTITIONER

## 2023-02-14 PROCEDURE — 99214 OFFICE O/P EST MOD 30 MIN: CPT | Performed by: NURSE PRACTITIONER

## 2023-02-14 PROCEDURE — 1036F TOBACCO NON-USER: CPT | Performed by: NURSE PRACTITIONER

## 2023-02-14 PROCEDURE — 94618 PULMONARY STRESS TESTING: CPT | Performed by: NURSE PRACTITIONER

## 2023-02-14 PROCEDURE — G8484 FLU IMMUNIZE NO ADMIN: HCPCS | Performed by: NURSE PRACTITIONER

## 2023-02-14 ASSESSMENT — ENCOUNTER SYMPTOMS
ALLERGIC/IMMUNOLOGIC NEGATIVE: 1
SHORTNESS OF BREATH: 1
WHEEZING: 0
EYES NEGATIVE: 1
GASTROINTESTINAL NEGATIVE: 1
COUGH: 0

## 2023-02-14 NOTE — PROGRESS NOTES
Albuterol inhaler: has not used in several weeks    3lpm bled into PAP    3lpm at rest and exertion    Has not been using the Advair.

## 2023-02-14 NOTE — PROGRESS NOTES
[facility]    Patient: Alejandrina Flores, 40 y.o.   : 1985    Pt of Dr. Hui Elena   Patient presents with    Follow-up     HFU asthma and resp failure      HPI from Dr. Brenton Lin inpatient note: 22  Noel Sylvester is a 40 y.o. female former smoker (1/2 ppd quit in , smoked from age 25 to 27), morbidly obese with post COVID syndrome, severe SANTIAGO diagnosed 2022 via digital polysomnography on supplemental oxygen at baseline, and chronic BLE edema, here for chief complaint of persistent chest pain and worsening shortness of breath with heart score 5, admitted for acute on chronic hypoxic respiratory failure. XR chest with nonspecific findings, no obvious consolidations or infiltrates noted. DVT ruled out. On evaluation, patient states she noticed swelling in legs getting worse over the last 1-2 weeks. PCP started on Lasix which did not help. Then replaced with Bumex for 3 days. Continued to have worsening swelling to the point that patient was unable to walk. Also endorses associated worsening orthopnea and chest pain. Chest pain started on , substernal, heaviness \"like something sitting on chest\". Initially intermittent 4-5/10. Has become persistent since then. No trauma, no sick contact. Endorses dry cough and raspy voice since this morning. No other associated symptoms. HPI  Bryanna Grover is here for follow up for asthma with recent exacerbation in October of last year with hospitalization   SANTIAGO treated with BiPAP good compliancy   MO BMI 77  Anay had covid in 2021 and was admitted to the hospital for 5 days and discharged on supplemental oxygen eventually her oxygen was sent back to the vendor as Bryanna Grover got better, however her condition hs worsened over the last few months with progressive SOB for mild activities, breathing difficulties to sleep, loud snoring, waking up gasping for air, bilateral LE edema.   PFTs: restrictive physiology but there is good response to bronchodilators, did not fit in the body box for lung volumes. Currently on home oxygen 3LPM with rest and bled into BiPAP and 6LPM with activity - requesting 6MWT today   Smoked 10 years, 1/2 ppd quit in 2014, smoked from age 25 to 27. Not using current Advair inhaler   Albuterol inhaler: has not used in several weeks    Asthma control (Severity) questionnaire:  Asthma symptoms:  > 2 times per week -> No   Night time awakenings: > 2 times per month -> No  Use of short acting beta agonist for symptoms control (Other than pre exercise use) :> 2times per week  -> No  Interference with normal activity  -> mild  Lung function: Fev1 >60% Predicted  -> Yes  Number of exacerbations per year: > 2 -> No     Progress History:   Since last visit any new medical issues? No  New ER or hospital visits? Yes as described above   Any new or changes in medicines? No  Using inhalers? No  Are they helpful?  Yes if needed   Flu vaccine- no  Pneumonia vaccine- no  Covid vaccine- done x 2    Past Medical hx   PMH:  Past Medical History:   Diagnosis Date    Acute on chronic diastolic CHF (congestive heart failure) ([facility] [address] 11/01/2022    Anxiety and depression 07/09/2021    Asthma     Back pain     Degeneration of lumbosacral intervertebral disc 10/31/2022    Mixed restrictive and obstructive lung disease ([address] 1/24/2023    Moderate persistent asthma without complication 94/40/1844    Morbid obesity ([address]     Sleep apnea      SURGICAL HISTORY:  Past Surgical History:   Procedure Laterality Date    ADENOIDECTOMY      ANKLE FRACTURE SURGERY Left 9/19/2017    LEFT ANKLE ORIF, I & D right posterior upper leg performed by Jeff Sanchez MD at [facility]  02/09/2016    NE OFFICE/OUTPT VISIT,PROCEDURE ONLY Left 8/2/2018    LEFT SUBTALAR JOINT FUSION, MEDIAL COLUMN FUSION, GASTROC RECESSION, POSS DELTOID REPAIR performed by Xiomara Dawkins DPM at [facility] HISTORY:  Social History     Tobacco Use    Smoking status: Former     Types: Cigarettes     Quit date: 3/4/2014     Years since quittin.9    Smokeless tobacco: Never   Substance Use Topics    Alcohol use: Yes     Comment: social    Drug use: Yes     Types: Marijuana (Weed)     ALLERGIES:  Allergies   Allergen Reactions    Morphine Itching     Felt like she was on fire     FAMILY HISTORY:  Family History   Problem Relation Age of Onset    Cancer Mother     Depression Mother     Thyroid Disease Mother     Arthritis Father     High Blood Pressure Father     Depression Sister     Depression Sister     Diabetes Paternal Grandfather     Cancer Paternal Grandmother     Cancer Maternal Grandfather     Cancer Maternal Grandmother      CURRENT MEDICATIONS:  Current Outpatient Medications   Medication Sig Dispense Refill    topiramate (TOPAMAX) 50 MG tablet take 1 tablet by mouth twice a day 60 tablet 0    triamterene-hydroCHLOROthiazide (MAXZIDE-25) 37.5-25 MG per tablet Take 0.5 tablets by mouth daily 30 tablet 2    metoprolol succinate (TOPROL XL) 25 MG extended release tablet Take 1 tablet by mouth daily 30 tablet 3    ferrous sulfate (IRON 325) 325 (65 Fe) MG tablet Take 1 tablet by mouth 2 times daily 180 tablet 3    vitamin D (ERGOCALCIFEROL) 1.25 MG (48674 UT) CAPS capsule Take 1 capsule by mouth once a week 12 capsule 3    Multiple Vitamin (MULTIVITAMIN ADULT PO) Take by mouth      butalbital-acetaminophen-caffeine (FIORICET, ESGIC) -40 MG per tablet Take 1 tablet by mouth every 6 hours as needed for Migraine 15 tablet 0    ARIPiprazole (ABILIFY) 5 MG tablet Take 1 tablet by mouth daily 90 tablet 3    DULoxetine (CYMBALTA) 30 MG extended release capsule Take 1 capsule by mouth daily 90 capsule 3    albuterol sulfate HFA (PROVENTIL;VENTOLIN;PROAIR) 108 (90 Base) MCG/ACT inhaler Inhale 2 puffs into the lungs every 4 hours as needed for Wheezing or Shortness of Breath (cough) 1 each 0    hydrOXYzine HCl (ATARAX) 25 MG tablet Take 1 tablet by mouth every 8 hours as needed for Anxiety 60 tablet 1    aspirin 81 MG chewable tablet Take 1 tablet by mouth daily 30 tablet 3    famotidine (PEPCID) 20 MG tablet Take 1 tablet by mouth 2 times daily (Patient taking differently: Take 20 mg by mouth as needed) 60 tablet 11    Naproxen Sodium (ALEVE PO) Take by mouth      pregabalin (LYRICA) 150 MG capsule Take 1 capsule by mouth at bedtime for 7 days. Max Daily Amount: 150 mg 7 capsule 0    fluticasone-salmeterol (ADVAIR HFA) 115-21 MCG/ACT inhaler Inhale 2 puffs into the lungs 2 times daily Rinse and spit after doses (Patient not taking: Reported on 2/14/2023) 1 each 11     No current facility-administered medications for this visit. ROS   Review of Systems   Constitutional: Negative. Negative for chills and fever. HENT: Negative. Negative for congestion. Eyes: Negative. Respiratory:  Positive for shortness of breath (only with exertion). Negative for cough and wheezing. Cardiovascular: Negative. Negative for chest pain and leg swelling. Gastrointestinal: Negative. Endocrine: Negative. Genitourinary: Negative. Musculoskeletal: Negative. Allergic/Immunologic: Negative. Neurological: Negative. Hematological: Negative. Psychiatric/Behavioral: Negative. Negative for sleep disturbance. Physical exam   /80 (Site: Left Lower Arm, Position: Sitting, Cuff Size: Medium Adult)   Pulse 58   Temp 98.2 °F (36.8 °C) (Skin)   Ht 5' 3\" (1.6 m)   Wt (!) 432 lb 9.6 oz (196.2 kg)   SpO2 100% Comment: 2lpm continuous  BMI 76.63 kg/m²    Wt Readings from Last 3 Encounters:   02/14/23 (!) 432 lb 9.6 oz (196.2 kg)   01/24/23 (!) 436 lb (197.8 kg)   01/19/23 (!) 438 lb 6.4 oz (198.9 kg)       Physical Exam  Vitals and nursing note reviewed. Constitutional:       Appearance: She is morbidly obese. HENT:      Head: Normocephalic and atraumatic.       Comments: Large tongue MP 4  Eyes: Conjunctiva/sclera: Conjunctivae normal.      Pupils: Pupils are equal, round, and reactive to light. Neck:      Vascular: No JVD. Cardiovascular:      Rate and Rhythm: Normal rate and regular rhythm. Heart sounds: Normal heart sounds. No murmur heard. No friction rub. No gallop. Pulmonary:      Effort: Pulmonary effort is normal. No respiratory distress. Breath sounds: Normal breath sounds. No wheezing or rales. Abdominal:      General: Bowel sounds are normal.      Palpations: Abdomen is soft. Musculoskeletal:         General: Normal range of motion. Cervical back: Normal range of motion and neck supple. Skin:     General: Skin is warm and dry. Capillary Refill: Capillary refill takes less than 2 seconds. Neurological:      Mental Status: She is alert and oriented to person, place, and time. Psychiatric:         Behavior: Behavior normal.         Thought Content: Thought content normal.         Judgment: Judgment normal.        Results   Lung Nodule Screening     [] Qualifies    [x] Does not qualify   [] Declined    [] Completed    The USPSTF recommends annual screening for lung cancer with low-dose computed tomography (LDCT) in adults aged 48 to [de-identified] years who have a 30 pack-year smoking history and currently smoke or have quit within the past 20 years. Screening should be discontinued once a person has not smoked for 20 years or develops a health problem that substantially limits life expectancy or the ability or willingness to have curative lung surgery.       Latest Reference Range & Units 1/19/23 13:46   WBC 4.8 - 10.8 thou/mm3 7.4   RBC 4.20 - 5.40 mill/mm3 6.03 (H)   Hemoglobin Quant 12.0 - 16.0 gm/dl 14.4   Hematocrit 37.0 - 47.0 % 49.2 (H)   MCV 81.0 - 99.0 fL 81.6   MCH 26.0 - 33.0 pg 23.9 (L)   MCHC 32.2 - 35.5 gm/dl 29.3 (L)   RDW-CV 11.5 - 14.5 % 25.1 (H)   RDW-SD 35.0 - 45.0 fL 72.2 (H)   Platelet Count 047 - 400 thou/mm3 213   Platelet Estimate Adequate  ADEQUATE Lymphocytes Absolute 1.0 - 4.8 thou/mm3 1.9   Monocytes Absolute 0.4 - 1.3 thou/mm3 0.5   Eosinophils Absolute 0.0 - 0.4 thou/mm3 0.2   Basophils Absolute 0.0 - 0.1 thou/mm3 0.0   Seg Neutrophils % 63.0   Segs Absolute 1.8 - 7.7 thou/mm3 4.7   Lymphocytes % 26.3   Monocytes % 6.8   Eosinophils % 3.1   Basophils % 0.5   Immature Grans (Abs) 0.00 - 0.07 thou/mm3 0.02   Immature Granulocytes % 0.3   Nucleated Red Blood Cells /100 wbc 0   Anisocytosis Absent  PRESENT   Elliptocytes Absent  1+   SCAN OF BLOOD SMEAR  see below           TTE 10/31/2022   Summary   Technically difficult study due to poor acoustic windows. Ejection fraction is visually estimated at 60%. Overall left ventricular function is normal.      Signature      ----------------------------------------------------------------   Electronically signed by Harper Aranda MD (Interpreting   physician) on 10/31/2022 at 04:01 PM   ----------------------------------------------------------------      Six Minute Walk Test  Anay Toledo 1985    Six minute walk test done in my office today by my medical assistant. Anay's oxygen saturation at rest on room air was 100%. Her oxygen saturation dropped to 86% on room air with exertion after walking 108 feet and within 1 minutes. The six minute walk test was repeated with oxygen supplementation. Oxygen supplementation was started with 1 LPM via nasal cannula and titrated to 3 LPM via nasal cannula. At the end of the test Anay Toledo's oxygen saturation remained at 92% on 3 LPM with exertion. She is mobile in the home and requires oxygen as outlined above. Patient ambulated a total of 540 feet with oxygen. Resting Dyspnea/Dee Dee score was 0 / 4  and 0 / 6  upon completion of the walk. Resting heart rate was  64 bpm and 94 bpm upon completing the walk. Nasal Oxygen order:  3 lpm to be used with:  Rest: No.  Walking: Yes. Sleep: Yes. POC flow: No.  Continuous flow: Yes.     DME Medical Necessity Documentation    Anay was seen in the office on 2/14/2023 for the diagnosis CHF/SANTIAGO. I am prescribing oxygen because the diagnosis and testing requires the patient to have oxygen in the home. her condition will improve or be benefited by oxygen use. The patient is able to perform good mobility in a home setting and therefore does require the use of a portable oxygen system. Assessment      Diagnosis Orders   1. Moderate persistent asthma without complication        2. Chronic diastolic congestive heart failure (HCC)  6 Minute Walk Test    DME Order for Home Oxygen as OP      3. Morbid obesity with BMI of 70 and over, adult ([address]        4. Hospital discharge follow-up        5. SANTIAGO treated with BiPAP  DME Order for Home Oxygen as OP            Plan   -Continue current inhaler regimen  -Requesting 6MWT today 3LPM with activity and 3LPM bleed into BiPAP   -DME order for oxygen 3LPM bleed into BiPAP continued   -Continue home oxygen as previously prescribed   -Weight loss encouraged   -Encouraged compliancy with BiPAP use  -Anay Toledo educated about environmental safety precautions she need to practice to prevent exacerbation ofher Asthma.  Anay Toledo verbalizes understanding.    -Advised to maintain pneumonia vaccine with PCP and to take flu vaccine this coming season.  -Advised patient to call office with any changes, questions, or concerns regarding respiratory status    Will see Anabelle Armstrong [phone] back in: 1 year    Donis Lange  2/14/2023

## 2023-02-21 RX ORDER — TOPIRAMATE 50 MG/1
50 TABLET, FILM COATED ORAL 2 TIMES DAILY
Qty: 60 TABLET | Refills: 0 | Status: SHIPPED | OUTPATIENT
Start: 2023-02-21 | End: 2023-02-23

## 2023-02-23 ENCOUNTER — OFFICE VISIT (OUTPATIENT)
Dept: PHYSICAL MEDICINE AND REHAB | Age: 38
End: 2023-02-23
Payer: MEDICARE

## 2023-02-23 ENCOUNTER — CARE COORDINATION (OUTPATIENT)
Dept: CARE COORDINATION | Age: 38
End: 2023-02-23

## 2023-02-23 VITALS
DIASTOLIC BLOOD PRESSURE: 68 MMHG | HEIGHT: 63 IN | SYSTOLIC BLOOD PRESSURE: 124 MMHG | BODY MASS INDEX: 51.91 KG/M2 | WEIGHT: 293 LBS

## 2023-02-23 DIAGNOSIS — M25.572 CHRONIC PAIN OF LEFT ANKLE: Primary | ICD-10-CM

## 2023-02-23 DIAGNOSIS — M79.2 NEUROPATHIC PAIN: ICD-10-CM

## 2023-02-23 DIAGNOSIS — G89.4 CHRONIC PAIN SYNDROME: ICD-10-CM

## 2023-02-23 DIAGNOSIS — G89.29 CHRONIC PAIN OF LEFT ANKLE: Primary | ICD-10-CM

## 2023-02-23 PROCEDURE — G8484 FLU IMMUNIZE NO ADMIN: HCPCS | Performed by: NURSE PRACTITIONER

## 2023-02-23 PROCEDURE — G8427 DOCREV CUR MEDS BY ELIG CLIN: HCPCS | Performed by: NURSE PRACTITIONER

## 2023-02-23 PROCEDURE — 99214 OFFICE O/P EST MOD 30 MIN: CPT | Performed by: NURSE PRACTITIONER

## 2023-02-23 PROCEDURE — 1036F TOBACCO NON-USER: CPT | Performed by: NURSE PRACTITIONER

## 2023-02-23 PROCEDURE — G8417 CALC BMI ABV UP PARAM F/U: HCPCS | Performed by: NURSE PRACTITIONER

## 2023-02-23 RX ORDER — TOPIRAMATE 100 MG/1
100 TABLET, FILM COATED ORAL 2 TIMES DAILY
Qty: 60 TABLET | Refills: 0 | Status: SHIPPED | OUTPATIENT
Start: 2023-02-23

## 2023-02-23 NOTE — CARE COORDINATION
Ambulatory Care Coordination Note  2023    Patient Current Location:  Home: [place] [address]     ACM contacted the patient by telephone. Verified name and  with patient as identifiers. Provided introduction to self, and explanation of the ACM role. Challenges to be reviewed by the provider   Additional needs identified to be addressed with provider: No  none               Method of communication with provider: none. ACM: Tyron Roper RN       Mason Estrella is being followed by care coordination for education and assistance in managing her chronic conditions and healthcare needs. Pt has h/o: obesity, COVID, CHF, SANTIAGO. Spoke with Mason Estrella today. pt reports that she is doing well. Pain mgmt appt today. Pt's mother will be attending with her. Continues to have foot pain. CHF: monitoring wts daily. Denies increase in wts recently. Continues to work on wt loss. Denies edema to ext's or worsening SOB. Recently seen by PULM. Repeat walk test completed for home oxygen. Pt now down to 3 liters at rest and HS with Bipap. Last SPO2 reading was 98%. SANTIAGO-wearing Bipap with oxygen. Has not had anymore migraines since wearing bipap. Continues to f/u with [facility]. Had appt last wk. Has [place] on phone that allows her to track calories and sodium consumption. Has dietician appt next wk. Plan of care:  Pt doing well overall. Aware of resources. F/u routinely and as needed with PCP and specialists. Denies new barriers. Pt has this ACM's number to call with any future concerns or issues. Will graduate from care coordination at this time. Offered patient enrollment in the Remote Patient Monitoring (RPM) program for in-home monitoring: NA.     Lab Results       None            Care Coordination Interventions    Referral from Primary Care Provider: No  Suggested Interventions and Community Resources  Medi Set or Pill Pack: Declined  Zone Management Tools: Completed (Comment: CHF)          Goals Addressed                   This Visit's Progress     COMPLETED: Behavioral Health        I will work towards the following Behavioral Health goals: I will take my medications daily as prescribed. and will consider getting established with mental health provider. Barriers: lack of support, overwhelmed by complexity of regimen, and stress  Plan for overcoming my barriers: support and education from PCP, ACM, SW. Resource info provided on different providers and services in the area. Confidence: 8/10  Anticipated Goal Completion Date: 11/12/22       COMPLETED: Self Monitoring        Daily Weights - I will weight myself as directed - Daily and write down weights  I will notify my provider of any increase in weight by 3 or more pounds in 2 days OR 5 or more pounds in a week. None Recently Recorded    Barriers: lack of support, overwhelmed by complexity of regimen, and stress  Plan for overcoming my barriers: education on HF zones, get established with cardiology.  Support [facility], PCP, specialists  Confidence: 9/10  Anticipated Goal Completion Date: 2/8/23                Future Appointments   Date Time Provider Jo Milton   2/23/2023  2:00 PM MALCOM Nye - CNP N SRPX Pain [facility] [address] [place] [address]   4/20/2023  1:20 PM Donald Vega APRN - CNP N Oncology [facility] - [address] [place] [address]   5/25/2023  3:00 PM Michelle Lemons MD N SRPX Heart [facility] - [address] [place] [address]   7/25/2023  3:00 PM MALCOM [address]   2/15/2024  2:00 PM MALCOM Castanon -  [address] [place] [address]
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
## 2023-03-17 RX ORDER — TOPIRAMATE 100 MG/1
TABLET, FILM COATED ORAL
Qty: 60 TABLET | Refills: 0 | OUTPATIENT
Start: 2023-03-17

## 2023-04-20 ENCOUNTER — OFFICE VISIT (OUTPATIENT)
Dept: ONCOLOGY | Age: 38
End: 2023-04-20
Payer: MEDICARE

## 2023-04-20 ENCOUNTER — HOSPITAL ENCOUNTER (OUTPATIENT)
Dept: INFUSION THERAPY | Age: 38
Discharge: HOME | End: 2023-04-20
Payer: MEDICARE

## 2023-04-20 VITALS
BODY MASS INDEX: 51.91 KG/M2 | TEMPERATURE: 98 F | WEIGHT: 293 LBS | OXYGEN SATURATION: 100 % | RESPIRATION RATE: 18 BRPM | HEART RATE: 56 BPM | HEIGHT: 63 IN | SYSTOLIC BLOOD PRESSURE: 133 MMHG | DIASTOLIC BLOOD PRESSURE: 63 MMHG

## 2023-04-20 VITALS
SYSTOLIC BLOOD PRESSURE: 133 MMHG | TEMPERATURE: 98 F | HEART RATE: 56 BPM | DIASTOLIC BLOOD PRESSURE: 63 MMHG | RESPIRATION RATE: 18 BRPM | OXYGEN SATURATION: 100 %

## 2023-04-20 DIAGNOSIS — D50.0 IRON DEFICIENCY ANEMIA DUE TO CHRONIC BLOOD LOSS: Primary | ICD-10-CM

## 2023-04-20 DIAGNOSIS — D50.0 IRON DEFICIENCY ANEMIA DUE TO CHRONIC BLOOD LOSS: ICD-10-CM

## 2023-04-20 LAB
ABSOLUTE IMMATURE GRANULOCYTE: 0.02 THOU/MM3 (ref 0–0.07)
ALBUMIN SERPL BCG-MCNC: 3.8 G/DL (ref 3.5–5.1)
ALP SERPL-CCNC: 71 U/L (ref 38–126)
ALT SERPL W/O P-5'-P-CCNC: 16 U/L (ref 11–66)
AST SERPL-CCNC: 15 U/L (ref 5–40)
BASOPHILS ABSOLUTE: 0 THOU/MM3 (ref 0–0.1)
BASOPHILS NFR BLD AUTO: 1 % (ref 0–3)
BILIRUB CONJ SERPL-MCNC: < 0.2 MG/DL (ref 0–0.3)
BILIRUB SERPL-MCNC: 0.6 MG/DL (ref 0.3–1.2)
BUN BLDP-MCNC: 14 MG/DL (ref 8–26)
CHLORIDE BLD-SCNC: 109 MEQ/L (ref 98–109)
CREAT BLD-MCNC: 0.8 MG/DL (ref 0.5–1.2)
EOSINOPHIL NFR BLD AUTO: 2 % (ref 0–4)
EOSINOPHILS ABSOLUTE: 0.2 THOU/MM3 (ref 0–0.4)
ERYTHROCYTE [DISTWIDTH] IN BLOOD BY AUTOMATED COUNT: 15.3 % (ref 11.5–14.5)
FERRITIN SERPL IA-MCNC: 154 NG/ML (ref 10–291)
GFR SERPL CREATININE-BSD FRML MDRD: > 60 ML/MIN/1.73M2
GLUCOSE BLD-MCNC: 90 MG/DL (ref 70–108)
HCT VFR BLD AUTO: 46.9 % (ref 37–47)
HGB BLD-MCNC: 14.6 GM/DL (ref 12–16)
IMMATURE GRANULOCYTES: 0 %
IONIZED CALCIUM, WHOLE BLOOD: 1.21 MMOL/L (ref 1.12–1.32)
IRON SATN MFR SERPL: 18 % (ref 20–50)
IRON SERPL-MCNC: 44 UG/DL (ref 50–170)
LYMPHOCYTES ABSOLUTE: 2.2 THOU/MM3 (ref 1–4.8)
LYMPHOCYTES NFR BLD AUTO: 26 % (ref 15–47)
MCH RBC QN AUTO: 27.4 PG (ref 26–33)
MCHC RBC AUTO-ENTMCNC: 31.1 GM/DL (ref 32.2–35.5)
MCV RBC AUTO: 88 FL (ref 81–99)
MONOCYTES ABSOLUTE: 0.6 THOU/MM3 (ref 0.4–1.3)
MONOCYTES NFR BLD AUTO: 6 % (ref 0–12)
NEUTROPHILS NFR BLD AUTO: 66 % (ref 43–75)
PLATELET # BLD AUTO: 219 THOU/MM3 (ref 130–400)
PMV BLD AUTO: 10.5 FL (ref 9.4–12.4)
POTASSIUM BLD-SCNC: 4 MEQ/L (ref 3.5–4.9)
PROT SERPL-MCNC: 7.3 G/DL (ref 6.1–8)
RBC # BLD AUTO: 5.32 MILL/MM3 (ref 4.2–5.4)
SEGMENTED NEUTROPHILS ABSOLUTE COUNT: 5.6 THOU/MM3 (ref 1.8–7.7)
SODIUM BLD-SCNC: 141 MEQ/L (ref 138–146)
TIBC SERPL-MCNC: 247 UG/DL (ref 171–450)
TOTAL CO2, WHOLE BLOOD: 24 MEQ/L (ref 23–33)
WBC # BLD AUTO: 8.6 THOU/MM3 (ref 4.8–10.8)

## 2023-04-20 PROCEDURE — G8417 CALC BMI ABV UP PARAM F/U: HCPCS | Performed by: NURSE PRACTITIONER

## 2023-04-20 PROCEDURE — 99211 OFF/OP EST MAY X REQ PHY/QHP: CPT

## 2023-04-20 PROCEDURE — 83550 IRON BINDING TEST: CPT

## 2023-04-20 PROCEDURE — G8427 DOCREV CUR MEDS BY ELIG CLIN: HCPCS | Performed by: NURSE PRACTITIONER

## 2023-04-20 PROCEDURE — 36415 COLL VENOUS BLD VENIPUNCTURE: CPT

## 2023-04-20 PROCEDURE — 83540 ASSAY OF IRON: CPT

## 2023-04-20 PROCEDURE — 99214 OFFICE O/P EST MOD 30 MIN: CPT | Performed by: NURSE PRACTITIONER

## 2023-04-20 PROCEDURE — 82728 ASSAY OF FERRITIN: CPT

## 2023-04-20 PROCEDURE — 80076 HEPATIC FUNCTION PANEL: CPT

## 2023-04-20 PROCEDURE — 80047 BASIC METABLC PNL IONIZED CA: CPT

## 2023-04-20 PROCEDURE — 1036F TOBACCO NON-USER: CPT | Performed by: NURSE PRACTITIONER

## 2023-04-20 PROCEDURE — 85025 COMPLETE CBC W/AUTO DIFF WBC: CPT
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
## 2023-07-25 ENCOUNTER — OFFICE VISIT (OUTPATIENT)
Dept: PULMONOLOGY | Age: 38
End: 2023-07-25
Payer: MEDICARE

## 2023-07-25 VITALS
HEIGHT: 63 IN | SYSTOLIC BLOOD PRESSURE: 122 MMHG | BODY MASS INDEX: 51.91 KG/M2 | WEIGHT: 293 LBS | DIASTOLIC BLOOD PRESSURE: 70 MMHG | OXYGEN SATURATION: 100 % | HEART RATE: 83 BPM | TEMPERATURE: 97.8 F

## 2023-07-25 DIAGNOSIS — G47.33 OSA TREATED WITH BIPAP: Primary | ICD-10-CM

## 2023-07-25 DIAGNOSIS — E66.01 MORBID OBESITY WITH BMI OF 70 AND OVER, ADULT (HCC): ICD-10-CM

## 2023-07-25 DIAGNOSIS — J43.9 MIXED RESTRICTIVE AND OBSTRUCTIVE LUNG DISEASE (HCC): ICD-10-CM

## 2023-07-25 DIAGNOSIS — I50.32 CHRONIC DIASTOLIC CONGESTIVE HEART FAILURE (HCC): ICD-10-CM

## 2023-07-25 DIAGNOSIS — J45.40 MODERATE PERSISTENT ASTHMA WITHOUT COMPLICATION: ICD-10-CM

## 2023-07-25 DIAGNOSIS — J98.4 MIXED RESTRICTIVE AND OBSTRUCTIVE LUNG DISEASE (HCC): ICD-10-CM

## 2023-07-25 PROCEDURE — 1036F TOBACCO NON-USER: CPT | Performed by: NURSE PRACTITIONER

## 2023-07-25 PROCEDURE — G8417 CALC BMI ABV UP PARAM F/U: HCPCS | Performed by: NURSE PRACTITIONER

## 2023-07-25 PROCEDURE — 3023F SPIROM DOC REV: CPT | Performed by: NURSE PRACTITIONER

## 2023-07-25 PROCEDURE — G8427 DOCREV CUR MEDS BY ELIG CLIN: HCPCS | Performed by: NURSE PRACTITIONER

## 2023-07-25 PROCEDURE — 99214 OFFICE O/P EST MOD 30 MIN: CPT | Performed by: NURSE PRACTITIONER

## 2023-07-25 ASSESSMENT — ENCOUNTER SYMPTOMS
DIARRHEA: 0
VOMITING: 0
WHEEZING: 0
EYES NEGATIVE: 1
COUGH: 0
CHEST TIGHTNESS: 0
NAUSEA: 0
ABDOMINAL PAIN: 0
SHORTNESS OF BREATH: 1

## 2023-07-25 NOTE — PROGRESS NOTES
[facility]      Chilango Rodriguez         [id number]  7/25/2023   Chief Complaint   Patient presents with    Follow-up     SANTIAGO 6 month f/u with [facility] download. Pt of Dr. Subhash Jensen    PAP Download:   Julian Jupiter or initial AHI: 59.6     Date of initial study: 10/17/22      Compliant  100%     Noncompliant 0 %     PAP Type Spont Level  25/21 cmH2O  Avg Hrs/Day 10hrs 44mins  AHI: 0.8   Recorded compliance dates: 6/25/23 to 7/24/23  Machine/Mfg:   [x] ResMed    [] Respironics/Dreamstation   Interface:   [] Nasal    [] Nasal pillows   [x] FFM      Provider:      [x] [facility]     []Ramsey     [] Fam    [] Vanessa Guzman    [] [place]               [] [facility]      [] Adaptive    [] [address] [facility] [address]:      [] Other    Neck Size: 14  Mallampati 4  ESS:  1  SAQLI: 98    Here is a scan of the most recent download:                Presentation:   Anay presents for 6 mo. sleep medicine follow up for obstructive sleep apnea  Since the last visit, Anay continues to do well on BiPAP and noticing benefit. No complaints . Wearing 3LPM oxygen bleed into Bipap and 3LPM with ambulation , she is hoping to get off oxygen     Weight lost 30 lbs over 6 months     Equipment issues: The pressure is  acceptable, the mask is acceptable     Sleep issues:  Do you feel better? yes  More rested? Yes    Better concentration? yes  Difficulty falling sleep? No   Difficulty staying asleep? No   Snoring? No     Review of Systems -   Review of Systems   Constitutional:  Negative for activity change, appetite change, chills, fatigue, fever and unexpected weight change. HENT: Negative. Eyes: Negative. Respiratory:  Positive for shortness of breath. Negative for cough, chest tightness and wheezing. Cardiovascular:  Negative for chest pain, palpitations and leg swelling. Gastrointestinal:  Negative for abdominal pain, diarrhea, nausea and vomiting. Genitourinary: Negative. Musculoskeletal: Negative.

## 2023-07-26 ENCOUNTER — OFFICE VISIT (OUTPATIENT)
Dept: PHYSICAL MEDICINE AND REHAB | Age: 38
End: 2023-07-26

## 2023-07-26 VITALS
SYSTOLIC BLOOD PRESSURE: 122 MMHG | HEIGHT: 63 IN | BODY MASS INDEX: 51.91 KG/M2 | DIASTOLIC BLOOD PRESSURE: 72 MMHG | WEIGHT: 293 LBS

## 2023-07-26 DIAGNOSIS — G57.52 TARSAL TUNNEL SYNDROME OF LEFT SIDE: Primary | ICD-10-CM

## 2023-07-26 DIAGNOSIS — G89.29 CHRONIC PAIN OF LEFT ANKLE: ICD-10-CM

## 2023-07-26 DIAGNOSIS — M25.572 CHRONIC PAIN OF LEFT ANKLE: ICD-10-CM

## 2023-07-26 DIAGNOSIS — G89.4 CHRONIC PAIN SYNDROME: ICD-10-CM

## 2023-07-26 DIAGNOSIS — M79.2 NEUROPATHIC PAIN: ICD-10-CM

## 2023-07-27 RX ORDER — TRIAMCINOLONE ACETONIDE 40 MG/ML
40 INJECTION, SUSPENSION INTRA-ARTICULAR; INTRAMUSCULAR ONCE
Status: SHIPPED | OUTPATIENT
Start: 2023-07-27

## 2023-07-28 ENCOUNTER — HOSPITAL ENCOUNTER (OUTPATIENT)
Dept: RESPIRATORY THERAPY | Age: 38
Discharge: HOME | End: 2023-07-28
Payer: MEDICARE

## 2023-07-28 DIAGNOSIS — G47.33 OSA TREATED WITH BIPAP: ICD-10-CM

## 2023-07-28 PROCEDURE — 94762 N-INVAS EAR/PLS OXIMTRY CONT: CPT

## 2023-08-08 ENCOUNTER — TELEPHONE (OUTPATIENT)
Dept: PULMONOLOGY | Age: 38
End: 2023-08-08

## 2023-08-08 NOTE — TELEPHONE ENCOUNTER
----- Message from MALCOM Mendez CNP sent at 8/7/2023 12:52 PM EDT -----  Sent patient message on my chart but she has not read it. Please call to notify her that she can stop using oxygen into bipap at night.

## 2023-09-17 DIAGNOSIS — E55.9 VITAMIN D DEFICIENCY: ICD-10-CM

## 2023-09-18 RX ORDER — ERGOCALCIFEROL 1.25 MG/1
50000 CAPSULE ORAL WEEKLY
Qty: 12 CAPSULE | Refills: 3 | Status: SHIPPED | OUTPATIENT
Start: 2023-09-18

## 2023-10-02 DIAGNOSIS — F32.2 SEVERE DEPRESSION (HCC): ICD-10-CM

## 2023-10-02 RX ORDER — ARIPIPRAZOLE 5 MG/1
5 TABLET ORAL DAILY
Qty: 90 TABLET | Refills: 3 | Status: SHIPPED | OUTPATIENT
Start: 2023-10-02

## 2023-10-02 RX ORDER — DULOXETIN HYDROCHLORIDE 30 MG/1
30 CAPSULE, DELAYED RELEASE ORAL DAILY
Qty: 90 CAPSULE | Refills: 3 | Status: SHIPPED | OUTPATIENT
Start: 2023-10-02

## 2023-11-16 ENCOUNTER — HOSPITAL ENCOUNTER (OUTPATIENT)
Dept: INFUSION THERAPY | Age: 38
Discharge: HOME | End: 2023-11-16
Payer: MEDICARE

## 2023-11-16 ENCOUNTER — OFFICE VISIT (OUTPATIENT)
Dept: ONCOLOGY | Age: 38
End: 2023-11-16
Payer: MEDICARE

## 2023-11-16 VITALS
BODY MASS INDEX: 51.91 KG/M2 | TEMPERATURE: 97 F | HEIGHT: 63 IN | HEART RATE: 78 BPM | OXYGEN SATURATION: 97 % | RESPIRATION RATE: 20 BRPM | SYSTOLIC BLOOD PRESSURE: 175 MMHG | DIASTOLIC BLOOD PRESSURE: 83 MMHG | WEIGHT: 293 LBS

## 2023-11-16 VITALS
HEIGHT: 63 IN | OXYGEN SATURATION: 97 % | HEART RATE: 78 BPM | SYSTOLIC BLOOD PRESSURE: 175 MMHG | DIASTOLIC BLOOD PRESSURE: 83 MMHG | TEMPERATURE: 97 F | BODY MASS INDEX: 72.1 KG/M2 | RESPIRATION RATE: 20 BRPM

## 2023-11-16 DIAGNOSIS — D50.0 IRON DEFICIENCY ANEMIA DUE TO CHRONIC BLOOD LOSS: ICD-10-CM

## 2023-11-16 DIAGNOSIS — D50.0 IRON DEFICIENCY ANEMIA DUE TO CHRONIC BLOOD LOSS: Primary | ICD-10-CM

## 2023-11-16 LAB
ABSOLUTE IMMATURE GRANULOCYTE: 0.02 THOU/MM3 (ref 0–0.07)
ALBUMIN SERPL BCG-MCNC: 3.7 G/DL (ref 3.5–5.1)
ALP SERPL-CCNC: 71 U/L (ref 38–126)
ALT SERPL W/O P-5'-P-CCNC: 19 U/L (ref 11–66)
AST SERPL-CCNC: 16 U/L (ref 5–40)
BASOPHILS ABSOLUTE: 0 THOU/MM3 (ref 0–0.1)
BASOPHILS NFR BLD AUTO: 0 % (ref 0–3)
BILIRUB CONJ SERPL-MCNC: < 0.2 MG/DL (ref 0–0.3)
BILIRUB SERPL-MCNC: 0.4 MG/DL (ref 0.3–1.2)
BUN BLDP-MCNC: 17 MG/DL (ref 8–26)
CHLORIDE BLD-SCNC: 107 MEQ/L (ref 98–109)
CREAT BLD-MCNC: 0.6 MG/DL (ref 0.5–1.2)
EOSINOPHIL NFR BLD AUTO: 3 % (ref 0–4)
EOSINOPHILS ABSOLUTE: 0.3 THOU/MM3 (ref 0–0.4)
ERYTHROCYTE [DISTWIDTH] IN BLOOD BY AUTOMATED COUNT: 13.7 % (ref 11.5–14.5)
FERRITIN SERPL IA-MCNC: 123 NG/ML (ref 10–291)
GFR SERPL CREATININE-BSD FRML MDRD: > 60 ML/MIN/1.73M2
GLUCOSE BLD-MCNC: 73 MG/DL (ref 70–108)
HCT VFR BLD AUTO: 46.1 % (ref 37–47)
HGB BLD-MCNC: 14.4 GM/DL (ref 12–16)
IMMATURE GRANULOCYTES: 0 %
IONIZED CALCIUM, WHOLE BLOOD: 1.16 MMOL/L (ref 1.12–1.32)
IRON SATN MFR SERPL: 14 % (ref 20–50)
IRON SERPL-MCNC: 42 UG/DL (ref 50–170)
LYMPHOCYTES ABSOLUTE: 2.5 THOU/MM3 (ref 1–4.8)
LYMPHOCYTES NFR BLD AUTO: 28 % (ref 15–47)
MCH RBC QN AUTO: 28.1 PG (ref 26–33)
MCHC RBC AUTO-ENTMCNC: 31.2 GM/DL (ref 32.2–35.5)
MCV RBC AUTO: 90 FL (ref 81–99)
MONOCYTES ABSOLUTE: 0.6 THOU/MM3 (ref 0.4–1.3)
MONOCYTES NFR BLD AUTO: 7 % (ref 0–12)
NEUTROPHILS NFR BLD AUTO: 61 % (ref 43–75)
PLATELET # BLD AUTO: 260 THOU/MM3 (ref 130–400)
PMV BLD AUTO: 10.5 FL (ref 9.4–12.4)
POTASSIUM BLD-SCNC: 3.9 MEQ/L (ref 3.5–4.9)
PROT SERPL-MCNC: 7.2 G/DL (ref 6.1–8)
RBC # BLD AUTO: 5.13 MILL/MM3 (ref 4.2–5.4)
SEGMENTED NEUTROPHILS ABSOLUTE COUNT: 5.4 THOU/MM3 (ref 1.8–7.7)
SODIUM BLD-SCNC: 141 MEQ/L (ref 138–146)
TIBC SERPL-MCNC: 302 UG/DL (ref 171–450)
TOTAL CO2, WHOLE BLOOD: 25 MEQ/L (ref 23–33)
WBC # BLD AUTO: 8.9 THOU/MM3 (ref 4.8–10.8)

## 2023-11-16 PROCEDURE — G8427 DOCREV CUR MEDS BY ELIG CLIN: HCPCS | Performed by: NURSE PRACTITIONER

## 2023-11-16 PROCEDURE — 83550 IRON BINDING TEST: CPT

## 2023-11-16 PROCEDURE — 80076 HEPATIC FUNCTION PANEL: CPT

## 2023-11-16 PROCEDURE — 82728 ASSAY OF FERRITIN: CPT

## 2023-11-16 PROCEDURE — 80047 BASIC METABLC PNL IONIZED CA: CPT

## 2023-11-16 PROCEDURE — G8417 CALC BMI ABV UP PARAM F/U: HCPCS | Performed by: NURSE PRACTITIONER

## 2023-11-16 PROCEDURE — 1036F TOBACCO NON-USER: CPT | Performed by: NURSE PRACTITIONER

## 2023-11-16 PROCEDURE — 85025 COMPLETE CBC W/AUTO DIFF WBC: CPT

## 2023-11-16 PROCEDURE — 36415 COLL VENOUS BLD VENIPUNCTURE: CPT

## 2023-11-16 PROCEDURE — 83540 ASSAY OF IRON: CPT

## 2023-11-16 PROCEDURE — 99211 OFF/OP EST MAY X REQ PHY/QHP: CPT

## 2023-11-16 PROCEDURE — 99214 OFFICE O/P EST MOD 30 MIN: CPT | Performed by: NURSE PRACTITIONER

## 2023-11-16 PROCEDURE — G8484 FLU IMMUNIZE NO ADMIN: HCPCS | Performed by: NURSE PRACTITIONER

## 2023-11-16 NOTE — PROGRESS NOTES
Soft, non-tender, non-distended with active bowel sounds. Musculoskeletal: No clubbing, cyanosis or edema bilaterally. Skin: Skin color, texture, turgor normal.  No visible rashes or lesions. Neurologic:  Neurovascularly intact without any focal sensory/motor deficits. Psychiatric: Alert and oriented, thought content appropriate, normal insight  Capillary Refill: Brisk,< 3 seconds   Peripheral Pulses: +2 palpable, equal bilaterally       Imaging Studies and Labs:   CBC:   Lab Results   Component Value Date    WBC 8.9 11/16/2023    HGB 14.4 11/16/2023    HCT 46.1 11/16/2023    MCV 90 11/16/2023     11/16/2023     BMP:   Lab Results   Component Value Date/Time     11/16/2023 10:59 AM     11/04/2022 04:05 AM    K 3.9 11/16/2023 10:59 AM    K 4.7 11/04/2022 04:05 AM    K 4.4 10/31/2022 04:50 AM    CL 93 11/04/2022 04:05 AM    CO2 39 11/04/2022 04:05 AM    BUN 19 11/04/2022 04:05 AM    CREATININE 0.6 11/16/2023 10:59 AM    CREATININE 0.6 11/04/2022 04:05 AM    GLUCOSE 83 11/04/2022 04:05 AM    CALCIUM 8.8 11/04/2022 04:05 AM      LFT:   Lab Results   Component Value Date    ALT 16 04/20/2023    AST 15 04/20/2023    ALKPHOS 71 04/20/2023    BILITOT 0.6 04/20/2023         Assessment and Plan:   1. Iron deficiency anemia due to chronic blood loss  12/08/2022 iron study results revealed iron level 27, TIBC 341, iron saturation 8% and ferritin level 36. Treatment with ferrous sulfate was discontinued due to inadequate response     12/14/2022 initiated treatment with Injectafer 750 mg IV    Current iron study results reveal iron level 42, TIBC 302, iron saturation 14% and ferritin level 123    HOLD Injectafer in view of adequate hemoglobin level    - CBC with Auto Differential; Future  - Hepatic Function Panel; Future  - POC PANEL BMP W/IOCA; Future  - Ferritin; Future  - Iron; Future  - Iron Binding Capacity; Future  - IRON SATURATION; Future    Return in about 6 months (around 5/16/2024).        All

## 2023-12-15 PROBLEM — F32.9 MAJOR DEPRESSIVE DISORDER, SINGLE EPISODE: Status: ACTIVE | Noted: 2023-12-15

## 2023-12-16 PROBLEM — F41.1 GAD (GENERALIZED ANXIETY DISORDER): Status: ACTIVE | Noted: 2023-12-16

## 2023-12-16 PROBLEM — F33.2 SEVERE EPISODE OF RECURRENT MAJOR DEPRESSIVE DISORDER, WITHOUT PSYCHOTIC FEATURES (HCC): Status: ACTIVE | Noted: 2023-12-16

## 2024-01-05 ENCOUNTER — TELEMEDICINE (OUTPATIENT)
Dept: FAMILY MEDICINE CLINIC | Age: 39
End: 2024-01-05
Payer: MEDICARE

## 2024-01-05 DIAGNOSIS — I50.32 CHRONIC DIASTOLIC CONGESTIVE HEART FAILURE (HCC): ICD-10-CM

## 2024-01-05 DIAGNOSIS — F12.20 CANNABIS DEPENDENCE, UNCOMPLICATED (HCC): ICD-10-CM

## 2024-01-05 DIAGNOSIS — F51.05 INSOMNIA DUE TO OTHER MENTAL DISORDER: ICD-10-CM

## 2024-01-05 DIAGNOSIS — F99 INSOMNIA DUE TO OTHER MENTAL DISORDER: ICD-10-CM

## 2024-01-05 DIAGNOSIS — J43.9 MIXED RESTRICTIVE AND OBSTRUCTIVE LUNG DISEASE (HCC): ICD-10-CM

## 2024-01-05 DIAGNOSIS — J98.4 MIXED RESTRICTIVE AND OBSTRUCTIVE LUNG DISEASE (HCC): ICD-10-CM

## 2024-01-05 DIAGNOSIS — F32.2 SEVERE DEPRESSION (HCC): Primary | ICD-10-CM

## 2024-01-05 PROCEDURE — 1036F TOBACCO NON-USER: CPT | Performed by: NURSE PRACTITIONER

## 2024-01-05 PROCEDURE — G8484 FLU IMMUNIZE NO ADMIN: HCPCS | Performed by: NURSE PRACTITIONER

## 2024-01-05 PROCEDURE — 3023F SPIROM DOC REV: CPT | Performed by: NURSE PRACTITIONER

## 2024-01-05 PROCEDURE — 1111F DSCHRG MED/CURRENT MED MERGE: CPT | Performed by: NURSE PRACTITIONER

## 2024-01-05 PROCEDURE — 99214 OFFICE O/P EST MOD 30 MIN: CPT | Performed by: NURSE PRACTITIONER

## 2024-01-05 PROCEDURE — G8417 CALC BMI ABV UP PARAM F/U: HCPCS | Performed by: NURSE PRACTITIONER

## 2024-01-05 PROCEDURE — G8428 CUR MEDS NOT DOCUMENT: HCPCS | Performed by: NURSE PRACTITIONER

## 2024-01-05 RX ORDER — MIRTAZAPINE 15 MG/1
15 TABLET, FILM COATED ORAL NIGHTLY
Qty: 30 TABLET | Refills: 0 | Status: SHIPPED | OUTPATIENT
Start: 2024-01-05

## 2024-01-05 RX ORDER — HYDROXYZINE 50 MG/1
50 TABLET, FILM COATED ORAL 3 TIMES DAILY PRN
Qty: 60 TABLET | Refills: 0 | Status: SHIPPED | OUTPATIENT
Start: 2024-01-05 | End: 2024-01-25

## 2024-01-05 RX ORDER — SERTRALINE HYDROCHLORIDE 100 MG/1
100 TABLET, FILM COATED ORAL DAILY
Qty: 30 TABLET | Refills: 0 | Status: SHIPPED | OUTPATIENT
Start: 2024-01-05

## 2024-01-05 NOTE — PROGRESS NOTES
Subjective:      Patient ID: Anay Toledo is a 38 y.o. female    HPI: Discuss Depression    TELEHEALTH EVALUATION -- Audio/Visual     Patient identification was verified at the start of the visit: Yes    Services were provided through a video synchronous discussion virtually to substitute for in-person clinic visit. Patient and provider were located at their individual homes.    Patient has requested an audio/video evaluation for the following concern(s):    Chief Complaint   Patient presents with    Mental Health Problem    Depression       Was in the Hospital in December 2023 for suicidal ideaiton.  Was placed on Zoloft 25 mg and Trazodone 50 mg.  Hydroxyzine has been beneficial for anxiety.   No benefit with Depression on Zoloft - tolerating with no SE.   Trazodone makes her restless.     She has been following with a therapist.  No plan to follow up with PSYCHIATRY.     Patient Active Problem List   Diagnosis    Morbid obesity with BMI of 70 and over, adult (HCC)    Vitamin D deficiency    Pneumonia due to COVID-19 virus    Herniation of right lung    Acute on chronic diastolic CHF (congestive heart failure) (HCC)    SANTIAGO (obstructive sleep apnea)    Chronic anemia    Moderate persistent asthma without complication    Obesity hypoventilation syndrome (HCC)    Chronic respiratory failure (HCC) on home O2    S/P cardiac cath Nov 2022 - nonobstructive    Iron deficiency anemia due to chronic blood loss    Mixed restrictive and obstructive lung disease (HCC)    Major depressive disorder, single episode    Severe episode of recurrent major depressive disorder, without psychotic features (HCC)    BRITTNEE (generalized anxiety disorder)    Cannabis dependence, uncomplicated (HCC)       Review of Systems   Constitutional:  Negative for chills and fever.   HENT: Negative.     Respiratory:  Negative for cough and shortness of breath.    Cardiovascular:  Negative for chest pain.   Gastrointestinal:  Negative for abdominal pain

## 2024-01-06 ASSESSMENT — ENCOUNTER SYMPTOMS
ABDOMINAL PAIN: 0
NAUSEA: 0
SHORTNESS OF BREATH: 0
COUGH: 0

## 2024-01-09 RX ORDER — SERTRALINE HYDROCHLORIDE 25 MG/1
25 TABLET, FILM COATED ORAL DAILY
Qty: 30 TABLET | Refills: 0 | OUTPATIENT
Start: 2024-01-09

## 2024-01-09 RX ORDER — TRAZODONE HYDROCHLORIDE 50 MG/1
TABLET ORAL
Qty: 30 TABLET | Refills: 0 | OUTPATIENT
Start: 2024-01-09
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
## 2024-01-26 DIAGNOSIS — F32.2 SEVERE DEPRESSION (HCC): ICD-10-CM

## 2024-01-26 DIAGNOSIS — F51.05 INSOMNIA DUE TO OTHER MENTAL DISORDER: ICD-10-CM

## 2024-01-26 DIAGNOSIS — F99 INSOMNIA DUE TO OTHER MENTAL DISORDER: ICD-10-CM

## 2024-01-26 RX ORDER — SERTRALINE HYDROCHLORIDE 100 MG/1
100 TABLET, FILM COATED ORAL DAILY
Qty: 30 TABLET | Refills: 0 | Status: SHIPPED | OUTPATIENT
Start: 2024-01-26

## 2024-01-26 RX ORDER — MIRTAZAPINE 15 MG/1
15 TABLET, FILM COATED ORAL NIGHTLY
Qty: 30 TABLET | Refills: 0 | Status: SHIPPED | OUTPATIENT
Start: 2024-01-26

## 2024-02-02 ENCOUNTER — PATIENT MESSAGE (OUTPATIENT)
Dept: FAMILY MEDICINE CLINIC | Age: 39
End: 2024-02-02

## 2024-02-02 DIAGNOSIS — F51.05 INSOMNIA DUE TO OTHER MENTAL DISORDER: Primary | ICD-10-CM

## 2024-02-02 DIAGNOSIS — F99 INSOMNIA DUE TO OTHER MENTAL DISORDER: Primary | ICD-10-CM

## 2024-02-02 RX ORDER — DOXEPIN 6 MG/1
1 TABLET, FILM COATED ORAL
Qty: 30 TABLET | Refills: 0 | Status: SHIPPED | OUTPATIENT
Start: 2024-02-02

## 2024-02-02 NOTE — TELEPHONE ENCOUNTER
From: Anay Toledo  To: Isaac Mccurdy  Sent: 2/2/2024 2:45 AM EST  Subject: Sleeping pills    I am having a much harder time sleeping now. I doubled the Remeron and it didn't help at all. It just made me really restless like the Trazadone did.

## 2024-02-27 DIAGNOSIS — F32.2 SEVERE DEPRESSION (HCC): ICD-10-CM

## 2024-02-27 DIAGNOSIS — F51.05 INSOMNIA DUE TO OTHER MENTAL DISORDER: ICD-10-CM

## 2024-02-27 DIAGNOSIS — F99 INSOMNIA DUE TO OTHER MENTAL DISORDER: ICD-10-CM

## 2024-02-27 RX ORDER — MIRTAZAPINE 15 MG/1
15 TABLET, FILM COATED ORAL NIGHTLY
Qty: 30 TABLET | Refills: 0 | Status: SHIPPED | OUTPATIENT
Start: 2024-02-27

## 2024-02-27 RX ORDER — SERTRALINE HYDROCHLORIDE 100 MG/1
100 TABLET, FILM COATED ORAL DAILY
Qty: 30 TABLET | Refills: 0 | Status: SHIPPED | OUTPATIENT
Start: 2024-02-27

## 2024-02-27 NOTE — TELEPHONE ENCOUNTER
Chief Complaint   Patient presents with   • Hypertension     Pt states he needs refill on Lisinopril.       HPI  Danny Vega is a 47 y.o. male presents today for follow up for chronic problems refills.      HTN  Currently takes lisinopril.  Takes medications as prescribed without missed doses, reports no episodes of hypotension or any additional adverse effects from medications(s);  BP's at home normal. Denies chest pain/chest pressure or discomfort, shortness of breath with exertion, headaches, palpitations, irregular heart beat, tachycardia, lower extremity edema, orthopnea, near-syncope.  He had an EKG for his work physical and Dr. Mendiola felt he needed a stress test because he had Q waves.       Chronic problems: htn stable with lisinopril, hyperlipidemia stable with omega 3 fatty acids, Has sleep apnea and uses CPAP, sleeps about 4-6 hours a night     PCP:  Ronit Mccabe, DNP, APRN    No Known Allergies    Past Medical History:   Diagnosis Date   • GERD (gastroesophageal reflux disease)    • Hyperlipidemia    • Hypertension        Past Surgical History:   Procedure Laterality Date   • APPENDECTOMY         Social History     Social History   • Marital status:      Social History Main Topics   • Smoking status: Never Smoker   • Smokeless tobacco: Never Used   • Alcohol use No   • Drug use: No   • Sexual activity: Defer     Other Topics Concern   • Not on file       Family History   Problem Relation Age of Onset   • Hypertension Mother    • Hypertension Father    • No Known Problems Sister    • No Known Problems Daughter    • Diabetes Maternal Grandmother    • Hypertension Maternal Grandmother    • Dementia Maternal Grandmother    • Cancer Maternal Grandfather    • Diabetes Paternal Grandmother    • Cancer Paternal Grandfather        Current Outpatient Prescriptions on File Prior to Visit   Medication Sig Dispense Refill   • aspirin 81 MG EC tablet Take 81 mg by mouth Daily.     • glucosamine  Attempted to speak with patient, left vm message   "sulfate 500 MG capsule capsule Take  by mouth Daily.     • lisinopril (PRINIVIL,ZESTRIL) 10 MG tablet Take 1 tablet by mouth Daily. 90 tablet 1   • Multiple Vitamins-Minerals (MULTIVITAMIN PO) Take  by mouth Daily.     • naltrexone-bupropion ER (CONTRAVE) 8-90 MG tablet Take 2 tablets by mouth 2 (Two) Times a Day. 360 tablet 1   • Omega-3 Fatty Acids (FISH OIL) 1000 MG capsule capsule Take  by mouth Daily.     • Probiotic Product (PROBIOTIC DAILY PO) Take  by mouth Daily.     • RABEprazole (ACIPHEX) 20 MG EC tablet Take 1 tablet by mouth Daily. 90 tablet 1     No current facility-administered medications on file prior to visit.         REVIEW OF SYMPTOMS: (Positives bolded)   General:  weight loss, fever, chills, night sweats, fatigue, appetite loss  HEENT:  blurry vision, eye pain, eye discharge, dry eyes, decreased vision  Respiratory: shortness of breath, cough, hemoptysis, wheezing, pleurisy,   Cardiovascular:  chest pain, PND, palpitation, edema, orthopnea, syncope, swelling of extremities  Gastro: Nausea, vomiting, diarrhea, hematemesis, abdominal pain, constipation  Genito: hematuria, dysuria, glycosuria, hesitancy, frequency, incontinence  Musckelo: Arthralgia, myalgia, muscle weakness, joint swelling, NSAID use  Skin: rash, pruritis, sores, nail changes, skin thickening, change in wart/mole, itching, rash, new lesions, pruritus, nail changes  Neuro:  Migraine, numbness, ataxia, tremor, vertigo, weakness, memory loss  \"All other systems reviewed and negative, except as listed above.”      OBJECTIVE:  Constitutional:  Appearance-No acute distress, Consistent with stated age. Orientation- Oriented x 3, alert Posture-Not doubled over. Gait-Normal pace, normal arm movement. Posture- Normal Build and Nutrition-Well developed and well nourished.  General- Patient is pleasant and cooperative with the interview and exam.    Integumentary: General-No rashes, ulcers or lesions. No edema.  Palpation- Normal skin " moisture/turgor. Skin is warm to touch, no increased warmth. Capillary refill is normal bilateral Upper and lower extremity.     Head/Neck: Head- normocephalic and atraumatic.  Neck- without visible/palpable lumps or pulsations.  Palpation- No bony tenderness about head/neck along frontal, occiptial, temporal, parietal, mastoid, jawline, zygoma, orbit or any other location.  NO temporal artery tenderness. No TMJ tenderness. Normal cervical ROM.   Neck Supple.  Thyroid-No thyromegaly, no nodules    Eye: Bilaterally PERRLA, EOMI.  No discharge.  Upper and lower eyelids are normal. Sclera/conjunctiva normal without discharge. Cornea is normal and clear. Lens is normal.  Eyeball appears normal. No ciliary flushing, no conjunctival injection.    ENMT:  Pinna- normal without tenderness or erythema.  External auditory canal Left- normal without erythema or discharge, no excessive cerumen. External auditory canal Right-normal without erythema or discharge, no excessive cerumen. TM left- Grey/pearly, normal light reflex and anatomy TM Right- Grey/pearly, normal light reflex and anatomy Hearing Assessment-normal to conversational speech at 2-5 feet.  Nose and sinus-No sinus tenderness along frontal/maxillary region. External appearance normal and midline. Nares- bilateral quiet airflow, no discharge. Nasal mucosa- No bleeding noted and no ulcerations observed. Pink, moist. Turbinates non boggy. Lips- normal color, moist without cracks/lesions Oral Cavity/Palate- hard/soft palate intact without lesions, oral mucosa pink and moist. Dentition assessed and discussed appropriate oral care. Tongue normal midline.  Oropharynx- no pharyngeal erythema, Uvula midline. No post nasal drip. No exudate. Salivary glands- Non tender to palpation    CHEST/LUNG: Inspection- symmetric chest wall no pectus deformity. Normal effort, no distress, no use of accessory muscles. Palpation- nontender sternum, ribline.  No abnormal pulsations.  Auscultation- Breath sounds normal throughout all lung fields.  Normal tracheal sounds, Normal bronchial sounds overlying sternum, Bronchovessicular sounds normal between scapulae posteriorly, Normal vessicular breath sounds heard throughout periphery. Lungs are clear today. Adventitious sounds- No wheezes, rales, rhonchi.     CARDIOVASCULAR:  Carotid artery- normal, no bruits or abnormal pulsations. Jugular vein- no pulsations. Palpation/Percussion- Normal PMI, no palpable thrill  Auscultation- Regular rate and rhythm. No murmur noted in sitting, supine positions. Extremities- no digital clubbing, cyanosis, edema, increased warmth.    ABDOMEN: Inspection- normal and no visible pulsations. Normal contour. Auscultation- Bowel sounds normal, no abdominal bruits. Palpation/Percussion- soft, non-tender, no rebound tenderness, no rigidity (guarding), no jar tenderness, no masses.  Liver-no hepatomegaly, Spleen - no splenomegaly, Hernias- none. Rectal not examined.     Peripheral Vascular: Upper extremity Left- Normal temperature with pink nailbeds and no ulcerations.  Upper extremity Right- Normal temperature with pink nailbeds and no ulcerations.  Lower extremity- Normal temperature with pink nailbeds and no ulcerations. DP pulses 2+ bilaterally.  Pedal hair intact.  Normal capillary refill. Edema- No edema.      Neurological: General- Moves all 4 extremities symmetrically. Symmetrical face and body posture. Cranial nerves- individually evaluated II-XII and intact. PERRLA, Normal EOMI, visual/special senses appear intact, Face is symmetrical and normal sensation/movement, normal tongue, normal strength/posture of neck musculature. Balance- Romberg intact.  Reflexes- ntact with DTR 2+ patellar, Achilles, bicep, brachial,tricep. Ankle clonus normal with 2 beats.  Strength- 5/5 bilateral UE and LE. Soft touch- intact bilateral UE and LE.  Temperature sensation- intact bilateral UE and LE. Cerebellar testing-Rapid  alternating movements intact.  Heel shin intact. Able to walk normal gait, normal heel toe walking. Neck- supple.      Neuropsych: Oriented- Person, place, time. (AAOx3), Mood/affect- normal and congruent. Able to articulate well. Speech-Normal speech, normal rate, normal tone, normal use of language, volume and coherence.  Thought content- normal with ability to perform basic computations and apply abstract thought/reason. Associations- intact, no SI/HI, no hallucinations, delusions, obsessions.  Judgment/insight- Appropriate. Memory-Recall intact, remote and recent memory intact. Knowledge- Age appropriate fund of knowledge, concentration and attention span normal.    Lymphatic: Head/Neck- normal size and non tender to palpation. Axillary- Head and neck LN are normal size and non tender to palpation. Femoral and Inguinal- normal size and non tender to palpation.      Assessment/Plan:  Danny was seen today for hypertension.    Diagnoses and all orders for this visit:    Essential hypertension  -     lisinopril (PRINIVIL,ZESTRIL) 10 MG tablet; Take 1 tablet by mouth Daily.  -     ECG 12 Lead    Gastroesophageal reflux disease without esophagitis  -     RABEprazole (ACIPHEX) 20 MG EC tablet; Take 1 tablet by mouth Daily.    Mixed hyperlipidemia  -     Omega-3 Fatty Acids (FISH OIL) 1000 MG capsule capsule; Take 2 capsules by mouth Daily With Breakfast.    Abnormal EKG  -     ECG 12 Lead  -       ECG 12 Lead  Date/Time: 6/22/2018 8:03 AM  Performed by: GEE LONG  Authorized by: GEE LONG   Comparison: compared with previous ECG from 4/23/2018  Similar to previous ECG  Comparison to previous ECG: From [facility], no change  Rhythm: sinus rhythm  Rate: normal  Conduction: conduction normal  ST Segments: ST segments normal  T Waves: T waves normal  QRS axis: normal  Clinical impression: normal ECG              Other orders  I cancelled order because he just had labs at work in April (CBC, CMP, lipid,  PSA, UA, EKG  -     Cancel: CBC & Differential  -     Cancel: Comprehensive metabolic panel  -     Cancel: Lipid panel      Morbid obesity:  BMI:   42.3    Weight:    312    Follow up plan:  Lose at least 3 pounds before next chronic visit, exercise at least 3 times a week for 30 minute increments, eat baked instead of fried foods, and eat healthier     -  Documentation of education   The patient BMI is outside this range and we recommended/discussed today to utilize a diet/exercise program to get back into the appropriate range.  Federal guidelines recommend that people under the age of 65 should have a BMI of 18.5-24.9  Food diary:  Bring to next visit  tial step is to document everything that is consumed. Pt's that have a food diary  Lose 2x the weight  by keeping track of foods.   Choose one bad food weekly and eliminate it from your diet.  Replace with one healthy food  Exercise diary: Goal over next 2-4 weeks is walk 30 minutes per day 5 days per week at pace difficult to hold conversation.   Drink more water, less soda.   Cut back on portion sizes.   Today we encouraged roughly a 1 lb per week weight loss with initial goal of 5% weight loss.  Avoid fast foods, eat more salads  If eating out for a meal, consider cutting food in half and placing into a to go container.  Individually portion any foods coming into the home   Use smaller plates  Drink 12 ozof water 30 minutes before meal as way to suppress appetite.  Discussed Contrave, metformin, topamax, Belviq and the cost of medications  Encouraged internet programs or self help books  Set  Goals that are realistic   Educated on ways to measure food  Baseball: 1 cup good for green salad, frozen yogurt, medium piece of fruit  Handful:  ½ cup good cut fruit, cooked vegetables, pasta, rice  Egg:  ¼ cup good for dried fruit  Deck of cards: 3 ounces good for meat and poultry  Check book:  3 ounces grilled fish  Plate Method:  reduce plate size to 9 inch dinner  plate.  Half of plate should be filled with non-starchy vegetables (broccoli, lettuce, cauliflower, tomatoes), ¼ plate with lean source of protein (lean chicken, turkey, fish), remaining ½ with whole grains (brown rice, potato, whole grain breads  Avoid liquid calories (regular soda, juice, coffee with cream)  Focus  on water, seltzer water and other non-calorie drinks  Replace regular sugar with non-caloric sweeteners  Avoid skipping meals: plan small regular meals throughout the day in order to keep your hunger controlled  Consider using meal replacements if unable to plan a healthy meal (protein shake, high protein bar)  Replace all white bread with whole wheat/whole grain alternative  Swap regular salad dressings (mayonnaise, butter, or low fat or fat free alternative)  Avoid high fat, high calorie, high carbohydrate snakes (cookies, pastries, cakes)  Snack on fruits, low fat dairy (yogurt, cottage cheese)          Management plan:  Take medications as prescribed; return to the clinic of new or worsening concerns.       Risks/benefits of current and new medications discussed with the patient and or family today.  The patient/family are aware and accept that if there any side effects they should call or return to clinic as soon as possible.  Appropriate F/U discussed for topics addressed today. All questions were answered to the satisfactory state of patient/family.  Should symptoms fail to improve or worsen they agree to call or return to clinic or to go to the ER. Education handouts were offered on any new Rx if requested.  Discussed the importance of following up with any needed screening tests/labs/specialist appointments and any requested follow-up recommended by me today.  Importance of maintaining follow-up discussed and patient accepts that missed appointments can delay diagnosis and potentially lead to worsening of conditions.    Return in about 6 months (around 12/22/2018) for Recheck HTN, hyperlipidia  .    Ronit Mccabe, YURIY, APRN  6/22/2018

## 2024-02-28 NOTE — TELEPHONE ENCOUNTER
Called patient and left a message to call the office.     Sent patient a [employer] message.

## 2024-04-02 DIAGNOSIS — F32.2 SEVERE DEPRESSION (HCC): ICD-10-CM

## 2024-04-02 RX ORDER — SERTRALINE HYDROCHLORIDE 100 MG/1
100 TABLET, FILM COATED ORAL DAILY
Qty: 90 TABLET | Refills: 0 | Status: SHIPPED | OUTPATIENT
Start: 2024-04-02

## 2024-05-09 ENCOUNTER — NURSE ONLY (OUTPATIENT)
Dept: LAB | Age: 39
End: 2024-05-09

## 2024-05-11 LAB
SOURCE: NORMAL
TRICHOMONAS VAGINALI, MOLECULAR: NEGATIVE

## 2024-05-14 LAB
C. TRACHOMATIS DNA,THIN PREP: NEGATIVE
N. GONORRHOEAE DNA, THIN PREP: NEGATIVE
SOURCE: NORMAL

## 2024-05-16 ENCOUNTER — OFFICE VISIT (OUTPATIENT)
Dept: ONCOLOGY | Age: 39
End: 2024-05-16
Payer: MEDICARE

## 2024-05-16 ENCOUNTER — OFFICE VISIT (OUTPATIENT)
Dept: PULMONOLOGY | Age: 39
End: 2024-05-16

## 2024-05-16 ENCOUNTER — HOSPITAL ENCOUNTER (OUTPATIENT)
Dept: INFUSION THERAPY | Age: 39
Discharge: HOME | End: 2024-05-16
Payer: MEDICARE

## 2024-05-16 VITALS
RESPIRATION RATE: 20 BRPM | SYSTOLIC BLOOD PRESSURE: 124 MMHG | HEART RATE: 63 BPM | DIASTOLIC BLOOD PRESSURE: 70 MMHG | TEMPERATURE: 98.4 F

## 2024-05-16 VITALS
HEART RATE: 63 BPM | OXYGEN SATURATION: 98 % | TEMPERATURE: 98.4 F | SYSTOLIC BLOOD PRESSURE: 124 MMHG | RESPIRATION RATE: 20 BRPM | HEIGHT: 63 IN | BODY MASS INDEX: 51.91 KG/M2 | WEIGHT: 293 LBS | DIASTOLIC BLOOD PRESSURE: 70 MMHG

## 2024-05-16 VITALS
TEMPERATURE: 98.1 F | SYSTOLIC BLOOD PRESSURE: 118 MMHG | DIASTOLIC BLOOD PRESSURE: 70 MMHG | HEART RATE: 66 BPM | OXYGEN SATURATION: 100 % | BODY MASS INDEX: 51.91 KG/M2 | WEIGHT: 293 LBS | HEIGHT: 63 IN

## 2024-05-16 DIAGNOSIS — D50.0 IRON DEFICIENCY ANEMIA DUE TO CHRONIC BLOOD LOSS: Primary | ICD-10-CM

## 2024-05-16 DIAGNOSIS — E66.01 MORBID OBESITY WITH BMI OF 70 AND OVER, ADULT (HCC): ICD-10-CM

## 2024-05-16 DIAGNOSIS — I50.32 CHRONIC DIASTOLIC CONGESTIVE HEART FAILURE (HCC): ICD-10-CM

## 2024-05-16 DIAGNOSIS — D50.0 IRON DEFICIENCY ANEMIA DUE TO CHRONIC BLOOD LOSS: ICD-10-CM

## 2024-05-16 DIAGNOSIS — J96.11 CHRONIC RESPIRATORY FAILURE WITH HYPOXIA (HCC): ICD-10-CM

## 2024-05-16 DIAGNOSIS — J44.89 ASTHMA-COPD OVERLAP SYNDROME (HCC): Primary | ICD-10-CM

## 2024-05-16 LAB
ALBUMIN SERPL BCG-MCNC: 3.5 G/DL (ref 3.5–5.1)
ALP SERPL-CCNC: 52 U/L (ref 38–126)
ALT SERPL W/O P-5'-P-CCNC: 14 U/L (ref 11–66)
AST SERPL-CCNC: 15 U/L (ref 5–40)
BASOPHILS ABSOLUTE: 0 THOU/MM3 (ref 0–0.1)
BASOPHILS NFR BLD AUTO: 1 % (ref 0–3)
BILIRUB CONJ SERPL-MCNC: < 0.2 MG/DL (ref 0–0.3)
BILIRUB SERPL-MCNC: 0.4 MG/DL (ref 0.3–1.2)
BUN BLDP-MCNC: 14 MG/DL (ref 8–26)
CHLORIDE BLD-SCNC: 107 MEQ/L (ref 98–109)
CREAT BLD-MCNC: 0.5 MG/DL (ref 0.5–1.2)
EOSINOPHIL NFR BLD AUTO: 4 % (ref 0–4)
EOSINOPHILS ABSOLUTE: 0.2 THOU/MM3 (ref 0–0.4)
ERYTHROCYTE [DISTWIDTH] IN BLOOD BY AUTOMATED COUNT: 14.4 % (ref 11.5–14.5)
FERRITIN SERPL IA-MCNC: 76 NG/ML (ref 10–291)
GFR SERPL CREATININE-BSD FRML MDRD: > 90 ML/MIN/1.73M2
GLUCOSE BLD-MCNC: 73 MG/DL (ref 70–108)
HCT VFR BLD AUTO: 41.7 % (ref 37–47)
HGB BLD-MCNC: 13.5 GM/DL (ref 12–16)
IMMATURE GRANULOCYTES %: 0 %
IMMATURE GRANULOCYTES ABSOLUTE: 0.01 THOU/MM3 (ref 0–0.07)
IONIZED CALCIUM, WHOLE BLOOD: 1.18 MMOL/L (ref 1.12–1.32)
IRON SATN MFR SERPL: 15 % (ref 20–50)
IRON SERPL-MCNC: 43 UG/DL (ref 50–170)
LYMPHOCYTES ABSOLUTE: 1.6 THOU/MM3 (ref 1–4.8)
LYMPHOCYTES NFR BLD AUTO: 25 % (ref 15–47)
MCH RBC QN AUTO: 27.5 PG (ref 26–33)
MCHC RBC AUTO-ENTMCNC: 32.4 GM/DL (ref 32.2–35.5)
MCV RBC AUTO: 85 FL (ref 81–99)
MONOCYTES ABSOLUTE: 0.4 THOU/MM3 (ref 0.4–1.3)
MONOCYTES NFR BLD AUTO: 7 % (ref 0–12)
NEUTROPHILS ABSOLUTE: 4.2 THOU/MM3 (ref 1.8–7.7)
NEUTROPHILS NFR BLD AUTO: 64 % (ref 43–75)
PLATELET # BLD AUTO: 184 THOU/MM3 (ref 130–400)
PMV BLD AUTO: 10.3 FL (ref 9.4–12.4)
POTASSIUM BLD-SCNC: 4 MEQ/L (ref 3.5–4.9)
PROT SERPL-MCNC: 7.1 G/DL (ref 6.1–8)
RBC # BLD AUTO: 4.91 MILL/MM3 (ref 4.2–5.4)
SODIUM BLD-SCNC: 140 MEQ/L (ref 138–146)
TIBC SERPL-MCNC: 289 UG/DL (ref 171–450)
TOTAL CO2, WHOLE BLOOD: 24 MEQ/L (ref 23–33)
WBC # BLD AUTO: 6.5 THOU/MM3 (ref 4.8–10.8)

## 2024-05-16 PROCEDURE — 80047 BASIC METABLC PNL IONIZED CA: CPT

## 2024-05-16 PROCEDURE — 83540 ASSAY OF IRON: CPT

## 2024-05-16 PROCEDURE — 36415 COLL VENOUS BLD VENIPUNCTURE: CPT

## 2024-05-16 PROCEDURE — 85025 COMPLETE CBC W/AUTO DIFF WBC: CPT

## 2024-05-16 PROCEDURE — 82728 ASSAY OF FERRITIN: CPT

## 2024-05-16 PROCEDURE — 80076 HEPATIC FUNCTION PANEL: CPT

## 2024-05-16 PROCEDURE — 99214 OFFICE O/P EST MOD 30 MIN: CPT | Performed by: NURSE PRACTITIONER

## 2024-05-16 PROCEDURE — 99211 OFF/OP EST MAY X REQ PHY/QHP: CPT

## 2024-05-16 PROCEDURE — 83550 IRON BINDING TEST: CPT

## 2024-05-16 RX ORDER — ALBUTEROL SULFATE 90 UG/1
2 AEROSOL, METERED RESPIRATORY (INHALATION) EVERY 4 HOURS PRN
Qty: 54 G | Refills: 3 | Status: SHIPPED | OUTPATIENT
Start: 2024-05-16

## 2024-05-16 ASSESSMENT — ENCOUNTER SYMPTOMS
EYES NEGATIVE: 1
ALLERGIC/IMMUNOLOGIC NEGATIVE: 1
SHORTNESS OF BREATH: 1
WHEEZING: 0
GASTROINTESTINAL NEGATIVE: 1

## 2024-05-16 NOTE — PATIENT INSTRUCTIONS
Iron panel pending  At this tme, no need for IV iron  Please make MFM aware of Iron deficiency anemia

## 2024-05-16 NOTE — PROGRESS NOTES
[facility]  [facility]  [address]  [place] [id number]  Dept: [phone]  Loc: [phone]       Visit Date:5/16/2024     Anay Toledo is a 38 y.o. female who presents today for:   Chief Complaint   Patient presents with    Follow-up     Iron deficiency anemia due to chronic blood loss        HPI:   Anay Toledo is a 38 y.o. female  with iron deficiency anemia. She was started on treatment with ferrous sulfate by mouth twice daily in September of 2022.     12/08/2022 iron study results revealed iron level 27, TIBC 341, iron saturation 8% and ferritin level 36. CBC results revealed WBCs 8, Hgb 11.8, HCT 41.4%, MCV 74, RDW 22% and platelet count 242,000.  BMP, GFR, erythropoietin level all within normal limits.  Haptoglobin and LDH only slightly elevated.  Vitamin B12 and folate level within normal limits.  Hepatic function panel within normal limits.     12/14/2022 and 12/21/2022 the patient received treatment with Injectafer 750 mg IV.     01/19/2023 iron studies reveal iron level 51, TIBC 302, iron saturation 17% and ferritin level 282.  CBC results reveal WBC 7.18, Hgb improved to 14.1 compared to her previous value of 11.8, HCT 47.3% and platelet count 204,000.  The patient typically wears O2, but reports she is trying to wean herself off.  Pulse ox stable at 96% on room air.        04/20/2023 Iron study results reveal iron level 44, TIBC 247, Iron saturation 18% and Ferritin level 154.  GFR and BMP results are within normal limits.  CBC results reveal WBC 8.6, Hgb 14.6, HCT 46.9% and platelet count 219,000.    11/16/2023: iron level 42, TIBC 302, iron saturation 14% and ferritin level 123.  GFR BMP results within normal limits.  WBC 8.9, Hgb 14.4, HCT 46.1% and platelet count 260,000.      PMH: morbid obesity, CHF, GERD, asthma, anxiety and depression, sleep apnea and uses a CPAP machine, O2 use at home, heavy menstrual periods,

## 2024-05-16 NOTE — PROGRESS NOTES
[facility]    Patient: ANGELICA HENRY, 38 y.o.   : 1985    Pt of Dr. Noble     Subjective     Chief Complaint   Patient presents with    Follow-up     Asthma 1 year f/u with no testing.         HPI  Angelica is here for follow up for her Asthma.    Currently she is on NO medications  Denies any pulmonary issues or concerns today   Currently 10 weeks pregnant- considered high risk pregnancy per patient and her .   SOB only with exertion   MO BMI 77  Patient wants to get rid of her oxygen but she was needing 3LPM with activity on last walk in the office. States she doesn't use because she isn't SOB; discussed the purpose of oxygen is to treat hypoxia not SOB. Will reassess oxygen needs today.   Hx of CHF - was following with Dr. Zavala   Follows with [facility] for her SHANTE  SANTIAGO on treatment with BiPAP- overnight study done on room air on PAP no supplemental oxygen needed   States she only leaves the house for appointments     Asthma control (Severity) questionnaire:  Asthma symptoms:  > 2 times per week -> No   Night time awakenings: > 2 times per month -> No  Use of short acting beta agonist for symptoms control (Other than pre exercise use) :> 2times per week  -> No  Interference with normal activity  -> mild  Lung function: Fev1 >60% Predicted  -> Yes  Number of exacerbations per year: > 2 -> No    Progress History:   Since last visit any new medical issues? No  New ER or hospital visits? No  Any new or changes in medicines? No  Using inhalers? Yes - rare use   Are they helpful? Yes   Immunization History   Administered Date(s) Administered    COVID-19, MODERNA BLUE border, Primary or Immunocompromised, (age 12y+), IM, 100 mcg/0.5mL 2021, 2022    TDaP, ADACEL (age 10y-64y), BOOSTRIX (age 10y+), IM, 0.5mL 2017       Tobacco Use      Smoking status: Former        Packs/day: 0.00        Types: Cigarettes        Quit date:

## 2024-05-17 ASSESSMENT — ENCOUNTER SYMPTOMS
SHORTNESS OF BREATH: 1
NAUSEA: 1

## 2024-05-20 ENCOUNTER — PATIENT MESSAGE (OUTPATIENT)
Dept: ONCOLOGY | Age: 39
End: 2024-05-20

## 2024-05-20 NOTE — TELEPHONE ENCOUNTER
From: Anay Toledo  To: Marlene Lou  Sent: 5/20/2024 1:08 PM EDT  Subject: Hello    I heard back from my OB. She said she is fine with me having the injectors done.

## 2024-05-22 DIAGNOSIS — D50.8 OTHER IRON DEFICIENCY ANEMIAS: ICD-10-CM

## 2024-05-22 DIAGNOSIS — Z3A.11 11 WEEKS GESTATION OF PREGNANCY: Primary | ICD-10-CM

## 2024-05-22 DIAGNOSIS — D50.9 IRON DEFICIENCY ANEMIA, UNSPECIFIED IRON DEFICIENCY ANEMIA TYPE: ICD-10-CM

## 2024-05-22 RX ORDER — SODIUM CHLORIDE 9 MG/ML
5-250 INJECTION, SOLUTION INTRAVENOUS PRN
Status: CANCELLED | OUTPATIENT
Start: 2024-06-06

## 2024-05-22 RX ORDER — DIPHENHYDRAMINE HYDROCHLORIDE 50 MG/ML
50 INJECTION INTRAMUSCULAR; INTRAVENOUS
Status: CANCELLED | OUTPATIENT
Start: 2024-06-06

## 2024-05-22 RX ORDER — ONDANSETRON 2 MG/ML
8 INJECTION INTRAMUSCULAR; INTRAVENOUS
Status: CANCELLED | OUTPATIENT
Start: 2024-06-06

## 2024-05-22 RX ORDER — SODIUM CHLORIDE 9 MG/ML
INJECTION, SOLUTION INTRAVENOUS CONTINUOUS
Status: CANCELLED | OUTPATIENT
Start: 2024-06-06

## 2024-05-22 RX ORDER — SODIUM CHLORIDE 9 MG/ML
5-250 INJECTION, SOLUTION INTRAVENOUS PRN
OUTPATIENT
Start: 2024-06-06

## 2024-05-22 RX ORDER — ACETAMINOPHEN 325 MG/1
650 TABLET ORAL
Status: CANCELLED | OUTPATIENT
Start: 2024-06-06

## 2024-05-22 RX ORDER — SODIUM CHLORIDE 9 MG/ML
INJECTION, SOLUTION INTRAVENOUS CONTINUOUS
OUTPATIENT
Start: 2024-06-06

## 2024-05-22 RX ORDER — HEPARIN SODIUM (PORCINE) LOCK FLUSH IV SOLN 100 UNIT/ML 100 UNIT/ML
500 SOLUTION INTRAVENOUS PRN
OUTPATIENT
Start: 2024-06-06

## 2024-05-22 RX ORDER — SODIUM CHLORIDE 0.9 % (FLUSH) 0.9 %
5-40 SYRINGE (ML) INJECTION PRN
Status: CANCELLED | OUTPATIENT
Start: 2024-06-06

## 2024-05-22 RX ORDER — EPINEPHRINE 1 MG/ML
0.3 INJECTION, SOLUTION, CONCENTRATE INTRAVENOUS PRN
OUTPATIENT
Start: 2024-06-06

## 2024-05-22 RX ORDER — FAMOTIDINE 10 MG/ML
20 INJECTION, SOLUTION INTRAVENOUS
OUTPATIENT
Start: 2024-06-06

## 2024-05-22 RX ORDER — ALBUTEROL SULFATE 90 UG/1
4 AEROSOL, METERED RESPIRATORY (INHALATION) PRN
OUTPATIENT
Start: 2024-06-06

## 2024-05-22 RX ORDER — HEPARIN SODIUM (PORCINE) LOCK FLUSH IV SOLN 100 UNIT/ML 100 UNIT/ML
500 SOLUTION INTRAVENOUS PRN
Status: CANCELLED | OUTPATIENT
Start: 2024-06-06

## 2024-05-22 RX ORDER — ONDANSETRON 2 MG/ML
8 INJECTION INTRAMUSCULAR; INTRAVENOUS
OUTPATIENT
Start: 2024-06-06

## 2024-05-22 RX ORDER — EPINEPHRINE 1 MG/ML
0.3 INJECTION, SOLUTION, CONCENTRATE INTRAVENOUS PRN
Status: CANCELLED | OUTPATIENT
Start: 2024-06-06

## 2024-05-22 RX ORDER — ALBUTEROL SULFATE 90 UG/1
4 AEROSOL, METERED RESPIRATORY (INHALATION) PRN
Status: CANCELLED | OUTPATIENT
Start: 2024-06-06

## 2024-05-22 RX ORDER — ACETAMINOPHEN 325 MG/1
650 TABLET ORAL
OUTPATIENT
Start: 2024-06-06

## 2024-05-22 RX ORDER — SODIUM CHLORIDE 0.9 % (FLUSH) 0.9 %
5-40 SYRINGE (ML) INJECTION PRN
OUTPATIENT
Start: 2024-06-06

## 2024-05-22 RX ORDER — FAMOTIDINE 10 MG/ML
20 INJECTION, SOLUTION INTRAVENOUS
Status: CANCELLED | OUTPATIENT
Start: 2024-06-06

## 2024-05-22 RX ORDER — DIPHENHYDRAMINE HYDROCHLORIDE 50 MG/ML
50 INJECTION INTRAMUSCULAR; INTRAVENOUS
OUTPATIENT
Start: 2024-06-06

## 2024-05-24 LAB — CYTOLOGY THIN PREP PAP: NORMAL

## 2024-05-31 ENCOUNTER — PATIENT MESSAGE (OUTPATIENT)
Dept: ONCOLOGY | Age: 39
End: 2024-05-31

## 2024-05-31 NOTE — TELEPHONE ENCOUNTER
From: Anay Toledo  To: Marlene Lou  Sent: 5/31/2024 11:39 AM EDT  Subject: Iron    I was referred to a high risk doctor in [place]. He asked about my medical history and I explained I would be getting iron infusions next week. He wanted to test for that to make sure I needed it. His messages said that my iron studies were fine. I enclosed one of the results. Do I still need to have the infusions done?

## 2024-06-02 ENCOUNTER — APPOINTMENT (OUTPATIENT)
Dept: GENERAL RADIOLOGY | Age: 39
End: 2024-06-02
Payer: MEDICARE

## 2024-06-02 ENCOUNTER — HOSPITAL ENCOUNTER (EMERGENCY)
Age: 39
Discharge: HOME | End: 2024-06-02
Attending: STUDENT IN AN ORGANIZED HEALTH CARE EDUCATION/TRAINING PROGRAM
Payer: MEDICARE

## 2024-06-02 VITALS
DIASTOLIC BLOOD PRESSURE: 84 MMHG | RESPIRATION RATE: 25 BRPM | HEIGHT: 63 IN | WEIGHT: 293 LBS | OXYGEN SATURATION: 99 % | HEART RATE: 74 BPM | TEMPERATURE: 98.8 F | BODY MASS INDEX: 51.91 KG/M2 | SYSTOLIC BLOOD PRESSURE: 129 MMHG

## 2024-06-02 DIAGNOSIS — R06.00 DYSPNEA, UNSPECIFIED TYPE: Primary | ICD-10-CM

## 2024-06-02 LAB
ALBUMIN SERPL BCG-MCNC: 3.4 G/DL (ref 3.5–5.1)
ALP SERPL-CCNC: 49 U/L (ref 38–126)
ALT SERPL W/O P-5'-P-CCNC: 16 U/L (ref 11–66)
ANION GAP SERPL CALC-SCNC: 8 MEQ/L (ref 8–16)
AST SERPL-CCNC: 15 U/L (ref 5–40)
BASOPHILS ABSOLUTE: 0 THOU/MM3 (ref 0–0.1)
BASOPHILS NFR BLD AUTO: 0.3 %
BILIRUB SERPL-MCNC: 0.4 MG/DL (ref 0.3–1.2)
BUN SERPL-MCNC: 11 MG/DL (ref 7–22)
CALCIUM SERPL-MCNC: 8.8 MG/DL (ref 8.5–10.5)
CHLORIDE SERPL-SCNC: 104 MEQ/L (ref 98–111)
CO2 SERPL-SCNC: 23 MEQ/L (ref 23–33)
CREAT SERPL-MCNC: 0.5 MG/DL (ref 0.4–1.2)
D DIMER PPP IA.FEU-MCNC: 624 NG/ML FEU (ref 0–500)
DEPRECATED RDW RBC AUTO: 45.7 FL (ref 35–45)
EKG ATRIAL RATE: 70 BPM
EKG P AXIS: 70 DEGREES
EKG P-R INTERVAL: 170 MS
EKG Q-T INTERVAL: 416 MS
EKG QRS DURATION: 104 MS
EKG QTC CALCULATION (BAZETT): 449 MS
EKG R AXIS: 49 DEGREES
EKG T AXIS: 40 DEGREES
EKG VENTRICULAR RATE: 70 BPM
EOSINOPHIL NFR BLD AUTO: 4.5 %
EOSINOPHILS ABSOLUTE: 0.3 THOU/MM3 (ref 0–0.4)
ERYTHROCYTE [DISTWIDTH] IN BLOOD BY AUTOMATED COUNT: 14.5 % (ref 11.5–14.5)
FLUAV RNA RESP QL NAA+PROBE: NOT DETECTED
FLUBV RNA RESP QL NAA+PROBE: NOT DETECTED
GFR SERPL CREATININE-BSD FRML MDRD: > 90 ML/MIN/1.73M2
GLUCOSE SERPL-MCNC: 85 MG/DL (ref 70–108)
HCT VFR BLD AUTO: 42.1 % (ref 37–47)
HGB BLD-MCNC: 13 GM/DL (ref 12–16)
IMM GRANULOCYTES # BLD AUTO: 0.02 THOU/MM3 (ref 0–0.07)
IMM GRANULOCYTES NFR BLD AUTO: 0.3 %
LYMPHOCYTES ABSOLUTE: 1.2 THOU/MM3 (ref 1–4.8)
LYMPHOCYTES NFR BLD AUTO: 20.9 %
MCH RBC QN AUTO: 26.5 PG (ref 26–33)
MCHC RBC AUTO-ENTMCNC: 30.9 GM/DL (ref 32.2–35.5)
MCV RBC AUTO: 85.9 FL (ref 81–99)
MONOCYTES ABSOLUTE: 0.4 THOU/MM3 (ref 0.4–1.3)
MONOCYTES NFR BLD AUTO: 7.4 %
NEUTROPHILS ABSOLUTE: 3.9 THOU/MM3 (ref 1.8–7.7)
NEUTROPHILS NFR BLD AUTO: 66.6 %
NRBC BLD AUTO-RTO: 0 /100 WBC
NT-PROBNP SERPL IA-MCNC: 262.5 PG/ML (ref 0–124)
OSMOLALITY SERPL CALC.SUM OF ELEC: 268.8 MOSMOL/KG (ref 275–300)
PLATELET # BLD AUTO: 180 THOU/MM3 (ref 130–400)
PMV BLD AUTO: 10.5 FL (ref 9.4–12.4)
POTASSIUM SERPL-SCNC: 3.9 MEQ/L (ref 3.5–5.2)
PROT SERPL-MCNC: 6.5 G/DL (ref 6.1–8)
RBC # BLD AUTO: 4.9 MILL/MM3 (ref 4.2–5.4)
SARS-COV-2 RNA RESP QL NAA+PROBE: NOT DETECTED
SODIUM SERPL-SCNC: 135 MEQ/L (ref 135–145)
TROPONIN, HIGH SENSITIVITY: 16 NG/L (ref 0–12)
TROPONIN, HIGH SENSITIVITY: 17 NG/L (ref 0–12)
WBC # BLD AUTO: 5.8 THOU/MM3 (ref 4.8–10.8)

## 2024-06-02 PROCEDURE — 83880 ASSAY OF NATRIURETIC PEPTIDE: CPT

## 2024-06-02 PROCEDURE — 85025 COMPLETE CBC W/AUTO DIFF WBC: CPT

## 2024-06-02 PROCEDURE — 87636 SARSCOV2 & INF A&B AMP PRB: CPT

## 2024-06-02 PROCEDURE — 85379 FIBRIN DEGRADATION QUANT: CPT

## 2024-06-02 PROCEDURE — 84484 ASSAY OF TROPONIN QUANT: CPT

## 2024-06-02 PROCEDURE — 99285 EMERGENCY DEPT VISIT HI MDM: CPT

## 2024-06-02 PROCEDURE — 6370000000 HC RX 637 (ALT 250 FOR IP): Performed by: STUDENT IN AN ORGANIZED HEALTH CARE EDUCATION/TRAINING PROGRAM

## 2024-06-02 PROCEDURE — 93005 ELECTROCARDIOGRAM TRACING: CPT | Performed by: STUDENT IN AN ORGANIZED HEALTH CARE EDUCATION/TRAINING PROGRAM

## 2024-06-02 PROCEDURE — 36415 COLL VENOUS BLD VENIPUNCTURE: CPT

## 2024-06-02 PROCEDURE — 93010 ELECTROCARDIOGRAM REPORT: CPT | Performed by: INTERNAL MEDICINE

## 2024-06-02 PROCEDURE — 80053 COMPREHEN METABOLIC PANEL: CPT

## 2024-06-02 RX ORDER — OMEPRAZOLE 20 MG/1
20 CAPSULE, DELAYED RELEASE ORAL
Qty: 14 CAPSULE | Refills: 0 | Status: SHIPPED | OUTPATIENT
Start: 2024-06-02 | End: 2024-06-16

## 2024-06-02 RX ADMIN — ALUMINUM HYDROXIDE, MAGNESIUM HYDROXIDE, AND SIMETHICONE: 1200; 120; 1200 SUSPENSION ORAL at 16:18

## 2024-06-02 ASSESSMENT — HEART SCORE: ECG: NON-SPECIFC REPOLARIZATION DISTURBANCE/LBTB/PM

## 2024-06-02 NOTE — ED PROVIDER NOTES
[facility] EMERGENCY DEPT    Pt Name: Anay Toledo  MRN: [id number]  Birthdate 1985  Date of evaluation: 6/2/2024  Resident Physician: Sarah Parra MD  Supervising Physician: Kenneth Reeves DO    CHIEF COMPLAINT       Chief Complaint   Patient presents with    Shortness of Breath       HISTORY OF PRESENT ILLNESS   Anay Toledo is a 38 y.o. female with PMHx of acute on chronic congestive heart failure, SANTIAGO, pneumonia, morbid obesity  who presents to the emergency department for evaluation of shortness of breath.    Patient states that she started to have a cough with productive sputum that was yellow in nature since Friday, 2 days ago.  Patient also states that she has been having some shortness of breath.  Also burning sensation in her chest.  Took albuterol with no symptomatic improvement therefore came to ED for further evaluation.    Follows with Dr. Paul    The patient has no other acute complaints at this time.    Review of Systems    Negative Except as Documented Above.    PASTMEDICAL HISTORY     Past Medical History:   Diagnosis Date    Acute on chronic diastolic CHF (congestive heart failure) ([facility]) 11/01/2022    Anemia     Anxiety and depression 07/09/2021    Asthma     Back pain     Degeneration of lumbosacral intervertebral disc 10/31/2022    Mixed restrictive and obstructive lung disease ([facility]) 01/24/2023    Moderate persistent asthma without complication 11/08/2022    Morbid obesity ([facility])     Sleep apnea        Patient Active Problem List   Diagnosis Code    Morbid obesity with BMI of 70 and over, adult ([facility]) E66.01, Z68.45    Vitamin D deficiency E55.9    Pneumonia due to COVID-19 virus U07.1, J12.82    Herniation of right lung J98.4    Acute on chronic diastolic CHF (congestive heart failure) ([facility]) I50.33    SANTIAGO (obstructive sleep apnea) G47.33    Chronic anemia D64.9    Moderate persistent asthma without complication J45.40    Obesity hypoventilation syndrome ([facility]) E66.2    Chronic

## 2024-06-02 NOTE — DISCHARGE INSTRUCTIONS
You are seen today in the emergency department for shortness of breath.  Your workup was reassuring.  We believe that your shortness of breath/chest pain is related to possible GERD as baby is getting big enough to cause worsening symptoms.  We are sending you home on Prilosec/omeprazole.  Please take this every morning before breakfast.    Please follow-up with Dr. Paul regarding todays visit

## 2024-06-02 NOTE — ED TRIAGE NOTES
Patient ambulatory into the ED for evaluation of shortness of breath. Pt reports being 12 weeks pregnant. Patient reports a cough since Friday, states she noticed coughing up yellow mucous. Patient adds that her chest is burning and did not have relief from her sob with her albuterol inhaler. EKG complete. VSS.

## 2024-06-02 NOTE — ED NOTES
Patient medicated as documented.  Patient dry heaving after GI cocktail but did not have an episode of emesis.

## 2024-06-03 ENCOUNTER — TELEPHONE (OUTPATIENT)
Dept: FAMILY MEDICINE CLINIC | Age: 39
End: 2024-06-03

## 2024-06-04 ENCOUNTER — TELEMEDICINE (OUTPATIENT)
Dept: FAMILY MEDICINE CLINIC | Age: 39
End: 2024-06-04
Payer: MEDICARE

## 2024-06-04 DIAGNOSIS — R05.1 ACUTE COUGH: ICD-10-CM

## 2024-06-04 DIAGNOSIS — J98.4 MIXED RESTRICTIVE AND OBSTRUCTIVE LUNG DISEASE (HCC): ICD-10-CM

## 2024-06-04 DIAGNOSIS — Z3A.12 12 WEEKS GESTATION OF PREGNANCY: ICD-10-CM

## 2024-06-04 DIAGNOSIS — J43.9 MIXED RESTRICTIVE AND OBSTRUCTIVE LUNG DISEASE (HCC): ICD-10-CM

## 2024-06-04 DIAGNOSIS — J45.31 MILD PERSISTENT ASTHMATIC BRONCHITIS WITH ACUTE EXACERBATION: Primary | ICD-10-CM

## 2024-06-04 PROBLEM — I50.33 ACUTE ON CHRONIC DIASTOLIC CHF (CONGESTIVE HEART FAILURE) (HCC): Status: RESOLVED | Noted: 2022-11-01 | Resolved: 2024-06-04

## 2024-06-04 PROCEDURE — G2211 COMPLEX E/M VISIT ADD ON: HCPCS | Performed by: NURSE PRACTITIONER

## 2024-06-04 PROCEDURE — G8427 DOCREV CUR MEDS BY ELIG CLIN: HCPCS | Performed by: NURSE PRACTITIONER

## 2024-06-04 PROCEDURE — 99214 OFFICE O/P EST MOD 30 MIN: CPT | Performed by: NURSE PRACTITIONER

## 2024-06-04 RX ORDER — BUDESONIDE 0.5 MG/2ML
500 INHALANT ORAL EVERY 12 HOURS PRN
Qty: 60 EACH | Refills: 0 | Status: SHIPPED | OUTPATIENT
Start: 2024-06-04

## 2024-06-04 RX ORDER — DEXTROMETHORPHAN HYDROBROMIDE AND PROMETHAZINE HYDROCHLORIDE 15; 6.25 MG/5ML; MG/5ML
5 SYRUP ORAL 4 TIMES DAILY PRN
Qty: 120 ML | Refills: 0 | Status: SHIPPED | OUTPATIENT
Start: 2024-06-04 | End: 2024-06-11

## 2024-06-04 ASSESSMENT — ENCOUNTER SYMPTOMS
SHORTNESS OF BREATH: 1
COUGH: 1
RHINORRHEA: 1
CHEST TIGHTNESS: 1
WHEEZING: 1
ABDOMINAL PAIN: 0
NAUSEA: 0

## 2024-06-04 NOTE — PROGRESS NOTES
Subjective:      Patient ID: Anay Toledo is a 38 y.o. female    HPI: ED Follow Up    TELEHEALTH EVALUATION -- Audio/Visual     Patient identification was verified at the start of the visit: Yes    Services were provided through a video synchronous discussion virtually to substitute for in-person clinic visit. Patient and provider were located at their individual homes.    Patient has requested an audio/video evaluation for the following concern(s):    Chief Complaint   Patient presents with    ED Follow-up       Onset of Friday with ST, coughing and SOB.   Was seen at ED 6/2/24 with labs that were unremarkable.      Continues with cough and ST.  Burning sensation in chest.   Coughing attacks.  Chest tightness. Sees PULM.  Asthma-COPD overlap.     Low grade fever this AM    12 weeks Pregnant.  No OTC    Patient Active Problem List   Diagnosis    Morbid obesity with BMI of 70 and over, adult (HCC)    Vitamin D deficiency    Pneumonia due to COVID-19 virus    Herniation of right lung    SANTIAGO (obstructive sleep apnea)    Chronic anemia    Moderate persistent asthma without complication    Obesity hypoventilation syndrome (HCC)    Chronic respiratory failure (HCC) on home O2    S/P cardiac cath Nov 2022 - nonobstructive    Iron deficiency anemia due to chronic blood loss    Mixed restrictive and obstructive lung disease (HCC)    Major depressive disorder, single episode    Severe episode of recurrent major depressive disorder, without psychotic features (HCC)    BRITTNEE (generalized anxiety disorder)    Cannabis dependence, uncomplicated (HCC)    11 weeks gestation of pregnancy    Iron deficiency anemia, unspecified    Other iron deficiency anemias       Review of Systems   Constitutional:  Positive for fatigue. Negative for chills and fever.   HENT:  Positive for congestion, postnasal drip and rhinorrhea.    Respiratory:  Positive for cough, chest tightness, shortness of breath and wheezing.    Cardiovascular:  Negative

## 2024-06-07 ENCOUNTER — HOSPITAL ENCOUNTER (OUTPATIENT)
Dept: INFUSION THERAPY | Age: 39
Discharge: HOME | End: 2024-06-07

## 2024-06-21 ENCOUNTER — HOSPITAL ENCOUNTER (OUTPATIENT)
Dept: INFUSION THERAPY | Age: 39
Discharge: HOME | End: 2024-06-21
Payer: MEDICARE

## 2024-06-21 VITALS
DIASTOLIC BLOOD PRESSURE: 66 MMHG | RESPIRATION RATE: 20 BRPM | OXYGEN SATURATION: 97 % | SYSTOLIC BLOOD PRESSURE: 135 MMHG | HEART RATE: 85 BPM | TEMPERATURE: 97.9 F

## 2024-06-21 DIAGNOSIS — Z3A.11 11 WEEKS GESTATION OF PREGNANCY: Primary | ICD-10-CM

## 2024-06-21 DIAGNOSIS — D50.0 IRON DEFICIENCY ANEMIA DUE TO CHRONIC BLOOD LOSS: ICD-10-CM

## 2024-06-21 PROCEDURE — 96365 THER/PROPH/DIAG IV INF INIT: CPT

## 2024-06-21 PROCEDURE — 2580000003 HC RX 258: Performed by: NURSE PRACTITIONER

## 2024-06-21 PROCEDURE — 6360000002 HC RX W HCPCS: Performed by: NURSE PRACTITIONER

## 2024-06-21 RX ORDER — SODIUM CHLORIDE 9 MG/ML
INJECTION, SOLUTION INTRAVENOUS CONTINUOUS
Status: CANCELLED | OUTPATIENT
Start: 2024-06-28

## 2024-06-21 RX ORDER — SODIUM CHLORIDE 9 MG/ML
5-250 INJECTION, SOLUTION INTRAVENOUS PRN
Status: CANCELLED | OUTPATIENT
Start: 2024-06-28

## 2024-06-21 RX ORDER — EPINEPHRINE 1 MG/ML
0.3 INJECTION, SOLUTION INTRAMUSCULAR; SUBCUTANEOUS PRN
Status: CANCELLED | OUTPATIENT
Start: 2024-06-21

## 2024-06-21 RX ORDER — EPINEPHRINE 1 MG/ML
0.3 INJECTION, SOLUTION INTRAMUSCULAR; SUBCUTANEOUS PRN
Status: CANCELLED | OUTPATIENT
Start: 2024-06-28

## 2024-06-21 RX ORDER — ACETAMINOPHEN 325 MG/1
650 TABLET ORAL
Status: CANCELLED | OUTPATIENT
Start: 2024-06-21

## 2024-06-21 RX ORDER — HEPARIN 100 UNIT/ML
500 SYRINGE INTRAVENOUS PRN
Status: CANCELLED | OUTPATIENT
Start: 2024-06-21

## 2024-06-21 RX ORDER — SODIUM CHLORIDE 0.9 % (FLUSH) 0.9 %
5-40 SYRINGE (ML) INJECTION PRN
Status: CANCELLED | OUTPATIENT
Start: 2024-06-28

## 2024-06-21 RX ORDER — ACETAMINOPHEN 325 MG/1
650 TABLET ORAL
Status: CANCELLED | OUTPATIENT
Start: 2024-06-28

## 2024-06-21 RX ORDER — SODIUM CHLORIDE 9 MG/ML
5-250 INJECTION, SOLUTION INTRAVENOUS PRN
Status: DISCONTINUED | OUTPATIENT
Start: 2024-06-21 | End: 2024-06-22 | Stop reason: HOSPADM

## 2024-06-21 RX ORDER — SODIUM CHLORIDE 9 MG/ML
INJECTION, SOLUTION INTRAVENOUS CONTINUOUS
Status: CANCELLED | OUTPATIENT
Start: 2024-06-21

## 2024-06-21 RX ORDER — SODIUM CHLORIDE 9 MG/ML
5-250 INJECTION, SOLUTION INTRAVENOUS PRN
Status: CANCELLED | OUTPATIENT
Start: 2024-06-21

## 2024-06-21 RX ORDER — ALBUTEROL SULFATE 90 UG/1
4 AEROSOL, METERED RESPIRATORY (INHALATION) PRN
Status: CANCELLED | OUTPATIENT
Start: 2024-06-21

## 2024-06-21 RX ORDER — DIPHENHYDRAMINE HYDROCHLORIDE 50 MG/ML
50 INJECTION INTRAMUSCULAR; INTRAVENOUS
Status: CANCELLED | OUTPATIENT
Start: 2024-06-21

## 2024-06-21 RX ORDER — SODIUM CHLORIDE 0.9 % (FLUSH) 0.9 %
5-40 SYRINGE (ML) INJECTION PRN
Status: CANCELLED | OUTPATIENT
Start: 2024-06-21

## 2024-06-21 RX ORDER — HEPARIN 100 UNIT/ML
500 SYRINGE INTRAVENOUS PRN
Status: CANCELLED | OUTPATIENT
Start: 2024-06-28

## 2024-06-21 RX ORDER — ALBUTEROL SULFATE 90 UG/1
4 AEROSOL, METERED RESPIRATORY (INHALATION) PRN
Status: CANCELLED | OUTPATIENT
Start: 2024-06-28

## 2024-06-21 RX ORDER — DIPHENHYDRAMINE HYDROCHLORIDE 50 MG/ML
50 INJECTION INTRAMUSCULAR; INTRAVENOUS
Status: CANCELLED | OUTPATIENT
Start: 2024-06-28

## 2024-06-21 RX ORDER — ONDANSETRON 2 MG/ML
8 INJECTION INTRAMUSCULAR; INTRAVENOUS
Status: CANCELLED | OUTPATIENT
Start: 2024-06-21

## 2024-06-21 RX ORDER — ONDANSETRON 2 MG/ML
8 INJECTION INTRAMUSCULAR; INTRAVENOUS
Status: CANCELLED | OUTPATIENT
Start: 2024-06-28

## 2024-06-21 RX ADMIN — SODIUM CHLORIDE 20 ML/HR: 9 INJECTION, SOLUTION INTRAVENOUS at 12:05

## 2024-06-21 RX ADMIN — FERRIC CARBOXYMALTOSE INJECTION 750 MG: 50 INJECTION, SOLUTION INTRAVENOUS at 12:16

## 2024-06-21 NOTE — DISCHARGE INSTRUCTIONS
Call if any uncontrolled nausea or vomiting   Call if any fever,chills, problems or concerns  Call if any questions  Drink at least 6 - 8 ounces glasses of fluids a day    Patient to watch for any:    Dizziness     Lightheadedness        Acute nausea/vomiting   Headache     Chest pain/pressure    Rash/itching     Shortness of breath      Patient instructed if experience any of the above symptoms following today's injectafer infusion, she is to notify MD immediately or go to the emergency department.

## 2024-06-21 NOTE — PROGRESS NOTES
Patient tolerated injectafer without any complications. Discharge instructions given to patient-verbalizes understanding. Ambulated off unit per self with belongings.

## 2024-06-21 NOTE — PLAN OF CARE
Problem: Chronic Conditions and Co-morbidities  Goal: Patient's chronic conditions and co-morbidity symptoms are monitored and maintained or improved  Outcome: Adequate for Discharge  Flowsheets (Taken 6/21/2024 1216)  Care Plan - Patient's Chronic Conditions and Co-Morbidity Symptoms are Monitored and Maintained or Improved:   Monitor and assess patient's chronic conditions and comorbid symptoms for stability, deterioration, or improvement   Collaborate with multidisciplinary team to address chronic and comorbid conditions and prevent exacerbation or deterioration  Note: Patient verbalizes understanding to verbal information given on injectafer,action and possible side effects. Aware to call MD if develop complications.        Problem: Safety - Adult  Goal: Free from fall injury  Outcome: Adequate for Discharge  Flowsheets (Taken 6/21/2024 1216)  Free From Fall Injury:   Instruct family/caregiver on patient safety   Based on caregiver fall risk screen, instruct family/caregiver to ask for assistance with transferring infant if caregiver noted to have fall risk factors  Note: Free from falls while in O.P. Oncology.Discussed the need to use the call light for assistance when getting up to ambulate.        Problem: Discharge Planning  Goal: Discharge to home or other facility with appropriate resources  Outcome: Adequate for Discharge  Flowsheets (Taken 6/21/2024 1216)  Discharge to home or other facility with appropriate resources:   Identify barriers to discharge with patient and caregiver   Identify discharge learning needs (meds, wound care, etc)  Note: Verbalize understanding of discharge instructions, follow up appointments, and when to call Physician.Discuss understanding of discharge instructions, follow up appointments and when to call Physician.      Care plan reviewed with patient. Patient verbalizes understanding of the plan of care and contributes to goal setting.

## 2024-06-23 DIAGNOSIS — J45.31 MILD PERSISTENT ASTHMATIC BRONCHITIS WITH ACUTE EXACERBATION: ICD-10-CM

## 2024-06-24 RX ORDER — BUDESONIDE 0.5 MG/2ML
INHALANT ORAL
Qty: 120 ML | Refills: 1 | Status: SHIPPED | OUTPATIENT
Start: 2024-06-24

## 2024-06-28 ENCOUNTER — HOSPITAL ENCOUNTER (OUTPATIENT)
Dept: INFUSION THERAPY | Age: 39
Discharge: HOME | End: 2024-06-28
Payer: MEDICARE

## 2024-06-28 VITALS
SYSTOLIC BLOOD PRESSURE: 138 MMHG | HEART RATE: 67 BPM | RESPIRATION RATE: 16 BRPM | OXYGEN SATURATION: 100 % | TEMPERATURE: 98.3 F | DIASTOLIC BLOOD PRESSURE: 74 MMHG

## 2024-06-28 DIAGNOSIS — D50.0 IRON DEFICIENCY ANEMIA DUE TO CHRONIC BLOOD LOSS: ICD-10-CM

## 2024-06-28 DIAGNOSIS — Z3A.11 11 WEEKS GESTATION OF PREGNANCY: Primary | ICD-10-CM

## 2024-06-28 PROCEDURE — 96365 THER/PROPH/DIAG IV INF INIT: CPT

## 2024-06-28 PROCEDURE — 6360000002 HC RX W HCPCS: Performed by: NURSE PRACTITIONER

## 2024-06-28 PROCEDURE — 2580000003 HC RX 258: Performed by: NURSE PRACTITIONER

## 2024-06-28 RX ORDER — ACETAMINOPHEN 325 MG/1
650 TABLET ORAL
OUTPATIENT
Start: 2024-06-28

## 2024-06-28 RX ORDER — ONDANSETRON 2 MG/ML
8 INJECTION INTRAMUSCULAR; INTRAVENOUS
OUTPATIENT
Start: 2024-06-28

## 2024-06-28 RX ORDER — SODIUM CHLORIDE 9 MG/ML
INJECTION, SOLUTION INTRAVENOUS CONTINUOUS
OUTPATIENT
Start: 2024-06-28

## 2024-06-28 RX ORDER — SODIUM CHLORIDE 9 MG/ML
5-250 INJECTION, SOLUTION INTRAVENOUS PRN
OUTPATIENT
Start: 2024-06-28

## 2024-06-28 RX ORDER — SODIUM CHLORIDE 0.9 % (FLUSH) 0.9 %
5-40 SYRINGE (ML) INJECTION PRN
OUTPATIENT
Start: 2024-06-28

## 2024-06-28 RX ORDER — DIPHENHYDRAMINE HYDROCHLORIDE 50 MG/ML
50 INJECTION INTRAMUSCULAR; INTRAVENOUS
OUTPATIENT
Start: 2024-06-28

## 2024-06-28 RX ORDER — ALBUTEROL SULFATE 90 UG/1
4 AEROSOL, METERED RESPIRATORY (INHALATION) PRN
OUTPATIENT
Start: 2024-06-28

## 2024-06-28 RX ORDER — SODIUM CHLORIDE 9 MG/ML
5-250 INJECTION, SOLUTION INTRAVENOUS PRN
Status: DISCONTINUED | OUTPATIENT
Start: 2024-06-28 | End: 2024-06-29 | Stop reason: HOSPADM

## 2024-06-28 RX ORDER — HEPARIN 100 UNIT/ML
500 SYRINGE INTRAVENOUS PRN
OUTPATIENT
Start: 2024-06-28

## 2024-06-28 RX ORDER — EPINEPHRINE 1 MG/ML
0.3 INJECTION, SOLUTION INTRAMUSCULAR; SUBCUTANEOUS PRN
OUTPATIENT
Start: 2024-06-28

## 2024-06-28 RX ORDER — SODIUM CHLORIDE 9 MG/ML
5-250 INJECTION, SOLUTION INTRAVENOUS PRN
Status: CANCELLED | OUTPATIENT
Start: 2024-06-28

## 2024-06-28 RX ADMIN — FERRIC CARBOXYMALTOSE INJECTION 750 MG: 50 INJECTION, SOLUTION INTRAVENOUS at 12:13

## 2024-06-28 RX ADMIN — SODIUM CHLORIDE 55 ML/HR: 9 INJECTION, SOLUTION INTRAVENOUS at 12:02

## 2024-06-28 NOTE — PLAN OF CARE
Problem: Chronic Conditions and Co-morbidities  Goal: Patient's chronic conditions and co-morbidity symptoms are monitored and maintained or improved  Outcome: Adequate for Discharge  Flowsheets (Taken 6/28/2024 1201)  Care Plan - Patient's Chronic Conditions and Co-Morbidity Symptoms are Monitored and Maintained or Improved: Monitor and assess patient's chronic conditions and comorbid symptoms for stability, deterioration, or improvement  Note: Injectafer reviewed, patient verbalizes understanding of medication being administered and potential side effects.       Problem: Discharge Planning  Goal: Discharge to home or other facility with appropriate resources  Outcome: Adequate for Discharge  Flowsheets (Taken 6/28/2024 1201)  Discharge to home or other facility with appropriate resources: Identify barriers to discharge with patient and caregiver     Problem: Safety - Adult  Goal: Free from fall injury  Outcome: Adequate for Discharge  Flowsheets (Taken 6/28/2024 1201)  Free From Fall Injury: Instruct family/caregiver on patient safety

## 2024-06-28 NOTE — PROGRESS NOTES
Patient assessed for the following post iron:    Dizziness   No  Lightheadedness  No      Acute nausea/vomiting No  Headache   No  Chest pain/pressure  No  Rash/itching   No  Shortness of breath  No    Patient kept for 20 minutes observation post infusion iron.    Patient tolerated iron treatment Injectafer without any complications.    Last vital signs:   /74   Pulse 67   Temp 98.3 °F (36.8 °C) (Oral)   Resp 16   LMP 11/04/2023 (Exact Date)   SpO2 100%     Patient instructed if experience any of the above symptoms following today's infusion, she is to notify MD immediately or go to the emergency department.    Discharge instructions given to patient. Verbalizes understanding. Ambulated off unit per self, accompanied by family, with belongings.

## 2024-07-01 ENCOUNTER — HOSPITAL ENCOUNTER (OUTPATIENT)
Age: 39
Discharge: HOME | End: 2024-07-01
Payer: MEDICARE

## 2024-07-01 PROCEDURE — 82105 ALPHA-FETOPROTEIN SERUM: CPT

## 2024-07-04 LAB
# FETUSES US: NORMAL
AFP MOM SERPL: 1.48
AFP SERPL-MCNC: 22 NG/ML
AGE - REPORTED: 39.3 YR
CURRENT SMOKER: NO
CURRENTLY SMOKING: NO
DATING METHOD: NORMAL
DIABETIC: NO
DONOR AGE, EGG RETRIEVAL: NORMAL
DUE DATE: NORMAL
FAMILY MEMBER DISEASES HX: NO
GA METHOD: NORMAL
GA: NORMAL WK
HISTORY OF NEURAL TUBE DEFECT?: NO
IDDM PATIENT QL: NO
IN VITRO FERTILIZATION: NO
INTEGRATED SCN PATIENT-IMP: NORMAL
LAST MENSTRUAL PERIOD: NORMAL
MATERNAL DOB: NORMAL
MATERNAL WEIGHT: 442
MONOCHORIONIC TWINS: NO
MOTHER TAKING VALPROIC/CARBAMAZEPINE: NO
NUMBER OF FETUSES: 1
PATIENT WEIGHT UNITS: NORMAL
RACE (MATERNAL): NORMAL
REPEAT SPECIMEN?: NO
SPECIMEN DRAWN SERPL: NORMAL

## 2024-07-22 NOTE — PROGRESS NOTES
Anay Toledo         [id number]  7/23/2024   Chief Complaint   Patient presents with    Follow-up     1 year fozia        Pt of Dr. Ulisses LIZAMA    PAP Download  Original or initial AHI: 59.6     Date of initial study: 10/17/22     Compliant  97%     Noncompliant 0 %         PAP Type Spont    Level  25/21 with 3LPM bleed in     Leaks (95th percentile): 0.7    Avg Hrs/Day 9.75    AHI: 0     Recorded compliance dates: 6/24/24-7/23/24    Machine/Mfg:   [x] ResMed    [] Lewis / Respironics    Interface:   [] Nasal    [] Nasal pillows   [x] FFM    Durable Medical Equipment Company:      [employer]    Neck Size:  14    Mallampati:  4    ESS:  7  SAQLI: 69    Scan of the most recent download:                  Presentation:   Anay presents for 1 yearsleep medicine follow up for obstructive sleep apnea  Since the last visit, Anay is struggling with feeling suffocated by her mask .   Is currently  20 weeks pregnant .  She just started noticing the suffocating feeling approximately 3 weeks ago.  She does feel up feeling of shortness of breath or a restricted feeling when lying flat.  She just recently got a hospital bed and is awaiting a new mattress for this to start sleeping in it.         Equipment issues:  The pressure is  acceptable, the mask is acceptable     Review of Systems -   Review of Systems   Constitutional:  Positive for fatigue.   Respiratory:  Positive for shortness of breath.    Psychiatric/Behavioral:  Positive for sleep disturbance.         Physical Exam:    BMI:  Body mass index is 82.87 kg/m².    Wt Readings from Last 3 Encounters:   07/23/24 (!) 212.2 kg (467 lb 12.8 oz)   06/02/24 (!) 200.9 kg (443 lb)   05/16/24 (!) 198 kg (436 lb 9.6 oz)     Weight gained 60 lbs over 1 year   Vitals: /80 (Site: Right Lower Arm, Position: Sitting, Cuff Size: Medium Adult)   Pulse 89   Temp 98.7 °F (37.1 °C) (Temporal)   Ht 1.6 m (5' 3\")   Wt (!) 212.2 kg (467 lb 12.8 oz)   LMP 11/04/2023 (Exact Date)   SpO2 97%

## 2024-07-23 ENCOUNTER — OFFICE VISIT (OUTPATIENT)
Dept: PULMONOLOGY | Age: 39
End: 2024-07-23
Payer: MEDICARE

## 2024-07-23 VITALS
DIASTOLIC BLOOD PRESSURE: 80 MMHG | HEART RATE: 89 BPM | BODY MASS INDEX: 51.91 KG/M2 | TEMPERATURE: 98.7 F | SYSTOLIC BLOOD PRESSURE: 132 MMHG | OXYGEN SATURATION: 97 % | HEIGHT: 63 IN | WEIGHT: 293 LBS

## 2024-07-23 DIAGNOSIS — G47.33 OSA TREATED WITH BIPAP: Primary | ICD-10-CM

## 2024-07-23 DIAGNOSIS — E66.01 MORBID OBESITY WITH BMI OF 70 AND OVER, ADULT (HCC): ICD-10-CM

## 2024-07-23 DIAGNOSIS — Z3A.20 20 WEEKS GESTATION OF PREGNANCY: ICD-10-CM

## 2024-07-23 PROCEDURE — G8427 DOCREV CUR MEDS BY ELIG CLIN: HCPCS | Performed by: NURSE PRACTITIONER

## 2024-07-23 PROCEDURE — G8417 CALC BMI ABV UP PARAM F/U: HCPCS | Performed by: NURSE PRACTITIONER

## 2024-07-23 PROCEDURE — 99214 OFFICE O/P EST MOD 30 MIN: CPT | Performed by: NURSE PRACTITIONER

## 2024-07-23 PROCEDURE — 1036F TOBACCO NON-USER: CPT | Performed by: NURSE PRACTITIONER

## 2024-07-23 RX ORDER — VITAMIN C, CALCIUM, IRON, VITAMIN D3, VITAMIN E, THIAMIN, RIBOFLAVIN, NIACINAMIDE, VITAMIN B6, FOLIC ACID, IODINE, ZINC, COPPER, DOCUSATE SODIUM 120; 124; 27; 400; 30; 3; 3.4; 20; 20; 1; 150; 25; 2 MG/1; MG/1; MG/1; [IU]/1; [IU]/1; MG/1; MG/1; MG/1; MG/1; MG/1; MG/1; MG/1; MG/1
1 TABLET ORAL DAILY
COMMUNITY

## 2024-07-23 RX ORDER — ASPIRIN 81 MG/1
81 TABLET ORAL DAILY
COMMUNITY

## 2024-07-23 ASSESSMENT — ENCOUNTER SYMPTOMS: SHORTNESS OF BREATH: 1

## 2024-08-08 ENCOUNTER — APPOINTMENT (OUTPATIENT)
Dept: GENERAL RADIOLOGY | Age: 39
End: 2024-08-08
Payer: MEDICARE

## 2024-08-08 ENCOUNTER — APPOINTMENT (OUTPATIENT)
Dept: CT IMAGING | Age: 39
End: 2024-08-08
Payer: MEDICARE

## 2024-08-08 ENCOUNTER — HOSPITAL ENCOUNTER (EMERGENCY)
Age: 39
Discharge: HOME | End: 2024-08-08
Attending: FAMILY MEDICINE
Payer: MEDICARE

## 2024-08-08 VITALS
DIASTOLIC BLOOD PRESSURE: 74 MMHG | RESPIRATION RATE: 22 BRPM | TEMPERATURE: 97.6 F | WEIGHT: 293 LBS | SYSTOLIC BLOOD PRESSURE: 142 MMHG | HEART RATE: 79 BPM | HEIGHT: 63 IN | OXYGEN SATURATION: 99 % | BODY MASS INDEX: 51.91 KG/M2

## 2024-08-08 DIAGNOSIS — R07.89 ATYPICAL CHEST PAIN: Primary | ICD-10-CM

## 2024-08-08 DIAGNOSIS — R06.02 SHORTNESS OF BREATH: ICD-10-CM

## 2024-08-08 DIAGNOSIS — Z3A.22 22 WEEKS GESTATION OF PREGNANCY: ICD-10-CM

## 2024-08-08 LAB
ALBUMIN SERPL BCG-MCNC: 3.3 G/DL (ref 3.5–5.1)
ALP SERPL-CCNC: 48 U/L (ref 38–126)
ALT SERPL W/O P-5'-P-CCNC: 15 U/L (ref 11–66)
ANION GAP SERPL CALC-SCNC: 12 MEQ/L (ref 8–16)
AST SERPL-CCNC: 13 U/L (ref 5–40)
BASOPHILS ABSOLUTE: 0 THOU/MM3 (ref 0–0.1)
BASOPHILS NFR BLD AUTO: 0.2 %
BILIRUB CONJ SERPL-MCNC: < 0.1 MG/DL (ref 0.1–13.8)
BILIRUB SERPL-MCNC: 0.3 MG/DL (ref 0.3–1.2)
BILIRUB UR QL STRIP.AUTO: NEGATIVE
BUN SERPL-MCNC: 9 MG/DL (ref 7–22)
CALCIUM SERPL-MCNC: 8.7 MG/DL (ref 8.5–10.5)
CHARACTER UR: CLEAR
CHLORIDE SERPL-SCNC: 104 MEQ/L (ref 98–111)
CO2 SERPL-SCNC: 22 MEQ/L (ref 23–33)
COLOR, UA: YELLOW
CREAT SERPL-MCNC: 0.4 MG/DL (ref 0.4–1.2)
CREAT UR-MCNC: 44 MG/DL
D DIMER PPP IA.FEU-MCNC: 920 NG/ML FEU (ref 0–500)
DEPRECATED RDW RBC AUTO: 50.6 FL (ref 35–45)
EKG Q-T INTERVAL: 388 MS
EKG QRS DURATION: 102 MS
EKG QTC CALCULATION (BAZETT): 461 MS
EKG R AXIS: 70 DEGREES
EKG T AXIS: 5 DEGREES
EKG VENTRICULAR RATE: 85 BPM
EOSINOPHIL NFR BLD AUTO: 2.2 %
EOSINOPHILS ABSOLUTE: 0.2 THOU/MM3 (ref 0–0.4)
ERYTHROCYTE [DISTWIDTH] IN BLOOD BY AUTOMATED COUNT: 16 % (ref 11.5–14.5)
GFR SERPL CREATININE-BSD FRML MDRD: > 90 ML/MIN/1.73M2
GLUCOSE SERPL-MCNC: 77 MG/DL (ref 70–108)
GLUCOSE UR QL STRIP.AUTO: NEGATIVE MG/DL
HCT VFR BLD AUTO: 41.9 % (ref 37–47)
HGB BLD-MCNC: 13.6 GM/DL (ref 12–16)
HGB UR QL STRIP.AUTO: NEGATIVE
IMM GRANULOCYTES # BLD AUTO: 0.02 THOU/MM3 (ref 0–0.07)
IMM GRANULOCYTES NFR BLD AUTO: 0.2 %
KETONES UR QL STRIP.AUTO: NEGATIVE
LYMPHOCYTES ABSOLUTE: 1.9 THOU/MM3 (ref 1–4.8)
LYMPHOCYTES NFR BLD AUTO: 23.2 %
MCH RBC QN AUTO: 28 PG (ref 26–33)
MCHC RBC AUTO-ENTMCNC: 32.5 GM/DL (ref 32.2–35.5)
MCV RBC AUTO: 86.4 FL (ref 81–99)
MONOCYTES ABSOLUTE: 0.6 THOU/MM3 (ref 0.4–1.3)
MONOCYTES NFR BLD AUTO: 7.1 %
NEUTROPHILS ABSOLUTE: 5.5 THOU/MM3 (ref 1.8–7.7)
NEUTROPHILS NFR BLD AUTO: 67.1 %
NITRITE UR QL STRIP: NEGATIVE
NRBC BLD AUTO-RTO: 0 /100 WBC
NT-PROBNP SERPL IA-MCNC: 421.9 PG/ML (ref 0–124)
PH UR STRIP.AUTO: 7 [PH] (ref 5–9)
PLATELET # BLD AUTO: 176 THOU/MM3 (ref 130–400)
PMV BLD AUTO: 11.3 FL (ref 9.4–12.4)
POTASSIUM SERPL-SCNC: 4.1 MEQ/L (ref 3.5–5.2)
PROT SERPL-MCNC: 6.6 G/DL (ref 6.1–8)
PROT UR STRIP.AUTO-MCNC: NEGATIVE MG/DL
PROT UR-MCNC: 5.3 MG/DL
PROT/CREAT 24H UR: 0.12 MG/G{CREAT}
RBC # BLD AUTO: 4.85 MILL/MM3 (ref 4.2–5.4)
SODIUM SERPL-SCNC: 138 MEQ/L (ref 135–145)
SP GR UR REFRACT.AUTO: 1.01 (ref 1–1.03)
TROPONIN, HIGH SENSITIVITY: 12 NG/L (ref 0–12)
UROBILINOGEN, URINE: 0.2 EU/DL (ref 0–1)
WBC # BLD AUTO: 8.2 THOU/MM3 (ref 4.8–10.8)
WBC #/AREA URNS HPF: NEGATIVE /[HPF]

## 2024-08-08 PROCEDURE — 83880 ASSAY OF NATRIURETIC PEPTIDE: CPT

## 2024-08-08 PROCEDURE — 84156 ASSAY OF PROTEIN URINE: CPT

## 2024-08-08 PROCEDURE — 36415 COLL VENOUS BLD VENIPUNCTURE: CPT

## 2024-08-08 PROCEDURE — 99214 OFFICE O/P EST MOD 30 MIN: CPT | Performed by: EMERGENCY MEDICINE

## 2024-08-08 PROCEDURE — 82570 ASSAY OF URINE CREATININE: CPT

## 2024-08-08 PROCEDURE — 93005 ELECTROCARDIOGRAM TRACING: CPT | Performed by: EMERGENCY MEDICINE

## 2024-08-08 PROCEDURE — 99215 OFFICE O/P EST HI 40 MIN: CPT

## 2024-08-08 PROCEDURE — 71045 X-RAY EXAM CHEST 1 VIEW: CPT

## 2024-08-08 PROCEDURE — 71275 CT ANGIOGRAPHY CHEST: CPT

## 2024-08-08 PROCEDURE — 85025 COMPLETE CBC W/AUTO DIFF WBC: CPT

## 2024-08-08 PROCEDURE — 84484 ASSAY OF TROPONIN QUANT: CPT

## 2024-08-08 PROCEDURE — 80048 BASIC METABOLIC PNL TOTAL CA: CPT

## 2024-08-08 PROCEDURE — 6360000004 HC RX CONTRAST MEDICATION: Performed by: FAMILY MEDICINE

## 2024-08-08 PROCEDURE — 2580000003 HC RX 258

## 2024-08-08 PROCEDURE — 81003 URINALYSIS AUTO W/O SCOPE: CPT

## 2024-08-08 PROCEDURE — 80076 HEPATIC FUNCTION PANEL: CPT

## 2024-08-08 PROCEDURE — 93010 ELECTROCARDIOGRAM REPORT: CPT | Performed by: INTERNAL MEDICINE

## 2024-08-08 PROCEDURE — 93005 ELECTROCARDIOGRAM TRACING: CPT | Performed by: FAMILY MEDICINE

## 2024-08-08 PROCEDURE — 85379 FIBRIN DEGRADATION QUANT: CPT

## 2024-08-08 PROCEDURE — 99285 EMERGENCY DEPT VISIT HI MDM: CPT

## 2024-08-08 PROCEDURE — 6370000000 HC RX 637 (ALT 250 FOR IP)

## 2024-08-08 RX ORDER — ACETAMINOPHEN 500 MG
1000 TABLET ORAL ONCE
Status: COMPLETED | OUTPATIENT
Start: 2024-08-08 | End: 2024-08-08

## 2024-08-08 RX ORDER — 0.9 % SODIUM CHLORIDE 0.9 %
500 INTRAVENOUS SOLUTION INTRAVENOUS ONCE
Status: COMPLETED | OUTPATIENT
Start: 2024-08-08 | End: 2024-08-08

## 2024-08-08 RX ADMIN — ACETAMINOPHEN 1000 MG: 500 TABLET ORAL at 18:14

## 2024-08-08 RX ADMIN — SODIUM CHLORIDE 500 ML: 9 INJECTION, SOLUTION INTRAVENOUS at 18:20

## 2024-08-08 RX ADMIN — IOPAMIDOL 80 ML: 755 INJECTION, SOLUTION INTRAVENOUS at 19:59

## 2024-08-08 ASSESSMENT — PAIN DESCRIPTION - DESCRIPTORS
DESCRIPTORS: DISCOMFORT
DESCRIPTORS: PRESSURE

## 2024-08-08 ASSESSMENT — PAIN SCALES - GENERAL
PAINLEVEL_OUTOF10: 5

## 2024-08-08 ASSESSMENT — PAIN DESCRIPTION - ONSET: ONSET: SUDDEN

## 2024-08-08 ASSESSMENT — PAIN DESCRIPTION - LOCATION
LOCATION: CHEST

## 2024-08-08 ASSESSMENT — PAIN - FUNCTIONAL ASSESSMENT
PAIN_FUNCTIONAL_ASSESSMENT: 0-10
PAIN_FUNCTIONAL_ASSESSMENT: INTOLERABLE, UNABLE TO DO ANY ACTIVE OR PASSIVE ACTIVITIES
PAIN_FUNCTIONAL_ASSESSMENT: 0-10

## 2024-08-08 ASSESSMENT — PAIN DESCRIPTION - PAIN TYPE
TYPE: ACUTE PAIN
TYPE: ACUTE PAIN

## 2024-08-08 ASSESSMENT — PAIN DESCRIPTION - FREQUENCY: FREQUENCY: CONTINUOUS

## 2024-08-08 ASSESSMENT — ENCOUNTER SYMPTOMS
DIARRHEA: 1
SHORTNESS OF BREATH: 1

## 2024-08-08 NOTE — ED NOTES
Pt presents to the ED from [facility] with complaints of feeling short of breath and having chest pain. Pt states she started feeling short of breath around 1330, used her inhaler around 1530 and shortly after got chest pain. Pt states she is 22 weeks pregnant. Pt sees Dr. Paul here at [facility]'s EKG completed on arrival to ED. VSS.

## 2024-08-08 NOTE — ED PROVIDER NOTES
[facility] EMERGENCY DEPT  EMERGENCY DEPARTMENT ENCOUNTER          Pt Name: Anay Toledo  MRN: [id number]  Birthdate 1985  Date of evaluation: 8/8/2024  Physician: Dorothea Del Rio MD  Supervising Attending Physician: Jo att. providers found       CHIEF COMPLAINT       Chief Complaint   Patient presents with    Shortness of Breath    Chest Pain         HISTORY OF PRESENT ILLNESS    `      Anay Toledo is a 38 y.o. female PMH asthma, HFpEF, GERD, SANTIAGO, anxiety currently at 22 weeks gestation presents to the emergency department as a transfer from urgent care, brought in by EMS for evaluation of sudden onset shortness of breath and chest pain. Patient notes shortness of breath associated with productive cough.  Patient reports taking albuterol inhaler with sudden onset substernal chest pain described as a heaviness in the center of chest and\" like someone is sitting on my chest\".  Chest pain is worsening upon inspiration and lying supine.  Pain is not reproducible or worsen with meals.  Reports Hx of chronic bilateral lower extremity edema. Patient also reports a 2-day history of watery diarrhea. Denies any wheezing, fever, chills, hemoptysis, recent sick contacts, nausea, vomiting.    REVIEW OF SYSTEMS   Review of Systems   Respiratory:  Positive for shortness of breath.    Cardiovascular:  Positive for chest pain.   Gastrointestinal:  Positive for diarrhea.         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

## 2024-08-08 NOTE — ED TRIAGE NOTES
Pt to urgent care due to sudden onset of SOB and chest pressure and heaviness. Pt state she was sitting at home with no activity when her symptoms started. She was recently on a 5 day [employer] event monitor and sent it back in on Tuesday.

## 2024-08-08 NOTE — ED PROVIDER NOTES
[facility] CARE [place]  Urgent Care Encounter       CHIEF COMPLAINT       Chief Complaint   Patient presents with    Shortness of Breath    Chest Pain       Nurses Notes reviewed and I agree except as noted in the HPI.  HISTORY OF PRESENT ILLNESS   Anay Toledo is a 38 y.o. female who presents for complaints of sudden onset of chest pressure and shortness of breath.  This started approximately 1 hour prior to arrival.  The patient is currently 22 weeks pregnant.  She has a history of congestive heart failure.  The patient has recently had a cardiac workup and wore an event monitor for 5 days.  Patient states she was feeling fine until the sudden onset developed this afternoon.  Patient was sitting in the air conditioning watching TV when the chest pressure and shortness of breath started    HPI    REVIEW OF SYSTEMS     Review of Systems   Respiratory:  Positive for shortness of breath.    Cardiovascular:  Positive for chest pain.       PAST MEDICAL HISTORY         Diagnosis Date    Acute on chronic diastolic CHF (congestive heart failure) (HCC) 11/01/2022    Anemia     Anxiety and depression 07/09/2021    Asthma     Back pain     Degeneration of lumbosacral intervertebral disc 10/31/2022    Mixed restrictive and obstructive lung disease ([facility]) 01/24/2023    Moderate persistent asthma without complication 11/08/2022    Morbid obesity ([facility])     Sleep apnea        SURGICALHISTORY     Patient  has a past surgical history that includes Adenoidectomy; laparoscopic appendectomy (02/09/2016); Ankle fracture surgery (Left, 9/19/2017); and pr office/outpt visit,procedure only (Left, 8/2/2018).    CURRENT MEDICATIONS       Previous Medications    ALBUTEROL SULFATE HFA (VENTOLIN HFA) 108 (90 BASE) MCG/ACT INHALER    Inhale 2 puffs into the lungs every 4 hours as needed for Wheezing or Shortness of Breath    ASPIRIN 81 MG EC TABLET    Take 1 tablet by mouth daily    BUDESONIDE (PULMICORT) 0.5 MG/2ML NEBULIZER

## 2024-08-09 ENCOUNTER — TELEPHONE (OUTPATIENT)
Dept: FAMILY MEDICINE CLINIC | Age: 39
End: 2024-08-09

## 2024-08-09 LAB
EKG ATRIAL RATE: 53 BPM
EKG Q-T INTERVAL: 376 MS
EKG QRS DURATION: 100 MS
EKG QTC CALCULATION (BAZETT): 447 MS
EKG R AXIS: 89 DEGREES
EKG T AXIS: 19 DEGREES
EKG VENTRICULAR RATE: 85 BPM

## 2024-08-09 PROCEDURE — 93010 ELECTROCARDIOGRAM REPORT: CPT | Performed by: INTERNAL MEDICINE

## 2024-08-09 NOTE — DISCHARGE INSTRUCTIONS
Follow up with your OB and cardiologist for further assessment and management of your symptoms.    Return to the emergency department immediately if there is any new or concerning symptom.

## 2024-08-09 NOTE — ED PROVIDER NOTES
Transfer of Care Note:   Physician Signing out: Dorothea Del Rio MD  Receiving Physician: Liana Villela MD  Sign out time: 2000      Brief history:  22 weeks pregnant patient presents complaining of chest pain    Items pending that need to be checked:  CTA      Tentative Impression of patient:  Atypical chest pain    Expected disposition of patient:  Pending results, discharged.        Additional Assessment and results:   I have personally performed a face to face diagnostic evaluation on this patient. The patient's initial evaluation and plan have been discussed with the prior physician who initially evaluated the patient. Nursing Notes, Past Medical Hx, Past Surgical Hx, Social Hx, Allergies, vital signs and Family Hx were all reviewed.      Vitals:    08/08/24 2045   BP: (!) 142/74   Pulse: 79   Resp: 22   Temp:    SpO2: 99%     Physical Exam      Labs Reviewed   BASIC METABOLIC PANEL W/ REFLEX TO MG FOR LOW K - Abnormal; Notable for the following components:       Result Value    CO2 22 (*)     All other components within normal limits   BRAIN NATRIURETIC PEPTIDE - Abnormal; Notable for the following components:    Pro-.9 (*)     All other components within normal limits   CBC WITH AUTO DIFFERENTIAL - Abnormal; Notable for the following components:    RDW-CV 16.0 (*)     RDW-SD 50.6 (*)     All other components within normal limits   D-DIMER, QUANTITATIVE - Abnormal; Notable for the following components:    D-Dimer, Quant 920.00 (*)     All other components within normal limits   HEPATIC FUNCTION PANEL - Abnormal; Notable for the following components:    Albumin 3.3 (*)     All other components within normal limits   TROPONIN   URINALYSIS WITH REFLEX TO CULTURE   ANION GAP   GLOMERULAR FILTRATION RATE, ESTIMATED   PROTEIN / CREATININE RATIO, URINE         Medications   acetaminophen (TYLENOL) tablet 1,000 mg (1,000 mg Oral Given 8/8/24 1814)   sodium chloride 0.9 % bolus 500 mL (0 mLs IntraVENous Stopped

## 2024-09-05 ENCOUNTER — HOSPITAL ENCOUNTER (OUTPATIENT)
Dept: ULTRASOUND IMAGING | Age: 39
Discharge: HOME | End: 2024-09-05
Payer: MEDICARE

## 2024-09-05 DIAGNOSIS — Z36.2 ENCOUNTER FOR FOLLOW-UP ULTRASOUND OF FETAL ANATOMY: ICD-10-CM

## 2024-09-05 PROCEDURE — 76816 OB US FOLLOW-UP PER FETUS: CPT

## 2024-09-05 PROCEDURE — 99211 OFF/OP EST MAY X REQ PHY/QHP: CPT

## 2024-09-05 NOTE — PROGRESS NOTES
HT: 5' 3\"  WT:   460.2 Lbs.  209.2 Kg.  BMI- 81.5  T: 97.5 Skin  BP: 134/65  P: 71  R: 20  No problems/concerns.  No CoVid signs/symptoms.

## 2024-10-02 ENCOUNTER — HOSPITAL ENCOUNTER (EMERGENCY)
Age: 39
Discharge: HOME | End: 2024-10-02
Payer: MEDICARE

## 2024-10-02 VITALS
WEIGHT: 293 LBS | SYSTOLIC BLOOD PRESSURE: 161 MMHG | RESPIRATION RATE: 24 BRPM | OXYGEN SATURATION: 97 % | HEIGHT: 63 IN | BODY MASS INDEX: 51.91 KG/M2 | TEMPERATURE: 98.1 F | HEART RATE: 109 BPM | DIASTOLIC BLOOD PRESSURE: 83 MMHG

## 2024-10-02 DIAGNOSIS — Z3A.29 29 WEEKS GESTATION OF PREGNANCY: ICD-10-CM

## 2024-10-02 DIAGNOSIS — U07.1 COVID-19: Primary | ICD-10-CM

## 2024-10-02 LAB — SARS-COV-2 RDRP RESP QL NAA+PROBE: DETECTED

## 2024-10-02 PROCEDURE — 99213 OFFICE O/P EST LOW 20 MIN: CPT | Performed by: NURSE PRACTITIONER

## 2024-10-02 PROCEDURE — 87635 SARS-COV-2 COVID-19 AMP PRB: CPT

## 2024-10-02 PROCEDURE — 99213 OFFICE O/P EST LOW 20 MIN: CPT

## 2024-10-02 RX ORDER — LORATADINE 10 MG
1 CAPSULE ORAL 2 TIMES DAILY PRN
COMMUNITY
Start: 2024-10-02

## 2024-10-02 RX ORDER — METOPROLOL TARTRATE 25 MG/1
25 TABLET, FILM COATED ORAL 2 TIMES DAILY
COMMUNITY

## 2024-10-02 ASSESSMENT — ENCOUNTER SYMPTOMS
COUGH: 1
SORE THROAT: 1
NAUSEA: 0
SHORTNESS OF BREATH: 1

## 2024-10-02 ASSESSMENT — PAIN SCALES - GENERAL: PAINLEVEL_OUTOF10: 2

## 2024-10-02 ASSESSMENT — PAIN DESCRIPTION - FREQUENCY: FREQUENCY: INTERMITTENT

## 2024-10-02 ASSESSMENT — PAIN DESCRIPTION - DESCRIPTORS: DESCRIPTORS: SHARP

## 2024-10-02 ASSESSMENT — PAIN - FUNCTIONAL ASSESSMENT
PAIN_FUNCTIONAL_ASSESSMENT: 0-10
PAIN_FUNCTIONAL_ASSESSMENT: ACTIVITIES ARE NOT PREVENTED

## 2024-10-02 ASSESSMENT — PAIN DESCRIPTION - LOCATION: LOCATION: CHEST

## 2024-10-02 ASSESSMENT — PAIN DESCRIPTION - PAIN TYPE: TYPE: ACUTE PAIN

## 2024-10-02 ASSESSMENT — PAIN DESCRIPTION - ORIENTATION: ORIENTATION: MID

## 2024-10-02 NOTE — ED PROVIDER NOTES
[facility] CARE CENTER  Urgent Care Encounter       CHIEF COMPLAINT       Chief Complaint   Patient presents with    Cough    Pharyngitis    Sinusitis    Taste Change     Co starting as sore throat and sinus congestion into a cough with increased shortness of breath.  No cannot taste or smell       Nurses Notes reviewed and I agree except as noted in the HPI.  HISTORY OF PRESENT ILLNESS   Anay Toledo is a 39 y.o. female who presents with new onset cough, sore throat, sinus congestion, and loss of taste and smell.  Patient admits to increasing shortness of breath.  Her symptoms started 3 days ago and have worsened.  Patient reports being 29 weeks and 6 days pregnant.  She has been taking Coricidin and Flonase.  There has been no relief.  Patient is morbidly obese and has obstructive sleep apnea and a history of COPD.  She no longer uses home oxygen.  Has been using a humidifier at nighttime.  Denies any chest pain.  Admits to a history of COVID-19 with pneumonia requiring hospitalization.    The history is provided by the patient and the spouse.       REVIEW OF SYSTEMS     Review of Systems   Constitutional:  Positive for appetite change (loss of taste) and fatigue. Negative for fever.   HENT:  Positive for congestion and sore throat.    Respiratory:  Positive for cough and shortness of breath.    Cardiovascular:  Negative for chest pain.   Gastrointestinal:  Negative for nausea.   Musculoskeletal:  Negative for myalgias.   Neurological:  Negative for headaches.       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

## 2024-10-02 NOTE — ED NOTES
PT GIVEN DISCHARGE INSTRUCTIONS, VERBALIZES UNDERSTANDING.  PT ASSESSMENT UNCHANGED, DISCHARGED IN STABLE CONDITION.        Ruben Butt RN  10/02/24 [id number]

## 2024-11-06 ENCOUNTER — HOSPITAL ENCOUNTER (OUTPATIENT)
Age: 39
Discharge: HOME | End: 2024-11-06
Payer: MEDICARE

## 2024-11-06 LAB
DEPRECATED RDW RBC AUTO: 46.4 FL (ref 35–45)
ERYTHROCYTE [DISTWIDTH] IN BLOOD BY AUTOMATED COUNT: 14.4 % (ref 11.5–14.5)
HCT VFR BLD AUTO: 42 % (ref 37–47)
HGB BLD-MCNC: 13.5 GM/DL (ref 12–16)
MCH RBC QN AUTO: 28.8 PG (ref 26–33)
MCHC RBC AUTO-ENTMCNC: 32.1 GM/DL (ref 32.2–35.5)
MCV RBC AUTO: 89.7 FL (ref 81–99)
PLATELET # BLD AUTO: 209 THOU/MM3 (ref 130–400)
PMV BLD AUTO: 11.5 FL (ref 9.4–12.4)
RBC # BLD AUTO: 4.68 MILL/MM3 (ref 4.2–5.4)
WBC # BLD AUTO: 9.7 THOU/MM3 (ref 4.8–10.8)

## 2024-11-06 PROCEDURE — 82570 ASSAY OF URINE CREATININE: CPT

## 2024-11-06 PROCEDURE — 84450 TRANSFERASE (AST) (SGOT): CPT

## 2024-11-06 PROCEDURE — 82565 ASSAY OF CREATININE: CPT

## 2024-11-06 PROCEDURE — 84156 ASSAY OF PROTEIN URINE: CPT

## 2024-11-06 PROCEDURE — 84460 ALANINE AMINO (ALT) (SGPT): CPT

## 2024-11-06 PROCEDURE — 36415 COLL VENOUS BLD VENIPUNCTURE: CPT

## 2024-11-06 PROCEDURE — 85027 COMPLETE CBC AUTOMATED: CPT

## 2024-11-06 PROCEDURE — 84520 ASSAY OF UREA NITROGEN: CPT

## 2024-11-07 LAB
ALT SERPL W/O P-5'-P-CCNC: 27 U/L (ref 11–66)
AST SERPL-CCNC: 19 U/L (ref 5–40)
BUN SERPL-MCNC: 11 MG/DL (ref 7–22)
CREAT SERPL-MCNC: 0.4 MG/DL (ref 0.4–1.2)
CREAT UR-MCNC: 149.5 MG/DL
GFR SERPL CREATININE-BSD FRML MDRD: > 90 ML/MIN/1.73M2
PROT UR-MCNC: 13.8 MG/DL
PROT/CREAT 24H UR: 0.09 MG/G{CREAT}

## 2025-01-02 ENCOUNTER — OFFICE VISIT (OUTPATIENT)
Dept: CARDIOLOGY CLINIC | Age: 40
End: 2025-01-02

## 2025-01-02 VITALS
HEART RATE: 56 BPM | DIASTOLIC BLOOD PRESSURE: 86 MMHG | WEIGHT: 293 LBS | HEIGHT: 63 IN | SYSTOLIC BLOOD PRESSURE: 140 MMHG | BODY MASS INDEX: 51.91 KG/M2

## 2025-01-02 DIAGNOSIS — E66.01 MORBID OBESITY WITH BMI OF 70 AND OVER, ADULT: ICD-10-CM

## 2025-01-02 DIAGNOSIS — R94.31 ABNORMAL HOLTER EXAM: ICD-10-CM

## 2025-01-02 DIAGNOSIS — Z87.898 HISTORY OF PALPITATIONS: ICD-10-CM

## 2025-01-02 DIAGNOSIS — I50.32 CHRONIC HEART FAILURE WITH PRESERVED EJECTION FRACTION (HCC): Primary | ICD-10-CM

## 2025-01-02 DIAGNOSIS — Z98.890 S/P CARDIAC CATH: ICD-10-CM

## 2025-01-02 RX ORDER — IBUPROFEN 800 MG/1
800 TABLET, FILM COATED ORAL EVERY 6 HOURS
COMMUNITY
Start: 2024-11-26

## 2025-01-02 RX ORDER — ENOXAPARIN SODIUM 100 MG/ML
60 INJECTION SUBCUTANEOUS EVERY 12 HOURS
COMMUNITY
Start: 2024-11-26

## 2025-01-02 RX ORDER — NIFEDIPINE 30 MG
30 TABLET, EXTENDED RELEASE ORAL DAILY
COMMUNITY
Start: 2024-11-27

## 2025-01-02 NOTE — PROGRESS NOTES
appropriate mood and affect    Lab  No results for input(s): \"CKTOTAL\", \"CKMB\", \"CKMBINDEX\", \"TROPONINI\" in the last 72 hours.  CBC:   Lab Results   Component Value Date/Time    WBC 9.7 11/06/2024 04:29 PM    RBC 4.68 11/06/2024 04:29 PM    HGB 13.5 11/06/2024 04:29 PM    HCT 42.0 11/06/2024 04:29 PM    MCV 89.7 11/06/2024 04:29 PM    MCH 28.8 11/06/2024 04:29 PM    MCHC 32.1 11/06/2024 04:29 PM    RDW 14.4 05/16/2024 10:52 AM     11/06/2024 04:29 PM    MPV 11.5 11/06/2024 04:29 PM     BMP:    Lab Results   Component Value Date/Time     08/08/2024 05:10 PM    K 4.1 08/08/2024 05:10 PM     08/08/2024 05:10 PM    CO2 22 08/08/2024 05:10 PM    BUN 11 11/06/2024 04:29 PM    CREATININE 0.4 11/06/2024 04:29 PM    CALCIUM 8.7 08/08/2024 05:10 PM    LABGLOM > 90 11/06/2024 04:29 PM    LABGLOM >60 12/15/2023 04:34 PM    GLUCOSE 77 08/08/2024 05:10 PM     Hepatic Function Panel:    Lab Results   Component Value Date/Time    ALKPHOS 48 08/08/2024 05:10 PM    ALT 27 11/06/2024 04:29 PM    AST 19 11/06/2024 04:29 PM    BILITOT 0.3 08/08/2024 05:10 PM    BILIDIR <0.1 08/08/2024 05:10 PM     Magnesium:    Lab Results   Component Value Date/Time    MG 2.2 11/02/2022 07:37 AM     Warfarin PT/INR:  No components found for: \"PTPATWAR\", \"PTINRWAR\"  HgBA1c:    Lab Results   Component Value Date/Time    LABA1C 5.9 10/14/2022 01:01 PM     FLP:    Lab Results   Component Value Date/Time    TRIG 87 10/14/2022 01:00 PM    HDL 55 10/14/2022 01:00 PM     TSH:    Lab Results   Component Value Date/Time    TSH 4.710 10/14/2022 01:00 PM       Conclusions      Summary   Technically difficult study due to poor acoustic windows.   Ejection fraction is visually estimated at 60%.   Overall left ventricular function is normal.      Signature      ----------------------------------------------------------------   Electronically signed by Alvino Arriaza MD (Interpreting   physician) on 10/31/2022 at 04:01 PM

## 2025-01-09 ENCOUNTER — HOSPITAL ENCOUNTER (OUTPATIENT)
Age: 40
Discharge: HOME | End: 2025-01-11
Attending: INTERNAL MEDICINE
Payer: MEDICARE

## 2025-01-09 ENCOUNTER — TELEPHONE (OUTPATIENT)
Dept: CARDIOLOGY CLINIC | Age: 40
End: 2025-01-09

## 2025-01-09 DIAGNOSIS — E66.01 MORBID OBESITY WITH BMI OF 70 AND OVER, ADULT: ICD-10-CM

## 2025-01-09 DIAGNOSIS — Z98.890 S/P CARDIAC CATH: ICD-10-CM

## 2025-01-09 DIAGNOSIS — R94.31 ABNORMAL HOLTER EXAM: ICD-10-CM

## 2025-01-09 DIAGNOSIS — Z87.898 HISTORY OF PALPITATIONS: ICD-10-CM

## 2025-01-09 DIAGNOSIS — I50.32 CHRONIC HEART FAILURE WITH PRESERVED EJECTION FRACTION (HCC): Primary | ICD-10-CM

## 2025-01-09 DIAGNOSIS — I50.32 CHRONIC HEART FAILURE WITH PRESERVED EJECTION FRACTION (HCC): ICD-10-CM

## 2025-01-09 PROCEDURE — 93242 EXT ECG>48HR<7D RECORDING: CPT

## 2025-01-09 NOTE — TELEPHONE ENCOUNTER
Lovely left message states pt order is supposed to be a Cardiac MRI with and with out contrast. New order placed and faxed over to [phone] Attn Lovely

## 2025-01-29 ENCOUNTER — TELEPHONE (OUTPATIENT)
Dept: CARDIOLOGY CLINIC | Age: 40
End: 2025-01-29

## 2025-01-29 NOTE — TELEPHONE ENCOUNTER
Chicho can you address due to Dr. Zavala being OOT?    Patient had monitor placed.  Results under cardiology tab under documents.    Anything needed?

## 2025-01-29 NOTE — TELEPHONE ENCOUNTER
Pt had some episodes of Atrial tachycardia.  This can be reviewed by Dr Zavala after her Cardiac MRI.

## 2025-02-18 ENCOUNTER — OFFICE VISIT (OUTPATIENT)
Dept: ONCOLOGY | Age: 40
End: 2025-02-18
Payer: MEDICARE

## 2025-02-18 ENCOUNTER — HOSPITAL ENCOUNTER (OUTPATIENT)
Dept: INFUSION THERAPY | Age: 40
Discharge: HOME | End: 2025-02-18
Payer: MEDICARE

## 2025-02-18 VITALS
BODY MASS INDEX: 51.91 KG/M2 | OXYGEN SATURATION: 98 % | RESPIRATION RATE: 18 BRPM | TEMPERATURE: 98.4 F | HEART RATE: 84 BPM | SYSTOLIC BLOOD PRESSURE: 125 MMHG | DIASTOLIC BLOOD PRESSURE: 76 MMHG | HEIGHT: 63 IN | WEIGHT: 293 LBS

## 2025-02-18 VITALS
RESPIRATION RATE: 18 BRPM | DIASTOLIC BLOOD PRESSURE: 76 MMHG | SYSTOLIC BLOOD PRESSURE: 125 MMHG | TEMPERATURE: 98.4 F | HEART RATE: 84 BPM

## 2025-02-18 DIAGNOSIS — D50.0 IRON DEFICIENCY ANEMIA DUE TO CHRONIC BLOOD LOSS: ICD-10-CM

## 2025-02-18 DIAGNOSIS — D50.0 IRON DEFICIENCY ANEMIA DUE TO CHRONIC BLOOD LOSS: Primary | ICD-10-CM

## 2025-02-18 LAB
ALBUMIN SERPL BCG-MCNC: 3.9 G/DL (ref 3.5–5.1)
ALP SERPL-CCNC: 74 U/L (ref 38–126)
ALT SERPL W/O P-5'-P-CCNC: 18 U/L (ref 11–66)
ANION GAP SERPL CALC-SCNC: 12 MEQ/L (ref 8–16)
AST SERPL-CCNC: 16 U/L (ref 5–40)
BASOPHILS ABSOLUTE: 0 THOU/MM3 (ref 0–0.1)
BASOPHILS NFR BLD AUTO: 0.4 %
BILIRUB SERPL-MCNC: 0.4 MG/DL (ref 0.3–1.2)
BUN SERPL-MCNC: 15 MG/DL (ref 7–22)
CALCIUM SERPL-MCNC: 9.1 MG/DL (ref 8.2–9.6)
CHLORIDE SERPL-SCNC: 100 MEQ/L (ref 98–111)
CO2 SERPL-SCNC: 27 MEQ/L (ref 23–33)
CREAT SERPL-MCNC: 0.6 MG/DL (ref 0.4–1.2)
DEPRECATED RDW RBC AUTO: 45.4 FL (ref 35–45)
EOSINOPHIL NFR BLD AUTO: 5.2 %
EOSINOPHILS ABSOLUTE: 0.5 THOU/MM3 (ref 0–0.4)
ERYTHROCYTE [DISTWIDTH] IN BLOOD BY AUTOMATED COUNT: 14.1 % (ref 11.5–14.5)
FERRITIN SERPL IA-MCNC: 139 NG/ML (ref 13–150)
GFR SERPL CREATININE-BSD FRML MDRD: > 90 ML/MIN/1.73M2
GLUCOSE SERPL-MCNC: 81 MG/DL (ref 70–108)
HCT VFR BLD AUTO: 43.9 % (ref 37–47)
HGB BLD-MCNC: 13.6 GM/DL (ref 12–16)
IMM GRANULOCYTES # BLD AUTO: 0.04 THOU/MM3 (ref 0–0.07)
IMM GRANULOCYTES NFR BLD AUTO: 0.4 %
IRON SERPL-MCNC: 39 UG/DL (ref 50–170)
LYMPHOCYTES ABSOLUTE: 2.6 THOU/MM3 (ref 1–4.8)
LYMPHOCYTES NFR BLD AUTO: 26.6 %
MCH RBC QN AUTO: 27.2 PG (ref 26–33)
MCHC RBC AUTO-ENTMCNC: 31 GM/DL (ref 32.2–35.5)
MCV RBC AUTO: 87.8 FL (ref 81–99)
MONOCYTES ABSOLUTE: 0.7 THOU/MM3 (ref 0.4–1.3)
MONOCYTES NFR BLD AUTO: 7.3 %
NEUTROPHILS ABSOLUTE: 5.8 THOU/MM3 (ref 1.8–7.7)
NEUTROPHILS NFR BLD AUTO: 60.1 %
NRBC BLD AUTO-RTO: 0 /100 WBC
PLATELET # BLD AUTO: 259 THOU/MM3 (ref 130–400)
PMV BLD AUTO: 11.4 FL (ref 9.4–12.4)
POTASSIUM SERPL-SCNC: 4.6 MEQ/L (ref 3.5–5.2)
PROT SERPL-MCNC: 7.3 G/DL (ref 6.1–8)
RBC # BLD AUTO: 5 MILL/MM3 (ref 4.2–5.4)
SODIUM SERPL-SCNC: 139 MEQ/L (ref 135–145)
TIBC SERPL-MCNC: 288 UG/DL (ref 171–450)
WBC # BLD AUTO: 9.7 THOU/MM3 (ref 4.8–10.8)

## 2025-02-18 PROCEDURE — 85025 COMPLETE CBC W/AUTO DIFF WBC: CPT

## 2025-02-18 PROCEDURE — G8427 DOCREV CUR MEDS BY ELIG CLIN: HCPCS | Performed by: INTERNAL MEDICINE

## 2025-02-18 PROCEDURE — G8417 CALC BMI ABV UP PARAM F/U: HCPCS | Performed by: INTERNAL MEDICINE

## 2025-02-18 PROCEDURE — 99211 OFF/OP EST MAY X REQ PHY/QHP: CPT

## 2025-02-18 PROCEDURE — 99214 OFFICE O/P EST MOD 30 MIN: CPT | Performed by: INTERNAL MEDICINE

## 2025-02-18 PROCEDURE — 83540 ASSAY OF IRON: CPT

## 2025-02-18 PROCEDURE — 36415 COLL VENOUS BLD VENIPUNCTURE: CPT

## 2025-02-18 PROCEDURE — 1036F TOBACCO NON-USER: CPT | Performed by: INTERNAL MEDICINE

## 2025-02-18 PROCEDURE — 82728 ASSAY OF FERRITIN: CPT

## 2025-02-18 PROCEDURE — 80053 COMPREHEN METABOLIC PANEL: CPT

## 2025-02-18 PROCEDURE — 84466 ASSAY OF TRANSFERRIN: CPT

## 2025-02-18 NOTE — PROGRESS NOTES
[facility] [place] [facility]  [facility]  [address]  [place] [id number]  Dept: [phone]  Loc: [phone]     Follow Up Office Visit    Visit Date:2/18/2025     Anay Toledo is a 39 y.o. female who presents today for:   Chief Complaint   Patient presents with    Follow-up     Iron deficiency anemia due to chronic blood loss        HPI:   Anay Toledo is a 39 y.o. female  with iron deficiency anemia. She was started on treatment with ferrous sulfate by mouth twice daily in September of 2022.     12/08/2022 iron study results revealed iron level 27, TIBC 341, iron saturation 8% and ferritin level 36. CBC results revealed WBCs 8, Hgb 11.8, HCT 41.4%, MCV 74, RDW 22% and platelet count 242,000.  BMP, GFR, erythropoietin level all within normal limits.  Haptoglobin and LDH only slightly elevated.  Vitamin B12 and folate level within normal limits.  Hepatic function panel within normal limits.     12/14/2022 and 12/21/2022 the patient received treatment with Injectafer 750 mg IV.     01/19/2023 iron studies reveal iron level 51, TIBC 302, iron saturation 17% and ferritin level 282.  CBC results reveal WBC 7.18, Hgb improved to 14.1 compared to her previous value of 11.8, HCT 47.3% and platelet count 204,000.  The patient typically wears O2, but reports she is trying to wean herself off.  Pulse ox stable at 96% on room air.        04/20/2023 Iron study results reveal iron level 44, TIBC 247, Iron saturation 18% and Ferritin level 154.  GFR and BMP results are within normal limits.  CBC results reveal WBC 8.6, Hgb 14.6, HCT 46.9% and platelet count 219,000.    11/16/2023: iron level 42, TIBC 302, iron saturation 14% and ferritin level 123.  GFR BMP results within normal limits.  WBC 8.9, Hgb 14.4, HCT 46.1% and platelet count 260,000.    The patient returns for follow-up on her diagnosis of iron deficiency anemia. The patient reports she is currently

## 2025-02-18 NOTE — PATIENT INSTRUCTIONS
Can follow up as needed for now    Will call with results. If we need to bring her in, we can call in the future.

## 2025-02-19 DIAGNOSIS — E66.01 MORBID OBESITY WITH BMI OF 70 AND OVER, ADULT: ICD-10-CM

## 2025-02-19 DIAGNOSIS — Z87.898 HISTORY OF PALPITATIONS: ICD-10-CM

## 2025-02-19 DIAGNOSIS — R94.31 ABNORMAL HOLTER EXAM: ICD-10-CM

## 2025-02-19 DIAGNOSIS — Z98.890 S/P CARDIAC CATH: ICD-10-CM

## 2025-02-19 DIAGNOSIS — I50.32 CHRONIC HEART FAILURE WITH PRESERVED EJECTION FRACTION (HCC): ICD-10-CM

## 2025-02-19 LAB — TRANSFERRIN SERPL-MCNC: 240 MG/DL (ref 200–360)

## 2025-02-24 ASSESSMENT — ENCOUNTER SYMPTOMS
FACIAL SWELLING: 0
CHEST TIGHTNESS: 0
COLOR CHANGE: 0
ABDOMINAL PAIN: 0
SORE THROAT: 0
SHORTNESS OF BREATH: 0
EYE REDNESS: 0
COUGH: 0
VOMITING: 0
PHOTOPHOBIA: 0
CONSTIPATION: 0
BACK PAIN: 0
DIARRHEA: 0
NAUSEA: 0

## 2025-02-27 ENCOUNTER — HOSPITAL ENCOUNTER (OUTPATIENT)
Dept: WOMENS IMAGING | Age: 40
End: 2025-02-27
Attending: STUDENT IN AN ORGANIZED HEALTH CARE EDUCATION/TRAINING PROGRAM
Payer: MEDICARE

## 2025-02-27 ENCOUNTER — HOSPITAL ENCOUNTER (OUTPATIENT)
Dept: WOMENS IMAGING | Age: 40
Discharge: HOME | End: 2025-02-27
Attending: STUDENT IN AN ORGANIZED HEALTH CARE EDUCATION/TRAINING PROGRAM
Payer: MEDICARE

## 2025-02-27 VITALS — BODY MASS INDEX: 51.91 KG/M2 | WEIGHT: 293 LBS | HEIGHT: 63 IN

## 2025-02-27 DIAGNOSIS — N64.4 MASTODYNIA: ICD-10-CM

## 2025-02-27 PROCEDURE — G0279 TOMOSYNTHESIS, MAMMO: HCPCS

## 2025-07-03 ENCOUNTER — OFFICE VISIT (OUTPATIENT)
Dept: CARDIOLOGY CLINIC | Age: 40
End: 2025-07-03
Payer: MEDICARE

## 2025-07-03 VITALS
SYSTOLIC BLOOD PRESSURE: 147 MMHG | HEART RATE: 78 BPM | WEIGHT: 293 LBS | HEIGHT: 63 IN | BODY MASS INDEX: 51.91 KG/M2 | DIASTOLIC BLOOD PRESSURE: 98 MMHG

## 2025-07-03 DIAGNOSIS — Z98.890 S/P CARDIAC CATH: ICD-10-CM

## 2025-07-03 DIAGNOSIS — I50.32 CHRONIC HEART FAILURE WITH PRESERVED EJECTION FRACTION (HCC): ICD-10-CM

## 2025-07-03 DIAGNOSIS — Z87.898 HISTORY OF PALPITATIONS: ICD-10-CM

## 2025-07-03 DIAGNOSIS — R94.31 ABNORMAL HOLTER EXAM: ICD-10-CM

## 2025-07-03 DIAGNOSIS — I10 PRIMARY HYPERTENSION: Primary | ICD-10-CM

## 2025-07-03 DIAGNOSIS — R07.89 CHEST PAIN, ATYPICAL: ICD-10-CM

## 2025-07-03 DIAGNOSIS — E66.01 MORBID OBESITY WITH BMI OF 70 AND OVER, ADULT (HCC): ICD-10-CM

## 2025-07-03 PROBLEM — Z3A.11 11 WEEKS GESTATION OF PREGNANCY: Status: RESOLVED | Noted: 2024-05-22 | Resolved: 2025-07-03

## 2025-07-03 PROCEDURE — 3077F SYST BP >= 140 MM HG: CPT | Performed by: INTERNAL MEDICINE

## 2025-07-03 PROCEDURE — 3080F DIAST BP >= 90 MM HG: CPT | Performed by: INTERNAL MEDICINE

## 2025-07-03 PROCEDURE — G8427 DOCREV CUR MEDS BY ELIG CLIN: HCPCS | Performed by: INTERNAL MEDICINE

## 2025-07-03 PROCEDURE — 93000 ELECTROCARDIOGRAM COMPLETE: CPT | Performed by: INTERNAL MEDICINE

## 2025-07-03 PROCEDURE — 99214 OFFICE O/P EST MOD 30 MIN: CPT | Performed by: INTERNAL MEDICINE

## 2025-07-03 PROCEDURE — 1036F TOBACCO NON-USER: CPT | Performed by: INTERNAL MEDICINE

## 2025-07-03 PROCEDURE — G8417 CALC BMI ABV UP PARAM F/U: HCPCS | Performed by: INTERNAL MEDICINE

## 2025-07-03 RX ORDER — VALSARTAN 80 MG/1
80 TABLET ORAL DAILY
Qty: 90 TABLET | Refills: 3 | Status: SHIPPED | OUTPATIENT
Start: 2025-07-03

## 2025-07-03 NOTE — PROGRESS NOTES
Chief Complaint   Patient presents with    Follow-up    Congestive Heart Failure       Seen oct 2022 NP here- CHF  Was admitted in hospital for  chf , leg edema and chest pain  Diuresed and had cath and sent home on bumex 2 mg po qd   Post d/c pat did not take bumex   No leg edema  Seen in hospital by dr. rossi          Pt here for 6 mo fu     EKG done today occasional chest pain shooting type, once in a while last few secionds  Like once in 1 to 2 week  Not exertion related    Had previous hx of cp and cath 2022 nonobstructive    Denies palpitations, dizziness    Chronic leg edema - Lymphedema    Had cath 2022 and patent coronaries    Sob on exertion chonic stable     Nonsmoker    FHx  None for CAD    PMHX  Had baby and delivered -  On home O2 3 liter and 6 on activity since sept 2022  Morbid obesity with body mass index of 50 or highe   Malpresentation  Cardiac Diagnosis: HFpEF, super morbid obesity (BMI 82), NSVT , junctional rhythm,- cardiology [id number] with me 3 months postpartum with a cardiac MRI   SANTIAGO, Asthma, COPD  Bipap santiago  CS delivery and TUbal lgation  onov 2024  Peripartum - pat tajking lasix and metoprolol 50 bid and lopressor stopped prior to delivery and lasix stopped later    Past Surgical History:   Procedure Laterality Date    ADENOIDECTOMY      ANKLE FRACTURE SURGERY Left 9/19/2017    LEFT ANKLE ORIF, I & D right posterior upper leg performed by Juan Mendoza MD at [facility] OR    LAPAROSCOPIC APPENDECTOMY  02/09/2016    FL OFFICE/OUTPT VISIT,PROCEDURE ONLY Left 8/2/2018    LEFT SUBTALAR JOINT FUSION, MEDIAL COLUMN FUSION, GASTROC RECESSION, POSS DELTOID REPAIR performed by Ruben Bartlett DPM at [facility] OR       Allergies   Allergen Reactions    Morphine Itching     Felt like she was on fire        Family History   Problem Relation Age of Onset    Cancer Mother     Depression Mother     Thyroid Disease Mother     Arthritis Father     High Blood Pressure Father     Depression Sister

## 2025-07-29 ENCOUNTER — OFFICE VISIT (OUTPATIENT)
Age: 40
End: 2025-07-29
Payer: MEDICARE

## 2025-07-29 VITALS
HEIGHT: 63 IN | SYSTOLIC BLOOD PRESSURE: 132 MMHG | BODY MASS INDEX: 51.91 KG/M2 | DIASTOLIC BLOOD PRESSURE: 88 MMHG | HEART RATE: 96 BPM | WEIGHT: 293 LBS | TEMPERATURE: 97.8 F | OXYGEN SATURATION: 97 %

## 2025-07-29 DIAGNOSIS — E66.01 MORBID OBESITY WITH BODY MASS INDEX OF 70 AND OVER IN ADULT (HCC): ICD-10-CM

## 2025-07-29 DIAGNOSIS — G47.33 SEVERE OBSTRUCTIVE SLEEP APNEA: Primary | ICD-10-CM

## 2025-07-29 PROBLEM — I50.9 CONGESTIVE HEART FAILURE (HCC): Status: ACTIVE | Noted: 2025-07-29

## 2025-07-29 PROBLEM — Z86.59 HISTORY OF DEPRESSION: Status: ACTIVE | Noted: 2025-07-29

## 2025-07-29 PROBLEM — Z82.79 FAMILY HISTORY OF CONGENITAL ANOMALY: Status: ACTIVE | Noted: 2025-07-29

## 2025-07-29 PROBLEM — G43.909 MIGRAINE: Status: ACTIVE | Noted: 2025-07-29

## 2025-07-29 PROCEDURE — 3079F DIAST BP 80-89 MM HG: CPT | Performed by: NURSE PRACTITIONER

## 2025-07-29 PROCEDURE — 99214 OFFICE O/P EST MOD 30 MIN: CPT | Performed by: NURSE PRACTITIONER

## 2025-07-29 PROCEDURE — 3075F SYST BP GE 130 - 139MM HG: CPT | Performed by: NURSE PRACTITIONER

## 2025-07-29 PROCEDURE — G8417 CALC BMI ABV UP PARAM F/U: HCPCS | Performed by: NURSE PRACTITIONER

## 2025-07-29 PROCEDURE — G8427 DOCREV CUR MEDS BY ELIG CLIN: HCPCS | Performed by: NURSE PRACTITIONER

## 2025-07-29 PROCEDURE — 1036F TOBACCO NON-USER: CPT | Performed by: NURSE PRACTITIONER

## 2025-07-29 NOTE — PROGRESS NOTES
[facility]       Anay Toledo         [id number]  7/29/2025   Chief Complaint   Patient presents with    Follow-up     [facility]         Pt of Dr. Ulisses LY Download:   Original or initial AHI: 59.6     Date of initial study: 10/17/22      Compliant  100%     Noncompliant 0%     PAP Type Spont Level  IPAP 56prP21 EPAP [id number]   Avg Hrs/Day 10 hours 36 minutes  AHI: 0.2   Leaks : 95 th percentile: 0.5   Recorded compliance dates 6/29/25-7/28/25   Machine/Mfg:   [x] ResMed    [] Respironics/Dreamstation   Interface:   [] Nasal    [] Nasal pillows   [x] FFM      Provider:      [x] -HMELICEO     [][employer]     [] [employer]    [] [employer]    [] [employer]               [] [employer]      [] [employer]    [] [place]:      [] Other    Neck Size: 15.25 inches  Mallampati 4  ESS:  2  SAQLI: 81    Here is a scan of the most recent download:                Results  - Diagnostic Testing:    - Sleep Study: Severe sleep apnea with pauses in breathing about 59 times an hour  -download showing apneas well controlled  '  Presentation:   History of Present Illness  The patient is a 39-year-old female who presents for a 1-year obstructive sleep apnea follow-up.    Obstructive Sleep Apnea and CPAP Mask Issues  She reports no improvement in her condition. She has been experiencing issues with her CPAP mask, which she recently changed. The mask initially fits well but tends to loosen and slide up her face as the night progresses, requiring her to constantly adjust it. This has led to instances where she has torn the mask while trying to reposition it. Despite these challenges, she continues to use the mask as it significantly improves her sleep quality. Without the mask, she struggles to breathe and cannot sleep at all. She has tried different masks and headbands but has not found a suitable one yet.    Pregnancy and Blood Pressure Issues  She was pregnant when she was seen last year and

## 2025-08-04 ENCOUNTER — PATIENT MESSAGE (OUTPATIENT)
Dept: FAMILY MEDICINE CLINIC | Age: 40
End: 2025-08-04

## 2025-08-04 DIAGNOSIS — R11.0 NAUSEA: Primary | ICD-10-CM

## 2025-08-04 RX ORDER — ONDANSETRON 4 MG/1
4 TABLET, FILM COATED ORAL EVERY 6 HOURS PRN
Qty: 30 TABLET | Refills: 1 | Status: SHIPPED | OUTPATIENT
Start: 2025-08-04

## 2025-08-13 ENCOUNTER — HOSPITAL ENCOUNTER (INPATIENT)
Age: 40
LOS: 4 days | Discharge: HOME | DRG: 070 | End: 2025-08-17
Attending: STUDENT IN AN ORGANIZED HEALTH CARE EDUCATION/TRAINING PROGRAM | Admitting: FAMILY MEDICINE
Payer: MEDICARE

## 2025-08-13 ENCOUNTER — APPOINTMENT (OUTPATIENT)
Dept: GENERAL RADIOLOGY | Age: 40
DRG: 070 | End: 2025-08-13
Payer: MEDICARE

## 2025-08-13 ENCOUNTER — APPOINTMENT (OUTPATIENT)
Dept: CT IMAGING | Age: 40
DRG: 070 | End: 2025-08-13
Payer: MEDICARE

## 2025-08-13 DIAGNOSIS — R42 DIZZINESS: ICD-10-CM

## 2025-08-13 DIAGNOSIS — G93.9 BRAIN LESION: ICD-10-CM

## 2025-08-13 DIAGNOSIS — I47.19 ATRIAL TACHYCARDIA: ICD-10-CM

## 2025-08-13 DIAGNOSIS — R06.02 SHORTNESS OF BREATH: Primary | ICD-10-CM

## 2025-08-13 DIAGNOSIS — R19.7 DIARRHEA, UNSPECIFIED TYPE: ICD-10-CM

## 2025-08-13 DIAGNOSIS — I50.33 ACUTE ON CHRONIC HEART FAILURE WITH PRESERVED EJECTION FRACTION (HFPEF) (HCC): ICD-10-CM

## 2025-08-13 LAB
ALBUMIN SERPL BCG-MCNC: 3.4 G/DL (ref 3.4–4.9)
ALBUMIN SERPL BCG-MCNC: 3.5 G/DL (ref 3.4–4.9)
ALP SERPL-CCNC: 55 U/L (ref 38–126)
ALP SERPL-CCNC: 57 U/L (ref 38–126)
ALT SERPL W/O P-5'-P-CCNC: 13 U/L (ref 10–35)
ALT SERPL W/O P-5'-P-CCNC: 14 U/L (ref 10–35)
ANION GAP SERPL CALC-SCNC: 12 MEQ/L (ref 8–16)
ANION GAP SERPL CALC-SCNC: 15 MEQ/L (ref 8–16)
AST SERPL-CCNC: 20 U/L (ref 10–35)
AST SERPL-CCNC: 31 U/L (ref 10–35)
B-HCG SERPL QL: NEGATIVE
BASOPHILS ABSOLUTE: 0 THOU/MM3 (ref 0–0.1)
BASOPHILS NFR BLD AUTO: 0.6 %
BILIRUB CONJ SERPL-MCNC: 0.2 MG/DL (ref 0–0.2)
BILIRUB SERPL-MCNC: 0.5 MG/DL (ref 0.3–1.2)
BILIRUB SERPL-MCNC: 0.6 MG/DL (ref 0.3–1.2)
BUN SERPL-MCNC: 10 MG/DL (ref 8–23)
BUN SERPL-MCNC: 12 MG/DL (ref 8–23)
CALCIUM SERPL-MCNC: 9.3 MG/DL (ref 8.5–10.5)
CALCIUM SERPL-MCNC: 9.3 MG/DL (ref 8.5–10.5)
CHLORIDE SERPL-SCNC: 105 MEQ/L (ref 98–111)
CHLORIDE SERPL-SCNC: 110 MEQ/L (ref 98–111)
CO2 SERPL-SCNC: 19 MEQ/L (ref 22–29)
CO2 SERPL-SCNC: 23 MEQ/L (ref 22–29)
CREAT SERPL-MCNC: 0.5 MG/DL (ref 0.5–0.9)
CREAT SERPL-MCNC: 0.6 MG/DL (ref 0.5–0.9)
DEPRECATED MEAN GLUCOSE BLD GHB EST-ACNC: 102 MG/DL (ref 70–126)
DEPRECATED RDW RBC AUTO: 47.5 FL (ref 35–45)
DEPRECATED RDW RBC AUTO: 48.1 FL (ref 35–45)
EKG ATRIAL RATE: 109 BPM
EKG P-R INTERVAL: 224 MS
EKG Q-T INTERVAL: 368 MS
EKG Q-T INTERVAL: 368 MS
EKG QRS DURATION: 104 MS
EKG QRS DURATION: 112 MS
EKG QTC CALCULATION (BAZETT): 495 MS
EKG QTC CALCULATION (BAZETT): 502 MS
EKG R AXIS: 11 DEGREES
EKG R AXIS: 45 DEGREES
EKG T AXIS: 31 DEGREES
EKG T AXIS: 38 DEGREES
EKG VENTRICULAR RATE: 109 BPM
EKG VENTRICULAR RATE: 112 BPM
EOSINOPHIL NFR BLD AUTO: 4.3 %
EOSINOPHILS ABSOLUTE: 0.3 THOU/MM3 (ref 0–0.4)
ERYTHROCYTE [DISTWIDTH] IN BLOOD BY AUTOMATED COUNT: 15.4 % (ref 11.5–14.5)
ERYTHROCYTE [DISTWIDTH] IN BLOOD BY AUTOMATED COUNT: 15.5 % (ref 11.5–14.5)
GFR SERPL CREATININE-BSD FRML MDRD: > 90 ML/MIN/1.73M2
GFR SERPL CREATININE-BSD FRML MDRD: > 90 ML/MIN/1.73M2
GLUCOSE SERPL-MCNC: 76 MG/DL (ref 74–109)
GLUCOSE SERPL-MCNC: 83 MG/DL (ref 74–109)
HBA1C MFR BLD HPLC: 5.4 % (ref 4–6)
HCT VFR BLD AUTO: 44.2 % (ref 37–47)
HCT VFR BLD AUTO: 44.5 % (ref 37–47)
HGB BLD-MCNC: 13.5 GM/DL (ref 12–16)
HGB BLD-MCNC: 13.8 GM/DL (ref 12–16)
IMM GRANULOCYTES # BLD AUTO: 0.03 THOU/MM3 (ref 0–0.07)
IMM GRANULOCYTES NFR BLD AUTO: 0.4 %
LYMPHOCYTES ABSOLUTE: 1.9 THOU/MM3 (ref 1–4.8)
LYMPHOCYTES NFR BLD AUTO: 25.8 %
MAGNESIUM SERPL-MCNC: 2.1 MG/DL (ref 1.6–2.6)
MCH RBC QN AUTO: 26 PG (ref 26–33)
MCH RBC QN AUTO: 26.3 PG (ref 26–33)
MCHC RBC AUTO-ENTMCNC: 30.5 GM/DL (ref 32.2–35.5)
MCHC RBC AUTO-ENTMCNC: 31 GM/DL (ref 32.2–35.5)
MCV RBC AUTO: 84.8 FL (ref 81–99)
MCV RBC AUTO: 85.2 FL (ref 81–99)
MONOCYTES ABSOLUTE: 0.6 THOU/MM3 (ref 0.4–1.3)
MONOCYTES NFR BLD AUTO: 8.6 %
NEUTROPHILS ABSOLUTE: 4.3 THOU/MM3 (ref 1.8–7.7)
NEUTROPHILS NFR BLD AUTO: 60.3 %
NRBC BLD AUTO-RTO: 0 /100 WBC
NT-PROBNP SERPL IA-MCNC: 1329 PG/ML (ref 0–124)
OSMOLALITY SERPL CALC.SUM OF ELEC: 278.3 MOSMOL/KG (ref 275–300)
OSMOLALITY SERPL CALC.SUM OF ELEC: 284.6 MOSMOL/KG (ref 275–300)
PLATELET # BLD AUTO: 215 THOU/MM3 (ref 130–400)
PLATELET # BLD AUTO: 223 THOU/MM3 (ref 130–400)
PMV BLD AUTO: 10.8 FL (ref 9.4–12.4)
PMV BLD AUTO: 10.8 FL (ref 9.4–12.4)
POTASSIUM SERPL-SCNC: 4.1 MEQ/L (ref 3.5–5.2)
POTASSIUM SERPL-SCNC: 4.5 MEQ/L (ref 3.5–5.2)
PROT SERPL-MCNC: 6.7 G/DL (ref 6.4–8.3)
PROT SERPL-MCNC: 6.8 G/DL (ref 6.4–8.3)
RBC # BLD AUTO: 5.19 MILL/MM3 (ref 4.2–5.4)
RBC # BLD AUTO: 5.25 MILL/MM3 (ref 4.2–5.4)
SODIUM SERPL-SCNC: 140 MEQ/L (ref 135–145)
SODIUM SERPL-SCNC: 144 MEQ/L (ref 135–145)
T4 FREE SERPL-MCNC: 1.5 NG/DL (ref 0.92–1.68)
TROPONIN, HIGH SENSITIVITY: 35 NG/L (ref 0–12)
TROPONIN, HIGH SENSITIVITY: 35 NG/L (ref 0–12)
TSH SERPL DL<=0.05 MIU/L-ACNC: 2.91 UIU/ML (ref 0.27–4.2)
WBC # BLD AUTO: 7.2 THOU/MM3 (ref 4.8–10.8)
WBC # BLD AUTO: 9.2 THOU/MM3 (ref 4.8–10.8)

## 2025-08-13 PROCEDURE — 6360000002 HC RX W HCPCS

## 2025-08-13 PROCEDURE — 2580000003 HC RX 258

## 2025-08-13 PROCEDURE — 84439 ASSAY OF FREE THYROXINE: CPT

## 2025-08-13 PROCEDURE — 83880 ASSAY OF NATRIURETIC PEPTIDE: CPT

## 2025-08-13 PROCEDURE — 71045 X-RAY EXAM CHEST 1 VIEW: CPT

## 2025-08-13 PROCEDURE — 82248 BILIRUBIN DIRECT: CPT

## 2025-08-13 PROCEDURE — 93010 ELECTROCARDIOGRAM REPORT: CPT | Performed by: NUCLEAR MEDICINE

## 2025-08-13 PROCEDURE — 93005 ELECTROCARDIOGRAM TRACING: CPT

## 2025-08-13 PROCEDURE — 84484 ASSAY OF TROPONIN QUANT: CPT

## 2025-08-13 PROCEDURE — 93005 ELECTROCARDIOGRAM TRACING: CPT | Performed by: STUDENT IN AN ORGANIZED HEALTH CARE EDUCATION/TRAINING PROGRAM

## 2025-08-13 PROCEDURE — 99223 1ST HOSP IP/OBS HIGH 75: CPT | Performed by: STUDENT IN AN ORGANIZED HEALTH CARE EDUCATION/TRAINING PROGRAM

## 2025-08-13 PROCEDURE — 83735 ASSAY OF MAGNESIUM: CPT

## 2025-08-13 PROCEDURE — 6360000002 HC RX W HCPCS: Performed by: STUDENT IN AN ORGANIZED HEALTH CARE EDUCATION/TRAINING PROGRAM

## 2025-08-13 PROCEDURE — 1200000003 HC TELEMETRY R&B

## 2025-08-13 PROCEDURE — 6360000004 HC RX CONTRAST MEDICATION

## 2025-08-13 PROCEDURE — 85027 COMPLETE CBC AUTOMATED: CPT

## 2025-08-13 PROCEDURE — 71275 CT ANGIOGRAPHY CHEST: CPT

## 2025-08-13 PROCEDURE — 83036 HEMOGLOBIN GLYCOSYLATED A1C: CPT

## 2025-08-13 PROCEDURE — 84703 CHORIONIC GONADOTROPIN ASSAY: CPT

## 2025-08-13 PROCEDURE — 84481 FREE ASSAY (FT-3): CPT

## 2025-08-13 PROCEDURE — 99285 EMERGENCY DEPT VISIT HI MDM: CPT

## 2025-08-13 PROCEDURE — 6370000000 HC RX 637 (ALT 250 FOR IP): Performed by: STUDENT IN AN ORGANIZED HEALTH CARE EDUCATION/TRAINING PROGRAM

## 2025-08-13 PROCEDURE — 36415 COLL VENOUS BLD VENIPUNCTURE: CPT

## 2025-08-13 PROCEDURE — 84443 ASSAY THYROID STIM HORMONE: CPT

## 2025-08-13 PROCEDURE — 2500000003 HC RX 250 WO HCPCS

## 2025-08-13 PROCEDURE — 80053 COMPREHEN METABOLIC PANEL: CPT

## 2025-08-13 PROCEDURE — 85025 COMPLETE CBC W/AUTO DIFF WBC: CPT

## 2025-08-13 RX ORDER — POTASSIUM CHLORIDE 7.45 MG/ML
10 INJECTION INTRAVENOUS PRN
Status: DISCONTINUED | OUTPATIENT
Start: 2025-08-13 | End: 2025-08-17 | Stop reason: HOSPADM

## 2025-08-13 RX ORDER — POTASSIUM CHLORIDE 1500 MG/1
40 TABLET, EXTENDED RELEASE ORAL PRN
Status: DISCONTINUED | OUTPATIENT
Start: 2025-08-13 | End: 2025-08-17 | Stop reason: HOSPADM

## 2025-08-13 RX ORDER — ALBUTEROL SULFATE 90 UG/1
2 INHALANT RESPIRATORY (INHALATION) EVERY 4 HOURS PRN
Status: DISCONTINUED | OUTPATIENT
Start: 2025-08-13 | End: 2025-08-17 | Stop reason: HOSPADM

## 2025-08-13 RX ORDER — SODIUM CHLORIDE 0.9 % (FLUSH) 0.9 %
5-40 SYRINGE (ML) INJECTION EVERY 12 HOURS SCHEDULED
Status: DISCONTINUED | OUTPATIENT
Start: 2025-08-13 | End: 2025-08-17 | Stop reason: HOSPADM

## 2025-08-13 RX ORDER — ACETAMINOPHEN 650 MG/1
650 SUPPOSITORY RECTAL EVERY 6 HOURS PRN
Status: DISCONTINUED | OUTPATIENT
Start: 2025-08-13 | End: 2025-08-17 | Stop reason: HOSPADM

## 2025-08-13 RX ORDER — ONDANSETRON 2 MG/ML
4 INJECTION INTRAMUSCULAR; INTRAVENOUS EVERY 6 HOURS PRN
Status: DISCONTINUED | OUTPATIENT
Start: 2025-08-13 | End: 2025-08-17 | Stop reason: HOSPADM

## 2025-08-13 RX ORDER — VALSARTAN 80 MG/1
80 TABLET ORAL DAILY
Status: DISCONTINUED | OUTPATIENT
Start: 2025-08-13 | End: 2025-08-14

## 2025-08-13 RX ORDER — ONDANSETRON 4 MG/1
4 TABLET, ORALLY DISINTEGRATING ORAL EVERY 8 HOURS PRN
Status: DISCONTINUED | OUTPATIENT
Start: 2025-08-13 | End: 2025-08-17 | Stop reason: HOSPADM

## 2025-08-13 RX ORDER — SODIUM CHLORIDE 9 MG/ML
INJECTION, SOLUTION INTRAVENOUS PRN
Status: DISCONTINUED | OUTPATIENT
Start: 2025-08-13 | End: 2025-08-17 | Stop reason: HOSPADM

## 2025-08-13 RX ORDER — SODIUM CHLORIDE 0.9 % (FLUSH) 0.9 %
5-40 SYRINGE (ML) INJECTION PRN
Status: DISCONTINUED | OUTPATIENT
Start: 2025-08-13 | End: 2025-08-17 | Stop reason: HOSPADM

## 2025-08-13 RX ORDER — IOPAMIDOL 755 MG/ML
80 INJECTION, SOLUTION INTRAVASCULAR
Status: COMPLETED | OUTPATIENT
Start: 2025-08-13 | End: 2025-08-13

## 2025-08-13 RX ORDER — MAGNESIUM SULFATE IN WATER 40 MG/ML
2000 INJECTION, SOLUTION INTRAVENOUS PRN
Status: DISCONTINUED | OUTPATIENT
Start: 2025-08-13 | End: 2025-08-17 | Stop reason: HOSPADM

## 2025-08-13 RX ORDER — POLYETHYLENE GLYCOL 3350 17 G/17G
17 POWDER, FOR SOLUTION ORAL DAILY PRN
Status: DISCONTINUED | OUTPATIENT
Start: 2025-08-13 | End: 2025-08-17 | Stop reason: HOSPADM

## 2025-08-13 RX ORDER — ENOXAPARIN SODIUM 100 MG/ML
60 INJECTION SUBCUTANEOUS 2 TIMES DAILY
Status: DISCONTINUED | OUTPATIENT
Start: 2025-08-13 | End: 2025-08-17 | Stop reason: HOSPADM

## 2025-08-13 RX ORDER — FUROSEMIDE 10 MG/ML
20 INJECTION INTRAMUSCULAR; INTRAVENOUS ONCE
Status: DISCONTINUED | OUTPATIENT
Start: 2025-08-13 | End: 2025-08-13

## 2025-08-13 RX ORDER — METOPROLOL TARTRATE 25 MG/1
25 TABLET, FILM COATED ORAL 2 TIMES DAILY
Status: DISCONTINUED | OUTPATIENT
Start: 2025-08-13 | End: 2025-08-15

## 2025-08-13 RX ORDER — ACETAMINOPHEN 325 MG/1
650 TABLET ORAL EVERY 6 HOURS PRN
Status: DISCONTINUED | OUTPATIENT
Start: 2025-08-13 | End: 2025-08-17 | Stop reason: HOSPADM

## 2025-08-13 RX ORDER — LORAZEPAM 2 MG/ML
0.5 INJECTION INTRAMUSCULAR
Status: DISCONTINUED | OUTPATIENT
Start: 2025-08-13 | End: 2025-08-14

## 2025-08-13 RX ORDER — FUROSEMIDE 10 MG/ML
20 INJECTION INTRAMUSCULAR; INTRAVENOUS DAILY
Status: DISCONTINUED | OUTPATIENT
Start: 2025-08-13 | End: 2025-08-14

## 2025-08-13 RX ORDER — 0.9 % SODIUM CHLORIDE 0.9 %
500 INTRAVENOUS SOLUTION INTRAVENOUS ONCE
Status: COMPLETED | OUTPATIENT
Start: 2025-08-13 | End: 2025-08-13

## 2025-08-13 RX ADMIN — METOPROLOL TARTRATE 25 MG: 50 TABLET, FILM COATED ORAL at 17:45

## 2025-08-13 RX ADMIN — SODIUM CHLORIDE 500 ML: 0.9 INJECTION, SOLUTION INTRAVENOUS at 14:00

## 2025-08-13 RX ADMIN — VALSARTAN 80 MG: 80 TABLET ORAL at 21:07

## 2025-08-13 RX ADMIN — ENOXAPARIN SODIUM 60 MG: 100 INJECTION SUBCUTANEOUS at 21:07

## 2025-08-13 RX ADMIN — FUROSEMIDE 20 MG: 10 INJECTION, SOLUTION INTRAMUSCULAR; INTRAVENOUS at 21:07

## 2025-08-13 RX ADMIN — SODIUM CHLORIDE, PRESERVATIVE FREE 10 ML: 5 INJECTION INTRAVENOUS at 21:07

## 2025-08-13 RX ADMIN — IOPAMIDOL 80 ML: 755 INJECTION, SOLUTION INTRAVENOUS at 12:13

## 2025-08-13 ASSESSMENT — PAIN SCALES - GENERAL
PAINLEVEL_OUTOF10: 0

## 2025-08-13 ASSESSMENT — PAIN - FUNCTIONAL ASSESSMENT: PAIN_FUNCTIONAL_ASSESSMENT: 0-10

## 2025-08-13 ASSESSMENT — HEART SCORE: ECG: NORMAL

## 2025-08-14 ENCOUNTER — APPOINTMENT (OUTPATIENT)
Dept: MRI IMAGING | Age: 40
DRG: 070 | End: 2025-08-14
Payer: MEDICARE

## 2025-08-14 ENCOUNTER — APPOINTMENT (OUTPATIENT)
Dept: INTERVENTIONAL RADIOLOGY/VASCULAR | Age: 40
DRG: 070 | End: 2025-08-14
Payer: MEDICARE

## 2025-08-14 ENCOUNTER — APPOINTMENT (OUTPATIENT)
Age: 40
DRG: 070 | End: 2025-08-14
Payer: MEDICARE

## 2025-08-14 PROBLEM — R06.02 SHORTNESS OF BREATH: Status: ACTIVE | Noted: 2025-08-14

## 2025-08-14 PROBLEM — R79.89 ELEVATED TROPONIN: Status: ACTIVE | Noted: 2025-08-14

## 2025-08-14 PROBLEM — I47.19 NARROW COMPLEX TACHYCARDIA: Status: ACTIVE | Noted: 2025-08-14

## 2025-08-14 PROBLEM — R19.7 DIARRHEA: Status: ACTIVE | Noted: 2025-08-14

## 2025-08-14 PROBLEM — J45.909 UNCOMPLICATED ASTHMA: Status: ACTIVE | Noted: 2022-11-08

## 2025-08-14 PROBLEM — I50.33 ACUTE ON CHRONIC HEART FAILURE WITH PRESERVED EJECTION FRACTION (HFPEF) (HCC): Status: ACTIVE | Noted: 2025-01-02

## 2025-08-14 PROBLEM — R51.9 ACUTE NONINTRACTABLE HEADACHE: Status: ACTIVE | Noted: 2025-08-14

## 2025-08-14 LAB
ALBUMIN SERPL BCG-MCNC: 3.2 G/DL (ref 3.4–4.9)
ALP SERPL-CCNC: 51 U/L (ref 38–126)
ALT SERPL W/O P-5'-P-CCNC: 12 U/L (ref 10–35)
ANION GAP SERPL CALC-SCNC: 20 MEQ/L (ref 8–16)
AST SERPL-CCNC: 25 U/L (ref 10–35)
BILIRUB SERPL-MCNC: 0.6 MG/DL (ref 0.3–1.2)
BUN SERPL-MCNC: 10 MG/DL (ref 8–23)
CALCIUM SERPL-MCNC: 9.2 MG/DL (ref 8.5–10.5)
CHLORIDE SERPL-SCNC: 105 MEQ/L (ref 98–111)
CO2 SERPL-SCNC: 15 MEQ/L (ref 22–29)
CREAT SERPL-MCNC: 0.6 MG/DL (ref 0.5–0.9)
DEPRECATED RDW RBC AUTO: 48.4 FL (ref 35–45)
ECHO AO ASC DIAM: 2.9 CM
ECHO AO ASCENDING AORTA INDEX: 1.08 CM/M2
ECHO AV CUSP MM: 2.2 CM
ECHO AV PEAK GRADIENT: 8 MMHG
ECHO AV PEAK VELOCITY: 1.4 M/S
ECHO AV VELOCITY RATIO: 0.79
ECHO BSA: 3.07 M2
ECHO BSA: 3.07 M2
ECHO EST RA PRESSURE: 3 MMHG
ECHO LA AREA 2C: 21 CM2
ECHO LA AREA 4C: 18.1 CM2
ECHO LA MAJOR AXIS: 5.7 CM
ECHO LA MINOR AXIS: 6.3 CM
ECHO LA VOL BP: 55 ML (ref 22–52)
ECHO LA VOL MOD A2C: 58 ML (ref 22–52)
ECHO LA VOL MOD A4C: 48 ML (ref 22–52)
ECHO LA VOL/BSA BIPLANE: 20 ML/M2 (ref 16–34)
ECHO LA VOLUME INDEX MOD A2C: 22 ML/M2 (ref 16–34)
ECHO LA VOLUME INDEX MOD A4C: 18 ML/M2 (ref 16–34)
ECHO LV E' LATERAL VELOCITY: 14.5 CM/S
ECHO LV E' SEPTAL VELOCITY: 10.4 CM/S
ECHO LV EF PHYSICIAN: 55 %
ECHO LV FRACTIONAL SHORTENING: 32 % (ref 28–44)
ECHO LV INTERNAL DIMENSION DIASTOLE INDEX: 2.08 CM/M2
ECHO LV INTERNAL DIMENSION DIASTOLIC: 5.6 CM (ref 3.9–5.3)
ECHO LV INTERNAL DIMENSION SYSTOLIC INDEX: 1.41 CM/M2
ECHO LV INTERNAL DIMENSION SYSTOLIC: 3.8 CM
ECHO LV ISOVOLUMETRIC RELAXATION TIME (IVRT): 67 MS
ECHO LV IVSD: 1.1 CM (ref 0.6–0.9)
ECHO LV MASS 2D: 234.3 G (ref 67–162)
ECHO LV MASS INDEX 2D: 87.1 G/M2 (ref 43–95)
ECHO LV POSTERIOR WALL DIASTOLIC: 1 CM (ref 0.6–0.9)
ECHO LV RELATIVE WALL THICKNESS RATIO: 0.36
ECHO LVOT PEAK GRADIENT: 5 MMHG
ECHO LVOT PEAK VELOCITY: 1.1 M/S
ECHO MV A VELOCITY: 0.7 M/S
ECHO MV E DECELERATION TIME (DT): 197 MS
ECHO MV E VELOCITY: 1.03 M/S
ECHO MV E/A RATIO: 1.47
ECHO MV E/E' LATERAL: 7.1
ECHO MV E/E' RATIO (AVERAGED): 8.5
ECHO MV E/E' SEPTAL: 9.9
ECHO PV MAX VELOCITY: 0.9 M/S
ECHO PV PEAK GRADIENT: 3 MMHG
ECHO RIGHT VENTRICULAR SYSTOLIC PRESSURE (RVSP): 34 MMHG
ECHO RV INTERNAL DIMENSION: 3 CM
ECHO RV TAPSE: 2.3 CM (ref 1.7–?)
ECHO TV E WAVE: 0.6 M/S
ECHO TV REGURGITANT MAX VELOCITY: 2.78 M/S
ECHO TV REGURGITANT PEAK GRADIENT: 31 MMHG
ERYTHROCYTE [DISTWIDTH] IN BLOOD BY AUTOMATED COUNT: 15.5 % (ref 11.5–14.5)
GFR SERPL CREATININE-BSD FRML MDRD: > 90 ML/MIN/1.73M2
GLUCOSE SERPL-MCNC: 90 MG/DL (ref 74–109)
HCT VFR BLD AUTO: 45 % (ref 37–47)
HGB BLD-MCNC: 13.6 GM/DL (ref 12–16)
MCH RBC QN AUTO: 25.9 PG (ref 26–33)
MCHC RBC AUTO-ENTMCNC: 30.2 GM/DL (ref 32.2–35.5)
MCV RBC AUTO: 85.6 FL (ref 81–99)
PLATELET # BLD AUTO: 215 THOU/MM3 (ref 130–400)
PMV BLD AUTO: 10.9 FL (ref 9.4–12.4)
POTASSIUM SERPL-SCNC: 4.2 MEQ/L (ref 3.5–5.2)
PROT SERPL-MCNC: 6.5 G/DL (ref 6.4–8.3)
RBC # BLD AUTO: 5.26 MILL/MM3 (ref 4.2–5.4)
SODIUM SERPL-SCNC: 140 MEQ/L (ref 135–145)
T3FREE SERPL-MCNC: 2.84 PG/ML (ref 2–4.4)
WBC # BLD AUTO: 7.5 THOU/MM3 (ref 4.8–10.8)

## 2025-08-14 PROCEDURE — 93306 TTE W/DOPPLER COMPLETE: CPT

## 2025-08-14 PROCEDURE — 70553 MRI BRAIN STEM W/O & W/DYE: CPT

## 2025-08-14 PROCEDURE — 36415 COLL VENOUS BLD VENIPUNCTURE: CPT

## 2025-08-14 PROCEDURE — 80053 COMPREHEN METABOLIC PANEL: CPT

## 2025-08-14 PROCEDURE — A9579 GAD-BASE MR CONTRAST NOS,1ML: HCPCS

## 2025-08-14 PROCEDURE — 2500000003 HC RX 250 WO HCPCS: Performed by: INTERNAL MEDICINE

## 2025-08-14 PROCEDURE — APPSS60 APP SPLIT SHARED TIME 46-60 MINUTES: Performed by: PHYSICIAN ASSISTANT

## 2025-08-14 PROCEDURE — 6370000000 HC RX 637 (ALT 250 FOR IP)

## 2025-08-14 PROCEDURE — 6360000004 HC RX CONTRAST MEDICATION

## 2025-08-14 PROCEDURE — 99223 1ST HOSP IP/OBS HIGH 75: CPT | Performed by: INTERNAL MEDICINE

## 2025-08-14 PROCEDURE — 93880 EXTRACRANIAL BILAT STUDY: CPT

## 2025-08-14 PROCEDURE — 2500000003 HC RX 250 WO HCPCS

## 2025-08-14 PROCEDURE — 6370000000 HC RX 637 (ALT 250 FOR IP): Performed by: STUDENT IN AN ORGANIZED HEALTH CARE EDUCATION/TRAINING PROGRAM

## 2025-08-14 PROCEDURE — 1200000003 HC TELEMETRY R&B

## 2025-08-14 PROCEDURE — 85027 COMPLETE CBC AUTOMATED: CPT

## 2025-08-14 PROCEDURE — 99233 SBSQ HOSP IP/OBS HIGH 50: CPT | Performed by: FAMILY MEDICINE

## 2025-08-14 PROCEDURE — 93306 TTE W/DOPPLER COMPLETE: CPT | Performed by: INTERNAL MEDICINE

## 2025-08-14 PROCEDURE — 6360000002 HC RX W HCPCS

## 2025-08-14 RX ORDER — LORAZEPAM 0.5 MG/1
0.5 TABLET ORAL PRN
Status: COMPLETED | OUTPATIENT
Start: 2025-08-14 | End: 2025-08-14

## 2025-08-14 RX ORDER — GADOTERIDOL 279.3 MG/ML
20 INJECTION INTRAVENOUS
Status: COMPLETED | OUTPATIENT
Start: 2025-08-14 | End: 2025-08-14

## 2025-08-14 RX ORDER — DEXAMETHASONE SODIUM PHOSPHATE 4 MG/ML
4 INJECTION, SOLUTION INTRA-ARTICULAR; INTRALESIONAL; INTRAMUSCULAR; INTRAVENOUS; SOFT TISSUE EVERY 6 HOURS
Status: DISCONTINUED | OUTPATIENT
Start: 2025-08-14 | End: 2025-08-15

## 2025-08-14 RX ADMIN — AMIODARONE HYDROCHLORIDE 150 MG: 1.5 INJECTION, SOLUTION INTRAVENOUS at 11:09

## 2025-08-14 RX ADMIN — LORAZEPAM 0.5 MG: 0.5 TABLET ORAL at 16:21

## 2025-08-14 RX ADMIN — AMIODARONE HYDROCHLORIDE 1 MG/MIN: 1.8 INJECTION, SOLUTION INTRAVENOUS at 16:26

## 2025-08-14 RX ADMIN — METOPROLOL TARTRATE 25 MG: 50 TABLET, FILM COATED ORAL at 10:43

## 2025-08-14 RX ADMIN — SODIUM CHLORIDE, PRESERVATIVE FREE 10 ML: 5 INJECTION INTRAVENOUS at 10:40

## 2025-08-14 RX ADMIN — ENOXAPARIN SODIUM 60 MG: 100 INJECTION SUBCUTANEOUS at 10:40

## 2025-08-14 RX ADMIN — AMIODARONE HYDROCHLORIDE 1 MG/MIN: 1.8 INJECTION, SOLUTION INTRAVENOUS at 11:24

## 2025-08-14 RX ADMIN — AMIODARONE HYDROCHLORIDE 0.5 MG/MIN: 1.8 INJECTION, SOLUTION INTRAVENOUS at 16:59

## 2025-08-14 RX ADMIN — GADOTERIDOL 20 ML: 279.3 INJECTION, SOLUTION INTRAVENOUS at 18:33

## 2025-08-14 RX ADMIN — METOPROLOL TARTRATE 25 MG: 50 TABLET, FILM COATED ORAL at 22:00

## 2025-08-14 RX ADMIN — ENOXAPARIN SODIUM 60 MG: 100 INJECTION SUBCUTANEOUS at 22:04

## 2025-08-14 ASSESSMENT — PAIN SCALES - GENERAL
PAINLEVEL_OUTOF10: 0

## 2025-08-15 LAB
ANION GAP SERPL CALC-SCNC: 15 MEQ/L (ref 8–16)
BUN SERPL-MCNC: 12 MG/DL (ref 8–23)
CALCIUM SERPL-MCNC: 9.3 MG/DL (ref 8.5–10.5)
CHLORIDE SERPL-SCNC: 102 MEQ/L (ref 98–111)
CO2 SERPL-SCNC: 20 MEQ/L (ref 22–29)
CREAT SERPL-MCNC: 0.6 MG/DL (ref 0.5–0.9)
DEPRECATED RDW RBC AUTO: 47.2 FL (ref 35–45)
EKG ATRIAL RATE: 107 BPM
EKG P-R INTERVAL: 208 MS
EKG Q-T INTERVAL: 384 MS
EKG QRS DURATION: 120 MS
EKG QTC CALCULATION (BAZETT): 512 MS
EKG R AXIS: 41 DEGREES
EKG T AXIS: 21 DEGREES
EKG VENTRICULAR RATE: 107 BPM
ERYTHROCYTE [DISTWIDTH] IN BLOOD BY AUTOMATED COUNT: 15.7 % (ref 11.5–14.5)
GFR SERPL CREATININE-BSD FRML MDRD: > 90 ML/MIN/1.73M2
GLUCOSE SERPL-MCNC: 123 MG/DL (ref 74–109)
HCT VFR BLD AUTO: 47.3 % (ref 37–47)
HGB BLD-MCNC: 14.8 GM/DL (ref 12–16)
MAGNESIUM SERPL-MCNC: 2.1 MG/DL (ref 1.6–2.6)
MCH RBC QN AUTO: 26.2 PG (ref 26–33)
MCHC RBC AUTO-ENTMCNC: 31.3 GM/DL (ref 32.2–35.5)
MCV RBC AUTO: 83.7 FL (ref 81–99)
PLATELET # BLD AUTO: 246 THOU/MM3 (ref 130–400)
PMV BLD AUTO: 10.9 FL (ref 9.4–12.4)
POTASSIUM SERPL-SCNC: 4.4 MEQ/L (ref 3.5–5.2)
RBC # BLD AUTO: 5.65 MILL/MM3 (ref 4.2–5.4)
SODIUM SERPL-SCNC: 137 MEQ/L (ref 135–145)
TROPONIN, HIGH SENSITIVITY: 24 NG/L (ref 0–12)
WBC # BLD AUTO: 5.6 THOU/MM3 (ref 4.8–10.8)

## 2025-08-15 PROCEDURE — 81001 URINALYSIS AUTO W/SCOPE: CPT

## 2025-08-15 PROCEDURE — 6370000000 HC RX 637 (ALT 250 FOR IP): Performed by: STUDENT IN AN ORGANIZED HEALTH CARE EDUCATION/TRAINING PROGRAM

## 2025-08-15 PROCEDURE — 2500000003 HC RX 250 WO HCPCS

## 2025-08-15 PROCEDURE — 6360000002 HC RX W HCPCS

## 2025-08-15 PROCEDURE — 2500000003 HC RX 250 WO HCPCS: Performed by: INTERNAL MEDICINE

## 2025-08-15 PROCEDURE — 6370000000 HC RX 637 (ALT 250 FOR IP)

## 2025-08-15 PROCEDURE — 36415 COLL VENOUS BLD VENIPUNCTURE: CPT

## 2025-08-15 PROCEDURE — 93010 ELECTROCARDIOGRAM REPORT: CPT | Performed by: NUCLEAR MEDICINE

## 2025-08-15 PROCEDURE — 87086 URINE CULTURE/COLONY COUNT: CPT

## 2025-08-15 PROCEDURE — 80048 BASIC METABOLIC PNL TOTAL CA: CPT

## 2025-08-15 PROCEDURE — 99232 SBSQ HOSP IP/OBS MODERATE 35: CPT | Performed by: STUDENT IN AN ORGANIZED HEALTH CARE EDUCATION/TRAINING PROGRAM

## 2025-08-15 PROCEDURE — 2060000000 HC ICU INTERMEDIATE R&B

## 2025-08-15 PROCEDURE — 85027 COMPLETE CBC AUTOMATED: CPT

## 2025-08-15 PROCEDURE — 93005 ELECTROCARDIOGRAM TRACING: CPT | Performed by: FAMILY MEDICINE

## 2025-08-15 PROCEDURE — 80307 DRUG TEST PRSMV CHEM ANLYZR: CPT

## 2025-08-15 PROCEDURE — 99233 SBSQ HOSP IP/OBS HIGH 50: CPT | Performed by: FAMILY MEDICINE

## 2025-08-15 PROCEDURE — 83735 ASSAY OF MAGNESIUM: CPT

## 2025-08-15 PROCEDURE — 84484 ASSAY OF TROPONIN QUANT: CPT

## 2025-08-15 RX ORDER — METOPROLOL TARTRATE 25 MG/1
25 TABLET, FILM COATED ORAL ONCE
Status: COMPLETED | OUTPATIENT
Start: 2025-08-15 | End: 2025-08-15

## 2025-08-15 RX ORDER — METOPROLOL TARTRATE 50 MG
50 TABLET ORAL 2 TIMES DAILY
Status: DISCONTINUED | OUTPATIENT
Start: 2025-08-15 | End: 2025-08-17 | Stop reason: HOSPADM

## 2025-08-15 RX ADMIN — AMIODARONE HYDROCHLORIDE 0.5 MG/MIN: 1.8 INJECTION, SOLUTION INTRAVENOUS at 13:42

## 2025-08-15 RX ADMIN — METOPROLOL TARTRATE 25 MG: 50 TABLET, FILM COATED ORAL at 13:07

## 2025-08-15 RX ADMIN — DEXAMETHASONE SODIUM PHOSPHATE 4 MG: 4 INJECTION, SOLUTION INTRA-ARTICULAR; INTRALESIONAL; INTRAMUSCULAR; INTRAVENOUS; SOFT TISSUE at 00:21

## 2025-08-15 RX ADMIN — METOPROLOL TARTRATE 50 MG: 50 TABLET, FILM COATED ORAL at 20:52

## 2025-08-15 RX ADMIN — ENOXAPARIN SODIUM 60 MG: 100 INJECTION SUBCUTANEOUS at 09:00

## 2025-08-15 RX ADMIN — DIAPER RASH SKIN PROTECTENT: at 20:50

## 2025-08-15 RX ADMIN — ENOXAPARIN SODIUM 60 MG: 100 INJECTION SUBCUTANEOUS at 20:53

## 2025-08-15 RX ADMIN — DEXAMETHASONE SODIUM PHOSPHATE 4 MG: 4 INJECTION, SOLUTION INTRA-ARTICULAR; INTRALESIONAL; INTRAMUSCULAR; INTRAVENOUS; SOFT TISSUE at 09:58

## 2025-08-15 RX ADMIN — SODIUM CHLORIDE, PRESERVATIVE FREE 10 ML: 5 INJECTION INTRAVENOUS at 20:49

## 2025-08-15 RX ADMIN — DEXAMETHASONE SODIUM PHOSPHATE 4 MG: 4 INJECTION, SOLUTION INTRA-ARTICULAR; INTRALESIONAL; INTRAMUSCULAR; INTRAVENOUS; SOFT TISSUE at 06:07

## 2025-08-15 RX ADMIN — AMIODARONE HYDROCHLORIDE 0.5 MG/MIN: 1.8 INJECTION, SOLUTION INTRAVENOUS at 01:46

## 2025-08-15 RX ADMIN — METOPROLOL TARTRATE 25 MG: 50 TABLET, FILM COATED ORAL at 10:22

## 2025-08-15 RX ADMIN — SODIUM CHLORIDE, PRESERVATIVE FREE 10 ML: 5 INJECTION INTRAVENOUS at 09:59

## 2025-08-15 RX ADMIN — ACETAMINOPHEN 650 MG: 325 TABLET ORAL at 15:02

## 2025-08-15 ASSESSMENT — PAIN SCALES - GENERAL
PAINLEVEL_OUTOF10: 0
PAINLEVEL_OUTOF10: 2
PAINLEVEL_OUTOF10: 3
PAINLEVEL_OUTOF10: 0
PAINLEVEL_OUTOF10: 6

## 2025-08-15 ASSESSMENT — PAIN DESCRIPTION - LOCATION
LOCATION: HEAD
LOCATION: HEAD

## 2025-08-15 ASSESSMENT — PAIN - FUNCTIONAL ASSESSMENT
PAIN_FUNCTIONAL_ASSESSMENT: ACTIVITIES ARE NOT PREVENTED
PAIN_FUNCTIONAL_ASSESSMENT: 0-10
PAIN_FUNCTIONAL_ASSESSMENT: 0-10

## 2025-08-15 ASSESSMENT — PAIN DESCRIPTION - DESCRIPTORS
DESCRIPTORS: ACHING
DESCRIPTORS: ACHING;DISCOMFORT

## 2025-08-16 LAB
AMPHETAMINES UR QL SCN: NEGATIVE
ANION GAP SERPL CALC-SCNC: 13 MEQ/L (ref 8–16)
BACTERIA URNS QL MICRO: ABNORMAL /HPF
BARBITURATES UR QL SCN: NEGATIVE
BENZODIAZ UR QL SCN: NEGATIVE
BILIRUB UR QL STRIP.AUTO: NEGATIVE
BUN SERPL-MCNC: 16 MG/DL (ref 8–23)
BUPRENORPHINE URINE: NEGATIVE
BZE UR QL SCN: NEGATIVE
CALCIUM SERPL-MCNC: 9.4 MG/DL (ref 8.5–10.5)
CANNABINOIDS UR QL SCN: POSITIVE
CASTS #/AREA URNS LPF: ABNORMAL /LPF
CASTS 2: ABNORMAL /LPF
CHARACTER UR: CLEAR
CHLORIDE SERPL-SCNC: 101 MEQ/L (ref 98–111)
CO2 SERPL-SCNC: 21 MEQ/L (ref 22–29)
COLOR, UA: YELLOW
CREAT SERPL-MCNC: 0.6 MG/DL (ref 0.5–0.9)
CRYSTALS URNS MICRO: ABNORMAL
DEPRECATED RDW RBC AUTO: 47.5 FL (ref 35–45)
EPITHELIAL CELLS, UA: ABNORMAL /HPF
ERYTHROCYTE [DISTWIDTH] IN BLOOD BY AUTOMATED COUNT: 15.5 % (ref 11.5–14.5)
FENTANYL: NEGATIVE
GFR SERPL CREATININE-BSD FRML MDRD: > 90 ML/MIN/1.73M2
GLUCOSE SERPL-MCNC: 115 MG/DL (ref 74–109)
GLUCOSE UR QL STRIP.AUTO: NEGATIVE MG/DL
HCT VFR BLD AUTO: 44.1 % (ref 37–47)
HGB BLD-MCNC: 13.6 GM/DL (ref 12–16)
HGB UR QL STRIP.AUTO: ABNORMAL
KETONES UR QL STRIP.AUTO: ABNORMAL
MAGNESIUM SERPL-MCNC: 2.4 MG/DL (ref 1.6–2.6)
MCH RBC QN AUTO: 25.9 PG (ref 26–33)
MCHC RBC AUTO-ENTMCNC: 30.8 GM/DL (ref 32.2–35.5)
MCV RBC AUTO: 84 FL (ref 81–99)
MISCELLANEOUS 2: ABNORMAL
NITRITE UR QL STRIP: NEGATIVE
OPIATES UR QL SCN: NEGATIVE
OXYCODONE: NEGATIVE
PCP UR QL SCN: NEGATIVE
PH UR STRIP.AUTO: 5.5 [PH] (ref 5–9)
PLATELET # BLD AUTO: 296 THOU/MM3 (ref 130–400)
PMV BLD AUTO: 11.1 FL (ref 9.4–12.4)
POTASSIUM SERPL-SCNC: 4.3 MEQ/L (ref 3.5–5.2)
PROT UR STRIP.AUTO-MCNC: NEGATIVE MG/DL
RBC # BLD AUTO: 5.25 MILL/MM3 (ref 4.2–5.4)
RBC URINE: ABNORMAL /HPF
RENAL EPI CELLS #/AREA URNS HPF: ABNORMAL /[HPF]
SODIUM SERPL-SCNC: 135 MEQ/L (ref 135–145)
SP GR UR REFRACT.AUTO: 1.03 (ref 1–1.03)
UROBILINOGEN, URINE: 0.2 EU/DL (ref 0–1)
WBC # BLD AUTO: 10.2 THOU/MM3 (ref 4.8–10.8)
WBC #/AREA URNS HPF: ABNORMAL /HPF
WBC #/AREA URNS HPF: ABNORMAL /[HPF]
YEAST LIKE FUNGI URNS QL MICRO: ABNORMAL

## 2025-08-16 PROCEDURE — 6360000002 HC RX W HCPCS

## 2025-08-16 PROCEDURE — 99232 SBSQ HOSP IP/OBS MODERATE 35: CPT | Performed by: STUDENT IN AN ORGANIZED HEALTH CARE EDUCATION/TRAINING PROGRAM

## 2025-08-16 PROCEDURE — 2060000000 HC ICU INTERMEDIATE R&B

## 2025-08-16 PROCEDURE — 99232 SBSQ HOSP IP/OBS MODERATE 35: CPT | Performed by: FAMILY MEDICINE

## 2025-08-16 PROCEDURE — 36415 COLL VENOUS BLD VENIPUNCTURE: CPT

## 2025-08-16 PROCEDURE — 6370000000 HC RX 637 (ALT 250 FOR IP): Performed by: STUDENT IN AN ORGANIZED HEALTH CARE EDUCATION/TRAINING PROGRAM

## 2025-08-16 PROCEDURE — 2500000003 HC RX 250 WO HCPCS

## 2025-08-16 PROCEDURE — 85027 COMPLETE CBC AUTOMATED: CPT

## 2025-08-16 PROCEDURE — 83735 ASSAY OF MAGNESIUM: CPT

## 2025-08-16 PROCEDURE — 80048 BASIC METABOLIC PNL TOTAL CA: CPT

## 2025-08-16 RX ORDER — AMIODARONE HYDROCHLORIDE 200 MG/1
200 TABLET ORAL DAILY
Status: DISCONTINUED | OUTPATIENT
Start: 2025-08-23 | End: 2025-08-17 | Stop reason: HOSPADM

## 2025-08-16 RX ORDER — AMIODARONE HYDROCHLORIDE 200 MG/1
200 TABLET ORAL 2 TIMES DAILY
Status: DISCONTINUED | OUTPATIENT
Start: 2025-08-16 | End: 2025-08-17 | Stop reason: HOSPADM

## 2025-08-16 RX ADMIN — ENOXAPARIN SODIUM 60 MG: 100 INJECTION SUBCUTANEOUS at 21:27

## 2025-08-16 RX ADMIN — ENOXAPARIN SODIUM 60 MG: 100 INJECTION SUBCUTANEOUS at 09:00

## 2025-08-16 RX ADMIN — AMIODARONE HYDROCHLORIDE 200 MG: 200 TABLET ORAL at 21:26

## 2025-08-16 RX ADMIN — METOPROLOL TARTRATE 50 MG: 50 TABLET, FILM COATED ORAL at 10:34

## 2025-08-16 RX ADMIN — SODIUM CHLORIDE, PRESERVATIVE FREE 10 ML: 5 INJECTION INTRAVENOUS at 10:35

## 2025-08-16 RX ADMIN — METOPROLOL TARTRATE 50 MG: 50 TABLET, FILM COATED ORAL at 21:27

## 2025-08-16 RX ADMIN — AMIODARONE HYDROCHLORIDE 200 MG: 200 TABLET ORAL at 10:51

## 2025-08-16 RX ADMIN — DIAPER RASH SKIN PROTECTENT 1 APPLICATION: at 18:00

## 2025-08-16 RX ADMIN — DIAPER RASH SKIN PROTECTENT: at 10:34

## 2025-08-16 RX ADMIN — DIAPER RASH SKIN PROTECTENT 1 APPLICATION: at 13:00

## 2025-08-16 RX ADMIN — SODIUM CHLORIDE, PRESERVATIVE FREE 10 ML: 5 INJECTION INTRAVENOUS at 20:11

## 2025-08-16 ASSESSMENT — PAIN SCALES - GENERAL
PAINLEVEL_OUTOF10: 0

## 2025-08-17 ENCOUNTER — APPOINTMENT (OUTPATIENT)
Age: 40
DRG: 070 | End: 2025-08-17
Payer: MEDICARE

## 2025-08-17 VITALS
WEIGHT: 293 LBS | SYSTOLIC BLOOD PRESSURE: 129 MMHG | DIASTOLIC BLOOD PRESSURE: 99 MMHG | TEMPERATURE: 97.4 F | HEIGHT: 63 IN | BODY MASS INDEX: 51.91 KG/M2 | HEART RATE: 105 BPM | RESPIRATION RATE: 20 BRPM | OXYGEN SATURATION: 95 %

## 2025-08-17 LAB
ANION GAP SERPL CALC-SCNC: 13 MEQ/L (ref 8–16)
BACTERIA UR CULT: ABNORMAL
BUN SERPL-MCNC: 24 MG/DL (ref 8–23)
CALCIUM SERPL-MCNC: 9 MG/DL (ref 8.5–10.5)
CHLORIDE SERPL-SCNC: 101 MEQ/L (ref 98–111)
CO2 SERPL-SCNC: 23 MEQ/L (ref 22–29)
CREAT SERPL-MCNC: 0.8 MG/DL (ref 0.5–0.9)
DEPRECATED RDW RBC AUTO: 48.4 FL (ref 35–45)
ECHO BSA: 3.07 M2
ERYTHROCYTE [DISTWIDTH] IN BLOOD BY AUTOMATED COUNT: 15.9 % (ref 11.5–14.5)
GFR SERPL CREATININE-BSD FRML MDRD: > 90 ML/MIN/1.73M2
GLUCOSE SERPL-MCNC: 86 MG/DL (ref 74–109)
HCT VFR BLD AUTO: 43.8 % (ref 37–47)
HGB BLD-MCNC: 13.8 GM/DL (ref 12–16)
MAGNESIUM SERPL-MCNC: 2.4 MG/DL (ref 1.6–2.6)
MCH RBC QN AUTO: 26.4 PG (ref 26–33)
MCHC RBC AUTO-ENTMCNC: 31.5 GM/DL (ref 32.2–35.5)
MCV RBC AUTO: 83.9 FL (ref 81–99)
ORGANISM: ABNORMAL
PLATELET # BLD AUTO: 264 THOU/MM3 (ref 130–400)
PMV BLD AUTO: 11.2 FL (ref 9.4–12.4)
POTASSIUM SERPL-SCNC: 4.1 MEQ/L (ref 3.5–5.2)
RBC # BLD AUTO: 5.22 MILL/MM3 (ref 4.2–5.4)
SODIUM SERPL-SCNC: 137 MEQ/L (ref 135–145)
WBC # BLD AUTO: 9.7 THOU/MM3 (ref 4.8–10.8)

## 2025-08-17 PROCEDURE — 6370000000 HC RX 637 (ALT 250 FOR IP): Performed by: STUDENT IN AN ORGANIZED HEALTH CARE EDUCATION/TRAINING PROGRAM

## 2025-08-17 PROCEDURE — 93270 REMOTE 30 DAY ECG REV/REPORT: CPT

## 2025-08-17 PROCEDURE — 2500000003 HC RX 250 WO HCPCS

## 2025-08-17 PROCEDURE — 99239 HOSP IP/OBS DSCHRG MGMT >30: CPT | Performed by: FAMILY MEDICINE

## 2025-08-17 PROCEDURE — 6360000002 HC RX W HCPCS

## 2025-08-17 PROCEDURE — 85027 COMPLETE CBC AUTOMATED: CPT

## 2025-08-17 PROCEDURE — 83735 ASSAY OF MAGNESIUM: CPT

## 2025-08-17 PROCEDURE — 36415 COLL VENOUS BLD VENIPUNCTURE: CPT

## 2025-08-17 PROCEDURE — 80048 BASIC METABOLIC PNL TOTAL CA: CPT

## 2025-08-17 RX ORDER — METOPROLOL TARTRATE 50 MG
50 TABLET ORAL 2 TIMES DAILY
Qty: 60 TABLET | Refills: 3 | Status: SHIPPED | OUTPATIENT
Start: 2025-08-17

## 2025-08-17 RX ORDER — AMIODARONE HYDROCHLORIDE 200 MG/1
200 TABLET ORAL DAILY
Qty: 30 TABLET | Refills: 1 | Status: SHIPPED | OUTPATIENT
Start: 2025-08-23 | End: 2025-08-22

## 2025-08-17 RX ORDER — AMIODARONE HYDROCHLORIDE 200 MG/1
200 TABLET ORAL 2 TIMES DAILY
Qty: 11 TABLET | Refills: 0 | Status: SHIPPED | OUTPATIENT
Start: 2025-08-17 | End: 2025-08-23

## 2025-08-17 RX ADMIN — DIAPER RASH SKIN PROTECTENT 1 APPLICATION: at 09:31

## 2025-08-17 RX ADMIN — METOPROLOL TARTRATE 50 MG: 50 TABLET, FILM COATED ORAL at 09:30

## 2025-08-17 RX ADMIN — AMIODARONE HYDROCHLORIDE 200 MG: 200 TABLET ORAL at 09:30

## 2025-08-17 RX ADMIN — SODIUM CHLORIDE, PRESERVATIVE FREE 10 ML: 5 INJECTION INTRAVENOUS at 09:32

## 2025-08-17 RX ADMIN — DIAPER RASH SKIN PROTECTENT 1 APPLICATION: at 14:21

## 2025-08-17 RX ADMIN — ENOXAPARIN SODIUM 60 MG: 100 INJECTION SUBCUTANEOUS at 09:30

## 2025-08-17 ASSESSMENT — PAIN SCALES - GENERAL
PAINLEVEL_OUTOF10: 0

## 2025-08-18 ENCOUNTER — TELEPHONE (OUTPATIENT)
Dept: CARDIOLOGY CLINIC | Age: 40
End: 2025-08-18

## 2025-08-18 ENCOUNTER — TELEPHONE (OUTPATIENT)
Age: 40
End: 2025-08-18

## 2025-08-21 PROBLEM — I89.0 CHRONIC ACQUIRED LYMPHEDEMA: Status: ACTIVE | Noted: 2025-08-21

## 2025-08-21 PROBLEM — R73.03 PREDIABETES: Status: ACTIVE | Noted: 2025-08-21

## 2025-08-21 PROBLEM — I25.10 CORONARY ARTERY DISEASE INVOLVING NATIVE CORONARY ARTERY OF NATIVE HEART WITHOUT ANGINA PECTORIS: Status: ACTIVE | Noted: 2025-08-21

## 2025-08-21 PROBLEM — E87.29 HIGH ANION GAP METABOLIC ACIDOSIS: Status: ACTIVE | Noted: 2025-08-21

## 2025-08-22 ENCOUNTER — OFFICE VISIT (OUTPATIENT)
Dept: CARDIOLOGY CLINIC | Age: 40
End: 2025-08-22
Payer: MEDICARE

## 2025-08-22 VITALS
HEART RATE: 60 BPM | HEIGHT: 63 IN | SYSTOLIC BLOOD PRESSURE: 134 MMHG | BODY MASS INDEX: 51.91 KG/M2 | WEIGHT: 293 LBS | DIASTOLIC BLOOD PRESSURE: 80 MMHG

## 2025-08-22 DIAGNOSIS — R94.31 ABNORMAL HOLTER EXAM: ICD-10-CM

## 2025-08-22 DIAGNOSIS — R06.02 SOB (SHORTNESS OF BREATH) ON EXERTION: ICD-10-CM

## 2025-08-22 DIAGNOSIS — R42 DIZZINESS ON STANDING: ICD-10-CM

## 2025-08-22 DIAGNOSIS — Z98.890 S/P CARDIAC CATH: ICD-10-CM

## 2025-08-22 DIAGNOSIS — E66.01 MORBID OBESITY (HCC): ICD-10-CM

## 2025-08-22 DIAGNOSIS — I50.32 CHRONIC DIASTOLIC CONGESTIVE HEART FAILURE (HCC): ICD-10-CM

## 2025-08-22 DIAGNOSIS — I47.19 ATRIAL TACHYCARDIA: ICD-10-CM

## 2025-08-22 DIAGNOSIS — I89.0 CHRONIC ACQUIRED LYMPHEDEMA: ICD-10-CM

## 2025-08-22 DIAGNOSIS — I47.10 PSVT (PAROXYSMAL SUPRAVENTRICULAR TACHYCARDIA): Primary | ICD-10-CM

## 2025-08-22 DIAGNOSIS — R00.2 INTERMITTENT PALPITATIONS: ICD-10-CM

## 2025-08-22 PROBLEM — I50.33 ACUTE ON CHRONIC HEART FAILURE WITH PRESERVED EJECTION FRACTION (HFPEF) (HCC): Status: RESOLVED | Noted: 2025-01-02 | Resolved: 2025-08-22

## 2025-08-22 PROBLEM — I25.10 CORONARY ARTERY DISEASE INVOLVING NATIVE CORONARY ARTERY OF NATIVE HEART WITHOUT ANGINA PECTORIS: Status: RESOLVED | Noted: 2025-08-21 | Resolved: 2025-08-22

## 2025-08-22 PROCEDURE — G8417 CALC BMI ABV UP PARAM F/U: HCPCS | Performed by: INTERNAL MEDICINE

## 2025-08-22 PROCEDURE — 1111F DSCHRG MED/CURRENT MED MERGE: CPT | Performed by: INTERNAL MEDICINE

## 2025-08-22 PROCEDURE — 3079F DIAST BP 80-89 MM HG: CPT | Performed by: INTERNAL MEDICINE

## 2025-08-22 PROCEDURE — 1036F TOBACCO NON-USER: CPT | Performed by: INTERNAL MEDICINE

## 2025-08-22 PROCEDURE — 3075F SYST BP GE 130 - 139MM HG: CPT | Performed by: INTERNAL MEDICINE

## 2025-08-22 PROCEDURE — G8427 DOCREV CUR MEDS BY ELIG CLIN: HCPCS | Performed by: INTERNAL MEDICINE

## 2025-08-22 PROCEDURE — 99215 OFFICE O/P EST HI 40 MIN: CPT | Performed by: INTERNAL MEDICINE

## 2025-08-24 PROBLEM — G93.9 BRAIN LESION: Status: ACTIVE | Noted: 2025-08-24

## 2025-08-25 PROBLEM — I48.91 A-FIB (HCC): Status: ACTIVE | Noted: 2025-08-22

## 2025-09-02 ENCOUNTER — TELEMEDICINE (OUTPATIENT)
Age: 40
End: 2025-09-02
Payer: MEDICARE

## 2025-09-02 DIAGNOSIS — G47.33 SEVERE OBSTRUCTIVE SLEEP APNEA: Primary | ICD-10-CM

## 2025-09-02 DIAGNOSIS — E66.01 MORBID OBESITY WITH BODY MASS INDEX OF 70 AND OVER IN ADULT (HCC): ICD-10-CM

## 2025-09-02 DIAGNOSIS — G93.9 BRAIN LESION: ICD-10-CM

## 2025-09-02 DIAGNOSIS — I47.10 PSVT (PAROXYSMAL SUPRAVENTRICULAR TACHYCARDIA): ICD-10-CM

## 2025-09-02 PROCEDURE — G8427 DOCREV CUR MEDS BY ELIG CLIN: HCPCS | Performed by: NURSE PRACTITIONER

## 2025-09-02 PROCEDURE — 1036F TOBACCO NON-USER: CPT | Performed by: NURSE PRACTITIONER

## 2025-09-02 PROCEDURE — 99213 OFFICE O/P EST LOW 20 MIN: CPT | Performed by: NURSE PRACTITIONER

## 2025-09-02 PROCEDURE — 1111F DSCHRG MED/CURRENT MED MERGE: CPT | Performed by: NURSE PRACTITIONER

## 2025-09-02 PROCEDURE — G8417 CALC BMI ABV UP PARAM F/U: HCPCS | Performed by: NURSE PRACTITIONER

## 2025-09-06 RX ORDER — AMIODARONE HYDROCHLORIDE 200 MG/1
200 TABLET ORAL 2 TIMES DAILY
Qty: 180 TABLET | Refills: 1 | Status: SHIPPED | OUTPATIENT
Start: 2025-09-06

## (undated) DEVICE — C-ARM: Brand: UNBRANDED

## (undated) DEVICE — SUTURE VCRL SZ 3-0 L27IN ABSRB UD FS-2 L19MM 1/2 CIR J423H

## (undated) DEVICE — KIT HEMTLGY CONC SYS CBMA MAR0 MAX QUICKDRAW W/ LIGHTNING

## (undated) DEVICE — SUTURE FIBERWIRE SZ 2 W/ TAPERED NEEDLE BLUE L38IN NONABSORB BLU L26.5MM 1/2 CIRCLE AR7200

## (undated) DEVICE — INTEGUSEAL MICROBIAL SEALANT: Brand: AVANOS

## (undated) DEVICE — COVER ARMBRD W13XL28.5IN IMPERV BLU FOR OP RM

## (undated) DEVICE — SPLINT CAST W5XL30IN RL FRM WRINKLE FREE INTLOK PERFRMANCE

## (undated) DEVICE — 3M™ WARMING BLANKET, UPPER BODY, 10 PER CASE, 42268: Brand: BAIR HUGGER™

## (undated) DEVICE — PACK PROCEDURE SURG SET UP SRMC

## (undated) DEVICE — DRAPE C ARM W36XL30IN RECTANG BND BG AND TAPE

## (undated) DEVICE — STIMULATOR BONE GROWTH PHYSIO-STIM

## (undated) DEVICE — PACK PROCEDURE SURG ORTH BASIC SRHP LF

## (undated) DEVICE — PRECISION (9.0 X 0.51 X 31.0MM)

## (undated) DEVICE — BASIC SINGLE BASIN BTC-LF: Brand: MEDLINE INDUSTRIES, INC.

## (undated) DEVICE — GLOVE ORANGE PI 8 1/2   MSG9085

## (undated) DEVICE — CHLORAPREP 26ML ORANGE

## (undated) DEVICE — INTENDED FOR TISSUE SEPARATION, AND OTHER PROCEDURES THAT REQUIRE A SHARP SURGICAL BLADE TO PUNCTURE OR CUT.: Brand: BARD-PARKER ® CARBON RIB-BACK BLADES

## (undated) DEVICE — PADDING CAST W6INXL4YD COT LO LINTING WYTEX

## (undated) DEVICE — SPONGE LAP W18XL18IN WHT COT 4 PLY FLD STRUNG RADPQ DISP ST

## (undated) DEVICE — HYPODERMIC SAFETY NEEDLE: Brand: MAGELLAN

## (undated) DEVICE — COAGULATOR ELECSURG BLADE 10 FTX1 IN PTFE STRL ULTRACLEAN

## (undated) DEVICE — DRESSING,GAUZE,XEROFORM,CURAD,5"X9",ST: Brand: CURAD

## (undated) DEVICE — C-ARMOR C-ARM EQUIPMENT COVERS CLEAR STERILE UNIVERSAL FIT 12 PER CASE: Brand: C-ARMOR

## (undated) DEVICE — GOWN,SIRUS,NONRNF,SETINSLV,XL,20/CS: Brand: MEDLINE

## (undated) DEVICE — SUTURE MCRYL SZ 4-0 L27IN ABSRB UD L19MM PS-2 1/2 CIR PRIM Y426H

## (undated) DEVICE — DRILL BIT

## (undated) DEVICE — PADDING UNDERCAST W4INXL12FT RAYON POLY SYN NONADHESIVE

## (undated) DEVICE — GLOVE ORANGE PI 8   MSG9080

## (undated) DEVICE — PADDING CAST W6INXL4YD ST COT COHESIVE HND TEARABLE SPEC

## (undated) DEVICE — KIT DRL BIT DIA265MM CORRESPONDING INCL DRL GUID HNDL LOC

## (undated) DEVICE — SOLUTION IV IRRIG POUR BRL 0.9% SODIUM CHL 2F7124

## (undated) DEVICE — ROYAL SILK SURGICAL GOWN, XXL: Brand: CONVERTORS

## (undated) DEVICE — ABDOMINAL PAD: Brand: DERMACEA

## (undated) DEVICE — MATTRESS MAXI AIR TRNSF SGL PT USE DCS 37 45 48 52 75

## (undated) DEVICE — SPLINT ORTH W4XL30IN WHT FBRGLS 1 SIDE FELT PD CNFRM LO

## (undated) DEVICE — GAUZE,SPONGE,8"X4",12PLY,XRAY,STRL,LF: Brand: MEDLINE

## (undated) DEVICE — BASIC SINGLE BASIN 1-LF: Brand: MEDLINE INDUSTRIES, INC.

## (undated) DEVICE — TUBING, SUCTION, 1/4" X 20', STRAIGHT: Brand: MEDLINE INDUSTRIES, INC.

## (undated) DEVICE — HEADED DRILL BIT: Brand: DARCO

## (undated) DEVICE — GLOVE ORANGE PI 7 1/2   MSG9075

## (undated) DEVICE — BANDAGE COMPR E SGL LAYERED CLSR BGE W/ CLP W4INXL15FT

## (undated) DEVICE — CONVERTED USE 338908 SPONGES LAP 18X18 ST

## (undated) DEVICE — GOWN,SIRUS,NON REINFRCD,LARGE,SET IN SL: Brand: MEDLINE

## (undated) DEVICE — GLOVE ORANGE PI 7   MSG9070

## (undated) DEVICE — SYRINGE IRRIG 60ML SFT PLIABLE BLB EZ TO GRP 1 HND USE W/

## (undated) DEVICE — DRESSING ALG W3XL4IN TRNSPAR ANTIMIC WND CNTCT LAYR W/

## (undated) DEVICE — 4-PORT MANIFOLD: Brand: NEPTUNE 2

## (undated) DEVICE — SYRINGE BULB 50/CS 48/PLT: Brand: MEDEGEN MEDICAL PRODUCTS, LLC

## (undated) DEVICE — BANDAGE COMPR SGL LAYERED CLP CLSR E 33FT LEN 4IN W TETRA

## (undated) DEVICE — DISCONTINUED USE 397795 BANDAGE ELAST TETRA FLEX LF 6INX5.5YD NS

## (undated) DEVICE — TAPE,CLOTH/SILK,CURAD,3"X10YD,LF,40/CS: Brand: CURAD

## (undated) DEVICE — DRAPE,U/SHT,SPLIT,FILM,60X84,STERILE: Brand: MEDLINE

## (undated) DEVICE — BANDAGE COMPR M W6INXL10YD WHT BGE VELC E MTRX HK AND LOOP

## (undated) DEVICE — HEADED COUNTERSINK: Brand: DARCO

## (undated) DEVICE — DRAPE,EXTREMITY,89X128,STERILE: Brand: MEDLINE

## (undated) DEVICE — BANDAGE COMPR W4INXL12FT E DISP ESMARCH EVEN

## (undated) DEVICE — SKIN AFFIX SURG ADHESIVE 72/CS 0.55ML: Brand: MEDLINE

## (undated) DEVICE — TAPERED DRILL BIT: Brand: SALVATION

## (undated) DEVICE — KIT DRL TMPLT 15MM 18MM 20MM 25MM 30MM CORRESPONDING